# Patient Record
Sex: MALE | Race: WHITE | NOT HISPANIC OR LATINO | Employment: FULL TIME | URBAN - METROPOLITAN AREA
[De-identification: names, ages, dates, MRNs, and addresses within clinical notes are randomized per-mention and may not be internally consistent; named-entity substitution may affect disease eponyms.]

---

## 2017-02-28 ENCOUNTER — APPOINTMENT (OUTPATIENT)
Dept: LAB | Age: 62
End: 2017-02-28
Payer: COMMERCIAL

## 2017-02-28 ENCOUNTER — LAB CONVERSION - ENCOUNTER (OUTPATIENT)
Dept: OTHER | Facility: OTHER | Age: 62
End: 2017-02-28

## 2017-02-28 ENCOUNTER — TRANSCRIBE ORDERS (OUTPATIENT)
Dept: ADMINISTRATIVE | Age: 62
End: 2017-02-28

## 2017-02-28 DIAGNOSIS — IMO0001 UNCONTROLLED DIABETES MELLITUS TYPE 2 WITHOUT COMPLICATIONS, UNSPECIFIED LONG TERM INSULIN USE STATUS: ICD-10-CM

## 2017-02-28 DIAGNOSIS — IMO0001 UNCONTROLLED DIABETES MELLITUS TYPE 2 WITHOUT COMPLICATIONS, UNSPECIFIED LONG TERM INSULIN USE STATUS: Primary | ICD-10-CM

## 2017-02-28 LAB
EST. AVERAGE GLUCOSE BLD GHB EST-MCNC: 192 MG/DL
HBA1C MFR BLD: 8.3 % (ref 4.2–6.3)

## 2017-02-28 PROCEDURE — 83036 HEMOGLOBIN GLYCOSYLATED A1C: CPT

## 2017-02-28 PROCEDURE — 36415 COLL VENOUS BLD VENIPUNCTURE: CPT

## 2017-06-16 ENCOUNTER — TRANSCRIBE ORDERS (OUTPATIENT)
Dept: ADMINISTRATIVE | Facility: HOSPITAL | Age: 62
End: 2017-06-16

## 2017-06-16 ENCOUNTER — APPOINTMENT (OUTPATIENT)
Dept: LAB | Facility: MEDICAL CENTER | Age: 62
End: 2017-06-16
Payer: COMMERCIAL

## 2017-06-16 DIAGNOSIS — E78.5 OTHER AND UNSPECIFIED HYPERLIPIDEMIA: Primary | ICD-10-CM

## 2017-06-16 DIAGNOSIS — IMO0002 UNCONTROLLED TYPE 2 DIABETES MELLITUS WITH COMPLICATION, WITH LONG-TERM CURRENT USE OF INSULIN: ICD-10-CM

## 2017-06-16 DIAGNOSIS — I10 ESSENTIAL HYPERTENSION, BENIGN: ICD-10-CM

## 2017-06-16 LAB
ALBUMIN SERPL BCP-MCNC: 3.8 G/DL (ref 3.5–5)
ALP SERPL-CCNC: 63 U/L (ref 46–116)
ALT SERPL W P-5'-P-CCNC: 38 U/L (ref 12–78)
ANION GAP SERPL CALCULATED.3IONS-SCNC: 10 MMOL/L (ref 4–13)
AST SERPL W P-5'-P-CCNC: 17 U/L (ref 5–45)
BILIRUB SERPL-MCNC: 1.42 MG/DL (ref 0.2–1)
BUN SERPL-MCNC: 16 MG/DL (ref 5–25)
CALCIUM SERPL-MCNC: 9.1 MG/DL (ref 8.3–10.1)
CHLORIDE SERPL-SCNC: 108 MMOL/L (ref 100–108)
CHOLEST SERPL-MCNC: 115 MG/DL (ref 50–200)
CO2 SERPL-SCNC: 25 MMOL/L (ref 21–32)
CREAT SERPL-MCNC: 0.91 MG/DL (ref 0.6–1.3)
CREAT UR-MCNC: 442 MG/DL
EST. AVERAGE GLUCOSE BLD GHB EST-MCNC: 194 MG/DL
GFR SERPL CREATININE-BSD FRML MDRD: >60 ML/MIN/1.73SQ M
GLUCOSE P FAST SERPL-MCNC: 125 MG/DL (ref 65–99)
HBA1C MFR BLD: 8.4 % (ref 4.2–6.3)
HDLC SERPL-MCNC: 47 MG/DL (ref 40–60)
LDLC SERPL CALC-MCNC: 49 MG/DL (ref 0–100)
MICROALBUMIN UR-MCNC: 93.2 MG/L (ref 0–20)
MICROALBUMIN/CREAT 24H UR: 21 MG/G CREATININE (ref 0–30)
POTASSIUM SERPL-SCNC: 4.1 MMOL/L (ref 3.5–5.3)
PROT SERPL-MCNC: 6.6 G/DL (ref 6.4–8.2)
SODIUM SERPL-SCNC: 143 MMOL/L (ref 136–145)
TRIGL SERPL-MCNC: 96 MG/DL

## 2017-06-16 PROCEDURE — 83036 HEMOGLOBIN GLYCOSYLATED A1C: CPT | Performed by: INTERNAL MEDICINE

## 2017-06-16 PROCEDURE — 82570 ASSAY OF URINE CREATININE: CPT | Performed by: INTERNAL MEDICINE

## 2017-06-16 PROCEDURE — 36415 COLL VENOUS BLD VENIPUNCTURE: CPT | Performed by: INTERNAL MEDICINE

## 2017-06-16 PROCEDURE — 80061 LIPID PANEL: CPT | Performed by: INTERNAL MEDICINE

## 2017-06-16 PROCEDURE — 82043 UR ALBUMIN QUANTITATIVE: CPT | Performed by: INTERNAL MEDICINE

## 2017-06-16 PROCEDURE — 80053 COMPREHEN METABOLIC PANEL: CPT | Performed by: INTERNAL MEDICINE

## 2017-06-27 ENCOUNTER — GENERIC CONVERSION - ENCOUNTER (OUTPATIENT)
Dept: OTHER | Facility: OTHER | Age: 62
End: 2017-06-27

## 2017-07-14 ENCOUNTER — GENERIC CONVERSION - ENCOUNTER (OUTPATIENT)
Dept: OTHER | Facility: OTHER | Age: 62
End: 2017-07-14

## 2017-07-18 ENCOUNTER — ALLSCRIPTS OFFICE VISIT (OUTPATIENT)
Dept: OTHER | Facility: OTHER | Age: 62
End: 2017-07-18

## 2017-07-18 DIAGNOSIS — Z12.11 ENCOUNTER FOR SCREENING FOR MALIGNANT NEOPLASM OF COLON: ICD-10-CM

## 2017-07-18 DIAGNOSIS — Z82.49 FAMILY HISTORY OF ISCHEMIC HEART DISEASE AND OTHER DISEASES OF THE CIRCULATORY SYSTEM: ICD-10-CM

## 2017-07-25 ENCOUNTER — HOSPITAL ENCOUNTER (OUTPATIENT)
Dept: RADIOLOGY | Facility: MEDICAL CENTER | Age: 62
Discharge: HOME | End: 2017-07-25
Payer: COMMERCIAL

## 2017-07-25 ENCOUNTER — APPOINTMENT (OUTPATIENT)
Dept: LAB | Facility: MEDICAL CENTER | Age: 62
End: 2017-07-25
Payer: COMMERCIAL

## 2017-07-25 DIAGNOSIS — Z82.49 FAMILY HISTORY OF ISCHEMIC HEART DISEASE AND OTHER DISEASES OF THE CIRCULATORY SYSTEM: ICD-10-CM

## 2017-07-25 DIAGNOSIS — Z12.11 ENCOUNTER FOR SCREENING FOR MALIGNANT NEOPLASM OF COLON: ICD-10-CM

## 2017-07-25 LAB — HEMOCCULT STL QL IA: POSITIVE

## 2017-07-25 PROCEDURE — 76706 US ABDL AORTA SCREEN AAA: CPT

## 2017-07-25 PROCEDURE — G0328 FECAL BLOOD SCRN IMMUNOASSAY: HCPCS

## 2017-08-03 ENCOUNTER — GENERIC CONVERSION - ENCOUNTER (OUTPATIENT)
Dept: OTHER | Facility: OTHER | Age: 62
End: 2017-08-03

## 2017-08-04 ENCOUNTER — GENERIC CONVERSION - ENCOUNTER (OUTPATIENT)
Dept: OTHER | Facility: OTHER | Age: 62
End: 2017-08-04

## 2017-08-07 ENCOUNTER — ANESTHESIA EVENT (OUTPATIENT)
Dept: GASTROENTEROLOGY | Facility: HOSPITAL | Age: 62
End: 2017-08-07
Payer: COMMERCIAL

## 2017-08-08 ENCOUNTER — ANESTHESIA (OUTPATIENT)
Dept: GASTROENTEROLOGY | Facility: HOSPITAL | Age: 62
End: 2017-08-08
Payer: COMMERCIAL

## 2017-08-08 ENCOUNTER — HOSPITAL ENCOUNTER (OUTPATIENT)
Facility: HOSPITAL | Age: 62
Setting detail: OUTPATIENT SURGERY
Discharge: HOME | End: 2017-08-08
Attending: COLON & RECTAL SURGERY | Admitting: COLON & RECTAL SURGERY
Payer: COMMERCIAL

## 2017-08-08 VITALS
DIASTOLIC BLOOD PRESSURE: 51 MMHG | SYSTOLIC BLOOD PRESSURE: 104 MMHG | WEIGHT: 210 LBS | TEMPERATURE: 97.4 F | HEART RATE: 55 BPM | HEIGHT: 73 IN | BODY MASS INDEX: 27.83 KG/M2 | OXYGEN SATURATION: 100 % | RESPIRATION RATE: 17 BRPM

## 2017-08-08 DIAGNOSIS — Z80.0 FAMILY HISTORY OF MALIGNANT NEOPLASM OF DIGESTIVE ORGAN: ICD-10-CM

## 2017-08-08 PROCEDURE — 88305 TISSUE EXAM BY PATHOLOGIST: CPT | Performed by: COLON & RECTAL SURGERY

## 2017-08-08 RX ORDER — SODIUM CHLORIDE 9 MG/ML
INJECTION, SOLUTION INTRAVENOUS CONTINUOUS PRN
Status: DISCONTINUED | OUTPATIENT
Start: 2017-08-08 | End: 2017-08-08 | Stop reason: SURG

## 2017-08-08 RX ORDER — MULTIVIT-MIN/IRON FUM/FOLIC AC 7.5 MG-4
1 TABLET ORAL DAILY
COMMUNITY

## 2017-08-08 RX ORDER — GLIMEPIRIDE 4 MG/1
4 TABLET ORAL
COMMUNITY
End: 2021-02-26

## 2017-08-08 RX ORDER — ATORVASTATIN CALCIUM 10 MG/1
10 TABLET, FILM COATED ORAL DAILY
COMMUNITY
End: 2021-07-16

## 2017-08-08 RX ORDER — FLUTICASONE PROPIONATE 50 MCG
1 SPRAY, SUSPENSION (ML) NASAL DAILY
COMMUNITY

## 2017-08-08 RX ORDER — MAGNESIUM OXIDE/MAG AA CHELATE 133 MG
1 TABLET ORAL 2 TIMES DAILY
COMMUNITY

## 2017-08-08 RX ORDER — PROPOFOL 10 MG/ML
INJECTION, EMULSION INTRAVENOUS AS NEEDED
Status: DISCONTINUED | OUTPATIENT
Start: 2017-08-08 | End: 2017-08-08 | Stop reason: SURG

## 2017-08-08 RX ORDER — LISINOPRIL 5 MG/1
5 TABLET ORAL DAILY
COMMUNITY
End: 2020-02-07

## 2017-08-08 RX ORDER — INSULIN GLARGINE 100 [IU]/ML
32 INJECTION, SOLUTION SUBCUTANEOUS
COMMUNITY
End: 2020-02-07 | Stop reason: ALTCHOICE

## 2017-08-08 RX ORDER — ASPIRIN 81 MG/1
81 TABLET ORAL DAILY
COMMUNITY

## 2017-08-08 RX ORDER — METFORMIN HYDROCHLORIDE 1000 MG/1
1000 TABLET, FILM COATED, EXTENDED RELEASE ORAL 2 TIMES DAILY WITH MEALS
COMMUNITY
End: 2020-02-07

## 2017-08-08 RX ADMIN — PROPOFOL 100 MG: 10 INJECTION, EMULSION INTRAVENOUS at 11:45

## 2017-08-08 RX ADMIN — SODIUM CHLORIDE: 0.9 INJECTION, SOLUTION INTRAVENOUS at 11:28

## 2017-08-08 RX ADMIN — PROPOFOL 100 MG: 10 INJECTION, EMULSION INTRAVENOUS at 11:40

## 2017-08-08 RX ADMIN — PROPOFOL 100 MG: 10 INJECTION, EMULSION INTRAVENOUS at 11:35

## 2017-08-08 RX ADMIN — PROPOFOL 100 MG: 10 INJECTION, EMULSION INTRAVENOUS at 11:50

## 2017-08-08 RX ADMIN — PROPOFOL 100 MG: 10 INJECTION, EMULSION INTRAVENOUS at 11:30

## 2017-08-29 ENCOUNTER — GENERIC CONVERSION - ENCOUNTER (OUTPATIENT)
Dept: OTHER | Facility: OTHER | Age: 62
End: 2017-08-29

## 2017-10-02 ENCOUNTER — GENERIC CONVERSION - ENCOUNTER (OUTPATIENT)
Dept: OTHER | Facility: OTHER | Age: 62
End: 2017-10-02

## 2017-12-05 ENCOUNTER — TRANSCRIBE ORDERS (OUTPATIENT)
Dept: ADMINISTRATIVE | Facility: HOSPITAL | Age: 62
End: 2017-12-05

## 2017-12-05 DIAGNOSIS — S97.82XA CRUSHING INJURY OF LEFT FOOT, INITIAL ENCOUNTER: ICD-10-CM

## 2017-12-05 DIAGNOSIS — M79.672 LEFT FOOT PAIN: Primary | ICD-10-CM

## 2017-12-06 ENCOUNTER — GENERIC CONVERSION - ENCOUNTER (OUTPATIENT)
Dept: OTHER | Facility: OTHER | Age: 62
End: 2017-12-06

## 2017-12-06 ENCOUNTER — HOSPITAL ENCOUNTER (OUTPATIENT)
Dept: MRI IMAGING | Facility: CLINIC | Age: 62
Discharge: HOME | End: 2017-12-06
Payer: COMMERCIAL

## 2017-12-06 DIAGNOSIS — M79.672 LEFT FOOT PAIN: ICD-10-CM

## 2017-12-06 DIAGNOSIS — S97.82XA CRUSHING INJURY OF LEFT FOOT, INITIAL ENCOUNTER: ICD-10-CM

## 2017-12-06 PROCEDURE — 73718 MRI LOWER EXTREMITY W/O DYE: CPT

## 2017-12-06 PROCEDURE — 73721 MRI JNT OF LWR EXTRE W/O DYE: CPT

## 2017-12-07 ENCOUNTER — ALLSCRIPTS OFFICE VISIT (OUTPATIENT)
Dept: OTHER | Facility: OTHER | Age: 62
End: 2017-12-07

## 2017-12-07 ENCOUNTER — ANESTHESIA EVENT (OUTPATIENT)
Dept: PERIOP | Facility: HOSPITAL | Age: 62
End: 2017-12-07
Payer: COMMERCIAL

## 2017-12-08 ENCOUNTER — HOSPITAL ENCOUNTER (OUTPATIENT)
Facility: HOSPITAL | Age: 62
Setting detail: OUTPATIENT SURGERY
Discharge: HOME | End: 2017-12-08
Attending: PODIATRIST | Admitting: PODIATRIST
Payer: COMMERCIAL

## 2017-12-08 ENCOUNTER — GENERIC CONVERSION - ENCOUNTER (OUTPATIENT)
Dept: OTHER | Facility: OTHER | Age: 62
End: 2017-12-08

## 2017-12-08 ENCOUNTER — GENERIC CONVERSION - ENCOUNTER (OUTPATIENT)
Dept: FAMILY MEDICINE CLINIC | Facility: CLINIC | Age: 62
End: 2017-12-08

## 2017-12-08 ENCOUNTER — ANESTHESIA (OUTPATIENT)
Dept: PERIOP | Facility: HOSPITAL | Age: 62
End: 2017-12-08
Payer: COMMERCIAL

## 2017-12-08 ENCOUNTER — APPOINTMENT (OUTPATIENT)
Dept: RADIOLOGY | Facility: HOSPITAL | Age: 62
End: 2017-12-08
Payer: COMMERCIAL

## 2017-12-08 VITALS
RESPIRATION RATE: 18 BRPM | BODY MASS INDEX: 27.83 KG/M2 | SYSTOLIC BLOOD PRESSURE: 138 MMHG | WEIGHT: 210 LBS | OXYGEN SATURATION: 95 % | HEART RATE: 70 BPM | DIASTOLIC BLOOD PRESSURE: 63 MMHG | TEMPERATURE: 97.6 F | HEIGHT: 73 IN

## 2017-12-08 DIAGNOSIS — Z98.890 S/P PERONEAL TENDON REPAIR: Primary | ICD-10-CM

## 2017-12-08 LAB
ATRIAL RATE: 63 BPM
GLUCOSE SERPL-MCNC: 121 MG/DL (ref 65–140)
GLUCOSE SERPL-MCNC: 139 MG/DL (ref 65–140)
P AXIS: 8 DEGREES
PR INTERVAL: 156 MS
QRS AXIS: -25 DEGREES
QRSD INTERVAL: 106 MS
QT INTERVAL: 398 MS
QTC INTERVAL: 401 MS
T WAVE AXIS: 55 DEGREES
VENTRICULAR RATE: 61 BPM

## 2017-12-08 PROCEDURE — 93005 ELECTROCARDIOGRAM TRACING: CPT | Performed by: PODIATRIST

## 2017-12-08 PROCEDURE — C1762 CONN TISS, HUMAN(INC FASCIA): HCPCS | Performed by: PODIATRIST

## 2017-12-08 PROCEDURE — C1781 MESH (IMPLANTABLE): HCPCS | Performed by: PODIATRIST

## 2017-12-08 PROCEDURE — C1713 ANCHOR/SCREW BN/BN,TIS/BN: HCPCS | Performed by: PODIATRIST

## 2017-12-08 PROCEDURE — 82948 REAGENT STRIP/BLOOD GLUCOSE: CPT

## 2017-12-08 DEVICE — TENDON GRACILIS 20CM FROZEN: Type: IMPLANTABLE DEVICE | Site: ANKLE | Status: FUNCTIONAL

## 2017-12-08 DEVICE — GRAFT PROLAYER 4 X 4CM 0.4-1MMTHCK: Type: IMPLANTABLE DEVICE | Site: ANKLE | Status: FUNCTIONAL

## 2017-12-08 DEVICE — SONICANCHOR KIT
Type: IMPLANTABLE DEVICE | Site: ANKLE | Status: FUNCTIONAL
Brand: SONICANCHOR

## 2017-12-08 DEVICE — (32 SQ CM) - ALLOGRAFT TISSUE WRAP DS WET 4 X 8 CM: Type: IMPLANTABLE DEVICE | Site: ANKLE | Status: FUNCTIONAL

## 2017-12-08 RX ORDER — METOCLOPRAMIDE HYDROCHLORIDE 5 MG/ML
10 INJECTION INTRAMUSCULAR; INTRAVENOUS ONCE AS NEEDED
Status: DISCONTINUED | OUTPATIENT
Start: 2017-12-08 | End: 2017-12-08 | Stop reason: HOSPADM

## 2017-12-08 RX ORDER — ROPIVACAINE HYDROCHLORIDE 5 MG/ML
INJECTION, SOLUTION EPIDURAL; INFILTRATION; PERINEURAL AS NEEDED
Status: DISCONTINUED | OUTPATIENT
Start: 2017-12-08 | End: 2017-12-08 | Stop reason: SURG

## 2017-12-08 RX ORDER — MAGNESIUM HYDROXIDE 1200 MG/15ML
LIQUID ORAL AS NEEDED
Status: DISCONTINUED | OUTPATIENT
Start: 2017-12-08 | End: 2017-12-08 | Stop reason: HOSPADM

## 2017-12-08 RX ORDER — OXYCODONE HYDROCHLORIDE 5 MG/1
5 TABLET ORAL EVERY 4 HOURS PRN
Status: DISCONTINUED | OUTPATIENT
Start: 2017-12-08 | End: 2017-12-08 | Stop reason: HOSPADM

## 2017-12-08 RX ORDER — ONDANSETRON 2 MG/ML
4 INJECTION INTRAMUSCULAR; INTRAVENOUS EVERY 6 HOURS PRN
Status: DISCONTINUED | OUTPATIENT
Start: 2017-12-08 | End: 2017-12-08 | Stop reason: HOSPADM

## 2017-12-08 RX ORDER — SODIUM CHLORIDE 9 MG/ML
125 INJECTION, SOLUTION INTRAVENOUS CONTINUOUS
Status: DISCONTINUED | OUTPATIENT
Start: 2017-12-08 | End: 2017-12-08 | Stop reason: HOSPADM

## 2017-12-08 RX ORDER — MIDAZOLAM HYDROCHLORIDE 1 MG/ML
INJECTION INTRAMUSCULAR; INTRAVENOUS AS NEEDED
Status: DISCONTINUED | OUTPATIENT
Start: 2017-12-08 | End: 2017-12-08 | Stop reason: SURG

## 2017-12-08 RX ORDER — FENTANYL CITRATE 50 UG/ML
INJECTION, SOLUTION INTRAMUSCULAR; INTRAVENOUS AS NEEDED
Status: DISCONTINUED | OUTPATIENT
Start: 2017-12-08 | End: 2017-12-08 | Stop reason: SURG

## 2017-12-08 RX ORDER — EPHEDRINE SULFATE 50 MG/ML
INJECTION, SOLUTION INTRAVENOUS AS NEEDED
Status: DISCONTINUED | OUTPATIENT
Start: 2017-12-08 | End: 2017-12-08 | Stop reason: SURG

## 2017-12-08 RX ORDER — OXYCODONE HYDROCHLORIDE 5 MG/1
10 TABLET ORAL EVERY 6 HOURS PRN
Status: DISCONTINUED | OUTPATIENT
Start: 2017-12-08 | End: 2017-12-08 | Stop reason: HOSPADM

## 2017-12-08 RX ORDER — LIDOCAINE HYDROCHLORIDE 10 MG/ML
INJECTION, SOLUTION INFILTRATION; PERINEURAL AS NEEDED
Status: DISCONTINUED | OUTPATIENT
Start: 2017-12-08 | End: 2017-12-08 | Stop reason: SURG

## 2017-12-08 RX ORDER — ACETAMINOPHEN 325 MG/1
650 TABLET ORAL EVERY 4 HOURS PRN
Status: DISCONTINUED | OUTPATIENT
Start: 2017-12-08 | End: 2017-12-08 | Stop reason: HOSPADM

## 2017-12-08 RX ORDER — PROPOFOL 10 MG/ML
INJECTION, EMULSION INTRAVENOUS AS NEEDED
Status: DISCONTINUED | OUTPATIENT
Start: 2017-12-08 | End: 2017-12-08 | Stop reason: SURG

## 2017-12-08 RX ORDER — ONDANSETRON 2 MG/ML
INJECTION INTRAMUSCULAR; INTRAVENOUS AS NEEDED
Status: DISCONTINUED | OUTPATIENT
Start: 2017-12-08 | End: 2017-12-08 | Stop reason: SURG

## 2017-12-08 RX ORDER — FENTANYL CITRATE/PF 50 MCG/ML
25 SYRINGE (ML) INJECTION
Status: DISCONTINUED | OUTPATIENT
Start: 2017-12-08 | End: 2017-12-08 | Stop reason: HOSPADM

## 2017-12-08 RX ORDER — OXYCODONE HYDROCHLORIDE AND ACETAMINOPHEN 5; 325 MG/1; MG/1
1 TABLET ORAL EVERY 4 HOURS PRN
Qty: 40 TABLET | Refills: 0 | Status: SHIPPED | OUTPATIENT
Start: 2017-12-08 | End: 2017-12-18

## 2017-12-08 RX ORDER — ONDANSETRON 2 MG/ML
4 INJECTION INTRAMUSCULAR; INTRAVENOUS ONCE AS NEEDED
Status: DISCONTINUED | OUTPATIENT
Start: 2017-12-08 | End: 2017-12-08 | Stop reason: HOSPADM

## 2017-12-08 RX ORDER — DEXMEDETOMIDINE HYDROCHLORIDE 100 UG/ML
INJECTION, SOLUTION INTRAVENOUS AS NEEDED
Status: DISCONTINUED | OUTPATIENT
Start: 2017-12-08 | End: 2017-12-08 | Stop reason: SURG

## 2017-12-08 RX ADMIN — SODIUM CHLORIDE 125 ML/HR: 0.9 INJECTION, SOLUTION INTRAVENOUS at 07:48

## 2017-12-08 RX ADMIN — ROPIVACAINE HYDROCHLORIDE 30 ML: 5 INJECTION, SOLUTION EPIDURAL; INFILTRATION; PERINEURAL at 10:32

## 2017-12-08 RX ADMIN — SODIUM CHLORIDE: 0.9 INJECTION, SOLUTION INTRAVENOUS at 07:20

## 2017-12-08 RX ADMIN — EPHEDRINE SULFATE 10 MG: 50 INJECTION, SOLUTION INTRAMUSCULAR; INTRAVENOUS; SUBCUTANEOUS at 08:58

## 2017-12-08 RX ADMIN — SODIUM CHLORIDE: 0.9 INJECTION, SOLUTION INTRAVENOUS at 10:12

## 2017-12-08 RX ADMIN — FENTANYL CITRATE 50 MCG: 50 INJECTION INTRAMUSCULAR; INTRAVENOUS at 08:50

## 2017-12-08 RX ADMIN — FENTANYL CITRATE 50 MCG: 50 INJECTION INTRAMUSCULAR; INTRAVENOUS at 08:06

## 2017-12-08 RX ADMIN — MIDAZOLAM HYDROCHLORIDE 2 MG: 1 INJECTION, SOLUTION INTRAMUSCULAR; INTRAVENOUS at 07:56

## 2017-12-08 RX ADMIN — CEFAZOLIN SODIUM 2000 MG: 2 SOLUTION INTRAVENOUS at 08:16

## 2017-12-08 RX ADMIN — FENTANYL CITRATE 50 MCG: 50 INJECTION INTRAMUSCULAR; INTRAVENOUS at 08:20

## 2017-12-08 RX ADMIN — DEXAMETHASONE SODIUM PHOSPHATE 2 MG: 10 INJECTION INTRAMUSCULAR; INTRAVENOUS at 10:32

## 2017-12-08 RX ADMIN — DEXMEDETOMIDINE HYDROCHLORIDE 24 MCG: 100 INJECTION, SOLUTION INTRAVENOUS at 08:50

## 2017-12-08 RX ADMIN — ONDANSETRON 4 MG: 2 INJECTION INTRAMUSCULAR; INTRAVENOUS at 08:10

## 2017-12-08 RX ADMIN — EPHEDRINE SULFATE 10 MG: 50 INJECTION, SOLUTION INTRAMUSCULAR; INTRAVENOUS; SUBCUTANEOUS at 09:12

## 2017-12-08 RX ADMIN — DEXMEDETOMIDINE HYDROCHLORIDE 8 MCG: 100 INJECTION, SOLUTION INTRAVENOUS at 10:12

## 2017-12-08 RX ADMIN — LIDOCAINE HYDROCHLORIDE 75 MG: 10 INJECTION, SOLUTION INFILTRATION; PERINEURAL at 08:02

## 2017-12-08 RX ADMIN — PROPOFOL 200 MG: 10 INJECTION, EMULSION INTRAVENOUS at 08:02

## 2017-12-08 RX ADMIN — DEXAMETHASONE SODIUM PHOSPHATE 5 MG: 10 INJECTION INTRAMUSCULAR; INTRAVENOUS at 08:10

## 2017-12-08 RX ADMIN — FENTANYL CITRATE 50 MCG: 50 INJECTION INTRAMUSCULAR; INTRAVENOUS at 10:01

## 2017-12-08 NOTE — OP NOTE
OPERATIVE REPORT  PATIENT NAME: Tj Marley    :  1955  MRN: [id number]  Pt Location: AN OR ROOM 02    SURGERY DATE: 2017    Surgeon(s) and Role:     * Trent Obregon DPM - Primary     * Belgica Rowna DPM - Assisting    Preop Diagnosis:  Unspecified injury of muscle(s) and tendon(s) of peroneal muscle group at lower leg level, left leg, initial encounter [S86 302A]  Pain of left foot [M79 672]    Post-Op Diagnosis Codes:     * Unspecified injury of muscle(s) and tendon(s) of peroneal muscle group at lower leg level, left leg, initial encounter [S86 302A]     * Pain of left foot [M79 672]    Procedure(s) (LRB):  ANKLE PERONEUS LONGUS TENDON REPAIR with allograft;  RETINACULUM REPAIR SECONDARY  Application of Provena Vac (Left)    Specimen(s):  * No specimens in log *    Estimated Blood Loss:   Minimal    Drains:  Negative Pressure Wound Therapy (V A C ) Other (Comment) Outer (Active)   Other- # applied 1 2017  7:38 AM   Target Pressure (mmHg) 125 2017 10:11 AM   Number of days: 0     Hemostasis:  PNTT @ 300 mmHg for 90 minutes    Materials:  2 "[employer] with Force Fiber #2  1 Prolayer Acellular Dermal Matrix 4x4 cm  1 Allowrap 4x8 cm graft  1 Musculoskeletal [employer] Foundation Gracilis Tendon Cadaver Allograft  3-0 Vicryl  3-0 Ethibond  Surgical Staples  Provena Wound Vac    Anesthesia Type:   General    Injectables:  Popliteal block performed at end of procedure    Operative Indications:  Unspecified injury of muscle(s) and tendon(s) of peroneal muscle group at lower leg level, left leg, initial encounter [S86 302A]  Pain of left foot [M79 672]    Operative Findings:  Complete rupture of the peroneus longus tendon with a greater than 3 cm deficit, as well as a partial thickness peroneus brevis tear not previously seen on MRI  Tenodesis of the 2 tendons as well as repair of the peroneus longus tendon using a cadaveric gracilis tendon allograft    Recreation of the torn superior peroneal retinaculum using Prolayer acellular dermal matrix and Hortencia sonic anchor  Complications:   None    Procedure and Technique:  Under mild sedation the patient was brought into the operating room and placed on the operating table in the supine position  The patient then underwent in duction under and general anesthesia and endotracheal intubation  Then the pneumatic thigh tourniquet was placed around the patient's left thigh with ample Webril padding underneath  The patient was then placed in the lateral decubitus position with the left lateral ankle exposed, and all prominent pressure points appropriately offloaded  The left foot was then scrubbed prepped and draped in the usual aseptic manner  The left lower extremity was then elevated and exsanguinated utilizing Esmarch bandage to the pneumatic thigh tourniquet elevated 300 mm of mercury  The Esmarch bandage was then removed and the left lower extremity was then placed back on the table  Attention was then directed to the patient's lateral ankle where a lateral incision was created down to bone proximally from the area overlying the mid fibula extended distally to the end of the fibula and curved in order to avoid the sural nerve and extended to the base of the 5th metatarsal subcutaneously  The area was then explored with blunt dissection with care to tie off any noted vessels and to retract any vital neurovascular structures  The peroneal tendon sheath was then identified and opened both proximally and distally utilizing Metzenbaum scissors  The peroneus longus and brevis tendons were identified  The longus was noted to have a complete rupture at the distal aspect of the fibula near the lateral malleolus with the and partially retracted proximally  The peroneus longus tendon sheath was explored distally through the inferior peroneal retinaculum down to the level of the cuboid notch    The distal aspect of the tendon was retracted down to the level of the cuboid notch  The peroneus brevis tendon was noted to remain within the fibular groove of the fibula, however a significant partial tear was noted, and the superior peroneal retinaculum was completely torn  At this point due to the significant tear of the peroneus longus tendon as well as the degeneration of the peroneus brevis tendon, it was determined that the patient would require a primary repair of the peroneus longus tendon utilizing cadaver allograft, tenodesis of the peroneus longus to peroneus brevis tendon, and repair of the superior peroneal retinaculum  Attention was then directed to the peroneus longus tendon  The ends of the peroneus longus rupture site were then debrided in order to stimulate he healing  A CT of Sang gracilis allograft was then trimmed to the appropriate length for the repair of the tendon  The crystal is tendon was then sutured to the 2 ends of the peroneus longus rupture utilizing Ethibond suture in a crossing interlocking stitch both ends  It was then noted intraoperatively that this repair was stable  Then a tenodesis of the peroneus longus to the peroneus brevis tendon was performed utilizing the 3-0 Ethibond suture with a combination of interlocking and simple interrupted sutures  Then the Prolayer Acellular Dermal Matrix 4x4 cm was trimmed to size in order for the recreation of the superior peroneal retinaculum  Over half of the graft was used  The graft was then sutured into place utilizing 3-0 Vicryl in a series of simple interrupted sutures with great care to catch both the tendon sheath and the peroneus brevis and peroneus longus tendons  Then the [employer] set was used to drill and insert 2 anchors at the distal tip of the fibula as well as to the superior border of the superior peroneal retinaculum  Both anchors were noted to be secure    The ForceFibers from the anchors were then used to suture through the Prolayer graft to anchor it to the fibula  The Force fiber was then passed through with the 2 tendons out the other side of the ProAir graft into the subcutaneous tissues in order to recreate the superior peroneal retinaculum  The force fibers were then tied tightly down and the repair of the STIR superior peroneal retinaculum was noted to be secure  Then in order to augment the tenodesis, the repair was supplemented with additional simple interrupted sutures through the subcutaneous tissues and the 2 tendons in order to recreate the tendon sheath  At this point the repair was noted to be secure and stable  The area was then flushed with copious amounts of sterile saline  The Allowrap 4x8 cm graft was then cut in half lengthwise and both hat were placed to cover the entirety of the surgical site  The subcutaneous tissues were then reapproximated with 3-0 Vicryl in simple interrupted sutures  The skin was then reapproximated utilizing surgical staples with all skin edges everted  Due to the significant tension at the surgical site was deemed that a probe and a wound VAC would be appropriate to prevent any surgical site dehiscence  The prevent wound VAC was then applied over the surgical site according to manufacture instructions  The pneumatic thigh tourniquet was then deflated after total of 90 minutes and a prompt hyperemic response was noted to the patient's left lower extremity  A slight compression dressing was applied with Webril and Ace wraps  A posterior splint was then applied and secured with Ace wraps with the foot at 90 degrees  Anesthesia then performed a postoperative popliteal block under ultrasound guidance  The patient was then brought out of the lateral decubitus position and placed on to the hospital stretcher in the supine position  He was then awoken from general anesthesia without incident    He was transferred to the postanesthesia care unit where is expected he will be discharged home later today      Patient Disposition:  PACU     SIGNATURE: Aida Olmstead DPM  DATE: December 8, 2017  TIME: 11:00 AM

## 2017-12-08 NOTE — ANESTHESIA PREPROCEDURE EVALUATION
Review of Systems/Medical History  Patient summary reviewed  Chart reviewed  No history of anesthetic complications     Cardiovascular  Negative cardio ROS Hypertension ,    Pulmonary  Negative pulmonary ROS No sleep apnea , ,        GI/Hepatic  Negative GI/hepatic ROS   No GERD ,        Negative  ROS        Endo/Other  Diabetes () well controlled type 2 Insulin,   Comment: Glaucoma    GYN       Hematology  Negative hematology ROS      Musculoskeletal  Negative musculoskeletal ROS        Neurology  Negative neurology ROS      Psychology   Negative psychology ROS          Physical Exam    Airway    Mallampati score: II  TM Distance: >3 FB  Neck ROM: full     Dental   No notable dental hx     Cardiovascular  Comment: Negative ROS,     Pulmonary      Other Findings      Lab Results   Component Value Date    WBC 6 72 08/30/2016    HGB 13 7 08/30/2016     08/30/2016     Lab Results   Component Value Date     06/16/2017    K 4 1 06/16/2017    BUN 16 06/16/2017    CREATININE 0 91 06/16/2017    GLUCOSE 79 08/30/2016     Lab Results   Component Value Date    HGBA1C 8 4 (H) 06/16/2017       Anesthesia Plan  ASA Score- 2       Anesthesia Type- general and regional with ASA Monitors  Additional Monitors:   Airway Plan: LMA  Comment: Popliteal block for post op pain control  Induction- intravenous  Informed Consent- Anesthetic plan and risks discussed with patient and spouse  I personally reviewed this patient with the CRNA  Discussed and agreed on the Anesthesia Plan with the CRNA  Wood Garcia

## 2017-12-08 NOTE — ANESTHESIA PROCEDURE NOTES
Peripheral Block    Patient location during procedure: OR  Start time: 12/8/2017 10:30 AM  Reason for block: at surgeon's request and post-op pain management  Staffing  Anesthesiologist: José Dean  Performed: anesthesiologist   Preanesthetic Checklist  Completed: patient identified, site marked, surgical consent, pre-op evaluation, timeout performed, IV checked, risks and benefits discussed and monitors and equipment checked  Peripheral Block  Patient position: right lateral decubitus  Prep: ChloraPrep  Patient monitoring: heart rate, cardiac monitor, continuous pulse ox and frequent blood pressure checks  Block type: popliteal  Laterality: left  Injection technique: single-shot  Procedures: ultrasound guided  ultrasound permanent image saved    Local infiltration: ropivacaine (with epi 1:400K + dexamethasone PF 2 mg)  Infiltration strength: 0 5 %  Dose: 30 mL  Needle  Needle type: [employer]   Needle gauge: 22 G  Needle length: 10 cm  Needle localization: ultrasound guidance  Assessment  Injection assessment: incremental injection, local visualized surrounding nerve on ultrasound and negative aspiration for heme  Heart rate change: no  Slow fractionated injection: yes  Post-procedure:  site cleaned  patient tolerated the procedure well with no immediate complications

## 2017-12-08 NOTE — ANESTHESIA POSTPROCEDURE EVALUATION
Post-Op Assessment Note      CV Status:  Stable    Mental Status:  Alert and awake    Hydration Status:  Euvolemic    PONV Controlled:  Controlled    Airway Patency:  Patent    Post Op Vitals Reviewed: Yes          Staff: CRNA       Comments: vss sv nonobstructed uneventful           BP   134/61   Temp 98 8   Pulse 73   Resp 18   SpO2 99

## 2017-12-08 NOTE — DISCHARGE INSTRUCTIONS
Dr Vergara Sers Instructions    1  Take your prescribed medication as directed  2  Upon arrival at home, lie down and elevate your surgical foot on 2 pillows  3  Remain quiet, off your feet as much as possible, for the first 24-48 hours  This is when your feet first swell and may become painful  After 48 hours you may begin limited walking following these restrictions:   Nonweightbearing to the left lower extremity with crutches, walker, or knee scooter  4  Drink large quantities of water and citrus fruit juice  Consume no alcohol  Continue a well-balanced diet  5  Report any unusual discomfort or fever to this office  6  A limited amount of discomfort and swelling is to be expected  In some cases the skin may take on a bruised appearance  The surgical solution that was applied to your foot prior to the operation is dark in color and the operation site may appear to be oozing when it actually is not  7  A slight amount of blood is to be expected, and is no cause for alarm  Do not remove the dressings  If there is active bleeding and if the bleeding persists, add additional gauze to the bandage, apply direct pressure, elevate your feet and call this office  8  Do not get the dressings wet  As regular bathing may be inconvenient, sponge baths are recommended  9  When anesthesia wears off and if any discomfort should be present, apply an ice pack directly behind the knee for 15 minute intervals for several hours or until the pain leaves  (USE IN EXCESS OF 15 MINUTES COULD CAUSE FROSTBITE)  Do not use hot water bags or electric pads  A convenient icepack can be made by placing ice cubes in a plastic bag and covering this with a towel  10  If necessary, take a mild laxative before retiring  11  Having performed the operation, we are interested in a prompt recovery  Please cooperate by following the above instructions    12  Please call to confirm your post-op appointment or call with any other questions  13  Please take 1 Xarelto daily for the prevention of blood clots

## 2017-12-08 NOTE — PROGRESS NOTES
Patient reports mild cramping in the right calf  Patient states that it "feels better" when dorsiflexing the right ankle  Spoke to Dr Josie Levine, no further intervention at this time  Most likely due to position on the operating room table  In addition, Dr Josie Levine stated that patient to resume diabetic medications on his routine home schedule

## 2017-12-08 NOTE — PROGRESS NOTES
Assessment    1  Preoperative clearance (V72 84) (Z01 818)   2  Ankle pain, left (719 47) (M25 572)    Plan  Abnormal bilirubin test    · Milk Thistle 150 MG Oral Capsule; TAKE AS DIRECTED  Ankle pain, left, Vitamin D deficiency    · Vitamin D3 5000 UNIT Oral Tablet; Take 1 tablet daily    Discussion/Summary  Surgical clearance faxed to Dr Nathan Elizabeth   Pt cleared, chronic conditions stable  pt to f/u with our office after the surgery to manage DM and HTN  pt to see surgeon later today as planned where they will do an EKG  Possible side effects of new medications were reviewed with the patient/guardian today  The treatment plan was reviewed with the patient/guardian  The patient/guardian understands and agrees with the treatment plan      Chief Complaint  patient presented here for medical clearance for left foot surgery on 12/08      History of Present Illness  Pre-Op Visit (Brief): The patient is being seen for a preoperative visit  Surgical Risk Assessment:  Prior Anesthesia: He had prior anesthesia-- and-- no prior adverse reaction to general anesthesia  Exercise Capacity: able to walk four blocks without symptoms-- and-- able to walk two flights of stairs without symptoms  Lifestyle Factors: denies alcohol use and denies tobacco use  Symptoms: no symptoms  Living Situation: home is secure and supportive and no post-op concerns with his living situation  HPI: having L ankle/foot surgery tomorrow  injured on 12/2 while walking in the woods  stopped his meds as of last night per surgeon's instructions  sugar was 80 yesterday am and 120 this am fasting, sugar has not been higher than 160 in the last few months  pt sees the surgeon later today  otherwise feeling well  no pre-op labs were ordered  pt is scheduled to have an EKG this afternoon as per surgeon  Active Problems  1  Abnormal bilirubin test (277 4) (E80 6)   2  Acid reflux disease (530 81) (K21 9)   3  Arthritis (716 90) (M19 90)   4   Balance problem (781 99) (R26 89)   5  Benign essential HTN (401 1) (I10)   6  Encounter for prostate cancer screening (V76 44) (Z12 5)   7  Encounter for screening for malignant neoplasm of colon (V76 51) (Z12 11)   8  Flu vaccine need (V04 81) (Z23)   9  Gilbert's syndrome (277 4) (E80 4)   10  Hyperlipidemia (272 4) (E78 5)   11  Male erectile dysfunction (607 84) (N52 9)   12  Need for pneumococcal vaccination (V03 82) (Z23)   13  Need for prophylactic vaccination and inoculation against influenza (V04 81) (Z23)   14  Type 2 diabetes mellitus with hyperglycemia (250 00) (E11 65)   15  Vitamin D deficiency (268 9) (E55 9)    Past Medical History   · History of Fecal occult blood test positive (792 1) (R19 5)   · History of chest pain (V13 89) (H66 076)   · History of nasal polyp (V13 89) (Z87 09)   · History of Melanoma of ear, left (172 2) (C43 22)   · Need for pneumococcal vaccination (V03 82) (Z23)   · Need for prophylactic vaccination and inoculation against influenza (V04 81) (Z23)    The active problems and past medical history were reviewed and updated today  Surgical History     · History of Nasal Endoscopy Polypectomy   · History of Revision Of Total Knee Arthroplasty    Family History  Mother    · Family history of cardiac disorder (V17 49) (Z82 49)   · Family history of diabetes mellitus (V18 0) (Z83 3)   · Family history of Melanoma  Father    · Family history of abdominal aortic aneurysm (AAA) (V17 49) (Z82 49)   · Family history of malignant neoplasm of prostate (V16 42) (Z80 42)   · Family history of Malignant neoplasm of colon, unspecified part of colon   · Family history of Malignant neoplasm of sigmoid colon    The family history was reviewed and updated today  Social History     · Being A Social Drinker  The social history was reviewed and updated today  Current Meds   1  Accu-Chek Compact Plus In Vitro Strip; USE 1 STRIP 4 TIMES DAILY;  Therapy: 76FBZ7334 to (Evaluate:08Dud6168) Requested for: 37LVW1127; Last Rx:06Gqn2999 Ordered   2  Accu-Chek Compact Test Drum STRP; USE 1 STRIP 4 TIMES DAILY; Therapy: 65Dvv7484 to (Evaluate:66Crm8315)  Requested for: 33Coa3829; Last Rx:42Eam2991 Ordered   3  Aspirin 81 MG TABS; Therapy: (Per Bull) to Recorded   4  Atorvastatin Calcium 10 MG Oral Tablet; Therapy: 47KUR9971 to Recorded   5  Flonase 50 MCG/ACT SUSP; Therapy: (Recorded:07Ayl7577) to Recorded   6  Glimepiride 4 MG Oral Tablet; take 1 tablet daily with breakfast; Therapy: 17KMR8736 to (Miroslava Jain)  Requested for: 02EER9657; Last ON:17BXG0796 Ordered   7  Lantus 100 UNIT/ML Subcutaneous Solution; INJECT 32 UNIT Daily; Therapy: 36XHU0272 to (Last Rx:66Ccz2607) Ordered   8  Lisinopril 5 MG Oral Tablet; take 1 tablet by mouth every day; Therapy: 62RBO8013 to (Evaluate:10Maf7180)  Requested for: 53RBG3013; Last Rx:45Bho6815 Ordered   9  Mens 50+ Multi Vitamin/Min Oral Tablet; Therapy: (Per Bull) to Recorded   10  MetFORMIN HCl - 1000 MG Oral Tablet; TAKE 1 TABLET TWICE DAILY; Therapy: 59RLX6589 to (Dominga Lis)  Requested for: 02IDO6227; Last  Rx:31Dwm8637 Ordered    The medication list was reviewed and updated today  Allergies  1  No Known Drug Allergies    Vitals   Recorded: 57AIS9534 10:53AM   Temperature 97 6 F, Tympanic   Heart Rate 70   Pulse Quality Normal   Respiration Quality Normal   Respiration 16   Systolic 957, LUE, Sitting   Diastolic 76, LUE, Sitting   Height 5 ft 9 in   Weight 215 lb 8 oz   BMI Calculated 31 82   BSA Calculated 2 13   O2 Saturation 98       Physical Exam   Constitutional  General appearance: No acute distress, well appearing and well nourished  Pulmonary  Respiratory effort: No increased work of breathing or signs of respiratory distress  Auscultation of lungs: Clear to auscultation  Cardiovascular  Auscultation of heart: Normal rate and rhythm, normal S1 and S2, no murmurs     Examination of extremities for edema and/or varicosities: Normal    Abdomen  Abdomen: Non-tender, no masses  Liver and spleen: No hepatomegaly or splenomegaly  Musculoskeletal  Gait and station: Abnormal  -- using crutches, L ankle brace  Neurologic  Cortical function: Normal mental status  Psychiatric  Judgment and insight: Normal    Orientation to person, place and time: Normal    Recent and remote memory: Intact  Mood and affect: Normal        End of Encounter Meds    1  Milk Thistle 150 MG Oral Capsule; TAKE AS DIRECTED; Therapy: 93IKW8894 to (Last Rx:07Dec2017) Ordered    2  Vitamin D3 5000 UNIT Oral Tablet; Take 1 tablet daily; Therapy: 61QLL0933 to (Last Rx:07Dec2017) Ordered    3  Lisinopril 5 MG Oral Tablet; take 1 tablet by mouth every day; Therapy: 79PON1234 to (Evaluate:11Jun2015)  Requested for: 38IEM7688; Last Rx:61Xpj3587 Ordered    4  Atorvastatin Calcium 10 MG Oral Tablet; Therapy: 15UIT5247 to Recorded    5  Accu-Chek Compact Plus In Vitro Strip; USE 1 STRIP 4 TIMES DAILY; Therapy: 64JDT4312 to (Rambo Tinoco)  Requested for: 055 813 52 47; Last Rx:42Atk9091 Ordered   6  Glimepiride 4 MG Oral Tablet; take 1 tablet daily with breakfast; Therapy: 20XEB0776 to (Hank Mckeon)  Requested for: 76OMX9725; Last AU:34CHO7148 Ordered   7  Lantus 100 UNIT/ML Subcutaneous Solution; INJECT 32 UNIT Daily; Therapy: 35ORT9729 to (Last Rx:26Apr2016) Ordered   8  MetFORMIN HCl - 1000 MG Oral Tablet; TAKE 1 TABLET TWICE DAILY; Therapy: 95IQP7557 to (Renato Crooknegro)  Requested for: 85IMI1478; Last Rx:30Mar2015 Ordered    9  Accu-Chek Compact Test Drum STRP; USE 1 STRIP 4 TIMES DAILY; Therapy: 24Orq6001 to (Evaluate:52Xil8608)  Requested for: 57Bul1357; Last Rx:17Roq2013 Ordered   10  Aspirin 81 MG TABS; Therapy: (Gary Norris) to Recorded   11  Flonase 50 MCG/ACT SUSP (Fluticasone Propionate); Therapy: (Recorded:82Dts7202) to Recorded   12  Mens 50+ Multi Vitamin/Min Oral Tablet;   Therapy: (Redia Jr) to Recorded    Signatures   Electronically signed by : CATALINA Lutz ; Dec  7 2017 11:21AM EST                       (Author)

## 2017-12-08 NOTE — DISCHARGE SUMMARY
Discharge Summary Outpatient Procedure Podiatry- Trina Astorga 60 y o  male MRN: [id number]    Unit/Bed#: OR POOL Encounter: [id number]    Admission Date: 12/8/2017     Admitting Diagnosis: Unspecified injury of muscle(s) and tendon(s) of peroneal muscle group at lower leg level, left leg, initial encounter [S86 302A]  Pain of left foot [M28 562]    Discharge Diagnosis: same    Procedures Performed: ANKLE PERONEUS LONGUS TENDON REPAIR with allograft;  RETINACULUM REPAIR SECONDARY  Application of Provena Vac: 95396 (CPT®)    Complications: none    Condition at Discharge: stable    Discharge instructions/Information to patient and family:   See after visit summary for information provided to patient and family  Provisions for Follow-Up Care/Important appointments:  See after visit summary for information related to follow-up care and any pertinent home health orders  Discharge Medications:  See after visit summary for reconciled discharge medications provided to patient and family

## 2017-12-08 NOTE — OR NURSING
Popliteal block was done after prevena vac was placed and prior to dressing the leg with webril, ace, splint, ace

## 2017-12-12 ENCOUNTER — GENERIC CONVERSION - ENCOUNTER (OUTPATIENT)
Dept: OTHER | Facility: OTHER | Age: 62
End: 2017-12-12

## 2017-12-12 ENCOUNTER — GENERIC CONVERSION - ENCOUNTER (OUTPATIENT)
Dept: FAMILY MEDICINE CLINIC | Facility: CLINIC | Age: 62
End: 2017-12-12

## 2018-01-11 NOTE — RESULT NOTES
Verified Results  (1) CBC/PLT/DIFF 23Tuv9795 10:44AM [facility] Order Number: [id number]     Test Name Result Flag Reference   WBC COUNT 6 72 Thousand/uL  4 31-10 16   RBC COUNT 4 52 Million/uL  3 88-5 62   HEMOGLOBIN 13 7 g/dL  12 0-17 0   HEMATOCRIT 40 0 %  36 5-49 3   MCV 89 fL  82-98   MCH 30 3 pg  26 8-34 3   MCHC 34 3 g/dL  31 4-37 4   RDW 12 6 %  11 6-15 1   MPV 10 7 fL  8 9-12 7   PLATELET COUNT 415 Thousands/uL  149-390   nRBC AUTOMATED 0 /100 WBCs     NEUTROPHILS RELATIVE PERCENT 54 %  43-75   LYMPHOCYTES RELATIVE PERCENT 35 %  14-44   MONOCYTES RELATIVE PERCENT 7 %  4-12   EOSINOPHILS RELATIVE PERCENT 3 %  0-6   BASOPHILS RELATIVE PERCENT 1 %  0-1   NEUTROPHILS ABSOLUTE COUNT 3 69 Thousands/?L  1 85-7 62   LYMPHOCYTES ABSOLUTE COUNT 2 35 Thousands/?L  0 60-4 47   MONOCYTES ABSOLUTE COUNT 0 45 Thousand/?L  0 17-1 22   EOSINOPHILS ABSOLUTE COUNT 0 17 Thousand/?L  0 00-0 61   BASOPHILS ABSOLUTE COUNT 0 05 Thousands/?L  0 00-0 10     (1) COMPREHENSIVE METABOLIC PANEL [id number] 11:26ME [facility] Order Number: [id number]     Test Name Result Flag Reference   GLUCOSE,RANDM 79 mg/dL     If the patient is fasting, the ADA then defines impaired fasting glucose as > 100 mg/dL and diabetes as > or equal to 123 mg/dL     SODIUM 141 mmol/L  136-145   POTASSIUM 4 4 mmol/L  3 5-5 3   CHLORIDE 108 mmol/L  100-108   CARBON DIOXIDE 26 mmol/L  21-32   ANION GAP (CALC) 7 mmol/L  4-13   BLOOD UREA NITROGEN 17 mg/dL  5-25   CREATININE 0 94 mg/dL  0 60-1 30   Standardized to IDMS reference method   CALCIUM 8 4 mg/dL  8 3-10 1   BILI, TOTAL 1 16 mg/dL H 0 20-1 00   ALK PHOSPHATAS 60 U/L     ALT (SGPT) 31 U/L  12-78   AST(SGOT) 13 U/L  5-45   ALBUMIN 3 8 g/dL  3 5-5 0   TOTAL PROTEIN 6 7 g/dL  6 4-8 2   eGFR Non-African American      >60 0 ml/min/1 73sq m   [employer] Disease Education Program recommendations are as follows:  GFR calculation is accurate only with a steady state creatinine  Chronic Kidney disease less than 60 ml/min/1 73 sq  meters  Kidney failure less than 15 ml/min/1 73 sq  meters  (1) HEMOGLOBIN A1C 28Kth1332 10:44AM [facility] Order Number: [id number]     Test Name Result Flag Reference   HEMOGLOBIN A1C 7 9 % H 4 2-6 3   EST  AVG  GLUCOSE 180 mg/dl       (1) PSA (SCREEN) (Dx V76 44 Screen for Prostate Cancer) 37Dxq9881 10:44AM [facility] Order Number: [id number]     Test Name Result Flag Reference   PROSTATE SPECIFIC ANTIGEN 0 6 ng/mL  0 0-4 0     (1) VITAMIN D 25-HYDROXY 41Zaf9598 10:44AM [facility] Order Number: [id number]     Test Name Result Flag Reference   VIT D 25-HYDROX 44 3 ng/mL  30 0-100 0   This assay is a certified procedure of the CDC Vitamin D Standardization Certification Program (VDSCP)     Deficiency <20ng/ml   Insufficiency 20-30ng/ml   Sufficient  ng/ml     *Patients undergoing fluorescein dye angiography may retain small amounts of fluorescein in the body for 48-72 hours post procedure  Samples containing fluorescein can produce falsely elevated Vitamin D values  If the patient had this procedure, a specimen should be resubmitted post fluorescein clearance

## 2018-01-11 NOTE — PROGRESS NOTES
Assessment    1  Benign essential HTN (401 1) (I10)   2  Hyperlipidemia (272 4) (E78 5)   3  Type 2 diabetes mellitus with hyperglycemia (250 00) (E11 65)    Discussion/Summary    Patient follow up hospitalization chest pain  Patient nnow in srable health  All vital signs normal       Chief Complaint  patient presented here for hospital follow up      History of Present Illness  Patient foir follow up hopitalization for chest pain  Review of Systems    Constitutional: No fever or chills, feels well, no tiredness, no recent weight gain or weight loss  Eyes: No complaints of eye pain, no red eyes, no discharge from eyes, no itchy eyes  ENT: no complaints of earache, no hearing loss, no nosebleeds, no nasal discharge, no sore throat, no hoarseness  Cardiovascular: No complaints of slow heart rate, no fast heart rate, no chest pain, no palpitations, no leg claudication, no lower extremity  Respiratory: No complaints of shortness of breath, no wheezing, no cough, no SOB on exertion, no orthopnea or PND  Gastrointestinal: No complaints of abdominal pain, no constipation, no nausea or vomiting, no diarrhea or bloody stools  Genitourinary: No complaints of dysuria, no incontinence, no hesitancy, no nocturia, no genital lesion, no testicular pain  Musculoskeletal: No complaints of arthralgia, no myalgias, no joint swelling or stiffness, no limb pain or swelling  Integumentary: No complaints of skin rash or skin lesions, no itching, no skin wound, no dry skin  Neurological: No compliants of headache, no confusion, no convulsions, no numbness or tingling, no dizziness or fainting, no limb weakness, no difficulty walking  Psychiatric: Is not suicidal, no sleep disturbances, no anxiety or depression, no change in personality, no emotional problems  Endocrine: No complaints of proptosis, no hot flashes, no muscle weakness, no erectile dysfunction, no deepening of the voice, no feelings of weakness  Hematologic/Lymphatic: No complaints of swollen glands, no swollen glands in the neck, does not bleed easily, no easy bruising  Active Problems    1  Abdominal pain, RLQ (right lower quadrant) (789 03) (R10 31)   2  Abdominal pain, RUQ (789 01) (R10 11)   3  Arthritis (716 90) (M19 90)   4  Balance problem (781 99) (R26 89)   5  Benign essential HTN (401 1) (I10)   6  Chest pain (786 50) (R07 9)   7  Hyperlipidemia (272 4) (E78 5)   8  Male erectile dysfunction (607 84) (N52 9)   9  Nasal polyp (471 9) (J33 9)   10  Need for pneumococcal vaccination (V03 82) (Z23)   11  Need for prophylactic vaccination and inoculation against influenza (V04 81) (Z23)   12  Type 2 diabetes mellitus with hyperglycemia (250 00) (E11 65)   13  Vitamin D deficiency (268 9) (E55 9)    Past Medical History    1  History of nasal polyp (V13 89) (Z87 09)   2  Need for pneumococcal vaccination (V03 82) (Z23)   3  Need for prophylactic vaccination and inoculation against influenza (V04 81) (Z23)    Surgical History    1  History of Nasal Endoscopy Polypectomy   2  History of Revision Of Total Knee Arthroplasty    Family History    1  Family history of cardiac disorder (V17 49) (Z82 49)   2  Family history of diabetes mellitus (V18 0) (Z83 3)   3  Family history of Melanoma    4  Family history of malignant neoplasm of prostate (V16 42) (Z80 42)   5  Family history of Malignant neoplasm of colon, unspecified part of colon   6  Family history of Malignant neoplasm of sigmoid colon    Social History    · Being A Social Drinker   · Former smoker (W47 50) (E80 132)    Current Meds   1  Accu-Chek Compact Plus In Vitro Strip; USE 1 STRIP 4 TIMES DAILY; Therapy: 74STU9479 to (Perez Lyman)  Requested for: 055 813 52 47; Last   Rx:67Ozs9968 Ordered   2  Accu-Chek [employer] In Vitro Strip; USE 1 STRIP 4 TIMES DAILY; Therapy: 95Uiz7452 to (Evaluate:71Llk0314)  Requested for: 70Dlz2076; Last   Rx:97Urd2702 Ordered   3   Aspirin 81 MG Oral Tablet; Therapy: (Ramón Dukes) to Recorded   4  Atorvastatin Calcium 10 MG Oral Tablet; Therapy: 15WOV0342 to Recorded   5  Dexilant 60 MG Oral Capsule Delayed Release; Therapy: (Nikki Pelletier) to Recorded   6  DHEA TABS; Therapy: (Ramón Dukes) to Recorded   7  Flonase 50 MCG/ACT SUSP; Therapy: (Recorded:30Tsi8353) to Recorded   8  Glimepiride 4 MG Oral Tablet; take 1 tablet daily with breakfast;   Therapy: 85YBB9607 to (Jailene Thompson)  Requested for: 50YSI8586; Last   QS:51HNC4973 Ordered   9  Lantus 100 UNIT/ML Subcutaneous Solution; INJECT 28 UNIT Daily; Therapy: 87LOZ0897 to Recorded   10  Lisinopril 5 MG Oral Tablet; take 1 tablet by mouth every day; Therapy: 02MES9820 to (Evaluate:11Jun2015)  Requested for: 29ZKE9400; Last    Rx:76Gdt5076 Ordered   11  Mens 50+ Multi Vitamin/Min Oral Tablet; Therapy: (Ramón Dukes) to Recorded   12  MetFORMIN HCl - 1000 MG Oral Tablet; TAKE 1 TABLET TWICE DAILY; Therapy: 50BPK1085 to (Medardo Bowman)  Requested for: 65YAZ7302; Last    Rx:30Mar2015 Ordered    Allergies    1  No Known Drug Allergies    Vitals  Vital Signs [Data Includes: Current Encounter]    Recorded: 85YZU9712 11:13AM   Temperature 98 4 F, Tympanic   Heart Rate 64   Pulse Quality Normal   Respiration 16   Respiration Quality Normal   Systolic 453, LUE, Sitting   Diastolic 72, LUE, Sitting   Height 5 ft 9 in   Weight 215 lb 8 oz   BMI Calculated 31 82   BSA Calculated 2 13   O2 Saturation 98     Physical Exam    Constitutional   General appearance: No acute distress, well appearing and well nourished  Eyes   Conjunctiva and lids: No swelling, erythema, or discharge  Pupils and irises: Equal, round and reactive to light  Ears, Nose, Mouth, and Throat   External inspection of ears and nose: Normal     Otoscopic examination: Tympanic membrance translucent with normal light reflex  Canals patent without erythema      Nasal mucosa, septum, and turbinates: Normal without edema or erythema  Oropharynx: Normal with no erythema, edema, exudate or lesions  Pulmonary   Respiratory effort: No increased work of breathing or signs of respiratory distress  Auscultation of lungs: Clear to auscultation, equal breath sounds bilaterally, no wheezes, no rales, no rhonci  Cardiovascular   Palpation of heart: Normal PMI, no thrills  Auscultation of heart: Normal rate and rhythm, normal S1 and S2, without murmurs  Examination of extremities for edema and/or varicosities: Normal     Carotid pulses: Normal     Abdomen   Abdomen: Non-tender, no masses  Liver and spleen: No hepatomegaly or splenomegaly  Lymphatic   Palpation of lymph nodes in neck: No lymphadenopathy  Musculoskeletal   Gait and station: Normal     Digits and nails: Normal without clubbing or cyanosis  Inspection/palpation of joints, bones, and muscles: Normal     Skin   Skin and subcutaneous tissue: Normal without rashes or lesions  Neurologic   Cranial nerves: Cranial nerves 2-12 intact  Reflexes: 2+ and symmetric  Sensation: No sensory loss  Psychiatric   Orientation to person, place and time: Normal     Mood and affect: Normal          Signatures   Electronically signed by :  CATALINA Okeefe ; Jan 26 2016 12:26PM EST                       (Author)

## 2018-01-12 VITALS
OXYGEN SATURATION: 97 % | BODY MASS INDEX: 31.96 KG/M2 | HEIGHT: 69 IN | HEART RATE: 81 BPM | RESPIRATION RATE: 16 BRPM | DIASTOLIC BLOOD PRESSURE: 70 MMHG | WEIGHT: 215.8 LBS | TEMPERATURE: 97.5 F | SYSTOLIC BLOOD PRESSURE: 124 MMHG

## 2018-01-16 NOTE — PROGRESS NOTES
History of Present Illness  Care Coordination Encounter Information:   Type of Encounter: Telephonic   Contact: Initial Contact        Care Coordination [facility]:   The reason for call is to discuss outreach for follow up/needed services  Called patient to assess any needs or concerns  Three unsuccessful attempts made to contact patient  Left messages with each attempt to return call  Active Problems    1  Abnormal bilirubin test (277 4) (E80 6)   2  Acid reflux (530 81) (K21 9)   3  Arthritis (716 90) (M19 90)   4  Balance problem (781 99) (R26 89)   5  Benign essential HTN (401 1) (I10)   6  Encounter for prostate cancer screening (V76 44) (Z12 5)   7  Encounter for screening for malignant neoplasm of colon (V76 51) (Z12 11)   8  Flu vaccine need (V04 81) (Z23)   9  Hyperlipidemia (272 4) (E78 5)   10  Male erectile dysfunction (607 84) (N52 9)   11  Need for pneumococcal vaccination (V03 82) (Z23)   12  Need for prophylactic vaccination and inoculation against influenza (V04 81) (Z23)   13  Type 2 diabetes mellitus with hyperglycemia (250 00) (E11 65)   14  Vitamin D deficiency (268 9) (E55 9)    Past Medical History    1  History of Abdominal pain, RLQ (right lower quadrant) (789 03) (R10 31)   2  History of Abdominal pain, RUQ (789 01) (R10 11)   3  History of chest pain (V13 89) (Z87 898)   4  History of nasal polyp (V13 89) (Z87 09)   5  History of nasal polyp (V13 89) (Z87 09)   6  Need for pneumococcal vaccination (V03 82) (Z23)   7  Need for prophylactic vaccination and inoculation against influenza (V04 81) (Z23)    Surgical History    1  History of Nasal Endoscopy Polypectomy   2  History of Revision Of Total Knee Arthroplasty    Family History  Mother    1  Family history of cardiac disorder (V17 49) (Z82 49)   2  Family history of diabetes mellitus (V18 0) (Z83 3)   3  Family history of Melanoma  Father    4  Family history of malignant neoplasm of prostate (V16 42) (Z80 42)   5   Family history of Malignant neoplasm of colon, unspecified part of colon   6  Family history of Malignant neoplasm of sigmoid colon    Social History    · Being A Social Drinker   · History of Former smoker (V15 82) (M17 346)    Current Meds    1  Dexilant 60 MG Oral Capsule Delayed Release; TAKE 1 CAPSULE DAILY EVERY   MORNING BEFORE BREAKFAST  Requested for: 24RZA7760; Last Rx:52Gjy6979   Ordered    2  Lisinopril 5 MG Oral Tablet; take 1 tablet by mouth every day; Therapy: 37QDF9369 to (Evaluate:11Jun2015)  Requested for: 35YVI1720; Last   Rx:43Tkj9725 Ordered    3  Atorvastatin Calcium 10 MG Oral Tablet; Therapy: 92FMY5903 to Recorded    4  Accu-Chek Compact Plus In Vitro Strip; USE 1 STRIP 4 TIMES DAILY; Therapy: 18DYG6234 to (Kina Coon)  Requested for: 055 813 52 47; Last   Rx:51Xuu0355 Ordered   5  Glimepiride 4 MG Oral Tablet; take 1 tablet daily with breakfast;   Therapy: 38IHD1027 to (Marni Steinberg)  Requested for: 76GLZ8645; Last   TV:73SXJ8122 Ordered   6  Lantus 100 UNIT/ML Subcutaneous Solution; INJECT 32 UNIT Daily; Therapy: 88YZI6392 to (Last Rx:98Xth4986) Ordered   7  MetFORMIN HCl - 1000 MG Oral Tablet; TAKE 1 TABLET TWICE DAILY; Therapy: 78IUH0834 to (Keara Lozano)  Requested for: 88IES9871; Last   Rx:75Gfi3817 Ordered    8  Accu-Chek Compact Test Drum STRP; USE 1 STRIP 4 TIMES DAILY; Therapy: 73Qhs3741 to (Evaluate:67Gra9819)  Requested for: 83Gfg9184; Last   Rx:02Hkm1132 Ordered   9  Aspirin 81 MG TABS; Therapy: (Mayank Cary) to Recorded   10  DHEA TABS; Therapy: (Mayank Cary) to Recorded   11  Flonase 50 MCG/ACT SUSP (Fluticasone Propionate); Therapy: (Recorded:16Xhp7795) to Recorded   12  Mens 50+ Multi Vitamin/Min Oral Tablet; Therapy: (Recorded:09Vvy0869) to Recorded    Allergies    1  No Known Drug Allergies    End of Encounter Meds    1   Dexilant 60 MG Oral Capsule Delayed Release; TAKE 1 CAPSULE DAILY EVERY   MORNING BEFORE BREAKFAST  Requested for: 62WKO8801; Last Rx:95Kag5471   Ordered    2  Lisinopril 5 MG Oral Tablet; take 1 tablet by mouth every day; Therapy: 73BTI1139 to (Evaluate:11Jun2015)  Requested for: 60MZW1961; Last   Rx:58Ity6576 Ordered    3  Atorvastatin Calcium 10 MG Oral Tablet; Therapy: 89TGU7127 to Recorded    4  Accu-Chek Compact Plus In Vitro Strip; USE 1 STRIP 4 TIMES DAILY; Therapy: 19FOE6787 to (Chante Ellis)  Requested for: 055 813 52 47; Last   Rx:82Tmk3363 Ordered   5  Glimepiride 4 MG Oral Tablet; take 1 tablet daily with breakfast;   Therapy: 81FAN6156 to (Lisa Pennington)  Requested for: 30ZIN7529; Last   ZR:93PGF3448 Ordered   6  Lantus 100 UNIT/ML Subcutaneous Solution; INJECT 32 UNIT Daily; Therapy: 72RBN7577 to (Last Rx:51Ler0655) Ordered   7  MetFORMIN HCl - 1000 MG Oral Tablet; TAKE 1 TABLET TWICE DAILY; Therapy: 22UMF8105 to (Evorn Human)  Requested for: 44MIJ8414; Last   Rx:30Mar2015 Ordered    8  Accu-Chek Compact Test Drum STRP; USE 1 STRIP 4 TIMES DAILY; Therapy: 62Xbx3998 to (Evaluate:58Tmq4595)  Requested for: 17Pja6370; Last   Rx:81Xkt3191 Ordered   9  Aspirin 81 MG TABS; Therapy: (Deronda Matsu) to Recorded   10  DHEA TABS; Therapy: (Deronda Matsu) to Recorded   11  Flonase 50 MCG/ACT SUSP (Fluticasone Propionate); Therapy: (Recorded:41Egb0900) to Recorded   12  Mens 50+ Multi Vitamin/Min Oral Tablet; Therapy: (Deronda Matsu) to Recorded    Future Appointments    Date/Time Provider Specialty Site   07/18/2017 02:30 PM Tanika Chavira [facility]     Message   Recorded as Task   Date: 06/29/2017 10:28 AM, Created By: Kinjal Meléndez   Task Name: Call Back   Assigned To: Kinjal Meléndez   Regarding Patient: Alee Rawls, Status: In Progress   CommentGinger Yao - 29 Jun 2017 10:28 AM     TASK CREATED  Called patient to assess any needs or concerns  There was no answer   Left message on machine with my contact information to return call  Arline Morales - 29 Jun 2017 10:28 AM     TASK IN PROGRESS   Harley Nicholson - 05 Jul 2017 2:47 PM     TASK EDITED  Second attempt made to contact patient  There was no answer  Left message on machine with my contact information to return call  Arline Morales - 11 Jul 2017 3:30 PM     TASK EDITED  Third attempt made to contact patient  There was no answer  Left message with my contact information to return call       Signatures   Electronically signed by : Zoraida Gutierrez; Jul 14 2017  8:21AM EST                       (Author)

## 2018-01-18 NOTE — MISCELLANEOUS
Message   Recorded as Task   Date: 08/04/2017 11:05 AM, Created By: Portia Gordon   Task Name: Follow Up   Assigned To: Petrona Drummond   Regarding Patient: Juana Kimble, Status: Active   CommentWaddell Lav - 04 Aug 2017 11:05 AM     TASK CREATED  Caller: Self; General Medical Question; [phone] (Home); [phone] (Home)  GOING FOR A COLONOSCOPY ON TUESDAY AND WANTED TO KNOW IF HE SHOULD STOP TAKING ANY OF HIS MEDS THAT MIGHT REACT TO THE ANESTESIA    left detailed message, hold asa starting today  hold dibetes meds the morning of colonoscopy  Signatures   Electronically signed by : KAUSHIK Navarro;  Aug  4 2017 12:56PM EST                       (Author)

## 2018-01-23 VITALS
WEIGHT: 215.5 LBS | HEART RATE: 70 BPM | RESPIRATION RATE: 16 BRPM | TEMPERATURE: 97.6 F | OXYGEN SATURATION: 98 % | SYSTOLIC BLOOD PRESSURE: 152 MMHG | DIASTOLIC BLOOD PRESSURE: 76 MMHG | HEIGHT: 69 IN | BODY MASS INDEX: 31.92 KG/M2

## 2018-01-23 NOTE — CONSULTS
Chief Complaint  patient presented here for medical clearance for left foot surgery on 12/08      History of Present Illness  Pre-Op Visit (Brief): The patient is being seen for a preoperative visit  Surgical Risk Assessment:   Prior Anesthesia: He had prior anesthesia and no prior adverse reaction to general anesthesia  Exercise Capacity: able to walk four blocks without symptoms and able to walk two flights of stairs without symptoms  Lifestyle Factors: denies alcohol use and denies tobacco use  Symptoms: no symptoms  Living Situation: home is secure and supportive and no post-op concerns with his living situation  HPI: having L ankle/foot surgery tomorrow  injured on 12/2 while walking in the woods  stopped his meds as of last night per surgeon's instructions  sugar was 80 yesterday am and 120 this am fasting, sugar has not been higher than 160 in the last few months  pt sees the surgeon later today  otherwise feeling well  no pre-op labs were ordered  pt is scheduled to have an EKG this afternoon as per surgeon  Active Problems    1  Abnormal bilirubin test (277 4) (E80 6)   2  Acid reflux disease (530 81) (K21 9)   3  Arthritis (716 90) (M19 90)   4  Balance problem (781 99) (R26 89)   5  Benign essential HTN (401 1) (I10)   6  Encounter for prostate cancer screening (V76 44) (Z12 5)   7  Encounter for screening for malignant neoplasm of colon (V76 51) (Z12 11)   8  Flu vaccine need (V04 81) (Z23)   9  Gilbert's syndrome (277 4) (E80 4)   10  Hyperlipidemia (272 4) (E78 5)   11  Male erectile dysfunction (607 84) (N52 9)   12  Need for pneumococcal vaccination (V03 82) (Z23)   13  Need for prophylactic vaccination and inoculation against influenza (V04 81) (Z23)   14  Type 2 diabetes mellitus with hyperglycemia (250 00) (E11 65)   15   Vitamin D deficiency (268 9) (E55 9)    Past Medical History    · History of Fecal occult blood test positive (792 1) (R19 5)   · History of chest pain (V13 89) (P48 393)   · History of nasal polyp (V13 89) (Z87 09)   · History of Melanoma of ear, left (172 2) (C43 22)   · Need for pneumococcal vaccination (V03 82) (Z23)   · Need for prophylactic vaccination and inoculation against influenza (V04 81) (Z23)    The active problems and past medical history were reviewed and updated today  Surgical History    · History of Nasal Endoscopy Polypectomy   · History of Revision Of Total Knee Arthroplasty    Family History    · Family history of cardiac disorder (V17 49) (Z82 49)   · Family history of diabetes mellitus (V18 0) (Z83 3)   · Family history of Melanoma    · Family history of abdominal aortic aneurysm (AAA) (V17 49) (Z82 49)   · Family history of malignant neoplasm of prostate (V16 42) (Z80 42)   · Family history of Malignant neoplasm of colon, unspecified part of colon   · Family history of Malignant neoplasm of sigmoid colon    The family history was reviewed and updated today  Social History    · Being A Social Drinker  The social history was reviewed and updated today  Current Meds   1  Accu-Chek Compact Plus In Vitro Strip; USE 1 STRIP 4 TIMES DAILY; Therapy: 24QRL0047 to (Paulino Pineda)  Requested for: 055 813 52 47; Last   Rx:17Otr2996 Ordered   2  Accu-Chek Compact Test Drum STRP; USE 1 STRIP 4 TIMES DAILY; Therapy: 76Rbe9168 to (Evaluate:28Zjc4423)  Requested for: 99Tps6522; Last   Rx:12Nse2044 Ordered   3  Aspirin 81 MG TABS; Therapy: (Conchita Funk) to Recorded   4  Atorvastatin Calcium 10 MG Oral Tablet; Therapy: 30WGK5067 to Recorded   5  Flonase 50 MCG/ACT SUSP; Therapy: (Recorded:94Oro1335) to Recorded   6  Glimepiride 4 MG Oral Tablet; take 1 tablet daily with breakfast;   Therapy: 21ZLY4760 to (Piper Parker)  Requested for: 74ZVS7352; Last   XW:96ZKJ6797 Ordered   7  Lantus 100 UNIT/ML Subcutaneous Solution; INJECT 32 UNIT Daily; Therapy: 22ETU1642 to (Last Rx:60Pbs1240) Ordered   8   Lisinopril 5 MG Oral Tablet; take 1 tablet by mouth every day; Therapy: 92DAY8056 to (Evaluate:11Jun2015)  Requested for: 52KDL4519; Last   Rx:02Ncc6952 Ordered   9  Mens 50+ Multi Vitamin/Min Oral Tablet; Therapy: ([phone]) to Recorded   10  MetFORMIN HCl - 1000 MG Oral Tablet; TAKE 1 TABLET TWICE DAILY; Therapy: 98TSE0262 to (Nicrenetta Medrano)  Requested for: 16DUA2775; Last    Rx:30Mar2015 Ordered    The medication list was reviewed and updated today  Allergies    1  No Known Drug Allergies    Vitals  Signs    Temperature: 97 6 F, Tympanic  Heart Rate: 70  Pulse Quality: Normal  Respiration Quality: Normal  Respiration: 16  Systolic: 458, LUE, Sitting  Diastolic: 76, LUE, Sitting  Height: 5 ft 9 in  Weight: 215 lb 8 oz  BMI Calculated: 31 82  BSA Calculated: 2 13  O2 Saturation: 98    Physical Exam    Constitutional   General appearance: No acute distress, well appearing and well nourished  Pulmonary   Respiratory effort: No increased work of breathing or signs of respiratory distress  Auscultation of lungs: Clear to auscultation  Cardiovascular   Auscultation of heart: Normal rate and rhythm, normal S1 and S2, no murmurs  Examination of extremities for edema and/or varicosities: Normal     Abdomen   Abdomen: Non-tender, no masses  Liver and spleen: No hepatomegaly or splenomegaly  Musculoskeletal   Gait and station: Abnormal   using crutches, L ankle brace  Neurologic   Cortical function: Normal mental status  Psychiatric   Judgment and insight: Normal     Orientation to person, place and time: Normal     Recent and remote memory: Intact  Mood and affect: Normal        Assessment    1  Preoperative clearance (V72 84) (Z01 818)   2  Ankle pain, left (719 47) (M25 572)    Plan  Abnormal bilirubin test    · Milk Thistle 150 MG Oral Capsule; TAKE AS DIRECTED  Ankle pain, left, Vitamin D deficiency    · Vitamin D3 5000 UNIT Oral Tablet;  Take 1 tablet daily    Discussion/Summary  Surgical clearance faxed to Dr Swapna Sarabia   Pt cleared, chronic conditions stable  pt to f/u with our office after the surgery to manage DM and HTN  pt to see surgeon later today as planned where they will do an EKG  Possible side effects of new medications were reviewed with the patient/guardian today  The treatment plan was reviewed with the patient/guardian  The patient/guardian understands and agrees with the treatment plan      End of Encounter Meds    1  Milk Thistle 150 MG Oral Capsule; TAKE AS DIRECTED; Therapy: 95UNR1253 to (Last Rx:07Dec2017) Ordered    2  Vitamin D3 5000 UNIT Oral Tablet; Take 1 tablet daily; Therapy: 85WFH4664 to (Last Rx:07Dec2017) Ordered    3  Lisinopril 5 MG Oral Tablet; take 1 tablet by mouth every day; Therapy: 82OKI2132 to (Evaluate:11Jun2015)  Requested for: 06ECU9118; Last   Rx:00Uiz4867 Ordered    4  Atorvastatin Calcium 10 MG Oral Tablet; Therapy: 74VJR4140 to Recorded    5  Accu-Chek Compact Plus In Vitro Strip; USE 1 STRIP 4 TIMES DAILY; Therapy: 49TAH7058 to (Shannon )  Requested for: 055 813 52 47; Last   Rx:53Klb8529 Ordered   6  Glimepiride 4 MG Oral Tablet; take 1 tablet daily with breakfast;   Therapy: 07WWF8857 to ([place])  Requested for: 27JDQ0737; Last   MF:25VRX0426 Ordered   7  Lantus 100 UNIT/ML Subcutaneous Solution; INJECT 32 UNIT Daily; Therapy: 61VXE6127 to (Last Rx:26Apr2016) Ordered   8  MetFORMIN HCl - 1000 MG Oral Tablet; TAKE 1 TABLET TWICE DAILY; Therapy: 15XJE9098 to (Valery Lay)  Requested for: 96NVZ3360; Last   Rx:30Mar2015 Ordered    9  Accu-Chek Compact Test Drum STRP; USE 1 STRIP 4 TIMES DAILY; Therapy: 98Dbr6723 to (Evaluate:35Dhs1091)  Requested for: 10Sez0143; Last   Rx:31Iaq8480 Ordered   10  Aspirin 81 MG TABS; Therapy: (Salazar Alford) to Recorded   11  Flonase 50 MCG/ACT SUSP (Fluticasone Propionate); Therapy: (Recorded:12Crw6288) to Recorded   12   Mens 50+ Multi Vitamin/Min Oral Tablet; Therapy: (Richa Patterson) to Recorded    Signatures   Electronically signed by : CATALINA Rosenthal ; Dec  7 2017 11:21AM EST                       (Author)

## 2018-09-21 ENCOUNTER — CLINICAL SUPPORT (OUTPATIENT)
Dept: FAMILY MEDICINE CLINIC | Facility: CLINIC | Age: 63
End: 2018-09-21
Payer: COMMERCIAL

## 2018-09-21 DIAGNOSIS — Z23 FLU VACCINE NEED: Primary | ICD-10-CM

## 2018-09-21 PROCEDURE — 90682 RIV4 VACC RECOMBINANT DNA IM: CPT

## 2018-09-21 PROCEDURE — 90471 IMMUNIZATION ADMIN: CPT

## 2018-11-27 LAB
LEFT EYE DIABETIC RETINOPATHY: NORMAL
RIGHT EYE DIABETIC RETINOPATHY: NORMAL
SEVERITY (EYE EXAM): NORMAL

## 2020-02-05 PROBLEM — I25.10 CORONARY ARTERY DISEASE INVOLVING NATIVE CORONARY ARTERY OF NATIVE HEART WITHOUT ANGINA PECTORIS: Status: ACTIVE | Noted: 2020-02-05

## 2020-02-05 PROBLEM — E78.5 HYPERLIPIDEMIA: Status: ACTIVE | Noted: 2020-02-05

## 2020-02-05 PROBLEM — E80.4 GILBERT SYNDROME: Status: ACTIVE | Noted: 2020-02-05

## 2020-02-05 PROBLEM — I10 ESSENTIAL HYPERTENSION: Status: ACTIVE | Noted: 2020-02-05

## 2020-02-05 PROBLEM — E11.9 TYPE 2 DIABETES MELLITUS WITHOUT COMPLICATION, WITH LONG-TERM CURRENT USE OF INSULIN (HCC): Status: ACTIVE | Noted: 2020-02-05

## 2020-02-05 PROBLEM — Z79.4 TYPE 2 DIABETES MELLITUS WITHOUT COMPLICATION, WITH LONG-TERM CURRENT USE OF INSULIN (HCC): Status: ACTIVE | Noted: 2020-02-05

## 2020-02-07 ENCOUNTER — OFFICE VISIT (OUTPATIENT)
Dept: FAMILY MEDICINE CLINIC | Facility: CLINIC | Age: 65
End: 2020-02-07
Payer: COMMERCIAL

## 2020-02-07 VITALS
OXYGEN SATURATION: 98 % | BODY MASS INDEX: 27.57 KG/M2 | RESPIRATION RATE: 16 BRPM | HEART RATE: 71 BPM | DIASTOLIC BLOOD PRESSURE: 68 MMHG | SYSTOLIC BLOOD PRESSURE: 140 MMHG | WEIGHT: 208 LBS | HEIGHT: 73 IN | TEMPERATURE: 97 F

## 2020-02-07 DIAGNOSIS — Z12.5 SCREENING FOR PROSTATE CANCER: ICD-10-CM

## 2020-02-07 DIAGNOSIS — H91.93 DECREASED HEARING OF BOTH EARS: ICD-10-CM

## 2020-02-07 DIAGNOSIS — E11.9 TYPE 2 DIABETES MELLITUS WITHOUT COMPLICATION, WITH LONG-TERM CURRENT USE OF INSULIN (HCC): Primary | ICD-10-CM

## 2020-02-07 DIAGNOSIS — I10 ESSENTIAL HYPERTENSION: ICD-10-CM

## 2020-02-07 DIAGNOSIS — E80.4 GILBERT SYNDROME: ICD-10-CM

## 2020-02-07 DIAGNOSIS — E78.5 HYPERLIPIDEMIA, UNSPECIFIED HYPERLIPIDEMIA TYPE: ICD-10-CM

## 2020-02-07 DIAGNOSIS — Z79.4 TYPE 2 DIABETES MELLITUS WITHOUT COMPLICATION, WITH LONG-TERM CURRENT USE OF INSULIN (HCC): Primary | ICD-10-CM

## 2020-02-07 DIAGNOSIS — Z82.49 FAMILY HISTORY OF ABDOMINAL AORTIC ANEURYSM (AAA): ICD-10-CM

## 2020-02-07 DIAGNOSIS — Z13.29 SCREENING FOR THYROID DISORDER: ICD-10-CM

## 2020-02-07 DIAGNOSIS — Z82.49 FAMILY HISTORY OF PREMATURE CAD: ICD-10-CM

## 2020-02-07 PROBLEM — I25.10 CORONARY ARTERY DISEASE INVOLVING NATIVE CORONARY ARTERY OF NATIVE HEART WITHOUT ANGINA PECTORIS: Status: RESOLVED | Noted: 2020-02-05 | Resolved: 2020-02-07

## 2020-02-07 PROBLEM — C44.91 SKIN CANCER, BASAL CELL: Status: ACTIVE | Noted: 2020-02-07

## 2020-02-07 LAB — SL AMB POCT HEMOGLOBIN AIC: 8 (ref ?–6.5)

## 2020-02-07 PROCEDURE — 3008F BODY MASS INDEX DOCD: CPT | Performed by: PHYSICIAN ASSISTANT

## 2020-02-07 PROCEDURE — 99214 OFFICE O/P EST MOD 30 MIN: CPT | Performed by: PHYSICIAN ASSISTANT

## 2020-02-07 PROCEDURE — 4040F PNEUMOC VAC/ADMIN/RCVD: CPT | Performed by: PHYSICIAN ASSISTANT

## 2020-02-07 PROCEDURE — 83036 HEMOGLOBIN GLYCOSYLATED A1C: CPT | Performed by: PHYSICIAN ASSISTANT

## 2020-02-07 PROCEDURE — 4010F ACE/ARB THERAPY RXD/TAKEN: CPT | Performed by: PHYSICIAN ASSISTANT

## 2020-02-07 PROCEDURE — 1036F TOBACCO NON-USER: CPT | Performed by: PHYSICIAN ASSISTANT

## 2020-02-07 PROCEDURE — 3288F FALL RISK ASSESSMENT DOCD: CPT | Performed by: PHYSICIAN ASSISTANT

## 2020-02-07 PROCEDURE — 1101F PT FALLS ASSESS-DOCD LE1/YR: CPT | Performed by: PHYSICIAN ASSISTANT

## 2020-02-07 PROCEDURE — 3078F DIAST BP <80 MM HG: CPT | Performed by: PHYSICIAN ASSISTANT

## 2020-02-07 PROCEDURE — 3077F SYST BP >= 140 MM HG: CPT | Performed by: PHYSICIAN ASSISTANT

## 2020-02-07 RX ORDER — METFORMIN HYDROCHLORIDE 500 MG/1
1000 TABLET, EXTENDED RELEASE ORAL 2 TIMES DAILY
COMMUNITY
Start: 2019-12-23 | End: 2021-07-16

## 2020-02-07 RX ORDER — MILK THISTLE 150 MG
CAPSULE ORAL
COMMUNITY
Start: 2017-12-07

## 2020-02-07 RX ORDER — INSULIN DETEMIR 100 [IU]/ML
INJECTION, SOLUTION SUBCUTANEOUS
COMMUNITY
Start: 2019-12-13 | End: 2021-07-16

## 2020-02-07 RX ORDER — LISINOPRIL 10 MG/1
10 TABLET ORAL DAILY
COMMUNITY
Start: 2019-11-18 | End: 2021-07-16

## 2020-02-07 RX ORDER — LATANOPROST 50 UG/ML
SOLUTION/ DROPS OPHTHALMIC
COMMUNITY
Start: 2019-11-18

## 2020-02-07 NOTE — PROGRESS NOTES
Diabetic Foot Exam    Patient's shoes and socks removed  Right Foot/Ankle   Right Foot Inspection  Skin Exam: skin normal and skin intact no dry skin, no warmth, no callus, no erythema, no maceration, no abnormal color, no pre-ulcer, no ulcer and no callus                          Toe Exam: right toe deformity (hammer  toes)no swelling and erythema  Sensory   Vibration: intact    Monofilament testing: intact  Vascular    The right DP pulse is 2+  Left Foot/Ankle  Left Foot Inspection                           Toe Exam: no swelling and no erythema                   Sensory   Vibration: intact    Monofilament: intact  Vascular    The left DP pulse is 2+  Assign Risk Category:  Deformity present; No loss of protective sensation;        Risk: 1   BMI Counseling: Body mass index is 27 44 kg/m²  The BMI is above normal  Nutrition recommendations include decreasing portion sizes and moderation in carbohydrate intake  Exercise recommendations include exercising 3-5 times per week  No pharmacotherapy was ordered  Assessment/Plan:     Diagnoses and all orders for this visit:    Type 2 diabetes mellitus without complication, with long-term current use of insulin (HCC)  Comments:  Hemoglobin A1c is 8 0  Patient will continue his current regimen and follow-up with endocrinology for medication adjustment  Orders:  -     POCT hemoglobin A1c  -     Ambulatory referral to Cardiology; Future  -     Comprehensive metabolic panel  -     Microalbumin / creatinine urine ratio    Family history of premature CAD  Comments:  Referred to cardiology for possible workup  Orders:  -     Ambulatory referral to Cardiology; Future    Essential hypertension  -     Ambulatory referral to Cardiology; Future  -     Comprehensive metabolic panel    Hyperlipidemia, unspecified hyperlipidemia type  Comments:  Continue statin therapy and low-fat diet  Orders:  -     Ambulatory referral to Cardiology;  Future  -     Comprehensive metabolic panel  -     Lipid panel    Alexandr Rebecca syndrome  Comments:  Known elevated bilirubin which is chronic  Orders:  -     CBC and differential    Family history of abdominal aortic aneurysm (AAA)  Comments:  Abdominal aortic ultrasound ordered  Orders:  -     Ambulatory referral to Cardiology; Future  -     US abdominal aorta; Future    Decreased hearing of both ears  -     Ambulatory Referral to Otolaryngology; Future    Screening for prostate cancer  -     PSA, Total Screen    Screening for thyroid disorder  -     TSH, 3rd generation    Other orders  -     LEVEMIR FLEXTOUCH 100 units/mL injection pen; INJECT UP TO 70 UNITS DAILY UNDER THE SKIN AS PER INSTRUCTIONS  -     latanoprost (XALATAN) 0 005 % ophthalmic solution; PUT 1 DROP INTO BOTH EYES IN THE EVENING  -     metFORMIN (GLUCOPHAGE-XR) 500 mg 24 hr tablet; Take 1,000 mg by mouth 2 (two) times a day  -     lisinopril (ZESTRIL) 10 mg tablet; Take 10 mg by mouth daily  -     Milk Thistle 150 MG CAPS; Take by mouth          Subjective:      Patient ID: Murali Jung is a 71 y o  male  Patient presents for follow up chronic conditions  Patient has non-insulin diabetes mellitus type 2 on long-term insulin therapy  Patient is under care of endocrinologist   Patient checks his blood sugar daily  He is currently on glimepiride 2 mg 2 tablets twice a day  He is on Glumetza 500 mg 2 tablets twice a day and Levemir 60-70 units a day  For blood pressure control he is on lisinopril 10 mg  For lipids he is on Lipitor generic 10 mg  Patient has a strong family history of coronary artery disease  Will be referring patient for cardiology workup  Patient's father also has a history of aortic aneurysm  We will be checking AAA ultrasound  Patient's hemoglobin A1c in office is 8 today  Will follow-up with his endocrinologist   Patient has a history of elevated bilirubin and has Gilbert's syndrome    Patient is having difficulty hearing and will need to go to an audiologist   Colonoscopy is due in August   Patient is seeing dermatologist and has a basal cell skin carcinoma which is scheduled to be excised      The following portions of the patient's history were reviewed and updated as appropriate:   He  has a past medical history of Diabetes mellitus ([address] ), Essential hypertension (2/5/2020), Glaucoma, Hyperlipidemia (2/5/2020), Melanoma of ear, left ([address] ), and Nasal polyp  He   Patient Active Problem List    Diagnosis Date Noted    Skin cancer, basal cell 02/07/2020    Decreased hearing of both ears 02/07/2020    Essential hypertension 02/05/2020    Hyperlipidemia 02/05/2020    Type 2 diabetes mellitus without complication, with long-term current use of insulin ([facility] [address] ) 02/05/2020    Tamiko Booker syndrome 02/05/2020     He  has a past surgical history that includes pr colonoscopy flx dx w/collj spec when pfrmd (N/A, 8/8/2017); ARTHROSCOPY KNEE (Bilateral); Shoulder arthroscopy; Cystoscopy; Nasal polyp excision; and pr repair flex foot tendon,ea (Left, 12/8/2017)  His family history includes Aortic aneurysm in his father; Colon cancer in his father; Diabetes in his mother; Heart disease in his mother; Melanoma in his mother; Prostate cancer in his father  He  reports that he has quit smoking  He has quit using smokeless tobacco  He reports that he drinks alcohol  He reports that he does not use drugs    Current Outpatient Medications   Medication Sig Dispense Refill    aspirin (ECOTRIN LOW STRENGTH) 81 mg EC tablet Take 81 mg by mouth daily      atorvastatin (LIPITOR) 10 mg tablet Take 10 mg by mouth daily      Cholecalciferol (D-3-5) 5000 units capsule Take 5,000 Units by mouth daily      fluticasone (FLONASE) 50 mcg/act nasal spray 1 spray into each nostril daily      glimepiride (AMARYL) 4 mg tablet Take 4 mg by mouth every morning before breakfast      latanoprost (XALATAN) 0 005 % ophthalmic solution PUT 1 DROP INTO BOTH EYES IN THE EVENING      LEVEMIR FLEXTOUCH 100 units/mL injection pen INJECT UP TO 70 UNITS DAILY UNDER THE SKIN AS PER INSTRUCTIONS      lisinopril (ZESTRIL) 10 mg tablet Take 10 mg by mouth daily      metFORMIN (GLUCOPHAGE-XR) 500 mg 24 hr tablet Take 1,000 mg by mouth 2 (two) times a day      Milk Thistle 150 MG CAPS Take by mouth      Multiple Vitamins-Minerals (MULTIVITAMIN WITH MINERALS) tablet Take 1 tablet by mouth daily      Specialty Vitamins Products (MAGNESIUM, AMINO ACID CHELATE,) 133 MG tablet Take 1 tablet by mouth 2 (two) times a day      Vitamins-Lipotropics (B-100 PO) Take by mouth       No current facility-administered medications for this visit        Current Outpatient Medications on File Prior to Visit   Medication Sig    aspirin (ECOTRIN LOW STRENGTH) 81 mg EC tablet Take 81 mg by mouth daily    atorvastatin (LIPITOR) 10 mg tablet Take 10 mg by mouth daily    Cholecalciferol (D-3-5) 5000 units capsule Take 5,000 Units by mouth daily    fluticasone (FLONASE) 50 mcg/act nasal spray 1 spray into each nostril daily    glimepiride (AMARYL) 4 mg tablet Take 4 mg by mouth every morning before breakfast    latanoprost (XALATAN) 0 005 % ophthalmic solution PUT 1 DROP INTO BOTH EYES IN THE EVENING    LEVEMIR FLEXTOUCH 100 units/mL injection pen INJECT UP TO 70 UNITS DAILY UNDER THE SKIN AS PER INSTRUCTIONS    lisinopril (ZESTRIL) 10 mg tablet Take 10 mg by mouth daily    metFORMIN (GLUCOPHAGE-XR) 500 mg 24 hr tablet Take 1,000 mg by mouth 2 (two) times a day    Milk Thistle 150 MG CAPS Take by mouth    Multiple Vitamins-Minerals (MULTIVITAMIN WITH MINERALS) tablet Take 1 tablet by mouth daily    Specialty Vitamins Products (MAGNESIUM, AMINO ACID CHELATE,) 133 MG tablet Take 1 tablet by mouth 2 (two) times a day    Vitamins-Lipotropics (B-100 PO) Take by mouth    [DISCONTINUED] lisinopril (ZESTRIL) 5 mg tablet Take 5 mg by mouth daily    [DISCONTINUED] metFORMIN (GLUMETZA) 1000 MG (MOD) 24 hr tablet Take 1,000 mg by mouth 2 (two) times a day with meals    [DISCONTINUED] insulin glargine (LANTUS) 100 units/mL subcutaneous injection Inject 32 Units under the skin daily at bedtime    [DISCONTINUED] rivaroxaban (XARELTO) 10 mg tablet Take 1 tablet by mouth daily (Patient not taking: Reported on 2/7/2020)     No current facility-administered medications on file prior to visit  He has No Known Allergies       Review of Systems   Constitutional: Negative for activity change, appetite change, chills, fatigue and fever  HENT: Negative for ear pain and sore throat  Eyes: Negative for visual disturbance  Respiratory: Negative for cough and shortness of breath  Cardiovascular: Negative for chest pain, palpitations and leg swelling  Gastrointestinal: Negative for abdominal pain, blood in stool, constipation, diarrhea and nausea  Genitourinary: Negative for difficulty urinating  Musculoskeletal: Negative for arthralgias, back pain and myalgias  Skin: Negative for rash  Neurological: Negative for dizziness, syncope and headaches  Psychiatric/Behavioral: Negative for sleep disturbance  Objective:        Physical Exam   Constitutional: He is oriented to person, place, and time  He appears well-developed and well-nourished  No distress  HENT:   Head: Normocephalic and atraumatic  Right Ear: Tympanic membrane, external ear and ear canal normal    Left Ear: Tympanic membrane, external ear and ear canal normal    Mouth/Throat: Oropharynx is clear and moist    Eyes: Pupils are equal, round, and reactive to light  Conjunctivae are normal    Neck: Carotid bruit is not present  No thyromegaly present  Cardiovascular: Normal rate, regular rhythm and normal heart sounds  No murmur heard  Pulses:       Dorsalis pedis pulses are 2+ on the right side, and 2+ on the left side  Pulmonary/Chest: Effort normal and breath sounds normal  He has no wheezes  Abdominal: Soft   Bowel sounds are normal  He exhibits no mass  There is no tenderness  Musculoskeletal: He exhibits no edema  Feet:   Right Foot:   Skin Integrity: Negative for ulcer, skin breakdown, erythema, warmth, callus or dry skin  Lymphadenopathy:     He has no cervical adenopathy  Neurological: He is alert and oriented to person, place, and time  Skin: Skin is warm and dry  No rash noted  He is not diaphoretic  No erythema  Psychiatric: He has a normal mood and affect  His behavior is normal  Judgment and thought content normal    Nursing note and vitals reviewed

## 2020-02-14 ENCOUNTER — HOSPITAL ENCOUNTER (OUTPATIENT)
Dept: RADIOLOGY | Facility: MEDICAL CENTER | Age: 65
Discharge: HOME | End: 2020-02-14
Payer: COMMERCIAL

## 2020-02-14 ENCOUNTER — APPOINTMENT (OUTPATIENT)
Dept: LAB | Facility: MEDICAL CENTER | Age: 65
End: 2020-02-14
Payer: COMMERCIAL

## 2020-02-14 ENCOUNTER — TRANSCRIBE ORDERS (OUTPATIENT)
Dept: ADMINISTRATIVE | Facility: HOSPITAL | Age: 65
End: 2020-02-14

## 2020-02-14 DIAGNOSIS — E78.5 HYPERLIPIDEMIA, UNSPECIFIED HYPERLIPIDEMIA TYPE: ICD-10-CM

## 2020-02-14 DIAGNOSIS — Z82.49 FAMILY HISTORY OF ABDOMINAL AORTIC ANEURYSM (AAA): ICD-10-CM

## 2020-02-14 DIAGNOSIS — E11.65 UNCONTROLLED TYPE 2 DIABETES MELLITUS WITH HYPERGLYCEMIA (HCC): ICD-10-CM

## 2020-02-14 DIAGNOSIS — E55.9 VITAMIN D DEFICIENCY: ICD-10-CM

## 2020-02-14 DIAGNOSIS — E11.65 UNCONTROLLED TYPE 2 DIABETES MELLITUS WITH HYPERGLYCEMIA (HCC): Primary | ICD-10-CM

## 2020-02-14 LAB
25(OH)D3 SERPL-MCNC: 44.9 NG/ML (ref 30–100)
ALBUMIN SERPL BCP-MCNC: 3.7 G/DL (ref 3.5–5)
ALP SERPL-CCNC: 57 U/L (ref 46–116)
ALT SERPL W P-5'-P-CCNC: 31 U/L (ref 12–78)
ANION GAP SERPL CALCULATED.3IONS-SCNC: 5 MMOL/L (ref 4–13)
AST SERPL W P-5'-P-CCNC: 16 U/L (ref 5–45)
BASOPHILS # BLD AUTO: 0.03 THOUSANDS/ΜL (ref 0–0.1)
BASOPHILS NFR BLD AUTO: 0 % (ref 0–1)
BILIRUB SERPL-MCNC: 1.12 MG/DL (ref 0.2–1)
BUN SERPL-MCNC: 12 MG/DL (ref 5–25)
CALCIUM SERPL-MCNC: 8.9 MG/DL (ref 8.3–10.1)
CHLORIDE SERPL-SCNC: 109 MMOL/L (ref 100–108)
CHOLEST SERPL-MCNC: 103 MG/DL (ref 50–200)
CO2 SERPL-SCNC: 27 MMOL/L (ref 21–32)
CREAT SERPL-MCNC: 0.89 MG/DL (ref 0.6–1.3)
CREAT UR-MCNC: 210 MG/DL
EOSINOPHIL # BLD AUTO: 0.13 THOUSAND/ΜL (ref 0–0.61)
EOSINOPHIL NFR BLD AUTO: 2 % (ref 0–6)
ERYTHROCYTE [DISTWIDTH] IN BLOOD BY AUTOMATED COUNT: 12.4 % (ref 11.6–15.1)
EST. AVERAGE GLUCOSE BLD GHB EST-MCNC: 171 MG/DL
GFR SERPL CREATININE-BSD FRML MDRD: 90 ML/MIN/1.73SQ M
GLUCOSE P FAST SERPL-MCNC: 99 MG/DL (ref 65–99)
HBA1C MFR BLD: 7.6 %
HCT VFR BLD AUTO: 39.7 % (ref 36.5–49.3)
HDLC SERPL-MCNC: 56 MG/DL
HGB BLD-MCNC: 12.9 G/DL (ref 12–17)
IMM GRANULOCYTES # BLD AUTO: 0.01 THOUSAND/UL (ref 0–0.2)
IMM GRANULOCYTES NFR BLD AUTO: 0 % (ref 0–2)
LDLC SERPL CALC-MCNC: 34 MG/DL (ref 0–100)
LDLC SERPL DIRECT ASSAY-MCNC: 44 MG/DL (ref 0–100)
LYMPHOCYTES # BLD AUTO: 1.78 THOUSANDS/ΜL (ref 0.6–4.47)
LYMPHOCYTES NFR BLD AUTO: 26 % (ref 14–44)
MCH RBC QN AUTO: 30.5 PG (ref 26.8–34.3)
MCHC RBC AUTO-ENTMCNC: 32.5 G/DL (ref 31.4–37.4)
MCV RBC AUTO: 94 FL (ref 82–98)
MICROALBUMIN UR-MCNC: 72.3 MG/L (ref 0–20)
MICROALBUMIN/CREAT 24H UR: 34 MG/G CREATININE (ref 0–30)
MONOCYTES # BLD AUTO: 0.4 THOUSAND/ΜL (ref 0.17–1.22)
MONOCYTES NFR BLD AUTO: 6 % (ref 4–12)
NEUTROPHILS # BLD AUTO: 4.55 THOUSANDS/ΜL (ref 1.85–7.62)
NEUTS SEG NFR BLD AUTO: 66 % (ref 43–75)
NONHDLC SERPL-MCNC: 47 MG/DL
NRBC BLD AUTO-RTO: 0 /100 WBCS
PLATELET # BLD AUTO: 333 THOUSANDS/UL (ref 149–390)
PMV BLD AUTO: 11 FL (ref 8.9–12.7)
POTASSIUM SERPL-SCNC: 4.4 MMOL/L (ref 3.5–5.3)
PROT SERPL-MCNC: 6.3 G/DL (ref 6.4–8.2)
PSA SERPL-MCNC: 1 NG/ML (ref 0–4)
RBC # BLD AUTO: 4.23 MILLION/UL (ref 3.88–5.62)
SODIUM SERPL-SCNC: 141 MMOL/L (ref 136–145)
TRIGL SERPL-MCNC: 66 MG/DL
TSH SERPL DL<=0.05 MIU/L-ACNC: 1.73 UIU/ML (ref 0.36–3.74)
WBC # BLD AUTO: 6.9 THOUSAND/UL (ref 4.31–10.16)

## 2020-02-14 PROCEDURE — 80061 LIPID PANEL: CPT | Performed by: PHYSICIAN ASSISTANT

## 2020-02-14 PROCEDURE — 82306 VITAMIN D 25 HYDROXY: CPT

## 2020-02-14 PROCEDURE — 76775 US EXAM ABDO BACK WALL LIM: CPT

## 2020-02-14 PROCEDURE — 84443 ASSAY THYROID STIM HORMONE: CPT | Performed by: PHYSICIAN ASSISTANT

## 2020-02-14 PROCEDURE — 80053 COMPREHEN METABOLIC PANEL: CPT | Performed by: PHYSICIAN ASSISTANT

## 2020-02-14 PROCEDURE — G0103 PSA SCREENING: HCPCS | Performed by: PHYSICIAN ASSISTANT

## 2020-02-14 PROCEDURE — 82043 UR ALBUMIN QUANTITATIVE: CPT | Performed by: PHYSICIAN ASSISTANT

## 2020-02-14 PROCEDURE — 82570 ASSAY OF URINE CREATININE: CPT | Performed by: PHYSICIAN ASSISTANT

## 2020-02-14 PROCEDURE — 85025 COMPLETE CBC W/AUTO DIFF WBC: CPT | Performed by: PHYSICIAN ASSISTANT

## 2020-02-14 PROCEDURE — 83036 HEMOGLOBIN GLYCOSYLATED A1C: CPT | Performed by: INTERNAL MEDICINE

## 2020-02-14 PROCEDURE — 3060F POS MICROALBUMINURIA REV: CPT | Performed by: PHYSICIAN ASSISTANT

## 2020-02-14 PROCEDURE — 36415 COLL VENOUS BLD VENIPUNCTURE: CPT | Performed by: PHYSICIAN ASSISTANT

## 2020-02-14 PROCEDURE — 83721 ASSAY OF BLOOD LIPOPROTEIN: CPT

## 2020-04-29 ENCOUNTER — TELEPHONE (OUTPATIENT)
Dept: CARDIOLOGY CLINIC | Facility: CLINIC | Age: 65
End: 2020-04-29

## 2020-05-05 ENCOUNTER — TELEMEDICINE (OUTPATIENT)
Dept: CARDIOLOGY CLINIC | Facility: CLINIC | Age: 65
End: 2020-05-05
Payer: COMMERCIAL

## 2020-05-05 VITALS
BODY MASS INDEX: 26.9 KG/M2 | SYSTOLIC BLOOD PRESSURE: 121 MMHG | WEIGHT: 203 LBS | DIASTOLIC BLOOD PRESSURE: 60 MMHG | HEART RATE: 70 BPM | HEIGHT: 73 IN

## 2020-05-05 DIAGNOSIS — Z79.4 TYPE 2 DIABETES MELLITUS WITHOUT COMPLICATION, WITH LONG-TERM CURRENT USE OF INSULIN (HCC): ICD-10-CM

## 2020-05-05 DIAGNOSIS — I10 ESSENTIAL HYPERTENSION: ICD-10-CM

## 2020-05-05 DIAGNOSIS — Z82.49 FAMILY HISTORY OF PREMATURE CAD: ICD-10-CM

## 2020-05-05 DIAGNOSIS — E78.5 HYPERLIPIDEMIA, UNSPECIFIED HYPERLIPIDEMIA TYPE: ICD-10-CM

## 2020-05-05 DIAGNOSIS — Z82.49 FAMILY HISTORY OF ABDOMINAL AORTIC ANEURYSM (AAA): ICD-10-CM

## 2020-05-05 DIAGNOSIS — E11.9 TYPE 2 DIABETES MELLITUS WITHOUT COMPLICATION, WITH LONG-TERM CURRENT USE OF INSULIN (HCC): ICD-10-CM

## 2020-05-05 PROCEDURE — 99242 OFF/OP CONSLTJ NEW/EST SF 20: CPT | Performed by: INTERNAL MEDICINE

## 2020-05-08 ENCOUNTER — OFFICE VISIT (OUTPATIENT)
Dept: FAMILY MEDICINE CLINIC | Facility: CLINIC | Age: 65
End: 2020-05-08
Payer: COMMERCIAL

## 2020-05-08 VITALS
HEART RATE: 89 BPM | TEMPERATURE: 96.1 F | BODY MASS INDEX: 27.78 KG/M2 | HEIGHT: 73 IN | WEIGHT: 209.6 LBS | RESPIRATION RATE: 16 BRPM | OXYGEN SATURATION: 97 % | DIASTOLIC BLOOD PRESSURE: 58 MMHG | SYSTOLIC BLOOD PRESSURE: 130 MMHG

## 2020-05-08 DIAGNOSIS — E11.9 TYPE 2 DIABETES MELLITUS WITHOUT COMPLICATION, WITH LONG-TERM CURRENT USE OF INSULIN (HCC): Primary | ICD-10-CM

## 2020-05-08 DIAGNOSIS — I10 ESSENTIAL HYPERTENSION: ICD-10-CM

## 2020-05-08 DIAGNOSIS — E78.5 HYPERLIPIDEMIA, UNSPECIFIED HYPERLIPIDEMIA TYPE: ICD-10-CM

## 2020-05-08 DIAGNOSIS — Z79.4 TYPE 2 DIABETES MELLITUS WITHOUT COMPLICATION, WITH LONG-TERM CURRENT USE OF INSULIN (HCC): Primary | ICD-10-CM

## 2020-05-08 PROCEDURE — 1036F TOBACCO NON-USER: CPT | Performed by: PHYSICIAN ASSISTANT

## 2020-05-08 PROCEDURE — 3060F POS MICROALBUMINURIA REV: CPT | Performed by: PHYSICIAN ASSISTANT

## 2020-05-08 PROCEDURE — 1160F RVW MEDS BY RX/DR IN RCRD: CPT | Performed by: PHYSICIAN ASSISTANT

## 2020-05-08 PROCEDURE — 99214 OFFICE O/P EST MOD 30 MIN: CPT | Performed by: PHYSICIAN ASSISTANT

## 2020-05-08 PROCEDURE — 4040F PNEUMOC VAC/ADMIN/RCVD: CPT | Performed by: PHYSICIAN ASSISTANT

## 2020-05-08 PROCEDURE — 3078F DIAST BP <80 MM HG: CPT | Performed by: PHYSICIAN ASSISTANT

## 2020-05-08 PROCEDURE — 3008F BODY MASS INDEX DOCD: CPT | Performed by: PHYSICIAN ASSISTANT

## 2020-05-08 PROCEDURE — 3075F SYST BP GE 130 - 139MM HG: CPT | Performed by: PHYSICIAN ASSISTANT

## 2020-05-08 PROCEDURE — 3051F HG A1C>EQUAL 7.0%<8.0%: CPT | Performed by: PHYSICIAN ASSISTANT

## 2020-06-08 ENCOUNTER — DOCUMENTATION (OUTPATIENT)
Dept: OTHER | Facility: HOSPITAL | Age: 65
End: 2020-06-08

## 2020-06-08 ENCOUNTER — APPOINTMENT (OUTPATIENT)
Dept: RADIOLOGY | Facility: MEDICAL CENTER | Age: 65
End: 2020-06-08
Payer: COMMERCIAL

## 2020-06-08 DIAGNOSIS — S99.912A ANKLE INJURY, LEFT, INITIAL ENCOUNTER: ICD-10-CM

## 2020-06-08 DIAGNOSIS — S99.922A INJURY, FOOT, LEFT, INITIAL ENCOUNTER: Primary | ICD-10-CM

## 2020-06-08 DIAGNOSIS — S86.302A PERONEAL TENDON INJURY, LEFT, INITIAL ENCOUNTER: ICD-10-CM

## 2020-06-08 DIAGNOSIS — S99.922A INJURY, FOOT, LEFT, INITIAL ENCOUNTER: ICD-10-CM

## 2020-06-08 PROCEDURE — 73630 X-RAY EXAM OF FOOT: CPT

## 2020-06-12 ENCOUNTER — HOSPITAL ENCOUNTER (OUTPATIENT)
Dept: MRI IMAGING | Facility: HOSPITAL | Age: 65
Discharge: HOME | End: 2020-06-12
Payer: COMMERCIAL

## 2020-06-12 DIAGNOSIS — S86.302A PERONEAL TENDON INJURY, LEFT, INITIAL ENCOUNTER: ICD-10-CM

## 2020-06-12 DIAGNOSIS — S99.912A ANKLE INJURY, LEFT, INITIAL ENCOUNTER: ICD-10-CM

## 2020-06-12 DIAGNOSIS — S99.922A INJURY, FOOT, LEFT, INITIAL ENCOUNTER: ICD-10-CM

## 2020-06-12 PROCEDURE — 73718 MRI LOWER EXTREMITY W/O DYE: CPT

## 2020-06-12 PROCEDURE — 73721 MRI JNT OF LWR EXTRE W/O DYE: CPT

## 2020-08-14 ENCOUNTER — APPOINTMENT (OUTPATIENT)
Dept: LAB | Facility: MEDICAL CENTER | Age: 65
End: 2020-08-14
Payer: COMMERCIAL

## 2020-08-14 DIAGNOSIS — I10 ESSENTIAL HYPERTENSION: ICD-10-CM

## 2020-08-14 DIAGNOSIS — E78.5 HYPERLIPIDEMIA, UNSPECIFIED HYPERLIPIDEMIA TYPE: ICD-10-CM

## 2020-08-14 DIAGNOSIS — E11.9 TYPE 2 DIABETES MELLITUS WITHOUT COMPLICATION, WITH LONG-TERM CURRENT USE OF INSULIN (HCC): ICD-10-CM

## 2020-08-14 DIAGNOSIS — Z79.4 TYPE 2 DIABETES MELLITUS WITHOUT COMPLICATION, WITH LONG-TERM CURRENT USE OF INSULIN (HCC): ICD-10-CM

## 2020-08-14 LAB
ALBUMIN SERPL BCP-MCNC: 3.7 G/DL (ref 3.5–5)
ALP SERPL-CCNC: 65 U/L (ref 46–116)
ALT SERPL W P-5'-P-CCNC: 46 U/L (ref 12–78)
ANION GAP SERPL CALCULATED.3IONS-SCNC: 7 MMOL/L (ref 4–13)
AST SERPL W P-5'-P-CCNC: 16 U/L (ref 5–45)
BILIRUB SERPL-MCNC: 1.28 MG/DL (ref 0.2–1)
BUN SERPL-MCNC: 18 MG/DL (ref 5–25)
CALCIUM SERPL-MCNC: 8.7 MG/DL (ref 8.3–10.1)
CHLORIDE SERPL-SCNC: 108 MMOL/L (ref 100–108)
CHOLEST SERPL-MCNC: 112 MG/DL (ref 50–200)
CO2 SERPL-SCNC: 26 MMOL/L (ref 21–32)
CREAT SERPL-MCNC: 0.98 MG/DL (ref 0.6–1.3)
EST. AVERAGE GLUCOSE BLD GHB EST-MCNC: 189 MG/DL
GFR SERPL CREATININE-BSD FRML MDRD: 80 ML/MIN/1.73SQ M
GLUCOSE P FAST SERPL-MCNC: 143 MG/DL (ref 65–99)
HBA1C MFR BLD: 8.2 %
HDLC SERPL-MCNC: 50 MG/DL
LDLC SERPL CALC-MCNC: 41 MG/DL (ref 0–100)
NONHDLC SERPL-MCNC: 62 MG/DL
POTASSIUM SERPL-SCNC: 4.3 MMOL/L (ref 3.5–5.3)
PROT SERPL-MCNC: 6.6 G/DL (ref 6.4–8.2)
SODIUM SERPL-SCNC: 141 MMOL/L (ref 136–145)
TRIGL SERPL-MCNC: 104 MG/DL

## 2020-08-14 PROCEDURE — 3052F HG A1C>EQUAL 8.0%<EQUAL 9.0%: CPT | Performed by: PHYSICIAN ASSISTANT

## 2020-08-14 PROCEDURE — 83036 HEMOGLOBIN GLYCOSYLATED A1C: CPT

## 2020-08-14 PROCEDURE — 80053 COMPREHEN METABOLIC PANEL: CPT

## 2020-08-14 PROCEDURE — 36415 COLL VENOUS BLD VENIPUNCTURE: CPT

## 2020-08-14 PROCEDURE — 80061 LIPID PANEL: CPT

## 2020-08-21 ENCOUNTER — OFFICE VISIT (OUTPATIENT)
Dept: FAMILY MEDICINE CLINIC | Facility: CLINIC | Age: 65
End: 2020-08-21
Payer: COMMERCIAL

## 2020-08-21 VITALS
HEIGHT: 73 IN | SYSTOLIC BLOOD PRESSURE: 122 MMHG | BODY MASS INDEX: 27.7 KG/M2 | HEART RATE: 76 BPM | WEIGHT: 209 LBS | OXYGEN SATURATION: 98 % | DIASTOLIC BLOOD PRESSURE: 74 MMHG | TEMPERATURE: 98.1 F

## 2020-08-21 DIAGNOSIS — Z79.4 TYPE 2 DIABETES MELLITUS WITHOUT COMPLICATION, WITH LONG-TERM CURRENT USE OF INSULIN (HCC): Primary | ICD-10-CM

## 2020-08-21 DIAGNOSIS — E11.9 TYPE 2 DIABETES MELLITUS WITHOUT COMPLICATION, WITH LONG-TERM CURRENT USE OF INSULIN (HCC): Primary | ICD-10-CM

## 2020-08-21 DIAGNOSIS — Z12.5 SCREENING FOR PROSTATE CANCER: ICD-10-CM

## 2020-08-21 DIAGNOSIS — I10 ESSENTIAL HYPERTENSION: ICD-10-CM

## 2020-08-21 DIAGNOSIS — E78.5 HYPERLIPIDEMIA, UNSPECIFIED HYPERLIPIDEMIA TYPE: ICD-10-CM

## 2020-08-21 PROCEDURE — 3078F DIAST BP <80 MM HG: CPT | Performed by: PHYSICIAN ASSISTANT

## 2020-08-21 PROCEDURE — 1160F RVW MEDS BY RX/DR IN RCRD: CPT | Performed by: PHYSICIAN ASSISTANT

## 2020-08-21 PROCEDURE — 3008F BODY MASS INDEX DOCD: CPT | Performed by: PHYSICIAN ASSISTANT

## 2020-08-21 PROCEDURE — 99214 OFFICE O/P EST MOD 30 MIN: CPT | Performed by: PHYSICIAN ASSISTANT

## 2020-08-21 PROCEDURE — 3060F POS MICROALBUMINURIA REV: CPT | Performed by: PHYSICIAN ASSISTANT

## 2020-08-21 PROCEDURE — 3725F SCREEN DEPRESSION PERFORMED: CPT | Performed by: PHYSICIAN ASSISTANT

## 2020-08-21 PROCEDURE — 3052F HG A1C>EQUAL 8.0%<EQUAL 9.0%: CPT | Performed by: PHYSICIAN ASSISTANT

## 2020-08-21 PROCEDURE — 1036F TOBACCO NON-USER: CPT | Performed by: PHYSICIAN ASSISTANT

## 2020-08-21 PROCEDURE — 4040F PNEUMOC VAC/ADMIN/RCVD: CPT | Performed by: PHYSICIAN ASSISTANT

## 2020-08-21 PROCEDURE — 3074F SYST BP LT 130 MM HG: CPT | Performed by: PHYSICIAN ASSISTANT

## 2020-08-21 NOTE — PROGRESS NOTES
Diabetic Foot Exam    Patient's shoes and socks removed  Right Foot/Ankle   Right Foot Inspection  Skin Exam: skin intact                          Toe Exam: right toe deformity  Sensory   Vibration: intact    Monofilament testing: intact  Vascular    The right DP pulse is 2+  Left Foot/Ankle  Left Foot Inspection  Skin Exam: skin intact                         Toe Exam: left toe deformity (hammer  toes)                   Sensory   Vibration: intact    Monofilament: intact  Vascular    The left DP pulse is 2+  Assign Risk Category:  Deformity present; No loss of protective sensation; No weak pulses       Risk: 1  Assessment/Plan:     Diagnoses and all orders for this visit:    Type 2 diabetes mellitus without complication, with long-term current use of insulin ([facility] [address] )  Comments:  Patient will continue his current regimen  Follow-up with endocrinology next month for possible insulin adjustment  Check blood sugar daily  Orders:  -     Hemoglobin A1C; Future  -     Microalbumin / creatinine urine ratio; Future    Essential hypertension  Comments:  Blood pressure is at goal continue current regimen  Orders:  -     Comprehensive metabolic panel; Future    Hyperlipidemia, unspecified hyperlipidemia type  Comments:  Lipids are at goal continue statin therapy and low-fat diet  Orders:  -     Lipid panel; Future    Screening for prostate cancer  -     PSA, Total Screen; Future          Subjective:      Patient ID: Collette Anthony is a 76 y o  male  Patient presents for follow-up in the office for chronic conditions  Patient has non-insulin diabetes mellitus type 2  He is on insulin therapy  Current regimen includes glimepiride 4 mg in the morning, metformin  mg 2 twice a day  He is on Levemir 60 units a day period he checks his blood sugar daily  His current hemoglobin A1c is 8 2  Patient has and endocrinology appointment next month  Blood pressure is well controlled with lisinopril at 10 mg   Hepatic and renal functions are normal   Patient is on generic Lipitor 10 mg for lipid control  His lipid panel is at goal      The following portions of the patient's history were reviewed and updated as appropriate:   He   Patient Active Problem List    Diagnosis Date Noted    Skin cancer, basal cell 02/07/2020    Decreased hearing of both ears 02/07/2020    Essential hypertension 02/05/2020    Hyperlipidemia 02/05/2020    Type 2 diabetes mellitus without complication, with long-term current use of insulin ([address] ) 02/05/2020    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     Current Outpatient Medications   Medication Sig Dispense Refill    aspirin (ECOTRIN LOW STRENGTH) 81 mg EC tablet Take 81 mg by mouth daily      atorvastatin (LIPITOR) 10 mg tablet Take 10 mg by mouth daily      Cholecalciferol (D-3-5) 5000 units capsule Take 5,000 Units by mouth daily      fluticasone (FLONASE) 50 mcg/act nasal spray 1 spray into each nostril daily      glimepiride (AMARYL) 4 mg tablet Take 4 mg by mouth every morning before breakfast      latanoprost (XALATAN) 0 005 % ophthalmic solution PUT 1 DROP INTO BOTH EYES IN THE EVENING      LEVEMIR FLEXTOUCH 100 units/mL injection pen       lisinopril (ZESTRIL) 10 mg tablet Take 10 mg by mouth daily      metFORMIN (GLUCOPHAGE-XR) 500 mg 24 hr tablet Take 1,000 mg by mouth 2 (two) times a day      Milk Thistle 150 MG CAPS Take by mouth      Multiple Vitamins-Minerals (MULTIVITAMIN WITH MINERALS) tablet Take 1 tablet by mouth daily      sodium chloride (OCEAN) 0 65 % nasal spray 1 spray into each nostril as needed for congestion      Specialty Vitamins Products (MAGNESIUM, AMINO ACID CHELATE,) 133 MG tablet Take 1 tablet by mouth 2 (two) times a day      Vitamins-Lipotropics (B-100 PO) Take by mouth       No current facility-administered medications for this visit        Current Outpatient Medications on File Prior to Visit   Medication Sig    aspirin (ECOTRIN LOW STRENGTH) 81 mg EC tablet Take 81 mg by mouth daily    atorvastatin (LIPITOR) 10 mg tablet Take 10 mg by mouth daily    Cholecalciferol (D-3-5) 5000 units capsule Take 5,000 Units by mouth daily    fluticasone (FLONASE) 50 mcg/act nasal spray 1 spray into each nostril daily    glimepiride (AMARYL) 4 mg tablet Take 4 mg by mouth every morning before breakfast    latanoprost (XALATAN) 0 005 % ophthalmic solution PUT 1 DROP INTO BOTH EYES IN THE EVENING    LEVEMIR FLEXTOUCH 100 units/mL injection pen     lisinopril (ZESTRIL) 10 mg tablet Take 10 mg by mouth daily    metFORMIN (GLUCOPHAGE-XR) 500 mg 24 hr tablet Take 1,000 mg by mouth 2 (two) times a day    Milk Thistle 150 MG CAPS Take by mouth    Multiple Vitamins-Minerals (MULTIVITAMIN WITH MINERALS) tablet Take 1 tablet by mouth daily    sodium chloride (OCEAN) 0 65 % nasal spray 1 spray into each nostril as needed for congestion    Specialty Vitamins Products (MAGNESIUM, AMINO ACID CHELATE,) 133 MG tablet Take 1 tablet by mouth 2 (two) times a day    Vitamins-Lipotropics (B-100 PO) Take by mouth     No current facility-administered medications on file prior to visit  He has No Known Allergies       Review of Systems   Constitutional: Negative for activity change, appetite change, chills, fatigue and fever  HENT: Negative for ear pain and sore throat  Eyes: Negative for visual disturbance  Respiratory: Negative for cough and shortness of breath  Cardiovascular: Negative for chest pain, palpitations and leg swelling  Gastrointestinal: Negative for abdominal pain, blood in stool, constipation, diarrhea and nausea  Genitourinary: Negative for difficulty urinating  Musculoskeletal: Negative for arthralgias, back pain and myalgias  Skin: Negative for rash  Neurological: Negative for dizziness, syncope and headaches  Psychiatric/Behavioral: Negative for sleep disturbance  Objective:        Physical Exam  Vitals signs and nursing note reviewed  Constitutional:       General: He is not in acute distress  Appearance: Normal appearance  He is well-developed  He is not ill-appearing  HENT:      Head: Normocephalic and atraumatic  Right Ear: Tympanic membrane, ear canal and external ear normal       Left Ear: Tympanic membrane, ear canal and external ear normal    Eyes:      Conjunctiva/sclera: Conjunctivae normal       Pupils: Pupils are equal, round, and reactive to light  Neck:      Thyroid: No thyromegaly  Vascular: No carotid bruit  Cardiovascular:      Rate and Rhythm: Normal rate and regular rhythm  Pulses: no weak pulses          Dorsalis pedis pulses are 2+ on the right side and 2+ on the left side  Heart sounds: Normal heart sounds  No murmur  Pulmonary:      Effort: Pulmonary effort is normal       Breath sounds: Normal breath sounds  No wheezing  Abdominal:      General: Bowel sounds are normal       Palpations: Abdomen is soft  There is no mass  Tenderness: There is no abdominal tenderness  Musculoskeletal:      Right lower leg: No edema  Left lower leg: No edema  Lymphadenopathy:      Cervical: No cervical adenopathy  Skin:     General: Skin is warm and dry  Coloration: Skin is not jaundiced or pale  Findings: No erythema  Neurological:      General: No focal deficit present  Mental Status: He is alert and oriented to person, place, and time  Psychiatric:         Behavior: Behavior normal          Thought Content:  Thought content normal          Judgment: Judgment normal

## 2020-09-18 ENCOUNTER — IMMUNIZATIONS (OUTPATIENT)
Dept: FAMILY MEDICINE CLINIC | Facility: CLINIC | Age: 65
End: 2020-09-18
Payer: COMMERCIAL

## 2020-09-18 DIAGNOSIS — Z23 ENCOUNTER FOR IMMUNIZATION: Primary | ICD-10-CM

## 2020-09-18 PROCEDURE — 90662 IIV NO PRSV INCREASED AG IM: CPT

## 2020-09-18 PROCEDURE — 90471 IMMUNIZATION ADMIN: CPT

## 2020-10-30 LAB
LEFT EYE DIABETIC RETINOPATHY: NORMAL
RIGHT EYE DIABETIC RETINOPATHY: NORMAL

## 2020-11-13 ENCOUNTER — VBI (OUTPATIENT)
Dept: ADMINISTRATIVE | Facility: OTHER | Age: 65
End: 2020-11-13

## 2020-12-04 ENCOUNTER — TELEPHONE (OUTPATIENT)
Dept: FAMILY MEDICINE CLINIC | Facility: CLINIC | Age: 65
End: 2020-12-04

## 2021-02-19 ENCOUNTER — LAB (OUTPATIENT)
Dept: LAB | Facility: MEDICAL CENTER | Age: 66
End: 2021-02-19
Payer: COMMERCIAL

## 2021-02-19 DIAGNOSIS — I10 ESSENTIAL HYPERTENSION: ICD-10-CM

## 2021-02-19 DIAGNOSIS — Z12.5 SCREENING FOR PROSTATE CANCER: ICD-10-CM

## 2021-02-19 DIAGNOSIS — Z79.4 TYPE 2 DIABETES MELLITUS WITHOUT COMPLICATION, WITH LONG-TERM CURRENT USE OF INSULIN (HCC): ICD-10-CM

## 2021-02-19 DIAGNOSIS — E11.9 TYPE 2 DIABETES MELLITUS WITHOUT COMPLICATION, WITH LONG-TERM CURRENT USE OF INSULIN (HCC): ICD-10-CM

## 2021-02-19 DIAGNOSIS — E78.5 HYPERLIPIDEMIA, UNSPECIFIED HYPERLIPIDEMIA TYPE: ICD-10-CM

## 2021-02-19 LAB
ALBUMIN SERPL BCP-MCNC: 4.1 G/DL (ref 3.5–5)
ALP SERPL-CCNC: 72 U/L (ref 46–116)
ALT SERPL W P-5'-P-CCNC: 40 U/L (ref 12–78)
ANION GAP SERPL CALCULATED.3IONS-SCNC: 5 MMOL/L (ref 4–13)
AST SERPL W P-5'-P-CCNC: 20 U/L (ref 5–45)
BILIRUB SERPL-MCNC: 1.62 MG/DL (ref 0.2–1)
BUN SERPL-MCNC: 17 MG/DL (ref 5–25)
CALCIUM SERPL-MCNC: 9.7 MG/DL (ref 8.3–10.1)
CHLORIDE SERPL-SCNC: 108 MMOL/L (ref 100–108)
CHOLEST SERPL-MCNC: 131 MG/DL (ref 50–200)
CO2 SERPL-SCNC: 28 MMOL/L (ref 21–32)
CREAT SERPL-MCNC: 1.11 MG/DL (ref 0.6–1.3)
CREAT UR-MCNC: 314 MG/DL
EST. AVERAGE GLUCOSE BLD GHB EST-MCNC: 183 MG/DL
GFR SERPL CREATININE-BSD FRML MDRD: 69 ML/MIN/1.73SQ M
GLUCOSE P FAST SERPL-MCNC: 219 MG/DL (ref 65–99)
HBA1C MFR BLD: 8 %
HDLC SERPL-MCNC: 56 MG/DL
LDLC SERPL CALC-MCNC: 53 MG/DL (ref 0–100)
MICROALBUMIN UR-MCNC: 176 MG/L (ref 0–20)
MICROALBUMIN/CREAT 24H UR: 56 MG/G CREATININE (ref 0–30)
NONHDLC SERPL-MCNC: 75 MG/DL
POTASSIUM SERPL-SCNC: 4.6 MMOL/L (ref 3.5–5.3)
PROT SERPL-MCNC: 6.9 G/DL (ref 6.4–8.2)
PSA SERPL-MCNC: 2.3 NG/ML (ref 0–4)
SODIUM SERPL-SCNC: 141 MMOL/L (ref 136–145)
TRIGL SERPL-MCNC: 108 MG/DL

## 2021-02-19 PROCEDURE — 3052F HG A1C>EQUAL 8.0%<EQUAL 9.0%: CPT | Performed by: PHYSICIAN ASSISTANT

## 2021-02-19 PROCEDURE — 80053 COMPREHEN METABOLIC PANEL: CPT

## 2021-02-19 PROCEDURE — 80061 LIPID PANEL: CPT

## 2021-02-19 PROCEDURE — 83036 HEMOGLOBIN GLYCOSYLATED A1C: CPT

## 2021-02-19 PROCEDURE — G0103 PSA SCREENING: HCPCS

## 2021-02-19 PROCEDURE — 36415 COLL VENOUS BLD VENIPUNCTURE: CPT

## 2021-02-19 PROCEDURE — 3060F POS MICROALBUMINURIA REV: CPT | Performed by: PHYSICIAN ASSISTANT

## 2021-02-19 PROCEDURE — 82570 ASSAY OF URINE CREATININE: CPT

## 2021-02-19 PROCEDURE — 82043 UR ALBUMIN QUANTITATIVE: CPT

## 2021-02-26 ENCOUNTER — OFFICE VISIT (OUTPATIENT)
Dept: FAMILY MEDICINE CLINIC | Facility: CLINIC | Age: 66
End: 2021-02-26
Payer: COMMERCIAL

## 2021-02-26 VITALS
SYSTOLIC BLOOD PRESSURE: 130 MMHG | RESPIRATION RATE: 18 BRPM | BODY MASS INDEX: 27.7 KG/M2 | OXYGEN SATURATION: 99 % | DIASTOLIC BLOOD PRESSURE: 60 MMHG | HEART RATE: 62 BPM | TEMPERATURE: 97.6 F | HEIGHT: 73 IN | WEIGHT: 209 LBS

## 2021-02-26 DIAGNOSIS — E11.9 TYPE 2 DIABETES MELLITUS WITHOUT COMPLICATION, WITH LONG-TERM CURRENT USE OF INSULIN (HCC): Primary | ICD-10-CM

## 2021-02-26 DIAGNOSIS — I10 ESSENTIAL HYPERTENSION: ICD-10-CM

## 2021-02-26 DIAGNOSIS — E78.5 HYPERLIPIDEMIA, UNSPECIFIED HYPERLIPIDEMIA TYPE: ICD-10-CM

## 2021-02-26 DIAGNOSIS — Z79.4 TYPE 2 DIABETES MELLITUS WITHOUT COMPLICATION, WITH LONG-TERM CURRENT USE OF INSULIN (HCC): Primary | ICD-10-CM

## 2021-02-26 PROCEDURE — 3078F DIAST BP <80 MM HG: CPT | Performed by: PHYSICIAN ASSISTANT

## 2021-02-26 PROCEDURE — 3008F BODY MASS INDEX DOCD: CPT | Performed by: PHYSICIAN ASSISTANT

## 2021-02-26 PROCEDURE — 3725F SCREEN DEPRESSION PERFORMED: CPT | Performed by: PHYSICIAN ASSISTANT

## 2021-02-26 PROCEDURE — 99214 OFFICE O/P EST MOD 30 MIN: CPT | Performed by: PHYSICIAN ASSISTANT

## 2021-02-26 PROCEDURE — 1101F PT FALLS ASSESS-DOCD LE1/YR: CPT | Performed by: PHYSICIAN ASSISTANT

## 2021-02-26 PROCEDURE — 1160F RVW MEDS BY RX/DR IN RCRD: CPT | Performed by: PHYSICIAN ASSISTANT

## 2021-02-26 PROCEDURE — 3075F SYST BP GE 130 - 139MM HG: CPT | Performed by: PHYSICIAN ASSISTANT

## 2021-02-26 PROCEDURE — 3288F FALL RISK ASSESSMENT DOCD: CPT | Performed by: PHYSICIAN ASSISTANT

## 2021-02-26 PROCEDURE — 1036F TOBACCO NON-USER: CPT | Performed by: PHYSICIAN ASSISTANT

## 2021-02-26 RX ORDER — PEN NEEDLE, DIABETIC 32GX 5/32"
NEEDLE, DISPOSABLE MISCELLANEOUS DAILY
COMMUNITY
Start: 2020-12-13 | End: 2021-07-16

## 2021-02-26 RX ORDER — FLASH GLUCOSE SCANNING READER
1 EACH MISCELLANEOUS 4 TIMES DAILY
Qty: 1 DEVICE | Refills: 0 | Status: SHIPPED | OUTPATIENT
Start: 2021-02-26

## 2021-02-26 RX ORDER — FLASH GLUCOSE SENSOR
1 KIT MISCELLANEOUS
Qty: 6 EACH | Refills: 1 | Status: SHIPPED | OUTPATIENT
Start: 2021-02-26 | End: 2021-07-16

## 2021-02-26 RX ORDER — PIOGLITAZONEHYDROCHLORIDE 15 MG/1
15 TABLET ORAL DAILY
Qty: 90 TABLET | Refills: 1 | Status: SHIPPED | OUTPATIENT
Start: 2021-02-26 | End: 2021-07-16

## 2021-02-26 RX ORDER — GLIMEPIRIDE 4 MG/1
4 TABLET ORAL 2 TIMES DAILY
Qty: 180 TABLET | Refills: 1 | Status: SHIPPED | OUTPATIENT
Start: 2021-02-26 | End: 2021-07-16

## 2021-02-26 NOTE — PROGRESS NOTES
BMI Counseling: Body mass index is 27 57 kg/m²  The BMI is above normal  Nutrition recommendations include decreasing portion sizes and moderation in carbohydrate intake  Exercise recommendations include exercising 3-5 times per week

## 2021-02-26 NOTE — PROGRESS NOTES
Diabetic Foot Exam    Patient's shoes and socks removed  Right Foot/Ankle   Right Foot Inspection  Skin Exam: skin not intact                          Toe Exam:  no right toe deformity  Sensory   Vibration: intact    Monofilament testing: intact  Vascular    The right DP pulse is 2+  Left Foot/Ankle  Left Foot Inspection  Skin Exam: skin not intact                         Toe Exam: no left toe deformity                   Sensory   Vibration: intact    Monofilament: intact  Vascular    The left DP pulse is 2+  Assign Risk Category:  No deformity present; No loss of protective sensation; No weak pulses       Risk: 0  Assessment/Plan:     Diagnoses and all orders for this visit:    Type 2 diabetes mellitus without complication, with long-term current use of insulin ([facility])  Comments:  Continue current regimen  Add Actos 15 mg 1 in the morning  Freestyle Kalpana ordered  Orders:  -     glimepiride (AMARYL) 4 mg tablet; Take 1 tablet (4 mg total) by mouth 2 (two) times a day  -     pioglitazone (ACTOS) 15 mg tablet; Take 1 tablet (15 mg total) by mouth daily  -     Continuous Blood Gluc  (FreeStyle Kalpana 14 Day [place]) CAMRON; Use 1 application 4 (four) times a day Patient to check his blood sugar 4 times a day  -     Continuous Blood Gluc Sensor (FreeStyle Kalpana 14 Day Sensor) MISC; Use 1 application every 14 (fourteen) days    Essential hypertension  Comments:  Blood pressure is at goal continue lisinopril    Hyperlipidemia, unspecified hyperlipidemia type  Comments:  Lipids are at goal continue statin therapy and low-fat diet    Other orders  -     BD Pen Needle Meghana U/F 32G X 4 MM MISC; daily Use as directed          Subjective:      Patient ID: Jose Rich is a 76 y o  male  Patient presents in the office for follow up chronic conditions  Patient has diabetes for which he is on chronic insulin therapy    Patient does have an endocrinologist   His current regimen includes metformin ER 1000 mg twice a day, glimepiride 2 mg twice a day and Levemir 66 units per day period patient has tried injectable such as Trulicity with side effects  He is also intolerant to [place]  We will try to obtain a continuous glucometer Reginald for patient  His blood sugar is not at goal   His fasting blood sugar was 219 and his A1c was 8 0  Blood pressure is controlled with lisinopril 10 mg  His urine microalbumin is positive  For lipid control he is on Lipitor 10 mg   And his lipid panel is at goal and hepatic functions are normal   PSA is normal as well      The following portions of the patient's history were reviewed and updated as appropriate:   He   Patient Active Problem List    Diagnosis Date Noted    Skin cancer, basal cell 02/07/2020    Decreased hearing of both ears 02/07/2020    Essential hypertension 02/05/2020    Hyperlipidemia 02/05/2020    Type 2 diabetes mellitus without complication, with long-term current use of insulin ([facility] [address] ) 02/05/2020    Jannifer Shaper syndrome 02/05/2020     Current Outpatient Medications   Medication Sig Dispense Refill    aspirin (ECOTRIN LOW STRENGTH) 81 mg EC tablet Take 81 mg by mouth daily      atorvastatin (LIPITOR) 10 mg tablet Take 10 mg by mouth daily      BD Pen Needle Meghana U/F 32G X 4 MM MISC daily Use as directed      Cholecalciferol (D-3-5) 5000 units capsule Take 5,000 Units by mouth daily      Continuous Blood Gluc  (FreeStyle Kalpana 14 Day [place]) CAMRON Use 1 application 4 (four) times a day Patient to check his blood sugar 4 times a day 1 Device 0    Continuous Blood Gluc Sensor (FreeStyle Kalpana 14 Day Sensor) MISC Use 1 application every 14 (fourteen) days 6 each 1    fluticasone (FLONASE) 50 mcg/act nasal spray 1 spray into each nostril daily      glimepiride (AMARYL) 4 mg tablet Take 1 tablet (4 mg total) by mouth 2 (two) times a day 180 tablet 1    latanoprost (XALATAN) 0 005 % ophthalmic solution PUT 1 DROP INTO BOTH EYES IN THE EVENING      LEVEMIR FLEXTOUCH 100 units/mL injection pen       lisinopril (ZESTRIL) 10 mg tablet Take 10 mg by mouth daily      metFORMIN (GLUCOPHAGE-XR) 500 mg 24 hr tablet Take 1,000 mg by mouth 2 (two) times a day      Milk Thistle 150 MG CAPS Take by mouth      Multiple Vitamins-Minerals (MULTIVITAMIN WITH MINERALS) tablet Take 1 tablet by mouth daily      pioglitazone (ACTOS) 15 mg tablet Take 1 tablet (15 mg total) by mouth daily 90 tablet 1    sodium chloride (OCEAN) 0 65 % nasal spray 1 spray into each nostril as needed for congestion      Specialty Vitamins Products (MAGNESIUM, AMINO ACID CHELATE,) 133 MG tablet Take 1 tablet by mouth 2 (two) times a day      Vitamins-Lipotropics (B-100 PO) Take by mouth       No current facility-administered medications for this visit  He has No Known Allergies       Review of Systems   Constitutional: Negative for activity change, appetite change, chills, fatigue and fever  HENT: Negative for ear pain and sore throat  Eyes: Negative for visual disturbance  Respiratory: Negative for cough and shortness of breath  Cardiovascular: Negative for chest pain, palpitations and leg swelling  Gastrointestinal: Negative for abdominal pain, blood in stool, constipation, diarrhea and nausea  Genitourinary: Negative for difficulty urinating  Musculoskeletal: Negative for arthralgias, back pain and myalgias  Skin: Negative for rash  Neurological: Negative for dizziness, syncope and headaches  Psychiatric/Behavioral: Negative for self-injury, sleep disturbance and suicidal ideas  The patient is not nervous/anxious  Objective:        Physical Exam  Vitals signs and nursing note reviewed  Constitutional:       General: He is not in acute distress  Appearance: Normal appearance  He is well-developed  He is obese  He is not ill-appearing  HENT:      Head: Normocephalic and atraumatic        Right Ear: Tympanic membrane, ear canal and external ear normal  Left Ear: Tympanic membrane, ear canal and external ear normal    Eyes:      Conjunctiva/sclera: Conjunctivae normal       Pupils: Pupils are equal, round, and reactive to light  Neck:      Thyroid: No thyromegaly  Vascular: No carotid bruit  Cardiovascular:      Rate and Rhythm: Normal rate and regular rhythm  Pulses: no weak pulses          Dorsalis pedis pulses are 2+ on the right side and 2+ on the left side  Heart sounds: Normal heart sounds  No murmur  Pulmonary:      Effort: Pulmonary effort is normal       Breath sounds: Normal breath sounds  No wheezing  Abdominal:      General: Bowel sounds are normal       Palpations: Abdomen is soft  There is no mass  Tenderness: There is no abdominal tenderness  Musculoskeletal:      Right lower leg: No edema  Left lower leg: No edema  Lymphadenopathy:      Cervical: No cervical adenopathy  Skin:     General: Skin is warm and dry  Findings: No erythema  Neurological:      General: No focal deficit present  Mental Status: He is alert and oriented to person, place, and time  Psychiatric:         Mood and Affect: Mood normal          Behavior: Behavior normal          Thought Content:  Thought content normal          Judgment: Judgment normal

## 2021-03-11 ENCOUNTER — ANESTHESIA EVENT (OUTPATIENT)
Dept: GASTROENTEROLOGY | Facility: AMBULARY SURGERY CENTER | Age: 66
End: 2021-03-11

## 2021-03-22 ENCOUNTER — TELEPHONE (OUTPATIENT)
Dept: FAMILY MEDICINE CLINIC | Facility: CLINIC | Age: 66
End: 2021-03-22

## 2021-03-22 NOTE — TELEPHONE ENCOUNTER
Pt called to ask which meds he should take/not take during prep for colonoscopy  Prep day is Thurs 3/25 & procedure is 3/26

## 2021-03-23 NOTE — TELEPHONE ENCOUNTER
Spoke with patient  On the day of his prep he was instructed to hold all his diabetic meds and insulin  He can take his blood pressure pill and cholesterol pill  He will be on liquid diet only  If his blood sugar goes up he may take metformin  The morning of his colonoscopy no diabetic meds may take his blood pressure pill    After procedure is complete he may resume his diabetic meds as previously prescribed

## 2021-03-26 ENCOUNTER — HOSPITAL ENCOUNTER (OUTPATIENT)
Dept: GASTROENTEROLOGY | Facility: AMBULARY SURGERY CENTER | Age: 66
Setting detail: OUTPATIENT SURGERY
Discharge: HOME | End: 2021-03-26
Attending: COLON & RECTAL SURGERY | Admitting: COLON & RECTAL SURGERY
Payer: COMMERCIAL

## 2021-03-26 ENCOUNTER — ANESTHESIA (OUTPATIENT)
Dept: GASTROENTEROLOGY | Facility: AMBULARY SURGERY CENTER | Age: 66
End: 2021-03-26

## 2021-03-26 VITALS
WEIGHT: 198 LBS | DIASTOLIC BLOOD PRESSURE: 70 MMHG | SYSTOLIC BLOOD PRESSURE: 140 MMHG | BODY MASS INDEX: 26.24 KG/M2 | HEART RATE: 61 BPM | HEIGHT: 73 IN | OXYGEN SATURATION: 99 % | TEMPERATURE: 96 F | RESPIRATION RATE: 18 BRPM

## 2021-03-26 DIAGNOSIS — Z12.11 SCREENING FOR COLON CANCER: ICD-10-CM

## 2021-03-26 DIAGNOSIS — Z86.010 PERSONAL HISTORY OF COLONIC POLYPS: ICD-10-CM

## 2021-03-26 DIAGNOSIS — Z80.0 FAMILY HISTORY OF COLON CANCER: ICD-10-CM

## 2021-03-26 LAB — GLUCOSE SERPL-MCNC: 151 MG/DL (ref 65–140)

## 2021-03-26 PROCEDURE — 99213 OFFICE O/P EST LOW 20 MIN: CPT | Performed by: COLON & RECTAL SURGERY

## 2021-03-26 PROCEDURE — 88305 TISSUE EXAM BY PATHOLOGIST: CPT | Performed by: PATHOLOGY

## 2021-03-26 PROCEDURE — 45380 COLONOSCOPY AND BIOPSY: CPT | Performed by: COLON & RECTAL SURGERY

## 2021-03-26 PROCEDURE — 82948 REAGENT STRIP/BLOOD GLUCOSE: CPT

## 2021-03-26 RX ORDER — SODIUM CHLORIDE, SODIUM LACTATE, POTASSIUM CHLORIDE, CALCIUM CHLORIDE 600; 310; 30; 20 MG/100ML; MG/100ML; MG/100ML; MG/100ML
INJECTION, SOLUTION INTRAVENOUS CONTINUOUS PRN
Status: DISCONTINUED | OUTPATIENT
Start: 2021-03-26 | End: 2021-03-26

## 2021-03-26 RX ORDER — PROPOFOL 10 MG/ML
INJECTION, EMULSION INTRAVENOUS AS NEEDED
Status: DISCONTINUED | OUTPATIENT
Start: 2021-03-26 | End: 2021-03-26

## 2021-03-26 RX ORDER — LIDOCAINE HYDROCHLORIDE 10 MG/ML
INJECTION, SOLUTION EPIDURAL; INFILTRATION; INTRACAUDAL; PERINEURAL AS NEEDED
Status: DISCONTINUED | OUTPATIENT
Start: 2021-03-26 | End: 2021-03-26

## 2021-03-26 RX ADMIN — PROPOFOL 30 MG: 10 INJECTION, EMULSION INTRAVENOUS at 08:42

## 2021-03-26 RX ADMIN — PROPOFOL 150 MG: 10 INJECTION, EMULSION INTRAVENOUS at 08:38

## 2021-03-26 RX ADMIN — PROPOFOL 50 MG: 10 INJECTION, EMULSION INTRAVENOUS at 08:48

## 2021-03-26 RX ADMIN — PROPOFOL 20 MG: 10 INJECTION, EMULSION INTRAVENOUS at 08:43

## 2021-03-26 RX ADMIN — SODIUM CHLORIDE, SODIUM LACTATE, POTASSIUM CHLORIDE, AND CALCIUM CHLORIDE: .6; .31; .03; .02 INJECTION, SOLUTION INTRAVENOUS at 08:35

## 2021-03-26 RX ADMIN — PROPOFOL 50 MG: 10 INJECTION, EMULSION INTRAVENOUS at 08:45

## 2021-03-26 RX ADMIN — PROPOFOL 30 MG: 10 INJECTION, EMULSION INTRAVENOUS at 08:40

## 2021-03-26 RX ADMIN — LIDOCAINE HYDROCHLORIDE 50 MG: 10 INJECTION, SOLUTION EPIDURAL; INFILTRATION; INTRACAUDAL at 08:38

## 2021-03-26 NOTE — ANESTHESIA POSTPROCEDURE EVALUATION
Post-Op Assessment Note    CV Status:  Stable  Pain Score: 0    Pain management: adequate     Mental Status:  Awake and alert   Hydration Status:  Stable   PONV Controlled:  None   Airway Patency:  Patent and adequate      Post Op Vitals Reviewed: Yes      Staff: CRNA, Anesthesiologist         No complications documented      BP   105/54   Temp     Pulse  58   Resp   16   SpO2   100%

## 2021-03-26 NOTE — DISCHARGE INSTRUCTIONS
Colorectal Polyps   WHAT YOU NEED TO KNOW:   Colorectal polyps are small growths of tissue in the lining of the colon and rectum  Most polyps are hyperplastic polyps and are usually benign (noncancerous)  Certain types of polyps, called adenomatous polyps, may turn into cancer  DISCHARGE INSTRUCTIONS:   Follow up with your healthcare provider or gastroenterologist as directed: You may need to return for more tests, such as another colonoscopy  Write down your questions so you remember to ask them during your visits  Reduce your risk for colorectal polyps:   · Eat a variety of healthy foods:  Healthy foods include fruit, vegetables, whole-grain breads, low-fat dairy products, beans, lean meat, and fish  Ask if you need to be on a special diet  · Maintain a healthy weight:  Ask your healthcare provider if you need to lose weight and how much you need to lose  Ask for help with a weight loss program     · Exercise:  Begin to exercise slowly and do more as you get stronger  Talk with your healthcare provider before you start an exercise program      · Limit alcohol:  Your risk for polyps increases the more you drink  · Do not smoke: If you smoke, it is never too late to quit  Ask for information about how to stop  For support and more information:   · Irvin Coronado ([facility])  [address], [place] [address]  Phone: [phone]  Web Address: www digestive  niddk nih gov    Contact your healthcare provider or gastroenterologist if:   · You have a fever  · You have chills, a cough, or feel weak and achy  · You have abdominal pain that does not go away or gets worse after you take medicine  · Your abdomen is swollen  · You are losing weight without trying  · You have questions or concerns about your condition or care  Seek care immediately or call 911 if:   · You have sudden shortness of breath       · You have a fast heart rate, fast breathing, or are too dizzy to stand up  · You have severe abdominal pain  · You see blood in your bowel movement  © Copyright [address] Information is for End User's use only and may not be sold, redistributed or otherwise used for commercial purposes  All illustrations and images included in CareNotes® are the copyrighted property of A D A M , Inc  or [address] [place], [place] [address]amira   The above information is an  only  It is not intended as medical advice for individual conditions or treatments  Talk to your doctor, nurse or pharmacist before following any medical regimen to see if it is safe and effective for you  Colonoscopy   WHAT YOU NEED TO KNOW:   A colonoscopy is a procedure to examine the inside of your colon (intestine) with a scope  Polyps or tissue growths may have been removed during your colonoscopy  It is normal to feel bloated and to have some abdominal discomfort  You should be passing gas  If you have hemorrhoids or you had polyps removed, you may have a small amount of bleeding  DISCHARGE INSTRUCTIONS:   Seek care immediately if:   · You have a large amount of bright red blood in your bowel movements  · Your abdomen is hard and firm and you have severe pain  · You have sudden trouble breathing  Contact your healthcare provider if:   · You develop a rash or hives  · You have a fever within 24 hours of your procedure       · You have not had a bowel movement for 3 days after your procedure  · You have questions or concerns about your condition or care  Activity:   · Do not lift, strain, or run  for 3 days after your procedure  · Rest after your procedure  You have been given medicine to relax you  Do not  drive or make important decisions until the day after your procedure  Return to your normal activity as directed  · Relieve gas and discomfort from bloating  by lying on your right side with a heating pad on your abdomen   You may need to take short walks to help the gas move out  Eat small meals until bloating is relieved  If you had polyps removed: For 7 days after your procedure:  · Do not  take aspirin  · Do not  go on long car rides  Follow up with your healthcare provider as directed:  Write down your questions so you remember to ask them during your visits  © 2017 [address] is for End User's use only and may not be sold, redistributed or otherwise used for commercial purposes  All illustrations and images included in CareNotes® are the copyrighted property of A D A M , Inc  or Rodger Oro  The above information is an  only  It is not intended as medical advice for individual conditions or treatments  Talk to your doctor, nurse or pharmacist before following any medical regimen to see if it is safe and effective for you  Diverticulosis   WHAT YOU NEED TO KNOW:   Diverticulosis is a condition that causes small pockets called diverticula to form in your intestine  These pockets make it difficult for bowel movements to pass through your digestive system  DISCHARGE INSTRUCTIONS:   Seek care immediately if:   · You have severe pain on the left side of your lower abdomen  · Your bowel movements are bright or dark red  Contact your healthcare provider if:   · You have a fever and chills  · You feel dizzy or lightheaded  · You have nausea, or you are vomiting  · You have a change in your bowel movements  · You have questions or concerns about your condition or care  Medicines:   · Medicines  to soften your bowel movements may be given  You may also need medicines to treat symptoms such as bloating and pain  · Take your medicine as directed  Contact your healthcare provider if you think your medicine is not helping or if you have side effects  Tell him or her if you are allergic to any medicine   Keep a list of the medicines, vitamins, and herbs you take  Include the amounts, and when and why you take them  Bring the list or the pill bottles to follow-up visits  Carry your medicine list with you in case of an emergency  Self-care: The goal of treatment is to manage any symptoms you have and prevent other problems such as diverticulitis  Diverticulitis is swelling or infection of the diverticula  Your healthcare provider may recommend any of the following:  · Eat a variety of high-fiber foods  High-fiber foods help you have regular bowel movements  High-fiber foods include cooked beans, fruits, vegetables, and some cereals  Most adults need 25 to 35 grams of fiber each day  Your healthcare provider may recommend that you have more  Ask your healthcare provider how much fiber you need  Increase fiber slowly  You may have abdominal discomfort, bloating, and gas if you add fiber to your diet too quickly  You may need to take a fiber supplement if you are not getting enough fiber from food  · Drink liquids as directed  You may need to drink 2 to 3 liters (8 to 12 cups) of liquids every day  Ask your healthcare provider how much liquid to drink each day and which liquids are best for you  · Apply heat  on your abdomen for 20 to 30 minutes every 2 hours for as many days as directed  Heat helps decrease pain and muscle spasms  Help prevent diverticulitis or other symptoms: The following may help decrease your risk for diverticulitis or symptoms, such as bleeding  Talk to your provider about these or other things you can do to prevent problems that may occur with diverticulosis  · Exercise regularly  Ask your healthcare provider about the best exercise plan for you  Exercise can help you have regular bowel movements  Get 30 minutes of exercise on most days of the week  · Maintain a healthy weight  Ask your healthcare provider how much you should weigh  Ask him or her to help you create a weight loss plan if you are overweight       · Do not smoke  Nicotine and other chemicals in cigarettes increase your risk for diverticulitis  Ask your healthcare provider for information if you currently smoke and need help to quit  E-cigarettes or smokeless tobacco still contain nicotine  Talk to your healthcare provider before you use these products  · Ask your healthcare provider if it is safe to take NSAIDs  NSAIDs may increase your risk of diverticulitis  Follow up with your healthcare provider as directed:  Write down your questions so you remember to ask them during your visits  © Copyright [address] Information is for End User's use only and may not be sold, redistributed or otherwise used for commercial purposes  All illustrations and images included in CareNotes® are the copyrighted property of A D A M , Inc  or [address] [place] [address]  The above information is an  only  It is not intended as medical advice for individual conditions or treatments  Talk to your doctor, nurse or pharmacist before following any medical regimen to see if it is safe and effective for you  Diverticulosis Diet   WHAT YOU NEED TO KNOW:   What is a diverticulosis diet? A diverticulosis diet includes high-fiber foods  High-fiber foods help you have regular bowel movements  Extra fiber may decrease your risk of forming new diverticula (small pockets) in your intestine  A high-fiber diet may also help prevent diverticulitis  Diverticulitis is a painful condition that occurs when diverticula become inflamed or infected  You do not need to avoid nuts, seeds, corn, or popcorn while you are on a diverticulosis diet  How much fiber do I need? You may need 25 to 35 grams of fiber each day  Ask your dietitian or healthcare provider how much fiber you should have  Increase your intake of fiber slowly  When you eat more fiber, you may have gas and feel bloated  You may need to take a fiber supplement if you do not get enough fiber from food   Drink plenty of liquids as you increase the fiber in your diet  Your dietitian or healthcare provider may recommend 8 eight-ounce cups or more each day  Ask which liquids are best for you  Which foods are high in fiber? · Foods with at least 4 grams of fiber per serving:      ? ? to ½ cup of high-fiber cereal (check the nutrition label on the box)    ? ½ cup of blackberries or raspberries    ? 4 dried prunes    ? 1 cooked artichoke    ? ½ cup of cooked legumes, such as lentils, or red, kidney, and ramos beans    · Foods with 1 to 3 grams of fiber per serving:      ? 1 slice of whole-wheat, pumpernickel, or rye bread    ? 4 whole-wheat crackers    ? ½ cup of cereal with 1 to 3 grams of fiber per serving (check the nutrition label on the box)    ? 1 piece of fruit, such as an apple, banana, pear, kiwi, or orange    ? 3 dates    ? ½ cup of canned apricots, fruit cocktail, peaches, or pears    ? ½ cup of raw or cooked vegetables, such as carrots, cauliflower, cabbage, spinach, squash, or corn    When should I contact my healthcare provider? · You have questions about a high-fiber diet  · You have a change in your bowel movements  · You have an upset stomach  · You have a fever  · You have pain in your lower abdomen on the left side  · You have questions about your condition or care  CARE AGREEMENT:   You have the right to help plan your care  Learn about your health condition and how it may be treated  Discuss treatment options with your healthcare providers to decide what care you want to receive  You always have the right to refuse treatment  The above information is an  only  It is not intended as medical advice for individual conditions or treatments  Talk to your doctor, nurse or pharmacist before following any medical regimen to see if it is safe and effective for you    © Copyright [address] Information is for End User's use only and may not be sold, redistributed or otherwise used for commercial purposes   All illustrations and images included in CareNotes® are the copyrighted property of A D A M , Inc  or [facility] Chris Velez

## 2021-03-26 NOTE — H&P
History and Physical   Colon and Rectal Surgery   Murali Jung 76 y o  male MRN: [id number]  Unit/Bed#:  Encounter: [id number]  03/26/21   @NOW    No chief complaint on file  History of Present Illness   HPI:  Murali Jung is a 76 y o  male who presents for surveillance   He denies any current symptoms    Historical Information   Past Medical History:   Diagnosis Date    Colon polyp     Diabetes mellitus ([facility] [address] )     Ear problems     Essential hypertension 2/5/2020    Glaucoma     HL (hearing loss)     Hyperlipidemia 2/5/2020    Melanoma of ear, left ([address] )     Nasal polyp     Tinnitus      Past Surgical History:   Procedure Laterality Date    ARTHROSCOPY KNEE Bilateral     BASAL CELL CARCINOMA EXCISION  03/2020    left arm    CYSTOSCOPY      kidney stone removal    NASAL POLYP EXCISION      TX COLONOSCOPY FLX DX W/COLLJ SPEC WHEN PFRMD N/A 8/8/2017    Procedure: COLONOSCOPY;  Surgeon: Tory Coats MD;  [facility];   Service: Colorectal    TX REPAIR FLEX FOOT TENDON,EA Left 12/8/2017    Procedure: ANKLE PERONEUS LONGUS TENDON REPAIR with allograft;  RETINACULUM REPAIR SECONDARY  Application of Pro[employer]a Vac;  Surgeon: Joni Guthrie DPM;  [facility];  Service: Podiatry    ROTATOR CUFF REPAIR      SHOULDER ARTHROSCOPY      WISDOM TOOTH EXTRACTION         Meds/Allergies     (Not in a hospital admission)        Current Outpatient Medications:     aspirin (ECOTRIN LOW STRENGTH) 81 mg EC tablet, Take 81 mg by mouth daily, Disp: , Rfl:     atorvastatin (LIPITOR) 10 mg tablet, Take 10 mg by mouth daily, Disp: , Rfl:     Cholecalciferol (D-3-5) 5000 units capsule, Take 5,000 Units by mouth daily, Disp: , Rfl:     fluticasone (FLONASE) 50 mcg/act nasal spray, 1 spray into each nostril daily, Disp: , Rfl:     glimepiride (AMARYL) 4 mg tablet, Take 1 tablet (4 mg total) by mouth 2 (two) times a day, Disp: 180 tablet, Rfl: 1    latanoprost (XALATAN) 0 005 % ophthalmic solution, PUT 1 DROP INTO BOTH EYES IN THE EVENING, Disp: , Rfl:     LEVEMIR FLEXTOUCH 100 units/mL injection pen, , Disp: , Rfl:     lisinopril (ZESTRIL) 10 mg tablet, Take 10 mg by mouth daily, Disp: , Rfl:     metFORMIN (GLUCOPHAGE-XR) 500 mg 24 hr tablet, Take 1,000 mg by mouth 2 (two) times a day, Disp: , Rfl:     Milk Thistle 150 MG CAPS, Take by mouth, Disp: , Rfl:     Multiple Vitamins-Minerals (MULTIVITAMIN WITH MINERALS) tablet, Take 1 tablet by mouth daily, Disp: , Rfl:     pioglitazone (ACTOS) 15 mg tablet, Take 1 tablet (15 mg total) by mouth daily, Disp: 90 tablet, Rfl: 1    sodium chloride (OCEAN) 0 65 % nasal spray, 1 spray into each nostril as needed for congestion, Disp: , Rfl:     Specialty Vitamins Products (MAGNESIUM, AMINO ACID CHELATE,) 133 MG tablet, Take 1 tablet by mouth 2 (two) times a day, Disp: , Rfl:     Vitamins-Lipotropics (B-100 PO), Take by mouth, Disp: , Rfl:     BD Pen Needle Meghana U/F 32G X 4 MM MISC, daily Use as directed, Disp: , Rfl:     Continuous Blood Gluc  (FreeStyle Kal[place] 14 Day [place]) CAMRON, Use 1 application 4 (four) times a day Patient to check his blood sugar 4 times a day, Disp: 1 Device, Rfl: 0    Continuous Blood Gluc Sensor (FreeStyle [place] 14 Day Sensor) MISC, Use 1 application every 14 (fourteen) days, Disp: 6 each, Rfl: 1    No Known Allergies      Social History   Social History     Substance and Sexual Activity   Alcohol Use Yes    Frequency: Monthly or less    Drinks per session: 1 or 2     Social History     Substance and Sexual Activity   Drug Use No     Social History     Tobacco Use   Smoking Status Former Smoker   Smokeless Tobacco Former User   Tobacco Comment    quit 15 years ago         Family History:   Family History   Problem Relation Age of Onset    Heart disease Mother         cardiac disorder     Diabetes Mother     Melanoma Mother     Aortic aneurysm Father         abdominal     Prostate cancer Father     Colon cancer Father          Objective     Current Vitals:      No intake or output data in the 24 hours ending 03/26/21 0800    Physical Exam:  General:  Resting comfortably in bed   Eyes:Sclera anicteric  ENT: Trachea midline  Pulm:  Symmetric chest raise  No respiratory Distress  CV:  Regular on monitor  Abdomen:  Soft NT ND  Extremities:  No clubbing/ cyanosis/ edema    Lab Results: I have personally reviewed pertinent lab results  Imaging: I have personally reviewed pertinent reports  ASSESSMENT:  Afshin  is a 76 y o  male who presents for outpatient colonoscopy  PLAN:  For colonoscopy    Risks/ Benefits reviewed to include but not limited to anesthesia, bleeding, missed lesions, and colonoscopic perforation requiring surgery

## 2021-03-26 NOTE — ANESTHESIA PREPROCEDURE EVALUATION
Procedure:  COLONOSCOPY    Relevant Problems   CARDIO   (+) Essential hypertension   (+) Hyperlipidemia      ENDO   (+) Type 2 diabetes mellitus without complication, with long-term current use of insulin (HCC)        Physical Exam    Airway      TM Distance: >3 FB  Neck ROM: full     Dental   No notable dental hx     Cardiovascular  Cardiovascular exam normal    Pulmonary  Pulmonary exam normal     Other Findings        Anesthesia Plan  ASA Score- 2     Anesthesia Type- IV sedation with anesthesia with ASA Monitors  Additional Monitors:   Airway Plan:           Plan Factors-    Chart reviewed  Patient summary reviewed  Induction- intravenous  Postoperative Plan-     Informed Consent- Anesthetic plan and risks discussed with patient  I personally reviewed this patient with the CRNA  Discussed and agreed on the Anesthesia Plan with the CRNA  Ammon Hewitt

## 2021-04-14 ENCOUNTER — OFFICE VISIT (OUTPATIENT)
Dept: FAMILY MEDICINE CLINIC | Facility: CLINIC | Age: 66
End: 2021-04-14
Payer: COMMERCIAL

## 2021-04-14 VITALS
OXYGEN SATURATION: 99 % | WEIGHT: 208 LBS | HEIGHT: 73 IN | RESPIRATION RATE: 18 BRPM | BODY MASS INDEX: 27.57 KG/M2 | DIASTOLIC BLOOD PRESSURE: 70 MMHG | SYSTOLIC BLOOD PRESSURE: 144 MMHG | HEART RATE: 73 BPM | TEMPERATURE: 97 F

## 2021-04-14 DIAGNOSIS — I10 ESSENTIAL HYPERTENSION: ICD-10-CM

## 2021-04-14 DIAGNOSIS — E11.9 TYPE 2 DIABETES MELLITUS WITHOUT COMPLICATION, WITH LONG-TERM CURRENT USE OF INSULIN (HCC): Primary | ICD-10-CM

## 2021-04-14 DIAGNOSIS — Z79.4 TYPE 2 DIABETES MELLITUS WITHOUT COMPLICATION, WITH LONG-TERM CURRENT USE OF INSULIN (HCC): Primary | ICD-10-CM

## 2021-04-14 DIAGNOSIS — R20.0 NUMBNESS OF TONGUE: ICD-10-CM

## 2021-04-14 PROCEDURE — 3078F DIAST BP <80 MM HG: CPT | Performed by: PHYSICIAN ASSISTANT

## 2021-04-14 PROCEDURE — 1160F RVW MEDS BY RX/DR IN RCRD: CPT | Performed by: PHYSICIAN ASSISTANT

## 2021-04-14 PROCEDURE — 1036F TOBACCO NON-USER: CPT | Performed by: PHYSICIAN ASSISTANT

## 2021-04-14 PROCEDURE — 3077F SYST BP >= 140 MM HG: CPT | Performed by: PHYSICIAN ASSISTANT

## 2021-04-14 PROCEDURE — 99214 OFFICE O/P EST MOD 30 MIN: CPT | Performed by: PHYSICIAN ASSISTANT

## 2021-04-14 PROCEDURE — 3008F BODY MASS INDEX DOCD: CPT | Performed by: PHYSICIAN ASSISTANT

## 2021-04-14 NOTE — PROGRESS NOTES
Assessment/Plan:     Diagnoses and all orders for this visit:    Type 2 diabetes mellitus without complication, with long-term current use of insulin ([facility])  Comments:  Some hypoglycemia at night  Decreased p m  Glimepiride to 2 mg  Follow-up in a month    Essential hypertension  Comments:  Blood pressure is at goal continue current regimen    Numbness of tongue  Comments:  Observe  Follow-up 1 month          Subjective:      Patient ID: Ta Quintero is a 76 y o  male  Presents in the office with 3 separate spells  Patient states his abdominal go numb like he has had Novocain from the dentist   Then feels a heart thumb and then some rapid beats and then a thump and back to normal   The numbness in his tongue then resolves  We are no visual disturbance  Period no vertigo  Patient states he did notice a little bit more of headache  No chest pain or trouble breathing  No nausea or vomiting  Patient also has diabetes type 2 on insulin therapy  He has a new continuous glucometer [place]  He has been getting some low blood sugars around 12 a m  He is on Actos 15 once a day metformin  twice a day also Levemir insulin  He is on glimepiride 4 mg twice a day  We will decrease the p m  Dose to half a tablet or 2 mg   Patient will follow-up in a month we will check his A1c      The following portions of the patient's history were reviewed and updated as appropriate:   He   Patient Active Problem List    Diagnosis Date Noted    Skin cancer, basal cell 02/07/2020    Decreased hearing of both ears 02/07/2020    Essential hypertension 02/05/2020    Hyperlipidemia 02/05/2020    Type 2 diabetes mellitus without complication, with long-term current use of insulin ([facility] [address] ) 02/05/2020    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     Current Outpatient Medications   Medication Sig Dispense Refill    aspirin (ECOTRIN LOW STRENGTH) 81 mg EC tablet Take 81 mg by mouth daily      atorvastatin (LIPITOR) 10 mg tablet Take 10 mg by mouth daily      BD Pen Needle Meghana U/F 32G X 4 MM MISC daily Use as directed      Cholecalciferol (D-3-5) 5000 units capsule Take 5,000 Units by mouth daily      Continuous Blood Gluc  (FreeStyle Kalpana 14 Day [place]) CAMRON Use 1 application 4 (four) times a day Patient to check his blood sugar 4 times a day 1 Device 0    Continuous Blood Gluc Sensor (FreeStyle Kalpana 14 Day Sensor) MISC Use 1 application every 14 (fourteen) days 6 each 1    fluticasone (FLONASE) 50 mcg/act nasal spray 1 spray into each nostril daily      glimepiride (AMARYL) 4 mg tablet Take 1 tablet (4 mg total) by mouth 2 (two) times a day 180 tablet 1    latanoprost (XALATAN) 0 005 % ophthalmic solution PUT 1 DROP INTO BOTH EYES IN THE EVENING      LEVEMIR FLEXTOUCH 100 units/mL injection pen       lisinopril (ZESTRIL) 10 mg tablet Take 10 mg by mouth daily      metFORMIN (GLUCOPHAGE-XR) 500 mg 24 hr tablet Take 1,000 mg by mouth 2 (two) times a day      Milk Thistle 150 MG CAPS Take by mouth      Multiple Vitamins-Minerals (MULTIVITAMIN WITH MINERALS) tablet Take 1 tablet by mouth daily      pioglitazone (ACTOS) 15 mg tablet Take 1 tablet (15 mg total) by mouth daily 90 tablet 1    sodium chloride (OCEAN) 0 65 % nasal spray 1 spray into each nostril as needed for congestion      Specialty Vitamins Products (MAGNESIUM, AMINO ACID CHELATE,) 133 MG tablet Take 1 tablet by mouth 2 (two) times a day      Vitamins-Lipotropics (B-100 PO) Take by mouth       No current facility-administered medications for this visit  He has No Known Allergies       Review of Systems   Constitutional: Negative for activity change, appetite change, fatigue and fever  Respiratory: Negative for cough and shortness of breath  Cardiovascular: Negative for chest pain, palpitations and leg swelling  Gastrointestinal: Negative for abdominal pain and nausea  Neurological: Positive for headaches  Negative for dizziness  Objective:        Physical Exam  Vitals signs and nursing note reviewed  Constitutional:       General: He is not in acute distress  Appearance: Normal appearance  He is not ill-appearing  Neurological:      Mental Status: He is alert

## 2021-05-11 ENCOUNTER — VBI (OUTPATIENT)
Dept: ADMINISTRATIVE | Facility: OTHER | Age: 66
End: 2021-05-11

## 2021-05-24 ENCOUNTER — RA CDI HCC (OUTPATIENT)
Dept: OTHER | Facility: HOSPITAL | Age: 66
End: 2021-05-24

## 2021-05-24 NOTE — PROGRESS NOTES
[address]  coding opportunities             Chart reviewed, (number of) suggestions sent to provider: 2        [address]  coding opportunities             Chart reviewed, (number of) suggestions sent to provider: 2           Patients insurance company: [employer] (Medicare Advantage and Commercial)     Visit status: Patient canceled the appointment     Provider never responded to [facility] [address]  coding request       [facility] [address]  coding opportunities             Chart reviewed, (number of) suggestions sent to provider: 2           Patients insurance company: [employer] (Medicare Advantage and [employer])     Visit status: Patient canceled the appointment     Provider never responded to [facility] [address]  coding request            Patients insurance company: [employer] ([employer])

## 2021-05-27 ENCOUNTER — DOCUMENTATION (OUTPATIENT)
Dept: OTHER | Facility: HOSPITAL | Age: 66
End: 2021-05-27

## 2021-05-27 DIAGNOSIS — G89.29 CHRONIC PAIN OF LEFT ANKLE: Primary | ICD-10-CM

## 2021-05-27 DIAGNOSIS — M25.572 CHRONIC PAIN OF LEFT ANKLE: Primary | ICD-10-CM

## 2021-05-28 ENCOUNTER — RA CDI HCC (OUTPATIENT)
Dept: OTHER | Facility: HOSPITAL | Age: 66
End: 2021-05-28

## 2021-05-28 ENCOUNTER — APPOINTMENT (OUTPATIENT)
Dept: RADIOLOGY | Facility: MEDICAL CENTER | Age: 66
End: 2021-05-28
Payer: COMMERCIAL

## 2021-05-28 DIAGNOSIS — G89.29 CHRONIC PAIN OF LEFT ANKLE: ICD-10-CM

## 2021-05-28 DIAGNOSIS — M25.572 CHRONIC PAIN OF LEFT ANKLE: ICD-10-CM

## 2021-05-28 PROCEDURE — 73610 X-RAY EXAM OF ANKLE: CPT

## 2021-05-28 NOTE — PROGRESS NOTES
[place] [address]  coding opportunities             Chart reviewed, (number of) suggestions sent to provider: 2     Problem listed updated   Provider Accepted, (number of) suggestions accepted: 2        Patients insurance company: [employer] (Medicare Advantage and Commercial)     Visit status: Patient canceled the appointment        [place] [address]  coding opportunities             Chart reviewed, (number of) suggestions sent to provider: 2      DX:  E11 65-Type 2 diabetes mellitus with [id number]  E11 3293-Type 2 diabetes mellitus 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, bilateral-per last opth note-oct-2020     Patients insurance company: [employer] ([employer])

## 2021-06-01 DIAGNOSIS — S99.912D LEFT ANKLE INJURY, SUBSEQUENT ENCOUNTER: Primary | ICD-10-CM

## 2021-06-01 PROBLEM — E11.3293 TYPE 2 DIABETES MELLITUS WITH MILD NONPROLIFERATIVE DIABETIC RETINOPATHY WITHOUT MACULAR EDEMA, BILATERAL (HCC): Status: ACTIVE | Noted: 2021-06-01

## 2021-06-01 PROBLEM — E11.65 TYPE 2 DIABETES MELLITUS WITH HYPERGLYCEMIA (HCC): Status: ACTIVE | Noted: 2021-06-01

## 2021-06-02 ENCOUNTER — HOSPITAL ENCOUNTER (OUTPATIENT)
Dept: MRI IMAGING | Facility: HOSPITAL | Age: 66
Discharge: HOME | End: 2021-06-02
Payer: COMMERCIAL

## 2021-06-02 DIAGNOSIS — S99.912D LEFT ANKLE INJURY, SUBSEQUENT ENCOUNTER: ICD-10-CM

## 2021-06-02 PROCEDURE — G1004 CDSM NDSC: HCPCS

## 2021-06-02 PROCEDURE — 73721 MRI JNT OF LWR EXTRE W/O DYE: CPT

## 2021-06-03 ENCOUNTER — DOCUMENTATION (OUTPATIENT)
Dept: OTHER | Facility: HOSPITAL | Age: 66
End: 2021-06-03

## 2021-06-03 DIAGNOSIS — Z01.818 PREOP EXAMINATION: Primary | ICD-10-CM

## 2021-06-04 ENCOUNTER — CLINICAL SUPPORT (OUTPATIENT)
Dept: URGENT CARE | Facility: MEDICAL CENTER | Age: 66
End: 2021-06-04
Payer: COMMERCIAL

## 2021-06-04 ENCOUNTER — APPOINTMENT (OUTPATIENT)
Dept: RADIOLOGY | Facility: MEDICAL CENTER | Age: 66
End: 2021-06-04
Payer: COMMERCIAL

## 2021-06-04 ENCOUNTER — APPOINTMENT (OUTPATIENT)
Dept: LAB | Facility: MEDICAL CENTER | Age: 66
End: 2021-06-04
Payer: COMMERCIAL

## 2021-06-04 DIAGNOSIS — Z01.818 PREOP EXAMINATION: ICD-10-CM

## 2021-06-04 LAB
ALBUMIN SERPL BCP-MCNC: 3.9 G/DL (ref 3.5–5)
ALP SERPL-CCNC: 48 U/L (ref 46–116)
ALT SERPL W P-5'-P-CCNC: 45 U/L (ref 12–78)
ANION GAP SERPL CALCULATED.3IONS-SCNC: 5 MMOL/L (ref 4–13)
AST SERPL W P-5'-P-CCNC: 23 U/L (ref 5–45)
ATRIAL RATE: 69 BPM
BASOPHILS # BLD AUTO: 0.05 THOUSANDS/ΜL (ref 0–0.1)
BASOPHILS NFR BLD AUTO: 1 % (ref 0–1)
BILIRUB SERPL-MCNC: 1 MG/DL (ref 0.2–1)
BUN SERPL-MCNC: 26 MG/DL (ref 5–25)
CALCIUM SERPL-MCNC: 9.4 MG/DL (ref 8.3–10.1)
CHLORIDE SERPL-SCNC: 110 MMOL/L (ref 100–108)
CO2 SERPL-SCNC: 27 MMOL/L (ref 21–32)
CREAT SERPL-MCNC: 1.1 MG/DL (ref 0.6–1.3)
EOSINOPHIL # BLD AUTO: 0.22 THOUSAND/ΜL (ref 0–0.61)
EOSINOPHIL NFR BLD AUTO: 3 % (ref 0–6)
ERYTHROCYTE [DISTWIDTH] IN BLOOD BY AUTOMATED COUNT: 13.2 % (ref 11.6–15.1)
EST. AVERAGE GLUCOSE BLD GHB EST-MCNC: 134 MG/DL
GFR SERPL CREATININE-BSD FRML MDRD: 70 ML/MIN/1.73SQ M
GLUCOSE SERPL-MCNC: 210 MG/DL (ref 65–140)
HBA1C MFR BLD: 6.3 %
HCT VFR BLD AUTO: 38 % (ref 36.5–49.3)
HGB BLD-MCNC: 12.5 G/DL (ref 12–17)
IMM GRANULOCYTES # BLD AUTO: 0.02 THOUSAND/UL (ref 0–0.2)
IMM GRANULOCYTES NFR BLD AUTO: 0 % (ref 0–2)
LYMPHOCYTES # BLD AUTO: 2.05 THOUSANDS/ΜL (ref 0.6–4.47)
LYMPHOCYTES NFR BLD AUTO: 27 % (ref 14–44)
MCH RBC QN AUTO: 30.9 PG (ref 26.8–34.3)
MCHC RBC AUTO-ENTMCNC: 32.9 G/DL (ref 31.4–37.4)
MCV RBC AUTO: 94 FL (ref 82–98)
MONOCYTES # BLD AUTO: 0.54 THOUSAND/ΜL (ref 0.17–1.22)
MONOCYTES NFR BLD AUTO: 7 % (ref 4–12)
NEUTROPHILS # BLD AUTO: 4.63 THOUSANDS/ΜL (ref 1.85–7.62)
NEUTS SEG NFR BLD AUTO: 62 % (ref 43–75)
NRBC BLD AUTO-RTO: 0 /100 WBCS
P AXIS: 11 DEGREES
PLATELET # BLD AUTO: 353 THOUSANDS/UL (ref 149–390)
PMV BLD AUTO: 10.4 FL (ref 8.9–12.7)
POTASSIUM SERPL-SCNC: 4.6 MMOL/L (ref 3.5–5.3)
PR INTERVAL: 158 MS
PROT SERPL-MCNC: 6.6 G/DL (ref 6.4–8.2)
QRS AXIS: -28 DEGREES
QRSD INTERVAL: 116 MS
QT INTERVAL: 384 MS
QTC INTERVAL: 411 MS
RBC # BLD AUTO: 4.04 MILLION/UL (ref 3.88–5.62)
SODIUM SERPL-SCNC: 142 MMOL/L (ref 136–145)
T WAVE AXIS: 54 DEGREES
VENTRICULAR RATE: 69 BPM
WBC # BLD AUTO: 7.51 THOUSAND/UL (ref 4.31–10.16)

## 2021-06-04 PROCEDURE — 93010 ELECTROCARDIOGRAM REPORT: CPT | Performed by: INTERNAL MEDICINE

## 2021-06-04 PROCEDURE — 71046 X-RAY EXAM CHEST 2 VIEWS: CPT

## 2021-06-04 PROCEDURE — 36415 COLL VENOUS BLD VENIPUNCTURE: CPT

## 2021-06-04 PROCEDURE — 85025 COMPLETE CBC W/AUTO DIFF WBC: CPT

## 2021-06-04 PROCEDURE — 3044F HG A1C LEVEL LT 7.0%: CPT | Performed by: INTERNAL MEDICINE

## 2021-06-04 PROCEDURE — 93005 ELECTROCARDIOGRAM TRACING: CPT

## 2021-06-04 PROCEDURE — 80053 COMPREHEN METABOLIC PANEL: CPT

## 2021-06-04 PROCEDURE — 83036 HEMOGLOBIN GLYCOSYLATED A1C: CPT

## 2021-06-10 ENCOUNTER — RA CDI HCC (OUTPATIENT)
Dept: OTHER | Facility: HOSPITAL | Age: 66
End: 2021-06-10

## 2021-06-10 NOTE — PROGRESS NOTES
[facility] [employer]  coding opportunities             Chart reviewed, (number of) suggestions sent to provider: 2      DX:  E11 65-Type 2 diabetes mellitus with [id number]  E11 3293-Type 2 diabetes mellitus w  [place] [address]  coding opportunities             Chart reviewed, (number of) suggestions sent to provider: 2            Number of suggestions actually used: 1      Number of suggestions NOT actually used: 1     Patients insurance company: [employer] (Medicare Advantage and Commercial)   dx not used:   Visit status: Patient arrived for their scheduled appointment     Provider never responded to [employer]  coding request     ith mild nonproliferative diabetic retinopathy without macular edema, bilateral-per last opth note-oct-2020            Patients insurance company: [employer] ([employer])

## 2021-06-16 ENCOUNTER — OFFICE VISIT (OUTPATIENT)
Dept: FAMILY MEDICINE CLINIC | Facility: CLINIC | Age: 66
End: 2021-06-16
Payer: COMMERCIAL

## 2021-06-16 VITALS
SYSTOLIC BLOOD PRESSURE: 132 MMHG | RESPIRATION RATE: 16 BRPM | DIASTOLIC BLOOD PRESSURE: 64 MMHG | HEIGHT: 73 IN | HEART RATE: 60 BPM | BODY MASS INDEX: 27.17 KG/M2 | WEIGHT: 205 LBS | OXYGEN SATURATION: 97 % | TEMPERATURE: 97.7 F

## 2021-06-16 DIAGNOSIS — I25.10 CORONARY ARTERY DISEASE INVOLVING NATIVE CORONARY ARTERY OF NATIVE HEART WITHOUT ANGINA PECTORIS: ICD-10-CM

## 2021-06-16 DIAGNOSIS — Z79.4 TYPE 2 DIABETES MELLITUS WITH BOTH EYES AFFECTED BY MILD NONPROLIFERATIVE RETINOPATHY WITHOUT MACULAR EDEMA, WITH LONG-TERM CURRENT USE OF INSULIN (HCC): ICD-10-CM

## 2021-06-16 DIAGNOSIS — E11.3293 TYPE 2 DIABETES MELLITUS WITH BOTH EYES AFFECTED BY MILD NONPROLIFERATIVE RETINOPATHY WITHOUT MACULAR EDEMA, WITH LONG-TERM CURRENT USE OF INSULIN (HCC): ICD-10-CM

## 2021-06-16 DIAGNOSIS — I10 ESSENTIAL HYPERTENSION: ICD-10-CM

## 2021-06-16 DIAGNOSIS — T14.8XXA LIGAMENT TEAR: Primary | ICD-10-CM

## 2021-06-16 PROCEDURE — 1160F RVW MEDS BY RX/DR IN RCRD: CPT | Performed by: INTERNAL MEDICINE

## 2021-06-16 PROCEDURE — 3078F DIAST BP <80 MM HG: CPT | Performed by: INTERNAL MEDICINE

## 2021-06-16 PROCEDURE — 1036F TOBACCO NON-USER: CPT | Performed by: INTERNAL MEDICINE

## 2021-06-16 PROCEDURE — 3008F BODY MASS INDEX DOCD: CPT | Performed by: INTERNAL MEDICINE

## 2021-06-16 PROCEDURE — 3075F SYST BP GE 130 - 139MM HG: CPT | Performed by: INTERNAL MEDICINE

## 2021-06-16 PROCEDURE — 99243 OFF/OP CNSLTJ NEW/EST LOW 30: CPT | Performed by: INTERNAL MEDICINE

## 2021-06-16 NOTE — PRE-PROCEDURE INSTRUCTIONS
Pre-Surgery Instructions:   Medication Instructions    aspirin (ECOTRIN LOW STRENGTH) 81 mg EC tablet Patient was instructed by Physician and understands   atorvastatin (LIPITOR) 10 mg tablet Instructed patient per Anesthesia Guidelines   Cholecalciferol (D-3-5) 5000 units capsule Instructed patient per Anesthesia Guidelines   fluticasone (FLONASE) 50 mcg/act nasal spray Instructed patient per Anesthesia Guidelines   glimepiride (AMARYL) 4 mg tablet Instructed patient per Anesthesia Guidelines   latanoprost (XALATAN) 0 005 % ophthalmic solution Instructed patient per Anesthesia Guidelines   LEVEMIR FLEXTOUCH 100 units/mL injection pen Instructed patient per Anesthesia Guidelines   lisinopril (ZESTRIL) 10 mg tablet Instructed patient per Anesthesia Guidelines   metFORMIN (GLUCOPHAGE-XR) 500 mg 24 hr tablet Instructed patient per Anesthesia Guidelines   Milk Thistle 150 MG CAPS Instructed patient per Anesthesia Guidelines   Multiple Vitamins-Minerals (MULTIVITAMIN WITH MINERALS) tablet Instructed patient per Anesthesia Guidelines   pioglitazone (ACTOS) 15 mg tablet Instructed patient per Anesthesia Guidelines   sodium chloride (OCEAN) 0 65 % nasal spray Instructed patient per Anesthesia Guidelines   Specialty Vitamins Products (MAGNESIUM, AMINO ACID CHELATE,) 133 MG tablet Instructed patient per Anesthesia Guidelines   Vitamins-Lipotropics (B-100 PO) Instructed patient per Anesthesia Guidelines  All pre-op instructions reviewed as per Adeola Sandra My Surgical Experience w/ pt verb understanding  Inst NPO post MN   Inst to hold lisinopril, glimepiride, actos, and metformin on morning of sx  Inst by PCP to take only 22 units of his levemir instead of the full dose on the morning of his procedure  Instructed to avoid all ASA and OTC Vit/Supp 1 week prior to surgery and to avoid NSAIDs prior to surgery by dr Allison Farmer which he has been doing  Tylenol ok to take prn   Inst ok to use nasal sprays & eye drops on am of sx  Current hospital visitor & masking policy reviewed  Has preop showering instructions from dr office, is getting chg , reviewed process at time of call  Inst to bring crutches w/ him on DOS  Pt  Verbalized an understanding of all instructions reviewed and offers no concerns at this time

## 2021-06-16 NOTE — PROGRESS NOTES
Assessment/Plan:         Diagnoses and all orders for this visit:    Ligament tear  Comments:  left ankle ant talofibular repair  Type 2 diabetes mellitus with both eyes affected by mild nonproliferative retinopathy without macular edema, with long-term current use of insulin ([facility])  Comments:  see instructions/ stable    Essential hypertension  Comments:  controlled    Coronary artery disease involving native coronary artery of native heart without angina pectoris  Comments:  stable          Subjective:      Patient ID: Sherrie Nunes is a 76 y o  male  Pt to have ankle surgery to repair ligament by dr Eugenia Nair 6/22/21 +htn-controlled +dm controlled last a1c 6 3  Denies mi, arrythmia or problem with gen anesthesia  +had covid vaccine  See instructions  Reviewed lab/ekg/cxr and cleared for surgery      The following portions of the patient's history were reviewed and updated as appropriate: He  has a past medical history of Colon polyp, Diabetes mellitus ([facility] ), Ear problems, Essential hypertension (2/5/2020), Glaucoma, HL (hearing loss), Hyperlipidemia (2/5/2020), Hypertension, Kidney stone, Melanoma of ear, left ([facility] ), Nasal polyp, and Tinnitus  He   Patient Active Problem List    Diagnosis Date Noted    Type 2 diabetes mellitus with hyperglycemia ([facility] ) 06/01/2021    Type 2 diabetes mellitus 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, bilateral ([facility] [address] ) 06/01/2021    Skin cancer, basal cell 02/07/2020    Decreased hearing of both ears 02/07/2020    Essential hypertension 02/05/2020    Hyperlipidemia 02/05/2020    Type 2 diabetes mellitus without complication, with long-term current use of insulin ([place] [address] ) 02/05/2020    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     He  has a past surgical history that includes pr colonoscopy flx dx w/collj spec when pfrmd (N/A, 8/8/2017); ARTHROSCOPY KNEE (Bilateral); Shoulder arthroscopy; Cystoscopy;  Nasal polyp excision; pr repair flex foot tendon,ea (Left, 12/8/2017); [place] tooth extraction; Excision basal cell carcinoma (03/2020); and Rotator cuff repair (Bilateral)  His family history includes Aortic aneurysm in his father; Colon cancer in his father; Diabetes in his mother; Heart disease in his mother; Melanoma in his mother; Prostate cancer in his father  He  reports that he has quit smoking  He has quit using smokeless tobacco  He reports current alcohol use  He reports that he does not use drugs    Current Outpatient Medications   Medication Sig Dispense Refill    aspirin (ECOTRIN LOW STRENGTH) 81 mg EC tablet Take 81 mg by mouth daily      atorvastatin (LIPITOR) 10 mg tablet Take 10 mg by mouth daily      BD Pen Needle Meghana U/F 32G X 4 MM MISC daily Use as directed      Cholecalciferol (D-3-5) 5000 units capsule Take 5,000 Units by mouth daily      Continuous Blood Gluc  (Indeedyle Kalpana 14 Day [place]) CAMRON Use 1 application 4 (four) times a day Patient to check his blood sugar 4 times a day 1 Device 0    Continuous Blood Gluc Sensor ([employer]Style Kalpana 14 Day Sensor) MISC Use 1 application every 14 (fourteen) days 6 each 1    fluticasone (FLONASE) 50 mcg/act nasal spray 1 spray into each nostril daily      glimepiride (AMARYL) 4 mg tablet Take 1 tablet (4 mg total) by mouth 2 (two) times a day 180 tablet 1    latanoprost (XALATAN) 0 005 % ophthalmic solution PUT 1 DROP INTO BOTH EYES IN THE EVENING      LEVEMIR FLEXTOUCH 100 units/mL injection pen       lisinopril (ZESTRIL) 10 mg tablet Take 10 mg by mouth daily      metFORMIN (GLUCOPHAGE-XR) 500 mg 24 hr tablet Take 1,000 mg by mouth 2 (two) times a day      Milk Thistle 150 MG CAPS Take by mouth      Multiple Vitamins-Minerals (MULTIVITAMIN WITH MINERALS) tablet Take 1 tablet by mouth daily      pioglitazone (ACTOS) 15 mg tablet Take 1 tablet (15 mg total) by mouth daily 90 tablet 1    sodium chloride (OCEAN) 0 65 % nasal spray 1 spray into each nostril as needed for congestion  Specialty Vitamins Products (MAGNESIUM, AMINO ACID CHELATE,) 133 MG tablet Take 1 tablet by mouth 2 (two) times a day      Vitamins-Lipotropics (B-100 PO) Take by mouth       No current facility-administered medications for this visit  Current Outpatient Medications on File Prior to Visit   Medication Sig    aspirin (ECOTRIN LOW STRENGTH) 81 mg EC tablet Take 81 mg by mouth daily    atorvastatin (LIPITOR) 10 mg tablet Take 10 mg by mouth daily    BD Pen Needle Meghana U/F 32G X 4 MM MISC daily Use as directed    Cholecalciferol (D-3-5) 5000 units capsule Take 5,000 Units by mouth daily    Continuous Blood Gluc  (FreeStyle Kalpana 14 Day [place]) CAMRON Use 1 application 4 (four) times a day Patient to check his blood sugar 4 times a day    Continuous Blood Gluc Sensor (FreeStyle Kalpana 14 Day Sensor) MISC Use 1 application every 14 (fourteen) days    fluticasone (FLONASE) 50 mcg/act nasal spray 1 spray into each nostril daily    glimepiride (AMARYL) 4 mg tablet Take 1 tablet (4 mg total) by mouth 2 (two) times a day    latanoprost (XALATAN) 0 005 % ophthalmic solution PUT 1 DROP INTO BOTH EYES IN THE EVENING    LEVEMIR FLEXTOUCH 100 units/mL injection pen     lisinopril (ZESTRIL) 10 mg tablet Take 10 mg by mouth daily    metFORMIN (GLUCOPHAGE-XR) 500 mg 24 hr tablet Take 1,000 mg by mouth 2 (two) times a day    Milk Thistle 150 MG CAPS Take by mouth    Multiple Vitamins-Minerals (MULTIVITAMIN WITH MINERALS) tablet Take 1 tablet by mouth daily    pioglitazone (ACTOS) 15 mg tablet Take 1 tablet (15 mg total) by mouth daily    sodium chloride (OCEAN) 0 65 % nasal spray 1 spray into each nostril as needed for congestion    Specialty Vitamins Products (MAGNESIUM, AMINO ACID CHELATE,) 133 MG tablet Take 1 tablet by mouth 2 (two) times a day    Vitamins-Lipotropics (B-100 PO) Take by mouth     No current facility-administered medications on file prior to visit       He has No Known Allergies       Review of Systems   Constitutional: Negative for chills and fever  HENT: Negative  Respiratory: Negative  Cardiovascular: Negative  Musculoskeletal: Positive for arthralgias  Objective:      /64 (BP Location: Right arm, Patient Position: Sitting, Cuff Size: Large)   Pulse 60   Temp 97 7 °F (36 5 °C) (Temporal)   Resp 16   Ht 6' 1" (1 854 m)   Wt 93 kg (205 lb)   SpO2 97%   BMI 27 05 kg/m²          Physical Exam  HENT:      Head: Normocephalic and atraumatic  Right Ear: Tympanic membrane and ear canal normal       Left Ear: Tympanic membrane and ear canal normal       Nose: Nose normal    Cardiovascular:      Rate and Rhythm: Normal rate and regular rhythm  Pulmonary:      Effort: Pulmonary effort is normal       Breath sounds: Normal breath sounds  Neurological:      Mental Status: He is alert

## 2021-06-16 NOTE — PATIENT INSTRUCTIONS
Told pt do not take vitamins, asa, gingkoba, gingsing, vit e and fish oil x 1 week before surgery  Day of surgery do not take actos/metformen/amaryl  Take lisinopril and atorvastatin day of surgery with sip of water  Decrease levimir to 22 units am of surgery    Reviewed lab/ekg/cxr and cleared for surgery

## 2021-06-20 ENCOUNTER — ANESTHESIA EVENT (OUTPATIENT)
Dept: PERIOP | Facility: HOSPITAL | Age: 66
End: 2021-06-20
Payer: COMMERCIAL

## 2021-06-22 ENCOUNTER — APPOINTMENT (OUTPATIENT)
Dept: RADIOLOGY | Facility: HOSPITAL | Age: 66
End: 2021-06-22
Payer: COMMERCIAL

## 2021-06-22 ENCOUNTER — HOSPITAL ENCOUNTER (OUTPATIENT)
Facility: HOSPITAL | Age: 66
Setting detail: OUTPATIENT SURGERY
Discharge: HOME | End: 2021-06-22
Attending: PODIATRIST | Admitting: PODIATRIST
Payer: COMMERCIAL

## 2021-06-22 ENCOUNTER — ANESTHESIA (OUTPATIENT)
Dept: PERIOP | Facility: HOSPITAL | Age: 66
End: 2021-06-22
Payer: COMMERCIAL

## 2021-06-22 VITALS
TEMPERATURE: 97.4 F | HEIGHT: 73 IN | OXYGEN SATURATION: 99 % | DIASTOLIC BLOOD PRESSURE: 69 MMHG | WEIGHT: 205 LBS | HEART RATE: 56 BPM | BODY MASS INDEX: 27.17 KG/M2 | SYSTOLIC BLOOD PRESSURE: 149 MMHG | RESPIRATION RATE: 16 BRPM

## 2021-06-22 DIAGNOSIS — G89.18 POST-OPERATIVE PAIN: Primary | ICD-10-CM

## 2021-06-22 DIAGNOSIS — S93.492D SPRAIN OF OTHER LIGAMENT OF LEFT ANKLE, SUBSEQUENT ENCOUNTER: ICD-10-CM

## 2021-06-22 DIAGNOSIS — Z98.890 POST-OPERATIVE STATE: ICD-10-CM

## 2021-06-22 LAB
GLUCOSE SERPL-MCNC: 101 MG/DL (ref 65–140)
GLUCOSE SERPL-MCNC: 111 MG/DL (ref 65–140)
GLUCOSE SERPL-MCNC: 112 MG/DL (ref 65–140)
GLUCOSE SERPL-MCNC: 128 MG/DL (ref 65–140)
GLUCOSE SERPL-MCNC: 47 MG/DL (ref 65–140)
GLUCOSE SERPL-MCNC: 63 MG/DL (ref 65–140)

## 2021-06-22 PROCEDURE — C1781 MESH (IMPLANTABLE): HCPCS | Performed by: PODIATRIST

## 2021-06-22 PROCEDURE — C1713 ANCHOR/SCREW BN/BN,TIS/BN: HCPCS | Performed by: PODIATRIST

## 2021-06-22 PROCEDURE — 82948 REAGENT STRIP/BLOOD GLUCOSE: CPT

## 2021-06-22 DEVICE — IMPLANTABLE DEVICE: Type: IMPLANTABLE DEVICE | Site: ANKLE | Status: FUNCTIONAL

## 2021-06-22 DEVICE — SONICANCHOR KIT
Type: IMPLANTABLE DEVICE | Site: ANKLE | Status: FUNCTIONAL
Brand: SONICANCHOR

## 2021-06-22 RX ORDER — PROPOFOL 10 MG/ML
INJECTION, EMULSION INTRAVENOUS AS NEEDED
Status: DISCONTINUED | OUTPATIENT
Start: 2021-06-22 | End: 2021-06-22

## 2021-06-22 RX ORDER — OXYCODONE HYDROCHLORIDE 5 MG/1
5 TABLET ORAL EVERY 4 HOURS PRN
Status: DISCONTINUED | OUTPATIENT
Start: 2021-06-22 | End: 2021-06-22 | Stop reason: HOSPADM

## 2021-06-22 RX ORDER — LIDOCAINE HYDROCHLORIDE 20 MG/ML
INJECTION, SOLUTION EPIDURAL; INFILTRATION; INTRACAUDAL; PERINEURAL AS NEEDED
Status: DISCONTINUED | OUTPATIENT
Start: 2021-06-22 | End: 2021-06-22

## 2021-06-22 RX ORDER — OXYCODONE HYDROCHLORIDE AND ACETAMINOPHEN 5; 325 MG/1; MG/1
1 TABLET ORAL EVERY 4 HOURS PRN
Qty: 20 TABLET | Refills: 0 | Status: SHIPPED | OUTPATIENT
Start: 2021-06-22 | End: 2021-07-02

## 2021-06-22 RX ORDER — CEFAZOLIN SODIUM 2 G/50ML
2000 SOLUTION INTRAVENOUS ONCE
Status: COMPLETED | OUTPATIENT
Start: 2021-06-22 | End: 2021-06-22

## 2021-06-22 RX ORDER — DEXTROSE MONOHYDRATE 25 G/50ML
25 INJECTION, SOLUTION INTRAVENOUS ONCE
Status: COMPLETED | OUTPATIENT
Start: 2021-06-22 | End: 2021-06-22

## 2021-06-22 RX ORDER — ONDANSETRON 2 MG/ML
4 INJECTION INTRAMUSCULAR; INTRAVENOUS ONCE AS NEEDED
Status: DISCONTINUED | OUTPATIENT
Start: 2021-06-22 | End: 2021-06-22 | Stop reason: HOSPADM

## 2021-06-22 RX ORDER — DEXTROSE MONOHYDRATE 25 G/50ML
INJECTION, SOLUTION INTRAVENOUS AS NEEDED
Status: DISCONTINUED | OUTPATIENT
Start: 2021-06-22 | End: 2021-06-22

## 2021-06-22 RX ORDER — ROPIVACAINE HYDROCHLORIDE 5 MG/ML
INJECTION, SOLUTION EPIDURAL; INFILTRATION; PERINEURAL
Status: COMPLETED | OUTPATIENT
Start: 2021-06-22 | End: 2021-06-22

## 2021-06-22 RX ORDER — ONDANSETRON 2 MG/ML
INJECTION INTRAMUSCULAR; INTRAVENOUS AS NEEDED
Status: DISCONTINUED | OUTPATIENT
Start: 2021-06-22 | End: 2021-06-22

## 2021-06-22 RX ORDER — SODIUM CHLORIDE, SODIUM LACTATE, POTASSIUM CHLORIDE, CALCIUM CHLORIDE 600; 310; 30; 20 MG/100ML; MG/100ML; MG/100ML; MG/100ML
125 INJECTION, SOLUTION INTRAVENOUS CONTINUOUS
Status: DISCONTINUED | OUTPATIENT
Start: 2021-06-22 | End: 2021-06-22 | Stop reason: HOSPADM

## 2021-06-22 RX ORDER — DEXTROSE MONOHYDRATE 50 MG/ML
500 INJECTION, SOLUTION INTRAVENOUS ONCE
Status: DISCONTINUED | OUTPATIENT
Start: 2021-06-22 | End: 2021-06-22 | Stop reason: HOSPADM

## 2021-06-22 RX ORDER — MIDAZOLAM HYDROCHLORIDE 2 MG/2ML
INJECTION, SOLUTION INTRAMUSCULAR; INTRAVENOUS AS NEEDED
Status: DISCONTINUED | OUTPATIENT
Start: 2021-06-22 | End: 2021-06-22

## 2021-06-22 RX ORDER — FENTANYL CITRATE/PF 50 MCG/ML
25 SYRINGE (ML) INJECTION
Status: DISCONTINUED | OUTPATIENT
Start: 2021-06-22 | End: 2021-06-22 | Stop reason: HOSPADM

## 2021-06-22 RX ORDER — MEPERIDINE HYDROCHLORIDE 25 MG/ML
12.5 INJECTION INTRAMUSCULAR; INTRAVENOUS; SUBCUTANEOUS
Status: DISCONTINUED | OUTPATIENT
Start: 2021-06-22 | End: 2021-06-22 | Stop reason: HOSPADM

## 2021-06-22 RX ORDER — FENTANYL CITRATE 50 UG/ML
INJECTION, SOLUTION INTRAMUSCULAR; INTRAVENOUS AS NEEDED
Status: DISCONTINUED | OUTPATIENT
Start: 2021-06-22 | End: 2021-06-22

## 2021-06-22 RX ORDER — HYDROMORPHONE HCL/PF 1 MG/ML
0.5 SYRINGE (ML) INJECTION
Status: DISCONTINUED | OUTPATIENT
Start: 2021-06-22 | End: 2021-06-22 | Stop reason: HOSPADM

## 2021-06-22 RX ORDER — MAGNESIUM HYDROXIDE 1200 MG/15ML
LIQUID ORAL AS NEEDED
Status: DISCONTINUED | OUTPATIENT
Start: 2021-06-22 | End: 2021-06-22 | Stop reason: HOSPADM

## 2021-06-22 RX ORDER — KETOROLAC TROMETHAMINE 30 MG/ML
INJECTION, SOLUTION INTRAMUSCULAR; INTRAVENOUS AS NEEDED
Status: DISCONTINUED | OUTPATIENT
Start: 2021-06-22 | End: 2021-06-22

## 2021-06-22 RX ADMIN — CEFAZOLIN SODIUM 2000 MG: 2 SOLUTION INTRAVENOUS at 07:26

## 2021-06-22 RX ADMIN — LIDOCAINE HYDROCHLORIDE 100 MG: 20 INJECTION, SOLUTION EPIDURAL; INFILTRATION; INTRACAUDAL; PERINEURAL at 07:34

## 2021-06-22 RX ADMIN — DEXTROSE MONOHYDRATE 12.5 G: 500 INJECTION PARENTERAL at 08:21

## 2021-06-22 RX ADMIN — ONDANSETRON 4 MG: 2 INJECTION INTRAMUSCULAR; INTRAVENOUS at 07:38

## 2021-06-22 RX ADMIN — DEXTROSE MONOHYDRATE 25 ML: 500 INJECTION PARENTERAL at 06:48

## 2021-06-22 RX ADMIN — SODIUM CHLORIDE, SODIUM LACTATE, POTASSIUM CHLORIDE, AND CALCIUM CHLORIDE 125 ML/HR: .6; .31; .03; .02 INJECTION, SOLUTION INTRAVENOUS at 06:47

## 2021-06-22 RX ADMIN — KETOROLAC TROMETHAMINE 15 MG: 30 INJECTION, SOLUTION INTRAMUSCULAR at 08:31

## 2021-06-22 RX ADMIN — PROPOFOL 200 MG: 10 INJECTION, EMULSION INTRAVENOUS at 07:34

## 2021-06-22 RX ADMIN — ROPIVACAINE HYDROCHLORIDE 30 ML: 5 INJECTION, SOLUTION EPIDURAL; INFILTRATION; PERINEURAL at 07:17

## 2021-06-22 RX ADMIN — FENTANYL CITRATE 100 MCG: 50 INJECTION INTRAMUSCULAR; INTRAVENOUS at 07:13

## 2021-06-22 RX ADMIN — MIDAZOLAM 4 MG: 1 INJECTION INTRAMUSCULAR; INTRAVENOUS at 07:13

## 2021-06-22 RX ADMIN — SODIUM CHLORIDE, SODIUM LACTATE, POTASSIUM CHLORIDE, AND CALCIUM CHLORIDE: .6; .31; .03; .02 INJECTION, SOLUTION INTRAVENOUS at 07:44

## 2021-06-22 RX ADMIN — SODIUM CHLORIDE, SODIUM LACTATE, POTASSIUM CHLORIDE, AND CALCIUM CHLORIDE: .6; .31; .03; .02 INJECTION, SOLUTION INTRAVENOUS at 08:35

## 2021-06-22 NOTE — ANESTHESIA POSTPROCEDURE EVALUATION
Post-Op Assessment Note    CV Status:  Stable    Pain management: adequate     Mental Status:  Alert and awake   Hydration Status:  Euvolemic   PONV Controlled:  Controlled   Airway Patency:  Patent      Post Op Vitals Reviewed: Yes      Reason for prolonged intubation > 24 hours:  NaReason for prolonged intubation > 48 hours:  Na      No complications documented      BP      Temp (P) 97 6 °F (36 4 °C) (06/22/21 0852)    Pulse     Resp      SpO2

## 2021-06-22 NOTE — ANESTHESIA PROCEDURE NOTES
Peripheral Block    Patient location during procedure: holding area  Start time: 6/22/2021 7:13 AM  Reason for block: at surgeon's request and post-op pain management  Staffing  Performed: anesthesiologist   Anesthesiologist: David Moreno DO  Preanesthetic Checklist  Completed: patient identified, IV checked, site marked, risks and benefits discussed, surgical consent, monitors and equipment checked, pre-op evaluation and timeout performed  Peripheral Block  Patient position: supine  Prep: ChloraPrep  Patient monitoring: continuous pulse ox and frequent blood pressure checks  Block type: sciatic  Laterality: left  Injection technique: single-shot  Procedures: nerve stimulator  Ultrasound permanent image savedropivacaine (NAROPIN) 0 5 % perineural infiltration, 30 mL  Needle  Needle type: Stimuplex   Needle gauge: 22 G  Needle length: 10 cm  Needle localization: anatomical landmarks and nerve stimulator  Test dose: negative  Assessment  Injection assessment: incremental injection, local visualized surrounding nerve on ultrasound and negative aspiration for heme  Paresthesia pain: none  Heart rate change: no  Slow fractionated injection: yes  Post-procedure:  site cleaned  patient tolerated the procedure well with no immediate complications

## 2021-06-22 NOTE — OP NOTE
OPERATIVE REPORT - Podiatry  PATIENT NAME: Ruddy Payne    :  1955  MRN: [id number]  Pt Location: [facility] OR ROOM 04    SURGERY DATE: 2021    Surgeon(s) and Role:     * Trent Obregon DPM - Primary     * Marco Duarte DPM - Assisting    Pre-op Diagnosis:  Sprain of other ligament of left ankle, subsequent encounter [U53 492D]    Post-Op Diagnosis Codes: * Sprain of other ligament of left ankle, subsequent encounter [X79 492D]    Procedure(s) (LRB):  ANTERIOR TALOFIBULAR LIGAMENT REPAIR (Left)    Specimen(s):  * No specimens in log *    Estimated Blood Loss:   Minimal    Drains:  * No LDAs found *    Anesthesia Type:   General w/ Popliteal Block     Hemostasis:  Pneumatic ankle tourniquet set at 250 mmHg for 50 mins  Direct compression, electrocautery    Materials:  Implant Name Type Inv  Item Serial No   Lot No  LRB No  Used Action   [id number]  (8 SQ CM)AMNIOBAND VIALBE MEMBRANE 2 X 4CM -HT [id number] MUSCULOSKELETAL TRANSPLAN  Left 1 Implanted   ANCHOR SONIC KIT 2 5 X 10MM FORCE FIBER 0 C-2 - [id number]  ANCHOR SONIC KIT 2 5 X 10MM FORCE FIBER 0 C-2  [employer]ISA [employer] [id number] Left 2 Implanted     Fiberwire  2-0 Vicryl  4-0 Vicryl  4-0 Nylon    Injectables:  None    Operative Findings:  Consistent with Diagnosis    Complications:   None    Procedure and Technique:     Patient received popliteal block in preop holding  Under mild sedation, the patient was brought into the operating room and placed on the operating room table in the supine position  IV sedation was achieved by anesthesia team and a universal timeout was performed where all parties are in agreement of correct patient, correct procedure and correct site  A pneumatic tourniquet was then placed over the patient's left lower extremity with ample padding  The foot was then prepped and draped in the usual aseptic manner   An esmarch bandage was used to exsangunate the foot and the pneumatic tourniquet was then inflated to 250 mmHg  Attention was then directed to the lateral aspect of the distal left fibula where an approximately 5 cm curvilinear incision was made anterior to the fibular margin  Blunt dissection was carried down through the subcutaneous tissue with care taken to protect any vital neurovascular structures  Electrocautery was utilized for any bleeders as needed  The intermediate dorsal cutaneous nerve was identified and retracted and protected as needed  Utilizing a scalpel, sharp dissection was then carried through the extensor retinaculum and capsule  Soft tissues were reflected off of the distal anterior aspect of the fibular margin  Two sonic anchors (see implants above) were inserted under standard and factor technique at the distal anterior margin  Utilizing the attached FiberWire, the capsular structures were imbricated  The direct repair of ATFL was reinforced utilizing the inferior extensor retinaculum utilizing 2-0 Vicryl  The lateral ankle structures were noted to be corrected and stable  Anterior drawer and talar tilt are negative  The surgical incision was irrigated with copious amounts of normal sterile saline  The periosteal and capsular structures were reapproximated using fiberwire  Amnio band graft was laid directly onto the wound bed  Subcutaneous closure was obtained utilizing vicryl  Skin edges were reapproximated and closure was obtained utilizing nylon  The foot was then cleansed and dried  The incision site was dressed with xeroform, gauze fluffs, gauze  This was then covered with a Kerlex  A below knee posterior splint was applied  The tourniquet was deflated at approximately 50 min and normal hyperemic response was noted to all digits  The patient tolerated the procedure and anesthesia well without immediate complications and transferred to PACU with vital signs stable       Dr Theresa Wall was present during the entire procedure and participated in all key aspects  SIGNATURE: Sherine Santizo DPM  DATE: June 22, 2021  TIME: 8:57 AM      Portions of the record may have been created with voice recognition software  Occasional wrong word or "sound a like" substitutions may have occurred due to the inherent limitations of voice recognition software  Read the chart carefully and recognize, using context, where substitutions have occurred

## 2021-06-22 NOTE — PROGRESS NOTES
Pt blood sugar was 47, after taking 22 unit Levmir insulin at home  Pt is alert and oriented x 3 , and conversation appropriately  Dr Perez Keita aware and D50 insusin given  Pt is asymptomatic, only c/o feeling a little tired

## 2021-06-22 NOTE — ANESTHESIA PREPROCEDURE EVALUATION
Procedure:  ANTERIOR TALOFIBULAR LIGAMENT REPAIR (Left Ankle)    Relevant Problems   CARDIO   (+) Essential hypertension   (+) Hyperlipidemia      ENDO   (+) Type 2 diabetes mellitus with hyperglycemia (HCC)   (+) Type 2 diabetes mellitus 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, bilateral (HCC)   (+) Type 2 diabetes mellitus without complication, with long-term current use of insulin (HCC)        Physical Exam    Airway    Mallampati score: II  TM Distance: >3 FB  Neck ROM: full     Dental   No notable dental hx     Cardiovascular  Rhythm: regular, Rate: normal, Cardiovascular exam normal    Pulmonary  Pulmonary exam normal Breath sounds clear to auscultation,     Other Findings        Anesthesia Plan  ASA Score- 2     Anesthesia Type- general and regional with ASA Monitors  Additional Monitors:   Airway Plan: LMA  Plan Factors-    Chart reviewed  Patient summary reviewed  Induction- intravenous  Postoperative Plan-     Informed Consent- Anesthetic plan and risks discussed with patient

## 2021-06-22 NOTE — DISCHARGE INSTRUCTIONS
Dr Mace Hunger Instructions    1  Take your prescribed medication as directed  2  Upon arrival at home, lie down and elevate your surgical leg on 2 pillows  3  Remain quiet, off your feet as much as possible, for the first 24-48 hours  This is when your feet first swell and may become painful  After 48 hours you may begin limited walking following these restrictions:   Nonweightbearing to surgical foot  4  Drink large quantities of water  Consume no alcohol  Continue a well-balanced diet  5  Report any unusual discomfort or fever to this office  6  A limited amount of discomfort and swelling is to be expected  In some cases the skin may take on a bruised appearance  The surgical solution that was applied to your ankle prior to the operation is dark in color and the operation site may appear to be oozing when it actually is not  7  A slight amount of blood is to be expected, and is no cause for alarm  Do not remove the dressings  If there is active bleeding and if the bleeding persists, add additional gauze to the bandage, apply direct pressure, elevate your feet and call this office  8  Do not get the dressings wet  As regular bathing may be inconvenient, sponge baths are recommended  9  When anesthesia wears off and if any discomfort should be present, apply an ice pack directly behind the knee for 15 minute intervals for several hours or until the pain leaves  (USE IN EXCESS OF 15 MINUTES COULD CAUSE FROSTBITE)  Do not use hot water bags or electric pads  A convenient icepack can be made by placing ice cubes in a plastic bag and covering this with a towel  10  If necessary, take a mild laxative before retiring  11  Having performed the operation, we are interested in a prompt recovery  Please cooperate by following the above instructions  12  Please call to confirm your post-op appointment or call with any other questions

## 2021-06-22 NOTE — DISCHARGE SUMMARY
Discharge Summary Outpatient Procedure Podiatry -   Nicholas Bentley 78 y o  male MRN: [id number]  Unit/Bed#: OR POOL Encounter: [id number]    Admission Date: 6/22/2021     Admitting Diagnosis: Sprain of other ligament of left ankle, subsequent encounter [S9 492D]    Discharge Diagnosis: same    Procedures Performed: ANTERIOR TALOFIBULAR LIGAMENT REPAIR:     Complications: none    Condition at Discharge: stable    Discharge instructions/Information to patient and family:   See after visit summary for information provided to patient and family  Provisions for Follow-Up Care/Important appointments:  See after visit summary for information related to follow-up care and any pertinent home health orders  Discharge Medications:  See after visit summary for reconciled discharge medications provided to patient and family

## 2021-07-12 ENCOUNTER — RA CDI HCC (OUTPATIENT)
Dept: OTHER | Facility: HOSPITAL | Age: 66
End: 2021-07-12

## 2021-07-12 NOTE — PROGRESS NOTES
[facility]  coding opportunities          Chart reviewed, no opportunity found: CHART REVIEWED, NO OPPORTUNITY FOUND      a1c in the 6's currently-doesn't meet for the e1165               Patients insurance company: [employer]

## 2021-07-15 PROBLEM — H40.9 GLAUCOMA: Status: ACTIVE | Noted: 2021-07-15

## 2021-07-16 ENCOUNTER — OFFICE VISIT (OUTPATIENT)
Dept: FAMILY MEDICINE CLINIC | Facility: CLINIC | Age: 66
End: 2021-07-16
Payer: COMMERCIAL

## 2021-07-16 VITALS
DIASTOLIC BLOOD PRESSURE: 62 MMHG | TEMPERATURE: 96.9 F | SYSTOLIC BLOOD PRESSURE: 118 MMHG | RESPIRATION RATE: 16 BRPM | HEART RATE: 64 BPM | OXYGEN SATURATION: 98 %

## 2021-07-16 DIAGNOSIS — E11.9 TYPE 2 DIABETES MELLITUS WITHOUT COMPLICATION, WITH LONG-TERM CURRENT USE OF INSULIN (HCC): ICD-10-CM

## 2021-07-16 DIAGNOSIS — Z79.4 TYPE 2 DIABETES MELLITUS WITHOUT COMPLICATION, WITH LONG-TERM CURRENT USE OF INSULIN (HCC): ICD-10-CM

## 2021-07-16 DIAGNOSIS — E11.3293 TYPE 2 DIABETES MELLITUS WITH BOTH EYES AFFECTED BY MILD NONPROLIFERATIVE RETINOPATHY WITHOUT MACULAR EDEMA, WITH LONG-TERM CURRENT USE OF INSULIN (HCC): Primary | ICD-10-CM

## 2021-07-16 DIAGNOSIS — Z79.4 TYPE 2 DIABETES MELLITUS WITH BOTH EYES AFFECTED BY MILD NONPROLIFERATIVE RETINOPATHY WITHOUT MACULAR EDEMA, WITH LONG-TERM CURRENT USE OF INSULIN (HCC): Primary | ICD-10-CM

## 2021-07-16 DIAGNOSIS — E78.5 HYPERLIPIDEMIA, UNSPECIFIED HYPERLIPIDEMIA TYPE: ICD-10-CM

## 2021-07-16 DIAGNOSIS — I10 ESSENTIAL HYPERTENSION: ICD-10-CM

## 2021-07-16 PROCEDURE — 3288F FALL RISK ASSESSMENT DOCD: CPT | Performed by: PHYSICIAN ASSISTANT

## 2021-07-16 PROCEDURE — 99214 OFFICE O/P EST MOD 30 MIN: CPT | Performed by: PHYSICIAN ASSISTANT

## 2021-07-16 PROCEDURE — 4010F ACE/ARB THERAPY RXD/TAKEN: CPT | Performed by: INTERNAL MEDICINE

## 2021-07-16 RX ORDER — METFORMIN HYDROCHLORIDE 500 MG/1
1000 TABLET, EXTENDED RELEASE ORAL 2 TIMES DAILY
Qty: 360 TABLET | Refills: 1 | Status: SHIPPED | OUTPATIENT
Start: 2021-07-16 | End: 2022-03-01

## 2021-07-16 RX ORDER — LISINOPRIL 10 MG/1
10 TABLET ORAL DAILY
Qty: 90 TABLET | Refills: 1 | Status: SHIPPED | OUTPATIENT
Start: 2021-07-16 | End: 2022-02-14

## 2021-07-16 RX ORDER — PIOGLITAZONEHYDROCHLORIDE 15 MG/1
15 TABLET ORAL DAILY
Qty: 90 TABLET | Refills: 1 | Status: SHIPPED | OUTPATIENT
Start: 2021-07-16 | End: 2022-01-27

## 2021-07-16 RX ORDER — INSULIN DETEMIR 100 [IU]/ML
66 INJECTION, SOLUTION SUBCUTANEOUS DAILY
Qty: 15 ML | Refills: 3 | Status: SHIPPED | OUTPATIENT
Start: 2021-07-16 | End: 2021-11-01 | Stop reason: SDUPTHER

## 2021-07-16 RX ORDER — FLASH GLUCOSE SENSOR
1 KIT MISCELLANEOUS
Qty: 6 EACH | Refills: 1 | Status: SHIPPED | OUTPATIENT
Start: 2021-07-16 | End: 2021-10-08 | Stop reason: SDUPTHER

## 2021-07-16 RX ORDER — GLIMEPIRIDE 2 MG/1
2 TABLET ORAL 2 TIMES DAILY
Qty: 180 TABLET | Refills: 1 | Status: SHIPPED | OUTPATIENT
Start: 2021-07-16 | End: 2022-01-12

## 2021-07-16 RX ORDER — ATORVASTATIN CALCIUM 10 MG/1
10 TABLET, FILM COATED ORAL DAILY
Qty: 90 TABLET | Refills: 1 | Status: SHIPPED | OUTPATIENT
Start: 2021-07-16 | End: 2022-03-07

## 2021-07-16 RX ORDER — PEN NEEDLE, DIABETIC 32GX 5/32"
NEEDLE, DISPOSABLE MISCELLANEOUS DAILY
Qty: 100 EACH | Refills: 3 | Status: SHIPPED | OUTPATIENT
Start: 2021-07-16 | End: 2022-05-20 | Stop reason: SDUPTHER

## 2021-07-16 NOTE — PROGRESS NOTES
Diabetic Foot Exam    Patient's shoes and socks removed  Right Foot/Ankle   Right Foot Inspection  Skin Exam: skin intact no dry skin, no callus, no ulcer and no callus                          Toe Exam:  no right toe deformity  Sensory   Vibration: intact    Monofilament testing: intact  Vascular    The right DP pulse is 2+  Assessment/Plan:     Diagnoses and all orders for this visit:    Type 2 diabetes mellitus with both eyes affected by mild nonproliferative retinopathy without macular edema, with long-term current use of insulin ([facility] [address] )  Comments:  Diabetes is at goal   Her actually going to cut his glimepiride 2 mg twice a day  Orders:  -     Comprehensive metabolic panel; Future  -     Hemoglobin A1C; Future  -     metFORMIN (GLUCOPHAGE-XR) 500 mg 24 hr tablet; Take 2 tablets (1,000 mg total) by mouth 2 (two) times a day  -     Levemir FlexTouch 100 units/mL injection pen; Inject 66 Units under the skin daily  -     BD Pen Needle Meghana U/F 32G X 4 MM MISC; Inject under the skin daily Use as directed    Essential hypertension  Comments:  Blood pressure is at goal continue current regimen  Orders:  -     Comprehensive metabolic panel; Future  -     lisinopril (ZESTRIL) 10 mg tablet; Take 1 tablet (10 mg total) by mouth daily    Hyperlipidemia, unspecified hyperlipidemia type  Comments:  Continue statin therapy and low-fat diet  Orders:  -     Lipid panel; Future  -     atorvastatin (LIPITOR) 10 mg tablet; Take 1 tablet (10 mg total) by mouth daily    Type 2 diabetes mellitus without complication, with long-term current use of insulin ([facility])  -     pioglitazone (ACTOS) 15 mg tablet; Take 1 tablet (15 mg total) by mouth daily  -     Continuous Blood Gluc Sensor (FreeStyle Kalpana 14 Day Sensor) MISC; Use 1 application every 14 (fourteen) days  -     glimepiride (AMARYL) 2 mg tablet; Take 1 tablet (2 mg total) by mouth 2 (two) times a day          Subjective:      Patient ID: Gonzales Sierra is a 78 y o  male     Patient presents in the office for follow up chronic conditions  Patient is on insulin  He has retinopathy  His current regimen includes Actos 15 mg  He is also on glimepiride 4 mg twice a day  Metformin  twice a day  He is on Levemir 22 units  His A1c has come down from 8 to 6 3  He is using a continuous glucometer Reginald which has greatly helped him  He has had some hypoglycemic episodes  We are going to decrease his glimepiride to 2 mg twice a day  Blood pressure controlled with lisinopril at 10 mg  For hyperlipidemia he is on atorvastatin 10 mg  Recent left ankle surgery        The following portions of the patient's history were reviewed and updated as appropriate:   He   Patient Active Problem List    Diagnosis Date Noted    Glaucoma 07/15/2021    Type 2 diabetes mellitus with hyperglycemia ([facility] [address] ) 06/01/2021    Type 2 diabetes mellitus 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, bilateral ([facility] [address] ) 06/01/2021    Skin cancer, basal cell 02/07/2020    Decreased hearing of both ears 02/07/2020    Essential hypertension 02/05/2020    Hyperlipidemia 02/05/2020    Type 2 diabetes mellitus without complication, with long-term current use of insulin (HCC) 02/05/2020    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     Current Outpatient Medications   Medication Sig Dispense Refill    aspirin (ECOTRIN LOW STRENGTH) 81 mg EC tablet Take 81 mg by mouth daily      atorvastatin (LIPITOR) 10 mg tablet Take 1 tablet (10 mg total) by mouth daily 90 tablet 1    BD Pen Needle Meghana U/F 32G X 4 MM MISC Inject under the skin daily Use as directed 100 each 3    Cholecalciferol (D-3-5) 5000 units capsule Take 5,000 Units by mouth daily      Continuous Blood Gluc  (FreeStyle Kalpana 14 Day [place]) CAMRON Use 1 application 4 (four) times a day Patient to check his blood sugar 4 times a day 1 Device 0    Continuous Blood Gluc Sensor (FreeStyle Kalpana 14 Day Sensor) MISC Use 1 application every 14 (fourteen) days 6 each 1    fluticasone (FLONASE) 50 mcg/act nasal spray 1 spray into each nostril daily      glimepiride (AMARYL) 2 mg tablet Take 1 tablet (2 mg total) by mouth 2 (two) times a day 180 tablet 1    latanoprost (XALATAN) 0 005 % ophthalmic solution PUT 1 DROP INTO BOTH EYES IN THE EVENING      Levemir FlexTouch 100 units/mL injection pen Inject 66 Units under the skin daily 15 mL 3    lisinopril (ZESTRIL) 10 mg tablet Take 1 tablet (10 mg total) by mouth daily 90 tablet 1    metFORMIN (GLUCOPHAGE-XR) 500 mg 24 hr tablet Take 2 tablets (1,000 mg total) by mouth 2 (two) times a day 360 tablet 1    Milk Thistle 150 MG CAPS Take by mouth      Multiple Vitamins-Minerals (MULTIVITAMIN WITH MINERALS) tablet Take 1 tablet by mouth daily      pioglitazone (ACTOS) 15 mg tablet Take 1 tablet (15 mg total) by mouth daily 90 tablet 1    sodium chloride (OCEAN) 0 65 % nasal spray 1 spray into each nostril as needed for congestion      Specialty Vitamins Products (MAGNESIUM, AMINO ACID CHELATE,) 133 MG tablet Take 1 tablet by mouth 2 (two) times a day      Vitamins-Lipotropics (B-100 PO) Take by mouth       No current facility-administered medications for this visit  He has No Known Allergies       Review of Systems   Constitutional: Negative for activity change, appetite change, chills, fatigue and fever  HENT: Negative for ear pain and sore throat  Eyes: Negative for visual disturbance  Respiratory: Negative for cough and shortness of breath  Cardiovascular: Negative for chest pain, palpitations and leg swelling  Gastrointestinal: Negative for abdominal pain, blood in stool, constipation, diarrhea and nausea  Genitourinary: Negative for difficulty urinating  Musculoskeletal: Negative for arthralgias, back pain and myalgias  Skin: Negative for rash  Neurological: Negative for dizziness, syncope and headaches  Psychiatric/Behavioral: Negative for sleep disturbance  Objective:        Physical Exam  Vitals and nursing note reviewed  Constitutional:       General: He is not in acute distress  Appearance: Normal appearance  He is well-developed  He is not ill-appearing  HENT:      Head: Normocephalic and atraumatic  Right Ear: Tympanic membrane, ear canal and external ear normal       Left Ear: Tympanic membrane, ear canal and external ear normal    Eyes:      Conjunctiva/sclera: Conjunctivae normal       Pupils: Pupils are equal, round, and reactive to light  Neck:      Thyroid: No thyromegaly  Vascular: No carotid bruit  Cardiovascular:      Rate and Rhythm: Normal rate and regular rhythm  Pulses:           Dorsalis pedis pulses are 2+ on the right side  Heart sounds: Normal heart sounds  No murmur heard  Pulmonary:      Effort: Pulmonary effort is normal       Breath sounds: Normal breath sounds  No wheezing  Abdominal:      General: Bowel sounds are normal       Palpations: Abdomen is soft  There is no mass  Tenderness: There is no abdominal tenderness  Musculoskeletal:      Right lower leg: No edema  Left lower leg: No edema  Comments: Left ankle surgery  Patient has surgical dressings in place  These were not removed for exam   Feet:      Right foot:      Skin integrity: No ulcer, callus or dry skin  Lymphadenopathy:      Cervical: No cervical adenopathy  Skin:     General: Skin is warm and dry  Neurological:      General: No focal deficit present  Mental Status: He is alert and oriented to person, place, and time  Psychiatric:         Mood and Affect: Mood normal          Behavior: Behavior normal          Thought Content:  Thought content normal          Judgment: Judgment normal

## 2021-07-19 ENCOUNTER — TELEPHONE (OUTPATIENT)
Dept: FAMILY MEDICINE CLINIC | Facility: CLINIC | Age: 66
End: 2021-07-19

## 2021-07-19 NOTE — TELEPHONE ENCOUNTER
Spoke with patient  Low blood sugar reading this morning  Last week at his office visit we cut his glimepiride down to 2 mg twice a day  Patient was instructed to discontinue the glimepiride completely    Continue to monitor years blood sugar call with readings

## 2021-09-24 ENCOUNTER — APPOINTMENT (OUTPATIENT)
Dept: LAB | Facility: MEDICAL CENTER | Age: 66
End: 2021-09-24
Payer: COMMERCIAL

## 2021-09-24 DIAGNOSIS — I10 ESSENTIAL HYPERTENSION: ICD-10-CM

## 2021-09-24 DIAGNOSIS — E78.5 HYPERLIPIDEMIA, UNSPECIFIED HYPERLIPIDEMIA TYPE: ICD-10-CM

## 2021-09-24 DIAGNOSIS — E11.3293 TYPE 2 DIABETES MELLITUS WITH BOTH EYES AFFECTED BY MILD NONPROLIFERATIVE RETINOPATHY WITHOUT MACULAR EDEMA, WITH LONG-TERM CURRENT USE OF INSULIN (HCC): ICD-10-CM

## 2021-09-24 DIAGNOSIS — Z79.4 TYPE 2 DIABETES MELLITUS WITH BOTH EYES AFFECTED BY MILD NONPROLIFERATIVE RETINOPATHY WITHOUT MACULAR EDEMA, WITH LONG-TERM CURRENT USE OF INSULIN (HCC): ICD-10-CM

## 2021-09-24 LAB
ALBUMIN SERPL BCP-MCNC: 3.9 G/DL (ref 3.5–5)
ALP SERPL-CCNC: 54 U/L (ref 46–116)
ALT SERPL W P-5'-P-CCNC: 38 U/L (ref 12–78)
ANION GAP SERPL CALCULATED.3IONS-SCNC: 5 MMOL/L (ref 4–13)
AST SERPL W P-5'-P-CCNC: 19 U/L (ref 5–45)
BILIRUB SERPL-MCNC: 1.34 MG/DL (ref 0.2–1)
BUN SERPL-MCNC: 23 MG/DL (ref 5–25)
CALCIUM SERPL-MCNC: 9.5 MG/DL (ref 8.3–10.1)
CHLORIDE SERPL-SCNC: 108 MMOL/L (ref 100–108)
CHOLEST SERPL-MCNC: 124 MG/DL (ref 50–200)
CO2 SERPL-SCNC: 27 MMOL/L (ref 21–32)
CREAT SERPL-MCNC: 1.06 MG/DL (ref 0.6–1.3)
EST. AVERAGE GLUCOSE BLD GHB EST-MCNC: 137 MG/DL
GFR SERPL CREATININE-BSD FRML MDRD: 72 ML/MIN/1.73SQ M
GLUCOSE P FAST SERPL-MCNC: 170 MG/DL (ref 65–99)
HBA1C MFR BLD: 6.4 %
HDLC SERPL-MCNC: 60 MG/DL
LDLC SERPL CALC-MCNC: 50 MG/DL (ref 0–100)
NONHDLC SERPL-MCNC: 64 MG/DL
POTASSIUM SERPL-SCNC: 4.6 MMOL/L (ref 3.5–5.3)
PROT SERPL-MCNC: 6.9 G/DL (ref 6.4–8.2)
SODIUM SERPL-SCNC: 140 MMOL/L (ref 136–145)
TRIGL SERPL-MCNC: 72 MG/DL

## 2021-09-24 PROCEDURE — 80061 LIPID PANEL: CPT

## 2021-09-24 PROCEDURE — 36415 COLL VENOUS BLD VENIPUNCTURE: CPT

## 2021-09-24 PROCEDURE — 83036 HEMOGLOBIN GLYCOSYLATED A1C: CPT

## 2021-09-24 PROCEDURE — 80053 COMPREHEN METABOLIC PANEL: CPT

## 2021-10-01 ENCOUNTER — RA CDI HCC (OUTPATIENT)
Dept: OTHER | Facility: HOSPITAL | Age: 66
End: 2021-10-01

## 2021-10-08 ENCOUNTER — OFFICE VISIT (OUTPATIENT)
Dept: FAMILY MEDICINE CLINIC | Facility: CLINIC | Age: 66
End: 2021-10-08
Payer: COMMERCIAL

## 2021-10-08 VITALS
TEMPERATURE: 97.2 F | OXYGEN SATURATION: 98 % | BODY MASS INDEX: 26.83 KG/M2 | SYSTOLIC BLOOD PRESSURE: 132 MMHG | DIASTOLIC BLOOD PRESSURE: 68 MMHG | HEART RATE: 68 BPM | WEIGHT: 202.4 LBS | HEIGHT: 73 IN

## 2021-10-08 DIAGNOSIS — E11.3293 TYPE 2 DIABETES MELLITUS WITH BOTH EYES AFFECTED BY MILD NONPROLIFERATIVE RETINOPATHY WITHOUT MACULAR EDEMA, WITH LONG-TERM CURRENT USE OF INSULIN (HCC): Primary | ICD-10-CM

## 2021-10-08 DIAGNOSIS — Z23 ENCOUNTER FOR IMMUNIZATION: ICD-10-CM

## 2021-10-08 DIAGNOSIS — I10 ESSENTIAL HYPERTENSION: ICD-10-CM

## 2021-10-08 DIAGNOSIS — E78.5 HYPERLIPIDEMIA, UNSPECIFIED HYPERLIPIDEMIA TYPE: ICD-10-CM

## 2021-10-08 DIAGNOSIS — E11.9 TYPE 2 DIABETES MELLITUS WITHOUT COMPLICATION, WITH LONG-TERM CURRENT USE OF INSULIN (HCC): ICD-10-CM

## 2021-10-08 DIAGNOSIS — Z79.4 TYPE 2 DIABETES MELLITUS WITHOUT COMPLICATION, WITH LONG-TERM CURRENT USE OF INSULIN (HCC): ICD-10-CM

## 2021-10-08 DIAGNOSIS — Z79.4 TYPE 2 DIABETES MELLITUS WITH BOTH EYES AFFECTED BY MILD NONPROLIFERATIVE RETINOPATHY WITHOUT MACULAR EDEMA, WITH LONG-TERM CURRENT USE OF INSULIN (HCC): Primary | ICD-10-CM

## 2021-10-08 PROCEDURE — 90471 IMMUNIZATION ADMIN: CPT

## 2021-10-08 PROCEDURE — 3078F DIAST BP <80 MM HG: CPT | Performed by: PHYSICIAN ASSISTANT

## 2021-10-08 PROCEDURE — 99214 OFFICE O/P EST MOD 30 MIN: CPT | Performed by: PHYSICIAN ASSISTANT

## 2021-10-08 PROCEDURE — 3008F BODY MASS INDEX DOCD: CPT | Performed by: PHYSICIAN ASSISTANT

## 2021-10-08 PROCEDURE — 1160F RVW MEDS BY RX/DR IN RCRD: CPT | Performed by: PHYSICIAN ASSISTANT

## 2021-10-08 PROCEDURE — 1036F TOBACCO NON-USER: CPT | Performed by: PHYSICIAN ASSISTANT

## 2021-10-08 PROCEDURE — 3075F SYST BP GE 130 - 139MM HG: CPT | Performed by: PHYSICIAN ASSISTANT

## 2021-10-08 PROCEDURE — 90662 IIV NO PRSV INCREASED AG IM: CPT

## 2021-10-08 RX ORDER — FLASH GLUCOSE SENSOR
1 KIT MISCELLANEOUS
Qty: 6 EACH | Refills: 1 | Status: SHIPPED | OUTPATIENT
Start: 2021-10-08 | End: 2022-04-15

## 2021-11-01 DIAGNOSIS — E11.3293 TYPE 2 DIABETES MELLITUS WITH BOTH EYES AFFECTED BY MILD NONPROLIFERATIVE RETINOPATHY WITHOUT MACULAR EDEMA, WITH LONG-TERM CURRENT USE OF INSULIN (HCC): ICD-10-CM

## 2021-11-01 DIAGNOSIS — Z79.4 TYPE 2 DIABETES MELLITUS WITH BOTH EYES AFFECTED BY MILD NONPROLIFERATIVE RETINOPATHY WITHOUT MACULAR EDEMA, WITH LONG-TERM CURRENT USE OF INSULIN (HCC): ICD-10-CM

## 2021-11-01 RX ORDER — INSULIN DETEMIR 100 [IU]/ML
66 INJECTION, SOLUTION SUBCUTANEOUS DAILY
Qty: 15 ML | Refills: 3 | Status: SHIPPED | OUTPATIENT
Start: 2021-11-01 | End: 2022-01-14 | Stop reason: SDUPTHER

## 2022-01-12 PROBLEM — Z12.5 SCREENING FOR PROSTATE CANCER: Status: ACTIVE | Noted: 2022-01-12

## 2022-01-14 ENCOUNTER — OFFICE VISIT (OUTPATIENT)
Dept: FAMILY MEDICINE CLINIC | Facility: CLINIC | Age: 67
End: 2022-01-14
Payer: COMMERCIAL

## 2022-01-14 VITALS
BODY MASS INDEX: 26.53 KG/M2 | HEART RATE: 58 BPM | DIASTOLIC BLOOD PRESSURE: 64 MMHG | WEIGHT: 200.2 LBS | HEIGHT: 73 IN | TEMPERATURE: 97.5 F | OXYGEN SATURATION: 98 % | SYSTOLIC BLOOD PRESSURE: 126 MMHG

## 2022-01-14 DIAGNOSIS — Z12.5 SCREENING FOR PROSTATE CANCER: ICD-10-CM

## 2022-01-14 DIAGNOSIS — I10 ESSENTIAL HYPERTENSION: ICD-10-CM

## 2022-01-14 DIAGNOSIS — E78.5 HYPERLIPIDEMIA, UNSPECIFIED HYPERLIPIDEMIA TYPE: ICD-10-CM

## 2022-01-14 DIAGNOSIS — Z79.4 TYPE 2 DIABETES MELLITUS WITH BOTH EYES AFFECTED BY MILD NONPROLIFERATIVE RETINOPATHY WITHOUT MACULAR EDEMA, WITH LONG-TERM CURRENT USE OF INSULIN (HCC): Primary | ICD-10-CM

## 2022-01-14 DIAGNOSIS — E11.3293 TYPE 2 DIABETES MELLITUS WITH BOTH EYES AFFECTED BY MILD NONPROLIFERATIVE RETINOPATHY WITHOUT MACULAR EDEMA, WITH LONG-TERM CURRENT USE OF INSULIN (HCC): ICD-10-CM

## 2022-01-14 DIAGNOSIS — Z79.4 TYPE 2 DIABETES MELLITUS WITH BOTH EYES AFFECTED BY MILD NONPROLIFERATIVE RETINOPATHY WITHOUT MACULAR EDEMA, WITH LONG-TERM CURRENT USE OF INSULIN (HCC): ICD-10-CM

## 2022-01-14 DIAGNOSIS — E11.3293 TYPE 2 DIABETES MELLITUS WITH BOTH EYES AFFECTED BY MILD NONPROLIFERATIVE RETINOPATHY WITHOUT MACULAR EDEMA, WITH LONG-TERM CURRENT USE OF INSULIN (HCC): Primary | ICD-10-CM

## 2022-01-14 LAB — SL AMB POCT HEMOGLOBIN AIC: 7 (ref ?–6.5)

## 2022-01-14 PROCEDURE — 99214 OFFICE O/P EST MOD 30 MIN: CPT | Performed by: PHYSICIAN ASSISTANT

## 2022-01-14 PROCEDURE — 1036F TOBACCO NON-USER: CPT | Performed by: PHYSICIAN ASSISTANT

## 2022-01-14 PROCEDURE — 3008F BODY MASS INDEX DOCD: CPT | Performed by: PHYSICIAN ASSISTANT

## 2022-01-14 PROCEDURE — 3074F SYST BP LT 130 MM HG: CPT | Performed by: PHYSICIAN ASSISTANT

## 2022-01-14 PROCEDURE — 1160F RVW MEDS BY RX/DR IN RCRD: CPT | Performed by: PHYSICIAN ASSISTANT

## 2022-01-14 PROCEDURE — 3051F HG A1C>EQUAL 7.0%<8.0%: CPT | Performed by: PHYSICIAN ASSISTANT

## 2022-01-14 PROCEDURE — 83036 HEMOGLOBIN GLYCOSYLATED A1C: CPT | Performed by: PHYSICIAN ASSISTANT

## 2022-01-14 PROCEDURE — 3078F DIAST BP <80 MM HG: CPT | Performed by: PHYSICIAN ASSISTANT

## 2022-01-14 RX ORDER — INSULIN DETEMIR 100 [IU]/ML
66 INJECTION, SOLUTION SUBCUTANEOUS DAILY
Qty: 15 ML | Refills: 3 | Status: SHIPPED | OUTPATIENT
Start: 2022-01-14 | End: 2022-04-15

## 2022-01-14 NOTE — PROGRESS NOTES
Diabetic Foot Exam    Patient's shoes and socks removed  Right Foot/Ankle   Right Foot Inspection  Skin Exam: skin normal and skin intact  No dry skin, no warmth, no callus, no erythema, no maceration, no abnormal color, no pre-ulcer, no ulcer and no callus  Toe Exam: right toe deformity (hammr  toes)  Sensory   Vibration: intact  Monofilament testing: intact    Vascular  The right DP pulse is 2+  Left Foot/Ankle  Left Foot Inspection  Skin Exam: skin normal and skin intact  No dry skin, no warmth, no erythema, no maceration, normal color, no pre-ulcer, no ulcer and no callus  Toe Exam: left toe deformity  Sensory   Vibration: intact  Monofilament testing: intact    Vascular  The left DP pulse is 2+  Assign Risk Category  Deformity present  No loss of protective sensation  No weak pulses  Risk: 0  Assessment/Plan:     Diagnoses and all orders for this visit:    Type 2 diabetes mellitus with both eyes affected by mild nonproliferative retinopathy without macular edema, with long-term current use of insulin (HCC)  Comments:  Diabetes is at goal continue current regimen  Orders:  -     POCT hemoglobin A1c  -     Comprehensive metabolic panel  -     Hemoglobin A1C  -     Lipid panel  -     Microalbumin / creatinine urine ratio  -     Levemir FlexTouch 100 units/mL injection pen;  Inject 66 Units under the skin daily    Essential hypertension  Comments:  Blood pressure is at goal continue current regimen  Orders:  -     Comprehensive metabolic panel    Hyperlipidemia, unspecified hyperlipidemia type  Comments:  Continue low-fat diet and statin therapy  Orders:  -     Lipid panel    Screening for prostate cancer  -     PSA, Total Screen    Type 2 diabetes mellitus with both eyes affected by mild nonproliferative retinopathy without macular edema, with long-term current use of insulin (HCC)  Comments:  Diabetes is at goal    Orders:  -     POCT hemoglobin A1c  -     Comprehensive metabolic panel  -     Hemoglobin A1C  -     Lipid panel  -     Microalbumin / creatinine urine ratio  -     Levemir FlexTouch 100 units/mL injection pen; Inject 66 Units under the skin daily          Subjective:      Patient ID: Hiral Nolasco is a 78 y o  male  Presents in the office for follow up chronic conditions  Patient has diabetes type 1 he is on insulin therapy  He has a complication of retinopathy  His current regimen includes metformin  of them twice a day and Actos 15 mg  He also is on Levemir 66 units a day  His glimepiride was stopped due to hypoglycemia  Hemoglobin A1c in the office today is 7 0  Patient has a continuous read glucometer  Blood pressure is at goal on lisinopril 10 mg   For hyperlipidemia he is on Lipitor 10 mg      The following portions of the patient's history were reviewed and updated as appropriate:   He   Patient Active Problem List    Diagnosis Date Noted    Screening for prostate cancer 01/12/2022    Glaucoma 07/15/2021    Type 2 diabetes mellitus with hyperglycemia ([facility] [address] ) 06/01/2021    Type 2 diabetes mellitus 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, bilateral ([facility] [address] ) 06/01/2021    Skin cancer, basal cell 02/07/2020    Decreased hearing of both ears 02/07/2020    Essential hypertension 02/05/2020    Hyperlipidemia 02/05/2020    Type 2 diabetes mellitus without complication, with long-term current use of insulin (HCC) 02/05/2020    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     Current Outpatient Medications   Medication Sig Dispense Refill    aspirin (ECOTRIN LOW STRENGTH) 81 mg EC tablet Take 81 mg by mouth daily      atorvastatin (LIPITOR) 10 mg tablet Take 1 tablet (10 mg total) by mouth daily 90 tablet 1    BD Pen Needle Meghana U/F 32G X 4 MM MISC Inject under the skin daily Use as directed 100 each 3    Cholecalciferol (D-3-5) 5000 units capsule Take 5,000 Units by mouth daily      Continuous Blood Gluc  (FreeStyle Kalpana 14 Day [place]) CAMRON Use 1 application 4 (four) times a day Patient to check his blood sugar 4 times a day 1 Device 0    Continuous Blood Gluc Sensor (FreeStyle Kalpana 14 Day Sensor) MISC Use 1 application every 14 (fourteen) days 6 each 1    fluticasone (FLONASE) 50 mcg/act nasal spray 1 spray into each nostril daily      latanoprost (XALATAN) 0 005 % ophthalmic solution PUT 1 DROP INTO BOTH EYES IN THE EVENING      Levemir FlexTouch 100 units/mL injection pen Inject 66 Units under the skin daily 15 mL 3    lisinopril (ZESTRIL) 10 mg tablet Take 1 tablet (10 mg total) by mouth daily 90 tablet 1    metFORMIN (GLUCOPHAGE-XR) 500 mg 24 hr tablet Take 2 tablets (1,000 mg total) by mouth 2 (two) times a day 360 tablet 1    Milk Thistle 150 MG CAPS Take by mouth      Multiple Vitamins-Minerals (MULTIVITAMIN WITH MINERALS) tablet Take 1 tablet by mouth daily      pioglitazone (ACTOS) 15 mg tablet Take 1 tablet (15 mg total) by mouth daily 90 tablet 1    sodium chloride (OCEAN) 0 65 % nasal spray 1 spray into each nostril as needed for congestion      Specialty Vitamins Products (MAGNESIUM, AMINO ACID CHELATE,) 133 MG tablet Take 1 tablet by mouth 2 (two) times a day      Vitamins-Lipotropics (B-100 PO) Take by mouth       No current facility-administered medications for this visit  He has No Known Allergies       Review of Systems   Constitutional: Negative for activity change, appetite change, chills, fatigue and fever  HENT: Negative for ear pain and sore throat  Eyes: Negative for visual disturbance  Respiratory: Negative for cough and shortness of breath  Cardiovascular: Negative for chest pain, palpitations and leg swelling  Gastrointestinal: Negative for abdominal pain, blood in stool, constipation, diarrhea and nausea  Genitourinary: Negative for difficulty urinating  Musculoskeletal: Negative for arthralgias, back pain and myalgias  Skin: Negative for rash     Neurological: Negative for dizziness, syncope and headaches  Psychiatric/Behavioral: Negative for sleep disturbance  Objective:        Physical Exam  Vitals and nursing note reviewed  Constitutional:       General: He is not in acute distress  Appearance: Normal appearance  He is well-developed  He is not ill-appearing  HENT:      Head: Normocephalic and atraumatic  Right Ear: Tympanic membrane, ear canal and external ear normal       Left Ear: Tympanic membrane, ear canal and external ear normal    Eyes:      Conjunctiva/sclera: Conjunctivae normal       Pupils: Pupils are equal, round, and reactive to light  Neck:      Thyroid: No thyromegaly  Vascular: No carotid bruit  Cardiovascular:      Rate and Rhythm: Normal rate and regular rhythm  Pulses: no weak pulses          Dorsalis pedis pulses are 2+ on the right side and 2+ on the left side  Heart sounds: Normal heart sounds  No murmur heard  Pulmonary:      Effort: Pulmonary effort is normal       Breath sounds: Normal breath sounds  No wheezing  Abdominal:      General: Bowel sounds are normal       Palpations: Abdomen is soft  There is no mass  Tenderness: There is no abdominal tenderness  Musculoskeletal:      Right lower leg: No edema  Left lower leg: No edema  Feet:      Right foot:      Skin integrity: No ulcer, skin breakdown, erythema, warmth, callus or dry skin  Left foot:      Skin integrity: No ulcer, skin breakdown, erythema, warmth, callus or dry skin  Lymphadenopathy:      Cervical: No cervical adenopathy  Skin:     General: Skin is warm and dry  Neurological:      General: No focal deficit present  Mental Status: He is alert and oriented to person, place, and time  Psychiatric:         Mood and Affect: Mood normal          Behavior: Behavior normal          Thought Content:  Thought content normal          Judgment: Judgment normal

## 2022-01-27 DIAGNOSIS — E11.9 TYPE 2 DIABETES MELLITUS WITHOUT COMPLICATION, WITH LONG-TERM CURRENT USE OF INSULIN (HCC): ICD-10-CM

## 2022-01-27 DIAGNOSIS — Z79.4 TYPE 2 DIABETES MELLITUS WITHOUT COMPLICATION, WITH LONG-TERM CURRENT USE OF INSULIN (HCC): ICD-10-CM

## 2022-01-27 RX ORDER — PIOGLITAZONEHYDROCHLORIDE 15 MG/1
TABLET ORAL
Qty: 90 TABLET | Refills: 1 | Status: SHIPPED | OUTPATIENT
Start: 2022-01-27 | End: 2022-07-25

## 2022-02-13 DIAGNOSIS — I10 ESSENTIAL HYPERTENSION: ICD-10-CM

## 2022-02-14 PROCEDURE — 4010F ACE/ARB THERAPY RXD/TAKEN: CPT | Performed by: PHYSICIAN ASSISTANT

## 2022-02-14 RX ORDER — LISINOPRIL 10 MG/1
TABLET ORAL
Qty: 90 TABLET | Refills: 1 | Status: SHIPPED | OUTPATIENT
Start: 2022-02-14 | End: 2022-08-08 | Stop reason: SDUPTHER

## 2022-03-01 DIAGNOSIS — Z79.4 TYPE 2 DIABETES MELLITUS WITH BOTH EYES AFFECTED BY MILD NONPROLIFERATIVE RETINOPATHY WITHOUT MACULAR EDEMA, WITH LONG-TERM CURRENT USE OF INSULIN (HCC): ICD-10-CM

## 2022-03-01 DIAGNOSIS — E11.3293 TYPE 2 DIABETES MELLITUS WITH BOTH EYES AFFECTED BY MILD NONPROLIFERATIVE RETINOPATHY WITHOUT MACULAR EDEMA, WITH LONG-TERM CURRENT USE OF INSULIN (HCC): ICD-10-CM

## 2022-03-01 RX ORDER — METFORMIN HYDROCHLORIDE 500 MG/1
TABLET, EXTENDED RELEASE ORAL
Qty: 360 TABLET | Refills: 1 | Status: SHIPPED | OUTPATIENT
Start: 2022-03-01

## 2022-03-06 DIAGNOSIS — E78.5 HYPERLIPIDEMIA, UNSPECIFIED HYPERLIPIDEMIA TYPE: ICD-10-CM

## 2022-03-07 RX ORDER — ATORVASTATIN CALCIUM 10 MG/1
TABLET, FILM COATED ORAL
Qty: 90 TABLET | Refills: 1 | Status: SHIPPED | OUTPATIENT
Start: 2022-03-07

## 2022-04-01 ENCOUNTER — APPOINTMENT (OUTPATIENT)
Dept: LAB | Facility: MEDICAL CENTER | Age: 67
End: 2022-04-01
Payer: COMMERCIAL

## 2022-04-01 LAB
ALBUMIN SERPL BCP-MCNC: 3.7 G/DL (ref 3.5–5)
ALP SERPL-CCNC: 57 U/L (ref 46–116)
ALT SERPL W P-5'-P-CCNC: 33 U/L (ref 12–78)
ANION GAP SERPL CALCULATED.3IONS-SCNC: 5 MMOL/L (ref 4–13)
AST SERPL W P-5'-P-CCNC: 23 U/L (ref 5–45)
BILIRUB SERPL-MCNC: 1.09 MG/DL (ref 0.2–1)
BUN SERPL-MCNC: 19 MG/DL (ref 5–25)
CALCIUM SERPL-MCNC: 9 MG/DL (ref 8.3–10.1)
CHLORIDE SERPL-SCNC: 111 MMOL/L (ref 100–108)
CHOLEST SERPL-MCNC: 115 MG/DL
CO2 SERPL-SCNC: 26 MMOL/L (ref 21–32)
CREAT SERPL-MCNC: 1.01 MG/DL (ref 0.6–1.3)
CREAT UR-MCNC: 206 MG/DL
EST. AVERAGE GLUCOSE BLD GHB EST-MCNC: 146 MG/DL
GFR SERPL CREATININE-BSD FRML MDRD: 76 ML/MIN/1.73SQ M
GLUCOSE P FAST SERPL-MCNC: 183 MG/DL (ref 65–99)
HBA1C MFR BLD: 6.7 %
HDLC SERPL-MCNC: 57 MG/DL
LDLC SERPL CALC-MCNC: 46 MG/DL (ref 0–100)
MICROALBUMIN UR-MCNC: 104 MG/L (ref 0–20)
MICROALBUMIN/CREAT 24H UR: 50 MG/G CREATININE (ref 0–30)
NONHDLC SERPL-MCNC: 58 MG/DL
POTASSIUM SERPL-SCNC: 4.3 MMOL/L (ref 3.5–5.3)
PROT SERPL-MCNC: 6.6 G/DL (ref 6.4–8.2)
PSA SERPL-MCNC: 1.9 NG/ML (ref 0–4)
SODIUM SERPL-SCNC: 142 MMOL/L (ref 136–145)
TRIGL SERPL-MCNC: 58 MG/DL

## 2022-04-01 PROCEDURE — 83036 HEMOGLOBIN GLYCOSYLATED A1C: CPT | Performed by: PHYSICIAN ASSISTANT

## 2022-04-01 PROCEDURE — 82570 ASSAY OF URINE CREATININE: CPT | Performed by: PHYSICIAN ASSISTANT

## 2022-04-01 PROCEDURE — 3044F HG A1C LEVEL LT 7.0%: CPT | Performed by: PHYSICIAN ASSISTANT

## 2022-04-01 PROCEDURE — 80053 COMPREHEN METABOLIC PANEL: CPT | Performed by: PHYSICIAN ASSISTANT

## 2022-04-01 PROCEDURE — 80061 LIPID PANEL: CPT | Performed by: PHYSICIAN ASSISTANT

## 2022-04-01 PROCEDURE — 36415 COLL VENOUS BLD VENIPUNCTURE: CPT | Performed by: PHYSICIAN ASSISTANT

## 2022-04-01 PROCEDURE — 82043 UR ALBUMIN QUANTITATIVE: CPT | Performed by: PHYSICIAN ASSISTANT

## 2022-04-01 PROCEDURE — 3060F POS MICROALBUMINURIA REV: CPT | Performed by: PHYSICIAN ASSISTANT

## 2022-04-01 PROCEDURE — G0103 PSA SCREENING: HCPCS | Performed by: PHYSICIAN ASSISTANT

## 2022-04-08 ENCOUNTER — RA CDI HCC (OUTPATIENT)
Dept: OTHER | Facility: HOSPITAL | Age: 67
End: 2022-04-08

## 2022-04-08 NOTE — PROGRESS NOTES
[place] [id number]  coding opportunities          Chart Reviewed number of suggestions sent to Provider: 1     Patients Insurance        Commercial Insurance: 81 [employer] on 4/15/22    Found in active problem list - please review for current status    E11 [id number]: Type 2 diabetes mellitus with hyperglycemia ([place] [id number] )

## 2022-04-13 PROBLEM — E11.65 TYPE 2 DIABETES MELLITUS WITH HYPERGLYCEMIA (HCC): Status: RESOLVED | Noted: 2021-06-01 | Resolved: 2022-04-13

## 2022-04-15 ENCOUNTER — OFFICE VISIT (OUTPATIENT)
Dept: FAMILY MEDICINE CLINIC | Facility: CLINIC | Age: 67
End: 2022-04-15
Payer: COMMERCIAL

## 2022-04-15 VITALS
HEIGHT: 73 IN | HEART RATE: 76 BPM | OXYGEN SATURATION: 97 % | TEMPERATURE: 97.3 F | SYSTOLIC BLOOD PRESSURE: 132 MMHG | BODY MASS INDEX: 26.24 KG/M2 | DIASTOLIC BLOOD PRESSURE: 68 MMHG | WEIGHT: 198 LBS

## 2022-04-15 DIAGNOSIS — E11.3293 TYPE 2 DIABETES MELLITUS WITH BOTH EYES AFFECTED BY MILD NONPROLIFERATIVE RETINOPATHY WITHOUT MACULAR EDEMA, WITH LONG-TERM CURRENT USE OF INSULIN (HCC): ICD-10-CM

## 2022-04-15 DIAGNOSIS — E11.3293 TYPE 2 DIABETES MELLITUS WITH BOTH EYES AFFECTED BY MILD NONPROLIFERATIVE RETINOPATHY WITHOUT MACULAR EDEMA, WITH LONG-TERM CURRENT USE OF INSULIN (HCC): Primary | ICD-10-CM

## 2022-04-15 DIAGNOSIS — E78.5 HYPERLIPIDEMIA, UNSPECIFIED HYPERLIPIDEMIA TYPE: ICD-10-CM

## 2022-04-15 DIAGNOSIS — M77.8 TENDINITIS OF THUMB: ICD-10-CM

## 2022-04-15 DIAGNOSIS — E11.9 TYPE 2 DIABETES MELLITUS WITHOUT COMPLICATION, WITH LONG-TERM CURRENT USE OF INSULIN (HCC): ICD-10-CM

## 2022-04-15 DIAGNOSIS — Z79.4 TYPE 2 DIABETES MELLITUS WITHOUT COMPLICATION, WITH LONG-TERM CURRENT USE OF INSULIN (HCC): ICD-10-CM

## 2022-04-15 DIAGNOSIS — Z79.4 TYPE 2 DIABETES MELLITUS WITH BOTH EYES AFFECTED BY MILD NONPROLIFERATIVE RETINOPATHY WITHOUT MACULAR EDEMA, WITH LONG-TERM CURRENT USE OF INSULIN (HCC): Primary | ICD-10-CM

## 2022-04-15 DIAGNOSIS — Z79.4 TYPE 2 DIABETES MELLITUS WITH BOTH EYES AFFECTED BY MILD NONPROLIFERATIVE RETINOPATHY WITHOUT MACULAR EDEMA, WITH LONG-TERM CURRENT USE OF INSULIN (HCC): ICD-10-CM

## 2022-04-15 DIAGNOSIS — I10 ESSENTIAL HYPERTENSION: ICD-10-CM

## 2022-04-15 PROCEDURE — 1160F RVW MEDS BY RX/DR IN RCRD: CPT | Performed by: PHYSICIAN ASSISTANT

## 2022-04-15 PROCEDURE — 3288F FALL RISK ASSESSMENT DOCD: CPT | Performed by: PHYSICIAN ASSISTANT

## 2022-04-15 PROCEDURE — 1036F TOBACCO NON-USER: CPT | Performed by: PHYSICIAN ASSISTANT

## 2022-04-15 PROCEDURE — 3725F SCREEN DEPRESSION PERFORMED: CPT | Performed by: PHYSICIAN ASSISTANT

## 2022-04-15 PROCEDURE — 99214 OFFICE O/P EST MOD 30 MIN: CPT | Performed by: PHYSICIAN ASSISTANT

## 2022-04-15 PROCEDURE — 3075F SYST BP GE 130 - 139MM HG: CPT | Performed by: PHYSICIAN ASSISTANT

## 2022-04-15 PROCEDURE — 1101F PT FALLS ASSESS-DOCD LE1/YR: CPT | Performed by: PHYSICIAN ASSISTANT

## 2022-04-15 PROCEDURE — 3008F BODY MASS INDEX DOCD: CPT | Performed by: PHYSICIAN ASSISTANT

## 2022-04-15 PROCEDURE — 3078F DIAST BP <80 MM HG: CPT | Performed by: PHYSICIAN ASSISTANT

## 2022-04-15 RX ORDER — FLASH GLUCOSE SENSOR
1 KIT MISCELLANEOUS
Qty: 6 EACH | Refills: 1 | Status: SHIPPED | OUTPATIENT
Start: 2022-04-15

## 2022-04-15 RX ORDER — INSULIN DETEMIR 100 [IU]/ML
66 INJECTION, SOLUTION SUBCUTANEOUS DAILY
Qty: 15 ML | Refills: 3 | Status: SHIPPED | OUTPATIENT
Start: 2022-04-15 | End: 2022-08-08 | Stop reason: SDUPTHER

## 2022-04-15 NOTE — PROGRESS NOTES
BMI Counseling: Body mass index is 26 12 kg/m²  The BMI is above normal  Nutrition recommendations include decreasing portion sizes, encouraging healthy choices of fruits and vegetables and moderation in carbohydrate intake  Exercise recommendations include exercising 3-5 times per week  Rationale for BMI follow-up plan is due to patient being overweight or obese  Depression Screening and Follow-up Plan: Patient was screened for depression during today's encounter  They screened negative with a PHQ-2 score of 0  Assessment/Plan:     Diagnoses and all orders for this visit:    Type 2 diabetes mellitus with both eyes affected by mild nonproliferative retinopathy without macular edema, with long-term current use of insulin ([facility])  Comments:  Diabetes is at goal continue current regimen  Orders:  -     Levemir FlexTouch 100 units/mL injection pen; Inject 66 Units under the skin daily  -     Hemoglobin A1C; Future    Essential hypertension  Comments:  Blood pressure is at goal continue current regimen  Orders:  -     Comprehensive metabolic panel; Future    Hyperlipidemia, unspecified hyperlipidemia type  Comments:  Lipids are at goal continue statin therapy and low-fat diet  Orders:  -     Lipid panel; Future    Tendinitis of thumb  Comments:  Tendinitis of the left thumb dominant hand  Referred to orthopedic  Orders:  -     Ambulatory Referral to Orthopedic Surgery; Future    Type 2 diabetes mellitus with both eyes affected by mild nonproliferative retinopathy without macular edema, with long-term current use of insulin ([facility])  Comments:  Diabetes is at goal    Orders:  -     Levemir FlexTouch 100 units/mL injection pen;  Inject 66 Units under the skin daily  -     Hemoglobin A1C; Future    Type 2 diabetes mellitus without complication, with long-term current use of insulin (HCC)  -     Continuous Blood Gluc Sensor (FreeStyle Kalpana 14 Day Sensor) MISC; Use 1 application every 14 (fourteen) days Subjective:      Patient ID: Reina Kaumfan is a 78 y o  male  Patient presents in the office for follow up chronic conditions  Patient has diabetes type 2 on chronic insulin therapy  He also has a history of retinopathy  Current regimen includes metformin  twice a day  He is on Actos 15 mg  He uses Levemir 66 units daily  He has a continuous glucometer Reginald  This has greatly helped in the control of his blood sugar  Hemoglobin A1c is 6 7  Renal functions are normal   Urine microalbumin is only slightly positive  Pressure is well controlled with lisinopril at 10 mg  For hyperlipidemia he is on Lipitor 10 mg  Hepatic functions are normal except for slightly elevated bilirubin  He has known Gilbert's syndrome    Lipid panel is at goal      The following portions of the patient's history were reviewed and updated as appropriate:   He   Patient Active Problem List    Diagnosis Date Noted    Screening for prostate cancer 01/12/2022    Glaucoma 07/15/2021    Type 2 diabetes mellitus 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, bilateral ([address] ) 06/01/2021    Skin cancer, basal cell 02/07/2020    Decreased hearing of both ears 02/07/2020    Essential hypertension 02/05/2020    Hyperlipidemia 02/05/2020    Type 2 diabetes mellitus without complication, with long-term current use of insulin (HCC) 02/05/2020    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     Current Outpatient Medications   Medication Sig Dispense Refill    aspirin (ECOTRIN LOW STRENGTH) 81 mg EC tablet Take 81 mg by mouth daily      atorvastatin (LIPITOR) 10 mg tablet TAKE 1 TABLET BY MOUTH EVERY DAY 90 tablet 1    BD Pen Needle Meghana U/F 32G X 4 MM MISC Inject under the skin daily Use as directed 100 each 3    Cholecalciferol (D-3-5) 5000 units capsule Take 5,000 Units by mouth daily      Continuous Blood Gluc  (FreeStyle Kalpana 14 Day [place]) CAMRON Use 1 application 4 (four) times a day Patient to check his blood sugar 4 times a day 1 Device 0    Continuous Blood Gluc Sensor (FreeStyle Kalpana 14 Day Sensor) MISC Use 1 application every 14 (fourteen) days 6 each 1    fluticasone (FLONASE) 50 mcg/act nasal spray 1 spray into each nostril daily      latanoprost (XALATAN) 0 005 % ophthalmic solution PUT 1 DROP INTO BOTH EYES IN THE EVENING      Levemir FlexTouch 100 units/mL injection pen Inject 66 Units under the skin daily 15 mL 3    lisinopril (ZESTRIL) 10 mg tablet TAKE 1 TABLET BY MOUTH EVERY DAY 90 tablet 1    metFORMIN (GLUCOPHAGE-XR) 500 mg 24 hr tablet TAKE 2 TABLETS BY MOUTH TWICE A  tablet 1    Milk Thistle 150 MG CAPS Take by mouth      Multiple Vitamins-Minerals (MULTIVITAMIN WITH MINERALS) tablet Take 1 tablet by mouth daily      pioglitazone (ACTOS) 15 mg tablet TAKE 1 TABLET BY MOUTH EVERY DAY 90 tablet 1    sodium chloride (OCEAN) 0 65 % nasal spray 1 spray into each nostril as needed for congestion      Specialty Vitamins Products (MAGNESIUM, AMINO ACID CHELATE,) 133 MG tablet Take 1 tablet by mouth 2 (two) times a day      Vitamins-Lipotropics (B-100 PO) Take by mouth       No current facility-administered medications for this visit  He has No Known Allergies       Review of Systems   Constitutional: Negative for activity change, appetite change, chills, fatigue and fever  HENT: Negative for ear pain and sore throat  Eyes: Negative for visual disturbance  Respiratory: Negative for cough and shortness of breath  Cardiovascular: Negative for chest pain, palpitations and leg swelling  Gastrointestinal: Negative for abdominal pain, blood in stool, constipation, diarrhea and nausea  Genitourinary: Negative for difficulty urinating  Musculoskeletal: Positive for arthralgias  Negative for back pain and myalgias  Left   Wrist/ thumb  Pain  Pt is  Left  Hand  dominant   Skin: Negative for rash  Neurological: Negative for dizziness, syncope and headaches  Psychiatric/Behavioral: Negative for self-injury, sleep disturbance and suicidal ideas  The patient is not nervous/anxious  Objective:        Physical Exam  Vitals and nursing note reviewed  Constitutional:       General: He is not in acute distress  Appearance: Normal appearance  He is well-developed  He is not ill-appearing  HENT:      Head: Normocephalic and atraumatic  Right Ear: Tympanic membrane, ear canal and external ear normal       Left Ear: Tympanic membrane, ear canal and external ear normal    Eyes:      Conjunctiva/sclera: Conjunctivae normal       Pupils: Pupils are equal, round, and reactive to light  Neck:      Thyroid: No thyromegaly  Vascular: No carotid bruit  Cardiovascular:      Rate and Rhythm: Normal rate and regular rhythm  Pulses: no weak pulses          Dorsalis pedis pulses are 2+ on the right side and 2+ on the left side  Heart sounds: Normal heart sounds  No murmur heard  Pulmonary:      Effort: Pulmonary effort is normal       Breath sounds: Normal breath sounds  No wheezing  Abdominal:      General: Bowel sounds are normal       Palpations: Abdomen is soft  There is no mass  Tenderness: There is no abdominal tenderness  Musculoskeletal:      Right lower leg: No edema  Left lower leg: No edema  Comments: Patient has tenderness the base of the left thumb  This is consistent with tendinitis   Feet:      Right foot:      Skin integrity: No ulcer, skin breakdown, erythema, warmth, callus or dry skin  Left foot:      Skin integrity: No ulcer, skin breakdown, erythema, warmth, callus or dry skin  Lymphadenopathy:      Cervical: No cervical adenopathy  Skin:     General: Skin is warm and dry  Neurological:      General: No focal deficit present  Mental Status: He is alert and oriented to person, place, and time     Psychiatric:         Mood and Affect: Mood normal          Behavior: Behavior normal  Thought Content: Thought content normal          Judgment: Judgment normal       Diabetic Foot Exam    Patient's shoes and socks removed  Right Foot/Ankle   Right Foot Inspection  Skin Exam: skin normal and skin intact  No dry skin, no warmth, no callus, no erythema, no maceration, no abnormal color, no pre-ulcer, no ulcer and no callus  Toe Exam: right toe deformity (hammer  toes)  Sensory   Vibration: intact  Monofilament testing: intact    Vascular  The right DP pulse is 2+  Left Foot/Ankle  Left Foot Inspection  Skin Exam: skin normal and skin intact  No dry skin, no warmth, no erythema, no maceration, normal color, no pre-ulcer, no ulcer and no callus  Toe Exam: left toe deformity  Sensory   Vibration: intact  Monofilament testing: intact    Vascular  The left DP pulse is 2+       Assign Risk Category  Deformity present  No loss of protective sensation  No weak pulses  Risk: 0

## 2022-05-06 ENCOUNTER — TELEPHONE (OUTPATIENT)
Dept: FAMILY MEDICINE CLINIC | Facility: CLINIC | Age: 67
End: 2022-05-06

## 2022-05-06 ENCOUNTER — APPOINTMENT (OUTPATIENT)
Dept: RADIOLOGY | Facility: CLINIC | Age: 67
End: 2022-05-06
Payer: COMMERCIAL

## 2022-05-06 ENCOUNTER — OFFICE VISIT (OUTPATIENT)
Dept: OBGYN CLINIC | Facility: CLINIC | Age: 67
End: 2022-05-06
Payer: COMMERCIAL

## 2022-05-06 VITALS
DIASTOLIC BLOOD PRESSURE: 69 MMHG | BODY MASS INDEX: 26.51 KG/M2 | HEIGHT: 73 IN | HEART RATE: 71 BPM | SYSTOLIC BLOOD PRESSURE: 145 MMHG | WEIGHT: 200 LBS

## 2022-05-06 DIAGNOSIS — M77.8 TENDINITIS OF THUMB: ICD-10-CM

## 2022-05-06 DIAGNOSIS — M25.532 PAIN IN LEFT WRIST: ICD-10-CM

## 2022-05-06 DIAGNOSIS — M19.032 ARTHRITIS OF LEFT WRIST: Primary | ICD-10-CM

## 2022-05-06 PROCEDURE — 20605 DRAIN/INJ JOINT/BURSA W/O US: CPT | Performed by: FAMILY MEDICINE

## 2022-05-06 PROCEDURE — 73110 X-RAY EXAM OF WRIST: CPT

## 2022-05-06 PROCEDURE — 3078F DIAST BP <80 MM HG: CPT | Performed by: FAMILY MEDICINE

## 2022-05-06 PROCEDURE — 99204 OFFICE O/P NEW MOD 45 MIN: CPT | Performed by: FAMILY MEDICINE

## 2022-05-06 PROCEDURE — 1036F TOBACCO NON-USER: CPT | Performed by: FAMILY MEDICINE

## 2022-05-06 PROCEDURE — 1160F RVW MEDS BY RX/DR IN RCRD: CPT | Performed by: FAMILY MEDICINE

## 2022-05-06 PROCEDURE — 3077F SYST BP >= 140 MM HG: CPT | Performed by: FAMILY MEDICINE

## 2022-05-06 PROCEDURE — 3008F BODY MASS INDEX DOCD: CPT | Performed by: FAMILY MEDICINE

## 2022-05-06 RX ORDER — LIDOCAINE HYDROCHLORIDE 10 MG/ML
1 INJECTION, SOLUTION INFILTRATION; PERINEURAL
Status: COMPLETED | OUTPATIENT
Start: 2022-05-06 | End: 2022-05-06

## 2022-05-06 RX ORDER — TRIAMCINOLONE ACETONIDE 40 MG/ML
20 INJECTION, SUSPENSION INTRA-ARTICULAR; INTRAMUSCULAR
Status: COMPLETED | OUTPATIENT
Start: 2022-05-06 | End: 2022-05-06

## 2022-05-06 RX ADMIN — TRIAMCINOLONE ACETONIDE 20 MG: 40 INJECTION, SUSPENSION INTRA-ARTICULAR; INTRAMUSCULAR at 09:55

## 2022-05-06 RX ADMIN — LIDOCAINE HYDROCHLORIDE 1 ML: 10 INJECTION, SOLUTION INFILTRATION; PERINEURAL at 09:55

## 2022-05-06 NOTE — PROGRESS NOTES
Assessment/Plan:  Assessment/Plan   Diagnoses and all orders for this visit:    Arthritis of left wrist  -     Ambulatory Referral to Orthopedic Surgery  -     XR wrist 3+ vw left; Future  -     Diclofenac Sodium (VOLTAREN) 1 %; Apply 2 g topically 4 (four) times a day  -     Medium joint arthrocentesis: L intercarpal        78year-old left hand dominant male with left thumb and wrist pain more than few months duration  Discussed with patient physical exam, radiographs, impression and plan  X-rays left wrist noted for triscaphe arthritis with subchondral cyst within the proximal pole scaphoid, chondrocalcinosis  Physical exam noted for tenderness the thumb CMC joint, snuffbox, radial styloid, and 1st dorsal compartment  He has limited range of motion with extension and there is pain with radial deviation  Genette Schmitt causes mild pain  He has intact range of motion and strength in digits of the hand  There is pain with axial load  He is intact neurovascularly  Clinical impression is that he is symptomatic from arthritis of the wrist   I discussed regimen of corticosteroid injection, supplements, and topical anti-inflammatory  Surgery is not warranted  I administered mixture of 0 5 cc 1% lidocaine and 0 5 cc Kenalog to the left wrist triscaphe joint without complication  He is To start taking tumeric at least 1000 mg daily, tart cherry at least 1000 mg daily, and glucosamine-chondroitin 2 to 3 times a day  He is to apply topical diclofenac gel to 4 times a day for 10 days  He will follow up as needed  Emery Wilkes  [facility] arthritis  Subjective:   Patient ID: Clint Guillermo is a 78 y o  male  Chief Complaint   Patient presents with    Left Hand - Pain       70-year-old left hand dominant male presents evaluation of left thumb and wrist pain more than few months duration  He denies any particular trauma or inciting event    Pain described as gradual in onset, localized to the base of the thumb at the CMC joint, radiating proximally to the radial volar aspect the wrist and distally to thenar eminence, achy and throbbing and sometimes sharp and shooting, worse with twisting and gripping, and improved with resting  He has difficulty with opening jars  He does not usually take medication for symptoms  Hand Pain  This is a new problem  The current episode started more than 1 month ago  The problem occurs daily  The problem has been gradually worsening  Associated symptoms include arthralgias  Pertinent negatives include no abdominal pain, chest pain, chills, fever, joint swelling, numbness, rash, sore throat or weakness  Exacerbated by: Gripping  He has tried rest for the symptoms  The treatment provided mild relief  The following portions of the patient's history were reviewed and updated as appropriate: He  has a past medical history of Colon polyp, Diabetes mellitus ([facility] [address] ), Ear problems, Essential hypertension (2/5/2020), Glaucoma, HL (hearing loss), Hyperlipidemia (2/5/2020), Hypertension, Kidney stone, Melanoma of ear, left ([facility] [address] ), Nasal polyp, and Tinnitus  He  has a past surgical history that includes pr colonoscopy flx dx w/collj spec when pfrmd (N/A, 8/8/2017); ARTHROSCOPY KNEE (Bilateral); Shoulder arthroscopy; Cystoscopy; Nasal polyp excision; pr repair flex foot tendon,ea (Left, 12/8/2017); [place] tooth extraction; Excision basal cell carcinoma (03/2020); Rotator cuff repair (Bilateral); and Foot Tendon Surgery (Left, 6/22/2021)  His family history includes Aortic aneurysm in his father; Colon cancer in his father; Diabetes in his mother; Heart disease in his mother; Melanoma in his mother; Prostate cancer in his father  He  reports that he has quit smoking  He has quit using smokeless tobacco  He reports current alcohol use  He reports that he does not use drugs  He has No Known Allergies       Review of Systems   Constitutional: Negative for chills and fever     HENT: Negative for sore throat  Eyes: Negative for visual disturbance  Respiratory: Negative for shortness of breath  Cardiovascular: Negative for chest pain  Gastrointestinal: Negative for abdominal pain  Genitourinary: Negative for flank pain  Musculoskeletal: Positive for arthralgias  Negative for joint swelling  Skin: Negative for rash and wound  Neurological: Negative for weakness and numbness  Hematological: Does not bruise/bleed easily  Psychiatric/Behavioral: Negative for self-injury  Objective:  Vitals:    05/06/22 0757   BP: 145/69   Pulse: 71   Weight: 90 7 kg (200 lb)   Height: 6' 1" (1 854 m)     Left Hand Exam     Muscle Strength   The patient has normal left wrist strength  Tests   Tinel's sign (median nerve): negative  Finkelstein's test: positive (Mild)    Other   Sensation: normal  Pulse: present          Observations     Left Wrist/Hand   Negative for deformity  Tenderness     Left Wrist/Hand   Tenderness in the first dorsal compartment, CMC and scaphoid  Additional Tenderness Details  Left  -radial styloid    Active Range of Motion     Left Wrist   Wrist flexion: 30 degrees   Wrist extension: 30 degrees   Radial deviation: WFL and with pain    Strength/Myotome Testing     Left Wrist/Hand   Normal wrist strength    Tests     Left Wrist/Hand   Positive CMC grind ( mild) and Finkelstein's (Mild)  Negative AIN OK sign, distal radial-ulnar joint stress and Tinel's sign (medial nerve)  Physical Exam  Vitals and nursing note reviewed  Constitutional:       General: He is not in acute distress  Appearance: He is well-developed  He is not ill-appearing or diaphoretic  HENT:      Head: Normocephalic and atraumatic  Right Ear: External ear normal       Left Ear: External ear normal    Eyes:      Conjunctiva/sclera: Conjunctivae normal    Neck:      Trachea: No tracheal deviation  Cardiovascular:      Rate and Rhythm: Normal rate     Pulmonary:      Effort: Pulmonary effort is normal  No respiratory distress  Abdominal:      General: There is no distension  Musculoskeletal:         General: Tenderness present  No swelling, deformity or signs of injury  Left hand: No deformity  Skin:     General: Skin is warm and dry  Coloration: Skin is not jaundiced or pale  Neurological:      Mental Status: He is alert and oriented to person, place, and time  Psychiatric:         Mood and Affect: Mood normal          Behavior: Behavior normal          Thought Content: Thought content normal          Judgment: Judgment normal          I have personally reviewed pertinent films in PACS and my interpretation is triscaphe arthritis with subchondral cyst within the proximal pole scaphoid, chondrocalcinosis  Medium joint arthrocentesis: L intercarpal  Universal Protocol:  Consent: Verbal consent obtained  Risks and benefits: risks, benefits and alternatives were discussed  Consent given by: patient  Time out: Immediately prior to procedure a "time out" was called to verify the correct patient, procedure, equipment, support staff and site/side marked as required  Patient understanding: patient states understanding of the procedure being performed  Patient consent: the patient's understanding of the procedure matches consent given  Procedure consent: procedure consent matches procedure scheduled  Relevant documents: relevant documents present and verified  Test results: test results available and properly labeled  Site marked: the operative site was marked  Radiology Images displayed and confirmed   If images not available, report reviewed: imaging studies available  Required items: required blood products, implants, devices, and special equipment available  Patient identity confirmed: verbally with patient    Supporting Documentation  Indications: pain   Procedure Details  Location: wrist - L intercarpal  Preparation: Patient was prepped and draped in the usual sterile fashion  Needle size: 27 G  Ultrasound guidance: no  Approach: dorsal  Medications administered: 1 mL lidocaine 1 %; 20 mg triamcinolone acetonide 40 mg/mL    Patient tolerance: patient tolerated the procedure well with no immediate complications  Dressing:  Sterile dressing applied

## 2022-05-06 NOTE — LETTER
May 6, 2022     Esther Heath PA-C  [address]    Patient: Afshin Chamberlain   YOB: 1955   Date of Visit: 5/6/2022       Dear Dr Davonte Ramey:    Thank you for referring Bull Santizo to me for evaluation  Below are my notes for this consultation  If you have questions, please do not hesitate to call me  I look forward to following your patient along with you  Sincerely,        [employer], DO        CC: No Recipients  [employer], DO  5/6/2022 10:46 AM  Sign when Signing Visit  Assessment/Plan:  Assessment/Plan   Diagnoses and all orders for this visit:    Arthritis of left wrist  -     Ambulatory Referral to Orthopedic Surgery  -     XR wrist 3+ vw left; Future  -     Diclofenac Sodium (VOLTAREN) 1 %; Apply 2 g topically 4 (four) times a day  -     Medium joint arthrocentesis: L intercarpal        78year-old left hand dominant male with left thumb and wrist pain more than few months duration  Discussed with patient physical exam, radiographs, impression and plan  X-rays left wrist noted for triscaphe arthritis with subchondral cyst within the proximal pole scaphoid, chondrocalcinosis  Physical exam noted for tenderness the thumb CMC joint, snuffbox, radial styloid, and 1st dorsal compartment  He has limited range of motion with extension and there is pain with radial deviation  Serena Going causes mild pain  He has intact range of motion and strength in digits of the hand  There is pain with axial load  He is intact neurovascularly  Clinical impression is that he is symptomatic from arthritis of the wrist   I discussed regimen of corticosteroid injection, supplements, and topical anti-inflammatory  Surgery is not warranted  I administered mixture of 0 5 cc 1% lidocaine and 0 5 cc Kenalog to the left wrist triscaphe joint without complication   He is To start taking tumeric at least 1000 mg daily, tart cherry at least 1000 mg daily, and glucosamine-chondroitin 2 to 3 times a day  He is to apply topical diclofenac gel to 4 times a day for 10 days  He will follow up as needed  Marcos Carter  [facility] arthritis  Subjective:   Patient ID: Snehal Corado is a 78 y o  male  Chief Complaint   Patient presents with    Left Hand - Pain       16-year-old left hand dominant male presents evaluation of left thumb and wrist pain more than few months duration  He denies any particular trauma or inciting event  Pain described as gradual in onset, localized to the base of the thumb at the [facility] joint, radiating proximally to the radial volar aspect the wrist and distally to thenar eminence, achy and throbbing and sometimes sharp and shooting, worse with twisting and gripping, and improved with resting  He has difficulty with opening jars  He does not usually take medication for symptoms  Hand Pain  This is a new problem  The current episode started more than 1 month ago  The problem occurs daily  The problem has been gradually worsening  Associated symptoms include arthralgias  Pertinent negatives include no abdominal pain, chest pain, chills, fever, joint swelling, numbness, rash, sore throat or weakness  Exacerbated by: Gripping  He has tried rest for the symptoms  The treatment provided mild relief  The following portions of the patient's history were reviewed and updated as appropriate: He  has a past medical history of Colon polyp, Diabetes mellitus ([address] ), Ear problems, Essential hypertension (2/5/2020), Glaucoma, HL (hearing loss), Hyperlipidemia (2/5/2020), Hypertension, Kidney stone, Melanoma of ear, left ([address] ), Nasal polyp, and Tinnitus  He  has a past surgical history that includes pr colonoscopy flx dx w/collj spec when pfrmd (N/A, 8/8/2017); ARTHROSCOPY KNEE (Bilateral); Shoulder arthroscopy; Cystoscopy; Nasal polyp excision; pr repair flex foot tendon,ea (Left, 12/8/2017); Ona tooth extraction;  Excision basal cell carcinoma (03/2020); Rotator cuff repair (Bilateral); and Foot Tendon Surgery (Left, 6/22/2021)  His family history includes Aortic aneurysm in his father; Colon cancer in his father; Diabetes in his mother; Heart disease in his mother; Melanoma in his mother; Prostate cancer in his father  He  reports that he has quit smoking  He has quit using smokeless tobacco  He reports current alcohol use  He reports that he does not use drugs  He has No Known Allergies       Review of Systems   Constitutional: Negative for chills and fever  HENT: Negative for sore throat  Eyes: Negative for visual disturbance  Respiratory: Negative for shortness of breath  Cardiovascular: Negative for chest pain  Gastrointestinal: Negative for abdominal pain  Genitourinary: Negative for flank pain  Musculoskeletal: Positive for arthralgias  Negative for joint swelling  Skin: Negative for rash and wound  Neurological: Negative for weakness and numbness  Hematological: Does not bruise/bleed easily  Psychiatric/Behavioral: Negative for self-injury  Objective:  Vitals:    05/06/22 0757   BP: 145/69   Pulse: 71   Weight: 90 7 kg (200 lb)   Height: 6' 1" (1 854 m)     Left Hand Exam     Muscle Strength   The patient has normal left wrist strength  Tests   Tinel's sign (median nerve): negative  Finkelstein's test: positive (Mild)    Other   Sensation: normal  Pulse: present          Observations     Left Wrist/Hand   Negative for deformity  Tenderness     Left Wrist/Hand   Tenderness in the first dorsal compartment, CMC and scaphoid  Additional Tenderness Details  Left  -radial styloid    Active Range of Motion     Left Wrist   Wrist flexion: 30 degrees   Wrist extension: 30 degrees   Radial deviation: WFL and with pain    Strength/Myotome Testing     Left Wrist/Hand   Normal wrist strength    Tests     Left Wrist/Hand   Positive CMC grind ( mild) and Finkelstein's (Mild)     Negative AIN OK sign, distal radial-ulnar joint stress and Tinel's sign (medial nerve)  Physical Exam  Vitals and nursing note reviewed  Constitutional:       General: He is not in acute distress  Appearance: He is well-developed  He is not ill-appearing or diaphoretic  HENT:      Head: Normocephalic and atraumatic  Right Ear: External ear normal       Left Ear: External ear normal    Eyes:      Conjunctiva/sclera: Conjunctivae normal    Neck:      Trachea: No tracheal deviation  Cardiovascular:      Rate and Rhythm: Normal rate  Pulmonary:      Effort: Pulmonary effort is normal  No respiratory distress  Abdominal:      General: There is no distension  Musculoskeletal:         General: Tenderness present  No swelling, deformity or signs of injury  Left hand: No deformity  Skin:     General: Skin is warm and dry  Coloration: Skin is not jaundiced or pale  Neurological:      Mental Status: He is alert and oriented to person, place, and time  Psychiatric:         Mood and Affect: Mood normal          Behavior: Behavior normal          Thought Content: Thought content normal          Judgment: Judgment normal          I have personally reviewed pertinent films in PACS and my interpretation is triscaphe arthritis with subchondral cyst within the proximal pole scaphoid, chondrocalcinosis  Medium joint arthrocentesis: L intercarpal  Universal Protocol:  Consent: Verbal consent obtained  Risks and benefits: risks, benefits and alternatives were discussed  Consent given by: patient  Time out: Immediately prior to procedure a "time out" was called to verify the correct patient, procedure, equipment, support staff and site/side marked as required    Patient understanding: patient states understanding of the procedure being performed  Patient consent: the patient's understanding of the procedure matches consent given  Procedure consent: procedure consent matches procedure scheduled  Relevant documents: relevant documents present and verified  Test results: test results available and properly labeled  Site marked: the operative site was marked  Radiology Images displayed and confirmed   If images not available, report reviewed: imaging studies available  Required items: required blood products, implants, devices, and special equipment available  Patient identity confirmed: verbally with patient    Supporting Documentation  Indications: pain   Procedure Details  Location: wrist - L intercarpal  Preparation: Patient was prepped and draped in the usual sterile fashion  Needle size: 27 G  Ultrasound guidance: no  Approach: dorsal  Medications administered: 1 mL lidocaine 1 %; 20 mg triamcinolone acetonide 40 mg/mL    Patient tolerance: patient tolerated the procedure well with no immediate complications  Dressing:  Sterile dressing applied

## 2022-05-06 NOTE — TELEPHONE ENCOUNTER
Patient had a wrist xray and an orthopedic consult and they mentioned something about the veins running through his wrist have "a lot of plaque or something" and patient wanted to know if you can take a look to see if there is anything else he needs to do or if he should be concerned  Please advise

## 2022-05-20 DIAGNOSIS — E11.3293 TYPE 2 DIABETES MELLITUS WITH BOTH EYES AFFECTED BY MILD NONPROLIFERATIVE RETINOPATHY WITHOUT MACULAR EDEMA, WITH LONG-TERM CURRENT USE OF INSULIN (HCC): ICD-10-CM

## 2022-05-20 DIAGNOSIS — Z79.4 TYPE 2 DIABETES MELLITUS WITH BOTH EYES AFFECTED BY MILD NONPROLIFERATIVE RETINOPATHY WITHOUT MACULAR EDEMA, WITH LONG-TERM CURRENT USE OF INSULIN (HCC): ICD-10-CM

## 2022-05-20 RX ORDER — PEN NEEDLE, DIABETIC 32GX 5/32"
NEEDLE, DISPOSABLE MISCELLANEOUS DAILY
Qty: 100 EACH | Refills: 3 | Status: SHIPPED | OUTPATIENT
Start: 2022-05-20

## 2022-07-15 ENCOUNTER — VBI (OUTPATIENT)
Dept: ADMINISTRATIVE | Facility: OTHER | Age: 67
End: 2022-07-15

## 2022-07-23 DIAGNOSIS — Z79.4 TYPE 2 DIABETES MELLITUS WITHOUT COMPLICATION, WITH LONG-TERM CURRENT USE OF INSULIN (HCC): ICD-10-CM

## 2022-07-23 DIAGNOSIS — E11.9 TYPE 2 DIABETES MELLITUS WITHOUT COMPLICATION, WITH LONG-TERM CURRENT USE OF INSULIN (HCC): ICD-10-CM

## 2022-07-25 RX ORDER — PIOGLITAZONEHYDROCHLORIDE 15 MG/1
TABLET ORAL
Qty: 90 TABLET | Refills: 1 | Status: SHIPPED | OUTPATIENT
Start: 2022-07-25

## 2022-08-08 DIAGNOSIS — Z79.4 TYPE 2 DIABETES MELLITUS WITH BOTH EYES AFFECTED BY MILD NONPROLIFERATIVE RETINOPATHY WITHOUT MACULAR EDEMA, WITH LONG-TERM CURRENT USE OF INSULIN (HCC): ICD-10-CM

## 2022-08-08 DIAGNOSIS — I10 ESSENTIAL HYPERTENSION: ICD-10-CM

## 2022-08-08 DIAGNOSIS — E11.3293 TYPE 2 DIABETES MELLITUS WITH BOTH EYES AFFECTED BY MILD NONPROLIFERATIVE RETINOPATHY WITHOUT MACULAR EDEMA, WITH LONG-TERM CURRENT USE OF INSULIN (HCC): ICD-10-CM

## 2022-08-08 RX ORDER — LISINOPRIL 10 MG/1
10 TABLET ORAL DAILY
Qty: 90 TABLET | Refills: 1 | Status: SHIPPED | OUTPATIENT
Start: 2022-08-08

## 2022-08-08 RX ORDER — INSULIN DETEMIR 100 [IU]/ML
66 INJECTION, SOLUTION SUBCUTANEOUS DAILY
Qty: 15 ML | Refills: 3 | Status: SHIPPED | OUTPATIENT
Start: 2022-08-08 | End: 2022-10-21 | Stop reason: SDUPTHER

## 2022-08-23 DIAGNOSIS — E11.3293 TYPE 2 DIABETES MELLITUS WITH BOTH EYES AFFECTED BY MILD NONPROLIFERATIVE RETINOPATHY WITHOUT MACULAR EDEMA, WITH LONG-TERM CURRENT USE OF INSULIN (HCC): ICD-10-CM

## 2022-08-23 DIAGNOSIS — Z79.4 TYPE 2 DIABETES MELLITUS WITH BOTH EYES AFFECTED BY MILD NONPROLIFERATIVE RETINOPATHY WITHOUT MACULAR EDEMA, WITH LONG-TERM CURRENT USE OF INSULIN (HCC): ICD-10-CM

## 2022-08-23 DIAGNOSIS — E78.5 HYPERLIPIDEMIA, UNSPECIFIED HYPERLIPIDEMIA TYPE: ICD-10-CM

## 2022-08-23 RX ORDER — METFORMIN HYDROCHLORIDE 500 MG/1
TABLET, EXTENDED RELEASE ORAL
Qty: 360 TABLET | Refills: 1 | Status: SHIPPED | OUTPATIENT
Start: 2022-08-23

## 2022-08-23 RX ORDER — ATORVASTATIN CALCIUM 10 MG/1
TABLET, FILM COATED ORAL
Qty: 90 TABLET | Refills: 1 | Status: SHIPPED | OUTPATIENT
Start: 2022-08-23

## 2022-08-26 DIAGNOSIS — Z79.4 TYPE 2 DIABETES MELLITUS WITHOUT COMPLICATION, WITH LONG-TERM CURRENT USE OF INSULIN (HCC): ICD-10-CM

## 2022-08-26 DIAGNOSIS — E11.9 TYPE 2 DIABETES MELLITUS WITHOUT COMPLICATION, WITH LONG-TERM CURRENT USE OF INSULIN (HCC): ICD-10-CM

## 2022-08-26 RX ORDER — FLASH GLUCOSE SENSOR
KIT MISCELLANEOUS
Qty: 6 EACH | Refills: 1 | Status: SHIPPED | OUTPATIENT
Start: 2022-08-26

## 2022-09-02 ENCOUNTER — APPOINTMENT (OUTPATIENT)
Dept: LAB | Facility: MEDICAL CENTER | Age: 67
End: 2022-09-02
Payer: COMMERCIAL

## 2022-09-02 DIAGNOSIS — E78.5 HYPERLIPIDEMIA, UNSPECIFIED HYPERLIPIDEMIA TYPE: ICD-10-CM

## 2022-09-02 DIAGNOSIS — I10 ESSENTIAL HYPERTENSION: ICD-10-CM

## 2022-09-02 DIAGNOSIS — Z79.4 TYPE 2 DIABETES MELLITUS WITH BOTH EYES AFFECTED BY MILD NONPROLIFERATIVE RETINOPATHY WITHOUT MACULAR EDEMA, WITH LONG-TERM CURRENT USE OF INSULIN (HCC): ICD-10-CM

## 2022-09-02 DIAGNOSIS — E11.3293 TYPE 2 DIABETES MELLITUS WITH BOTH EYES AFFECTED BY MILD NONPROLIFERATIVE RETINOPATHY WITHOUT MACULAR EDEMA, WITH LONG-TERM CURRENT USE OF INSULIN (HCC): ICD-10-CM

## 2022-09-02 LAB
ALBUMIN SERPL BCP-MCNC: 3.4 G/DL (ref 3.5–5)
ALP SERPL-CCNC: 63 U/L (ref 46–116)
ALT SERPL W P-5'-P-CCNC: 40 U/L (ref 12–78)
ANION GAP SERPL CALCULATED.3IONS-SCNC: 5 MMOL/L (ref 4–13)
AST SERPL W P-5'-P-CCNC: 19 U/L (ref 5–45)
BILIRUB SERPL-MCNC: 0.81 MG/DL (ref 0.2–1)
BUN SERPL-MCNC: 19 MG/DL (ref 5–25)
CALCIUM ALBUM COR SERPL-MCNC: 9.6 MG/DL (ref 8.3–10.1)
CALCIUM SERPL-MCNC: 9.1 MG/DL (ref 8.3–10.1)
CHLORIDE SERPL-SCNC: 108 MMOL/L (ref 96–108)
CHOLEST SERPL-MCNC: 115 MG/DL
CO2 SERPL-SCNC: 26 MMOL/L (ref 21–32)
CREAT SERPL-MCNC: 1.05 MG/DL (ref 0.6–1.3)
GFR SERPL CREATININE-BSD FRML MDRD: 72 ML/MIN/1.73SQ M
GLUCOSE P FAST SERPL-MCNC: 162 MG/DL (ref 65–99)
HDLC SERPL-MCNC: 51 MG/DL
LDLC SERPL CALC-MCNC: 45 MG/DL (ref 0–100)
NONHDLC SERPL-MCNC: 64 MG/DL
POTASSIUM SERPL-SCNC: 4.2 MMOL/L (ref 3.5–5.3)
PROT SERPL-MCNC: 6.2 G/DL (ref 6.4–8.4)
SODIUM SERPL-SCNC: 139 MMOL/L (ref 135–147)
TRIGL SERPL-MCNC: 96 MG/DL

## 2022-09-02 PROCEDURE — 83036 HEMOGLOBIN GLYCOSYLATED A1C: CPT

## 2022-09-02 PROCEDURE — 80061 LIPID PANEL: CPT

## 2022-09-02 PROCEDURE — 80053 COMPREHEN METABOLIC PANEL: CPT

## 2022-09-02 PROCEDURE — 36415 COLL VENOUS BLD VENIPUNCTURE: CPT

## 2022-09-03 LAB
EST. AVERAGE GLUCOSE BLD GHB EST-MCNC: 146 MG/DL
HBA1C MFR BLD: 6.7 %

## 2022-09-27 ENCOUNTER — VBI (OUTPATIENT)
Dept: ADMINISTRATIVE | Facility: OTHER | Age: 67
End: 2022-09-27

## 2022-10-12 PROBLEM — Z12.5 SCREENING FOR PROSTATE CANCER: Status: RESOLVED | Noted: 2022-01-12 | Resolved: 2022-10-12

## 2022-10-21 ENCOUNTER — OFFICE VISIT (OUTPATIENT)
Dept: FAMILY MEDICINE CLINIC | Facility: CLINIC | Age: 67
End: 2022-10-21
Payer: COMMERCIAL

## 2022-10-21 VITALS
BODY MASS INDEX: 27.04 KG/M2 | OXYGEN SATURATION: 100 % | HEART RATE: 90 BPM | HEIGHT: 73 IN | DIASTOLIC BLOOD PRESSURE: 66 MMHG | SYSTOLIC BLOOD PRESSURE: 134 MMHG | TEMPERATURE: 97.3 F | WEIGHT: 204 LBS

## 2022-10-21 DIAGNOSIS — E78.5 HYPERLIPIDEMIA, UNSPECIFIED HYPERLIPIDEMIA TYPE: ICD-10-CM

## 2022-10-21 DIAGNOSIS — Z79.4 TYPE 2 DIABETES MELLITUS WITH BOTH EYES AFFECTED BY MILD NONPROLIFERATIVE RETINOPATHY WITHOUT MACULAR EDEMA, WITH LONG-TERM CURRENT USE OF INSULIN (HCC): Primary | ICD-10-CM

## 2022-10-21 DIAGNOSIS — Z79.4 TYPE 2 DIABETES MELLITUS WITH BOTH EYES AFFECTED BY MILD NONPROLIFERATIVE RETINOPATHY WITHOUT MACULAR EDEMA, WITH LONG-TERM CURRENT USE OF INSULIN (HCC): ICD-10-CM

## 2022-10-21 DIAGNOSIS — E11.3293 TYPE 2 DIABETES MELLITUS WITH BOTH EYES AFFECTED BY MILD NONPROLIFERATIVE RETINOPATHY WITHOUT MACULAR EDEMA, WITH LONG-TERM CURRENT USE OF INSULIN (HCC): Primary | ICD-10-CM

## 2022-10-21 DIAGNOSIS — I10 ESSENTIAL HYPERTENSION: ICD-10-CM

## 2022-10-21 DIAGNOSIS — Z23 ENCOUNTER FOR IMMUNIZATION: ICD-10-CM

## 2022-10-21 DIAGNOSIS — E11.3293 TYPE 2 DIABETES MELLITUS WITH BOTH EYES AFFECTED BY MILD NONPROLIFERATIVE RETINOPATHY WITHOUT MACULAR EDEMA, WITH LONG-TERM CURRENT USE OF INSULIN (HCC): ICD-10-CM

## 2022-10-21 DIAGNOSIS — Z12.5 SCREENING FOR PROSTATE CANCER: ICD-10-CM

## 2022-10-21 PROCEDURE — 99214 OFFICE O/P EST MOD 30 MIN: CPT | Performed by: PHYSICIAN ASSISTANT

## 2022-10-21 PROCEDURE — 90471 IMMUNIZATION ADMIN: CPT

## 2022-10-21 PROCEDURE — 90662 IIV NO PRSV INCREASED AG IM: CPT

## 2022-10-21 RX ORDER — INSULIN DETEMIR 100 [IU]/ML
66 INJECTION, SOLUTION SUBCUTANEOUS DAILY
Qty: 15 ML | Refills: 31 | Status: SHIPPED | OUTPATIENT
Start: 2022-10-21

## 2022-10-21 NOTE — PROGRESS NOTES
Diabetic Foot Exam    Patient's shoes and socks removed  Right Foot/Ankle   Right Foot Inspection  Skin Exam: skin normal and skin intact  No dry skin, no warmth, no callus, no erythema, no maceration, no abnormal color, no pre-ulcer, no ulcer and no callus  Toe Exam: right toe deformity  No swelling, no tenderness and erythema    Sensory   Vibration: intact  Monofilament testing: intact    Vascular  The right DP pulse is 2+  Left Foot/Ankle  Left Foot Inspection  Skin Exam: skin normal and skin intact  No dry skin, no warmth, no erythema, no maceration, normal color, no pre-ulcer, no ulcer and no callus  Toe Exam: left toe deformity  No swelling, no tenderness and no erythema  Sensory   Vibration: intact  Monofilament testing: intact    Vascular  The left DP pulse is 2+  Assessment/Plan:     Diagnoses and all orders for this visit:    Type 2 diabetes mellitus with both eyes affected by mild nonproliferative retinopathy without macular edema, with long-term current use of insulin ([address] )  Comments:  Diabetes is at goal continue current regimen  Orders:  -     Levemir FlexTouch 100 units/mL injection pen; Inject 66 Units under the skin daily  -     Hemoglobin A1C; Future  -     Microalbumin / creatinine urine ratio; Future    Essential hypertension  Comments:  Blood pressure is at goal continue current regimen  Orders:  -     Comprehensive metabolic panel; Future    Hyperlipidemia, unspecified hyperlipidemia type  Comments:  Lipids are at goal continue current regimen  Orders:  -     Lipid panel; Future    Type 2 diabetes mellitus with both eyes affected by mild nonproliferative retinopathy without macular edema, with long-term current use of insulin ([facility])  Comments:  Diabetes is at goal    Orders:  -     Levemir FlexTouch 100 units/mL injection pen; Inject 66 Units under the skin daily  -     Hemoglobin A1C; Future  -     Microalbumin / creatinine urine ratio;  Future    Screening for prostate cancer  -     PSA, Total Screen; Future          Subjective:      Patient ID: Rajan Arauz is a 75 y o  male  Presents in the office for follow up chronic conditions  Patient has diabetes type 2 on insulin therapy  He also has retinopathy  He is a continuous glucometer [place]  His current regimen includes metformin  b i d , Actos 15 mg and Levemir 66 units  Current A1c is at goal at 6 7  Blood pressure is well controlled with lisinopril 10 mg  For hyperlipidemia he is on atorvastatin 10 mg  Flu vaccine updated today  Other labs show profile    Renal functions are normal      The following portions of the patient's history were reviewed and updated as appropriate:   He   Patient Active Problem List    Diagnosis Date Noted   • Glaucoma 07/15/2021   • Type 2 diabetes mellitus 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, bilateral ([facility] ) 06/01/2021   • Skin cancer, basal cell 02/07/2020   • Decreased hearing of both ears 02/07/2020   • Essential hypertension 02/05/2020   • Hyperlipidemia 02/05/2020   • Type 2 diabetes mellitus without complication, with long-term current use of insulin ([facility] ) 02/05/2020   • [place] syndrome 02/05/2020     Current Outpatient Medications   Medication Sig Dispense Refill   • Levemir FlexTouch 100 units/mL injection pen Inject 66 Units under the skin daily 15 mL 31   • aspirin (ECOTRIN LOW STRENGTH) 81 mg EC tablet Take 81 mg by mouth daily     • atorvastatin (LIPITOR) 10 mg tablet TAKE 1 TABLET BY MOUTH EVERY DAY 90 tablet 1   • BD Pen Needle Meghana U/F 32G X 4 MM MISC Inject under the skin daily Use as directed 100 each 3   • Cholecalciferol (D-3-5) 5000 units capsule Take 5,000 Units by mouth daily     • Continuous Blood Gluc  (FreeStyle Kalpana 14 Day [place]) CAMRON Use 1 application 4 (four) times a day Patient to check his blood sugar 4 times a day 1 Device 0   • Continuous Blood Gluc Sensor (FreeStyle Kalpana 14 Day Sensor) MISC USE 1 EVERY 14 DAYS 6 each 1   • Diclofenac Sodium (VOLTAREN) 1 % Apply 2 g topically 4 (four) times a day 100 g 2   • fluticasone (FLONASE) 50 mcg/act nasal spray 1 spray into each nostril daily     • latanoprost (XALATAN) 0 005 % ophthalmic solution PUT 1 DROP INTO BOTH EYES IN THE EVENING     • lisinopril (ZESTRIL) 10 mg tablet Take 1 tablet (10 mg total) by mouth daily 90 tablet 1   • metFORMIN (GLUCOPHAGE-XR) 500 mg 24 hr tablet TAKE 2 TABLETS BY MOUTH TWICE A  tablet 1   • Milk Thistle 150 MG CAPS Take by mouth     • Multiple Vitamins-Minerals (MULTIVITAMIN WITH MINERALS) tablet Take 1 tablet by mouth daily     • pioglitazone (ACTOS) 15 mg tablet TAKE 1 TABLET BY MOUTH EVERY DAY 90 tablet 1   • sodium chloride (OCEAN) 0 65 % nasal spray 1 spray into each nostril as needed for congestion     • Specialty Vitamins Products (MAGNESIUM, AMINO ACID CHELATE,) 133 MG tablet Take 1 tablet by mouth 2 (two) times a day     • Vitamins-Lipotropics (B-100 PO) Take by mouth       No current facility-administered medications for this visit  He has No Known Allergies       Review of Systems   Constitutional: Negative for activity change, appetite change, chills, fatigue and fever  HENT: Negative for ear pain and sore throat  Eyes: Negative for visual disturbance  Respiratory: Negative for cough and shortness of breath  Cardiovascular: Negative for chest pain, palpitations and leg swelling  Gastrointestinal: Negative for abdominal pain, blood in stool, constipation, diarrhea and nausea  Genitourinary: Negative for difficulty urinating  Musculoskeletal: Negative for arthralgias, back pain and myalgias  Skin: Negative for rash  Neurological: Negative for dizziness, syncope and headaches  Psychiatric/Behavioral: Positive for sleep disturbance  Objective:        Physical Exam  Vitals and nursing note reviewed  Constitutional:       Appearance: Normal appearance  He is well-developed     HENT:      Head: Normocephalic and atraumatic  Right Ear: Tympanic membrane, ear canal and external ear normal       Left Ear: Tympanic membrane, ear canal and external ear normal    Eyes:      Conjunctiva/sclera: Conjunctivae normal       Pupils: Pupils are equal, round, and reactive to light  Neck:      Thyroid: No thyromegaly  Vascular: No carotid bruit  Cardiovascular:      Rate and Rhythm: Normal rate and regular rhythm  Pulses:           Dorsalis pedis pulses are 2+ on the right side and 2+ on the left side  Heart sounds: Normal heart sounds  No murmur heard  Pulmonary:      Effort: Pulmonary effort is normal       Breath sounds: Normal breath sounds  No wheezing  Abdominal:      General: Bowel sounds are normal       Palpations: Abdomen is soft  There is no mass  Tenderness: There is no abdominal tenderness  Musculoskeletal:      Right lower leg: No edema  Left lower leg: No edema  Feet:      Right foot:      Skin integrity: No ulcer, skin breakdown, erythema, warmth, callus or dry skin  Left foot:      Skin integrity: No ulcer, skin breakdown, erythema, warmth, callus or dry skin  Lymphadenopathy:      Cervical: No cervical adenopathy  Skin:     General: Skin is warm and dry  Neurological:      General: No focal deficit present  Mental Status: He is alert and oriented to person, place, and time  Psychiatric:         Mood and Affect: Mood normal          Behavior: Behavior normal          Thought Content:  Thought content normal          Judgment: Judgment normal

## 2023-01-18 DIAGNOSIS — E11.9 TYPE 2 DIABETES MELLITUS WITHOUT COMPLICATION, WITH LONG-TERM CURRENT USE OF INSULIN (HCC): ICD-10-CM

## 2023-01-18 DIAGNOSIS — Z79.4 TYPE 2 DIABETES MELLITUS WITHOUT COMPLICATION, WITH LONG-TERM CURRENT USE OF INSULIN (HCC): ICD-10-CM

## 2023-01-18 RX ORDER — PIOGLITAZONEHYDROCHLORIDE 15 MG/1
TABLET ORAL
Qty: 90 TABLET | Refills: 1 | Status: SHIPPED | OUTPATIENT
Start: 2023-01-18

## 2023-01-27 LAB
LEFT EYE DIABETIC RETINOPATHY: POSITIVE
RIGHT EYE DIABETIC RETINOPATHY: POSITIVE

## 2023-01-31 DIAGNOSIS — I10 ESSENTIAL HYPERTENSION: ICD-10-CM

## 2023-01-31 RX ORDER — LISINOPRIL 10 MG/1
TABLET ORAL
Qty: 90 TABLET | Refills: 1 | Status: SHIPPED | OUTPATIENT
Start: 2023-01-31

## 2023-02-15 DIAGNOSIS — E11.9 TYPE 2 DIABETES MELLITUS WITHOUT COMPLICATION, WITH LONG-TERM CURRENT USE OF INSULIN (HCC): ICD-10-CM

## 2023-02-15 DIAGNOSIS — E78.5 HYPERLIPIDEMIA, UNSPECIFIED HYPERLIPIDEMIA TYPE: ICD-10-CM

## 2023-02-15 DIAGNOSIS — Z79.4 TYPE 2 DIABETES MELLITUS WITH BOTH EYES AFFECTED BY MILD NONPROLIFERATIVE RETINOPATHY WITHOUT MACULAR EDEMA, WITH LONG-TERM CURRENT USE OF INSULIN (HCC): ICD-10-CM

## 2023-02-15 DIAGNOSIS — E11.3293 TYPE 2 DIABETES MELLITUS WITH BOTH EYES AFFECTED BY MILD NONPROLIFERATIVE RETINOPATHY WITHOUT MACULAR EDEMA, WITH LONG-TERM CURRENT USE OF INSULIN (HCC): ICD-10-CM

## 2023-02-15 DIAGNOSIS — Z79.4 TYPE 2 DIABETES MELLITUS WITHOUT COMPLICATION, WITH LONG-TERM CURRENT USE OF INSULIN (HCC): ICD-10-CM

## 2023-02-15 RX ORDER — METFORMIN HYDROCHLORIDE 500 MG/1
TABLET, EXTENDED RELEASE ORAL
Qty: 360 TABLET | Refills: 1 | Status: SHIPPED | OUTPATIENT
Start: 2023-02-15

## 2023-02-15 RX ORDER — ATORVASTATIN CALCIUM 10 MG/1
TABLET, FILM COATED ORAL
Qty: 90 TABLET | Refills: 1 | Status: SHIPPED | OUTPATIENT
Start: 2023-02-15

## 2023-02-15 RX ORDER — FLASH GLUCOSE SENSOR
KIT MISCELLANEOUS
Qty: 6 EACH | Refills: 1 | Status: SHIPPED | OUTPATIENT
Start: 2023-02-15

## 2023-02-17 ENCOUNTER — OFFICE VISIT (OUTPATIENT)
Dept: FAMILY MEDICINE CLINIC | Facility: CLINIC | Age: 68
End: 2023-02-17

## 2023-02-17 VITALS
TEMPERATURE: 98.1 F | SYSTOLIC BLOOD PRESSURE: 140 MMHG | WEIGHT: 204.8 LBS | OXYGEN SATURATION: 99 % | HEART RATE: 71 BPM | BODY MASS INDEX: 27.14 KG/M2 | DIASTOLIC BLOOD PRESSURE: 64 MMHG | HEIGHT: 73 IN

## 2023-02-17 DIAGNOSIS — Z79.4 TYPE 2 DIABETES MELLITUS WITH BOTH EYES AFFECTED BY MILD NONPROLIFERATIVE RETINOPATHY WITHOUT MACULAR EDEMA, WITH LONG-TERM CURRENT USE OF INSULIN (HCC): ICD-10-CM

## 2023-02-17 DIAGNOSIS — E78.5 HYPERLIPIDEMIA, UNSPECIFIED HYPERLIPIDEMIA TYPE: ICD-10-CM

## 2023-02-17 DIAGNOSIS — Z01.818 PREOPERATIVE EXAMINATION: Primary | ICD-10-CM

## 2023-02-17 DIAGNOSIS — I10 ESSENTIAL HYPERTENSION: ICD-10-CM

## 2023-02-17 DIAGNOSIS — H02.401 PTOSIS OF RIGHT EYELID: ICD-10-CM

## 2023-02-17 DIAGNOSIS — E11.3293 TYPE 2 DIABETES MELLITUS WITH BOTH EYES AFFECTED BY MILD NONPROLIFERATIVE RETINOPATHY WITHOUT MACULAR EDEMA, WITH LONG-TERM CURRENT USE OF INSULIN (HCC): ICD-10-CM

## 2023-02-17 NOTE — PROGRESS NOTES
Assessment/Plan:     Diagnoses and all orders for this visit:    Preoperative examination  Comments:  Is cleared for proposed eyelid surgery    Ptosis of right eyelid    Type 2 diabetes mellitus with both eyes affected by mild nonproliferative retinopathy without macular edema, with long-term current use of insulin ([facility] )  Comments: This is stable  Continue current regimen    Essential hypertension  Comments:  Blood pressure is well controlled    Hyperlipidemia, unspecified hyperlipidemia type  Comments:  Continue statin therapy and low-fat diet          Subjective:      Patient ID: Danny Brown is a 75 y o  male  Presents to the office for preoperative examination  Patient is scheduled arched third with his  for ptosis of his right upper eyelid  He has insulin-dependent diabetes  He will hold his insulin the morning of the surgery  He may take his metformin  Hold his Actos  He has hypertension he may take his lisinopril 10 mg the morning of his surgery  He will hold his atorvastatin or cholesterol medication  He  Is on a baby aspirin  he will stop this approximately 3 days prior to surgery        The following portions of the patient's history were reviewed and updated as appropriate:   He   Patient Active Problem List    Diagnosis Date Noted   • Glaucoma 07/15/2021   • Type 2 diabetes mellitus 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, bilateral ([facility] ) 06/01/2021   • Skin cancer, basal cell 02/07/2020   • Decreased hearing of both ears 02/07/2020   • Essential hypertension 02/05/2020   • Hyperlipidemia 02/05/2020   • Type 2 diabetes mellitus without complication, with long-term current use of insulin ([facility] ) 02/05/2020   • [place] syndrome 02/05/2020     Current Outpatient Medications   Medication Sig Dispense Refill   • aspirin (ECOTRIN LOW STRENGTH) 81 mg EC tablet Take 81 mg by mouth daily     • atorvastatin (LIPITOR) 10 mg tablet TAKE 1 TABLET BY MOUTH EVERY DAY 90 tablet 1   • BD Pen Needle Meghana U/F 32G X 4 MM MISC Inject under the skin daily Use as directed 100 each 3   • Cholecalciferol 125 MCG (5000 UT) capsule Take 5,000 Units by mouth daily     • Continuous Blood Gluc  (FreeStyle Kalpana 14 Day [place]) CAMRON Use 1 application 4 (four) times a day Patient to check his blood sugar 4 times a day 1 Device 0   • Continuous Blood Gluc Sensor (FreeStyle Kalpana 14 Day Sensor) MISC USE 1 EVERY 14 DAYS 6 each 1   • fluticasone (FLONASE) 50 mcg/act nasal spray 1 spray into each nostril daily     • latanoprost (XALATAN) 0 005 % ophthalmic solution PUT 1 DROP INTO BOTH EYES IN THE EVENING     • Levemir FlexTouch 100 units/mL injection pen Inject 66 Units under the skin daily 15 mL 31   • lisinopril (ZESTRIL) 10 mg tablet TAKE 1 TABLET BY MOUTH EVERY DAY 90 tablet 1   • metFORMIN (GLUCOPHAGE-XR) 500 mg 24 hr tablet TAKE 2 TABLETS BY MOUTH TWICE A  tablet 1   • Milk Thistle 150 MG CAPS Take by mouth     • Multiple Vitamins-Minerals (MULTIVITAMIN WITH MINERALS) tablet Take 1 tablet by mouth daily     • pioglitazone (ACTOS) 15 mg tablet TAKE 1 TABLET BY MOUTH EVERY DAY 90 tablet 1   • sodium chloride (OCEAN) 0 65 % nasal spray 1 spray into each nostril as needed for congestion     • Vitamins-Lipotropics (B-100 PO) Take by mouth     • Diclofenac Sodium (VOLTAREN) 1 % Apply 2 g topically 4 (four) times a day (Patient not taking: Reported on 2/17/2023) 100 g 2   • Specialty Vitamins Products (MAGNESIUM, AMINO ACID CHELATE,) 133 MG tablet Take 1 tablet by mouth 2 (two) times a day (Patient not taking: Reported on 2/17/2023)       No current facility-administered medications for this visit  He has No Known Allergies       Review of Systems   Constitutional: Negative for activity change, appetite change, chills, fatigue and fever  HENT: Negative for ear pain and sore throat  Eyes: Negative for visual disturbance  Respiratory: Negative for cough and shortness of breath  Cardiovascular: Negative for chest pain, palpitations and leg swelling  Gastrointestinal: Negative for abdominal pain, blood in stool, constipation, diarrhea and nausea  Genitourinary: Negative for difficulty urinating  Musculoskeletal: Negative for arthralgias, back pain and myalgias  Skin: Negative for rash  Neurological: Negative for dizziness, syncope and headaches  Psychiatric/Behavioral: Negative for sleep disturbance  Objective:        Physical Exam  Vitals and nursing note reviewed  Constitutional:       Appearance: Normal appearance  He is well-developed  HENT:      Head: Normocephalic and atraumatic  Right Ear: Tympanic membrane, ear canal and external ear normal       Left Ear: Tympanic membrane, ear canal and external ear normal       Mouth/Throat:      Pharynx: No posterior oropharyngeal erythema  Eyes:      Conjunctiva/sclera: Conjunctivae normal       Pupils: Pupils are equal, round, and reactive to light  Neck:      Thyroid: No thyromegaly  Vascular: No carotid bruit  Cardiovascular:      Rate and Rhythm: Normal rate and regular rhythm  Heart sounds: Normal heart sounds  No murmur heard  Pulmonary:      Effort: Pulmonary effort is normal       Breath sounds: Normal breath sounds  No wheezing  Abdominal:      General: Bowel sounds are normal       Palpations: Abdomen is soft  There is no mass  Tenderness: There is no abdominal tenderness  Musculoskeletal:      Right lower leg: No edema  Left lower leg: No edema  Lymphadenopathy:      Cervical: No cervical adenopathy  Skin:     General: Skin is warm and dry  Neurological:      General: No focal deficit present  Mental Status: He is alert and oriented to person, place, and time  Psychiatric:         Mood and Affect: Mood normal          Behavior: Behavior normal          Thought Content:  Thought content normal          Judgment: Judgment normal

## 2023-02-20 DIAGNOSIS — Z79.4 TYPE 2 DIABETES MELLITUS WITHOUT COMPLICATION, WITH LONG-TERM CURRENT USE OF INSULIN (HCC): ICD-10-CM

## 2023-02-20 DIAGNOSIS — E11.9 TYPE 2 DIABETES MELLITUS WITHOUT COMPLICATION, WITH LONG-TERM CURRENT USE OF INSULIN (HCC): ICD-10-CM

## 2023-02-20 RX ORDER — FLASH GLUCOSE SCANNING READER
1 EACH MISCELLANEOUS 4 TIMES DAILY
Qty: 1 EACH | Refills: 0 | Status: SHIPPED | OUTPATIENT
Start: 2023-02-20

## 2023-04-07 ENCOUNTER — APPOINTMENT (OUTPATIENT)
Dept: LAB | Facility: MEDICAL CENTER | Age: 68
End: 2023-04-07

## 2023-04-07 DIAGNOSIS — Z79.4 TYPE 2 DIABETES MELLITUS WITH BOTH EYES AFFECTED BY MILD NONPROLIFERATIVE RETINOPATHY WITHOUT MACULAR EDEMA, WITH LONG-TERM CURRENT USE OF INSULIN (HCC): ICD-10-CM

## 2023-04-07 DIAGNOSIS — I10 ESSENTIAL HYPERTENSION: ICD-10-CM

## 2023-04-07 DIAGNOSIS — Z12.5 SCREENING FOR PROSTATE CANCER: ICD-10-CM

## 2023-04-07 DIAGNOSIS — E11.3293 TYPE 2 DIABETES MELLITUS WITH BOTH EYES AFFECTED BY MILD NONPROLIFERATIVE RETINOPATHY WITHOUT MACULAR EDEMA, WITH LONG-TERM CURRENT USE OF INSULIN (HCC): ICD-10-CM

## 2023-04-07 DIAGNOSIS — E78.5 HYPERLIPIDEMIA, UNSPECIFIED HYPERLIPIDEMIA TYPE: ICD-10-CM

## 2023-04-07 LAB
ALBUMIN SERPL BCP-MCNC: 3.8 G/DL (ref 3.5–5)
ALP SERPL-CCNC: 51 U/L (ref 46–116)
ALT SERPL W P-5'-P-CCNC: 31 U/L (ref 12–78)
ANION GAP SERPL CALCULATED.3IONS-SCNC: 4 MMOL/L (ref 4–13)
AST SERPL W P-5'-P-CCNC: 18 U/L (ref 5–45)
BILIRUB SERPL-MCNC: 1.12 MG/DL (ref 0.2–1)
BUN SERPL-MCNC: 29 MG/DL (ref 5–25)
CALCIUM SERPL-MCNC: 9.9 MG/DL (ref 8.3–10.1)
CHLORIDE SERPL-SCNC: 108 MMOL/L (ref 96–108)
CHOLEST SERPL-MCNC: 113 MG/DL
CO2 SERPL-SCNC: 25 MMOL/L (ref 21–32)
CREAT SERPL-MCNC: 1.28 MG/DL (ref 0.6–1.3)
CREAT UR-MCNC: 217 MG/DL
GFR SERPL CREATININE-BSD FRML MDRD: 57 ML/MIN/1.73SQ M
GLUCOSE P FAST SERPL-MCNC: 187 MG/DL (ref 65–99)
HDLC SERPL-MCNC: 58 MG/DL
LDLC SERPL CALC-MCNC: 40 MG/DL (ref 0–100)
MICROALBUMIN UR-MCNC: 42.9 MG/L (ref 0–20)
MICROALBUMIN/CREAT 24H UR: 20 MG/G CREATININE (ref 0–30)
NONHDLC SERPL-MCNC: 55 MG/DL
POTASSIUM SERPL-SCNC: 4.8 MMOL/L (ref 3.5–5.3)
PROT SERPL-MCNC: 6.5 G/DL (ref 6.4–8.4)
PSA SERPL-MCNC: 1.2 NG/ML (ref 0–4)
SODIUM SERPL-SCNC: 137 MMOL/L (ref 135–147)
TRIGL SERPL-MCNC: 75 MG/DL

## 2023-04-08 LAB
EST. AVERAGE GLUCOSE BLD GHB EST-MCNC: 148 MG/DL
HBA1C MFR BLD: 6.8 %

## 2023-04-23 DIAGNOSIS — Z79.4 TYPE 2 DIABETES MELLITUS WITHOUT COMPLICATION, WITH LONG-TERM CURRENT USE OF INSULIN (HCC): ICD-10-CM

## 2023-04-23 DIAGNOSIS — E11.9 TYPE 2 DIABETES MELLITUS WITHOUT COMPLICATION, WITH LONG-TERM CURRENT USE OF INSULIN (HCC): ICD-10-CM

## 2023-04-24 RX ORDER — PIOGLITAZONEHYDROCHLORIDE 15 MG/1
TABLET ORAL
Qty: 90 TABLET | Refills: 1 | Status: SHIPPED | OUTPATIENT
Start: 2023-04-24

## 2023-05-11 ENCOUNTER — TELEPHONE (OUTPATIENT)
Dept: FAMILY MEDICINE CLINIC | Facility: CLINIC | Age: 68
End: 2023-05-11

## 2023-05-11 DIAGNOSIS — H91.93 DECREASED HEARING OF BOTH EARS: Primary | ICD-10-CM

## 2023-05-11 NOTE — TELEPHONE ENCOUNTER
Patient called in needing a DR to  order placed in [facility] for a hearing evaluation  Please advise  Appt on 5/26

## 2023-05-23 DIAGNOSIS — I10 ESSENTIAL HYPERTENSION: ICD-10-CM

## 2023-05-23 DIAGNOSIS — E78.5 HYPERLIPIDEMIA, UNSPECIFIED HYPERLIPIDEMIA TYPE: ICD-10-CM

## 2023-05-23 RX ORDER — LISINOPRIL 10 MG/1
TABLET ORAL
Qty: 90 TABLET | Refills: 1 | Status: SHIPPED | OUTPATIENT
Start: 2023-05-23

## 2023-05-23 RX ORDER — ATORVASTATIN CALCIUM 10 MG/1
TABLET, FILM COATED ORAL
Qty: 90 TABLET | Refills: 1 | Status: SHIPPED | OUTPATIENT
Start: 2023-05-23

## 2023-05-26 ENCOUNTER — OFFICE VISIT (OUTPATIENT)
Dept: AUDIOLOGY | Age: 68
End: 2023-05-26

## 2023-05-26 DIAGNOSIS — H93.13 TINNITUS OF BOTH EARS: ICD-10-CM

## 2023-05-26 DIAGNOSIS — H90.3 SENSORY HEARING LOSS, BILATERAL: Primary | ICD-10-CM

## 2023-05-26 DIAGNOSIS — H91.93 DECREASED HEARING OF BOTH EARS: ICD-10-CM

## 2023-05-26 NOTE — PROGRESS NOTES
HEARING EVALUATION    Name:  Sandra Reddy  :  1955  Age:  75 y o  Date of Evaluation: 23     History: Difficulty Understanding  Reason for visit: Sandra Reddy is being seen today at the request of Dr Tiffanie Arellano for an evaluation of hearing  Patient reports difficulty understanding, especially in background noise  He reports constant tinnitus, bilaterally  He has a longstanding mild rising to normal sloping to severe sensorineural hearing loss, bilaterally  He previously tried ReSound hearing aids at [facility], but returned them due to [facility]  EVALUATION:    Otoscopic Evaluation:   Right Ear: Clear and healthy ear canal and tympanic membrane   Left Ear: Clear and healthy ear canal and tympanic membrane    Tympanometry:   Right: Type A - normal middle ear pressure and compliance   Left: Type A - normal middle ear pressure and compliance    Audiogram Results:  Pure tone testing revealed a mild rising to normal sloping to severe sensorineural hearing loss bilaterally  SRT and PTA are in agreement indicating good test reliability  Word recognition scores were excellent bilaterally  Hearing is stable since   *see attached audiogram      RECOMMENDATIONS:  Annual hearing eval, Return to [facility]  for F/U, Hearing Aid Evaluation and Copy to Patient/Caregiver    PATIENT EDUCATION:   Discussed results and recommendations with Chelle Amaya and his wife  Questions were addressed and the patient was encouraged to contact our department should concerns arise  Servando Poon    Clinical Audiologist

## 2023-05-26 NOTE — PROGRESS NOTES
Hearing Aid Evaluation  Name:  Juve Apple  :  1955  Age:  75 y o  Date of Evaluation: 23     Audiologic Results: Audiologic testing was performed on 2023, testing revealed mild rising to normal hearing sloping to severe sensorineural nova loss, bilaterally  Amplification is recommended binaurally    Hearing Aid Evaluation:  Hearing aid styles, technology, and price were discussed with the patient  Possible hearing aid options, programs, and accessories were discussed  Pricing options and technology levels were discussed to determine the appropriate device to recommend  Recommendation/Quote: The first hearing aid recommendation is  [employer]on Real 3 miniRITE-R  See attached quote sheet*    Selection:   The patient selected the [employer] Real 3 miniRITE-R hearing aids  Level: Intermediate   Color:Chroma beige    size: Right 3X85 / Left 3X85   Dome size: 8 mm DB   Accessories:     Patient paid $1,700 00 today and will pay the remaining $1,700 00 when he returns for his HAP  Servando Kemp    Clinical Audiologist

## 2023-05-30 NOTE — PROGRESS NOTES
Progress Note    Name:  Kallie Arellano  :  1955  Age:  75 y o  Date of Evaluation: 23     Real 3 miniRITE-R hearing aids arrived  Right: [id number]  Left: [id number]  Warranty: 2026    Patient scheduled for 2023    Servando Olea    Clinical Audiologist

## 2023-06-02 ENCOUNTER — OFFICE VISIT (OUTPATIENT)
Dept: PODIATRY | Facility: CLINIC | Age: 68
End: 2023-06-02

## 2023-06-02 ENCOUNTER — HOSPITAL ENCOUNTER (OUTPATIENT)
Dept: RADIOLOGY | Facility: HOSPITAL | Age: 68
End: 2023-06-02
Attending: PODIATRIST
Payer: MEDICARE

## 2023-06-02 VITALS
DIASTOLIC BLOOD PRESSURE: 73 MMHG | WEIGHT: 204 LBS | HEIGHT: 73 IN | OXYGEN SATURATION: 98 % | HEART RATE: 71 BPM | SYSTOLIC BLOOD PRESSURE: 152 MMHG | BODY MASS INDEX: 27.04 KG/M2

## 2023-06-02 DIAGNOSIS — M79.671 PAIN IN BOTH FEET: ICD-10-CM

## 2023-06-02 DIAGNOSIS — R52 PAIN: ICD-10-CM

## 2023-06-02 DIAGNOSIS — M79.672 PAIN IN BOTH FEET: ICD-10-CM

## 2023-06-02 DIAGNOSIS — M77.42 METATARSALGIA, LEFT FOOT: ICD-10-CM

## 2023-06-02 DIAGNOSIS — M19.072 DJD (DEGENERATIVE JOINT DISEASE), ANKLE AND FOOT, LEFT: Primary | ICD-10-CM

## 2023-06-02 PROCEDURE — 73630 X-RAY EXAM OF FOOT: CPT

## 2023-06-02 RX ORDER — TRIAMCINOLONE ACETONIDE 40 MG/ML
20 INJECTION, SUSPENSION INTRA-ARTICULAR; INTRAMUSCULAR
Status: SHIPPED | OUTPATIENT
Start: 2023-06-02

## 2023-06-02 RX ORDER — BUPIVACAINE HYDROCHLORIDE 2.5 MG/ML
1 INJECTION, SOLUTION EPIDURAL; INFILTRATION; INTRACAUDAL
Status: SHIPPED | OUTPATIENT
Start: 2023-06-02

## 2023-06-02 RX ORDER — BIMATOPROST 0.1 MG/ML
SOLUTION/ DROPS OPHTHALMIC
COMMUNITY
Start: 2023-06-02

## 2023-06-02 RX ADMIN — BUPIVACAINE HYDROCHLORIDE 1 ML: 2.5 INJECTION, SOLUTION EPIDURAL; INFILTRATION; INTRACAUDAL at 14:00

## 2023-06-02 RX ADMIN — TRIAMCINOLONE ACETONIDE 20 MG: 40 INJECTION, SUSPENSION INTRA-ARTICULAR; INTRAMUSCULAR at 14:00

## 2023-06-02 NOTE — PROGRESS NOTES
Assessment/Plan:    X-rays of the patient's left foot taken today were personally viewed and interpreted  I saw no signs of fracture or dislocation to the left foot  Follow-up on official read  On clinical examination, there is moderate to severe tenderness palpation to the articulation of the cuboid to the fourth and fifth metatarsal bases  There is no erythema, no edema, no calor, no ecchymosis  There is decreased epicritic sensation to the patient's left third fourth and fifth toes  There is contracture deformities of the lesser digits of the left foot  There is no tenderness with palpation along the peroneal tendons and over the Achilles tendon laterally  There is no tenderness palpation of the ATFL or the CFL of the lateral left ankle  The patient's symptomatology is consistent with metatarsalgia or flareup of an arthritic joint to the dorsal lateral aspect of the left midfoot  Treatment options were discussed and he was agreeable to a corticosteroid injection  After prepping the skin with alcohol, a CSI was administered without complication  The patient tolerated the injection well  Continue supportive shoe gear  Ankle bracing as needed  The patient states he has multiple braces at home that he can use  Recommend follow-up in 4 weeks  Diagnoses and all orders for this visit:    Pain  -     X-ray foot left 3+ views; Future    Pain in both feet  Comments:  Refer to podiatry  Trial of gabapentin  Orders:  -     Ambulatory Referral to Podiatry    Other orders  -     Lumigan 0 01 % ophthalmic drops          Subjective:      Patient ID: Halima Fernandez is a 75 y o  male  Patient presents today for his initial consultation with Jaida Jara with a chief complaint of severe left midfoot pain that has been present for about a week  The patient states that the pain came on rather suddenly while he was ambulating  There is no specific injury or trauma    He does have a prior history of peroneal tendon debridement and repair  He states that this was operated on twice and after the second surgery, he has had numbness in his left third fourth and fifth toes  This pain to his left foot is present with weightbearing and ambulation  He does get relief and getting off of his feet  The following portions of the patient's history were reviewed and updated as appropriate: allergies, current medications, past family history, past medical history, past social history, past surgical history and problem list       PAST MEDICAL HISTORY:  Past Medical History:   Diagnosis Date   • Arthritis    • Cancer ([facility] [place] [facility] )    • Colon polyp    • Diabetes mellitus ([facility] [place] [facility] )    • Ear problems    • Essential hypertension 02/05/2020   • Glaucoma    • HL (hearing loss)    • Hyperlipidemia 02/05/2020   • Hypertension    • Kidney stone    • Melanoma of ear, left ([facility] [place] [facility] )    • Nasal polyp    • Tinnitus        PAST SURGICAL HISTORY:  Past Surgical History:   Procedure Laterality Date   • ARTHROSCOPY KNEE Bilateral    • BASAL CELL CARCINOMA EXCISION  03/2020    left arm   • CYSTOSCOPY      kidney stone removal   • FOOT TENDON SURGERY Left 06/22/2021    Procedure: ANTERIOR TALOFIBULAR LIGAMENT REPAIR;  Surgeon: Mark Quintana DPM;  [facility];  Service: Podiatry   • FRACTURE SURGERY  March 2023   • KNEE SURGERY     • NASAL POLYP EXCISION     • DC COLONOSCOPY FLX DX W/COLLJ SPEC WHEN PFRMD N/A 08/08/2017    Procedure: COLONOSCOPY;  Surgeon: Rogers Salgado MD;  [facility];   Service: Colorectal   • DC RPR TDN FLXR FOOT 1/2 W/O FREE GRAFG EACH TENDON Left 12/08/2017    Procedure: ANKLE PERONEUS LONGUS TENDON REPAIR with allograft;  RETINACULUM REPAIR SECONDARY  Application of Provena Vac;  Surgeon: Mark Quintana DPM;  [facility];  Service: Podiatry   • ROTATOR CUFF REPAIR Bilateral    • SHOULDER ARTHROSCOPY     • WISDOM TOOTH EXTRACTION          ALLERGIES:  Patient has no known allergies      MEDICATIONS:  Current Outpatient Medications   Medication Sig Dispense Refill   • aspirin (ECOTRIN LOW STRENGTH) 81 mg EC tablet Take 81 mg by mouth daily     • atorvastatin (LIPITOR) 10 mg tablet TAKE 1 TABLET BY MOUTH EVERY DAY 90 tablet 1   • BD Pen Needle Meghana U/F 32G X 4 MM MISC Inject under the skin daily Use as directed 100 each 3   • Cholecalciferol 125 MCG (5000 UT) capsule Take 5,000 Units by mouth daily     • Continuous Blood Gluc  (FreeStyle Kalpana 14 Day [place]) CAMRON Use 1 application 4 (four) times a day Patient to check his blood sugar 4 times a day 1 each 0   • Continuous Blood Gluc Sensor (FreeStyle Kalpana 14 Day Sensor) MISC USE 1 EVERY 14 DAYS 6 each 1   • Diclofenac Sodium (VOLTAREN) 1 % Apply 2 g topically 4 (four) times a day 100 g 2   • fluticasone (FLONASE) 50 mcg/act nasal spray 1 spray into each nostril daily     • gabapentin (Neurontin) 100 mg capsule Take 1 capsule (100 mg total) by mouth 2 (two) times a day 60 capsule 2   • latanoprost (XALATAN) 0 005 % ophthalmic solution PUT 1 DROP INTO BOTH EYES IN THE EVENING     • Levemir FlexTouch 100 units/mL injection pen Inject 66 Units under the skin daily 15 mL 31   • lisinopril (ZESTRIL) 10 mg tablet TAKE 1 TABLET BY MOUTH EVERY DAY 90 tablet 1   • Lumigan 0 01 % ophthalmic drops      • metFORMIN (GLUCOPHAGE-XR) 500 mg 24 hr tablet TAKE 2 TABLETS BY MOUTH TWICE A  tablet 1   • Milk Thistle 150 MG CAPS Take by mouth     • Multiple Vitamins-Minerals (MULTIVITAMIN WITH MINERALS) tablet Take 1 tablet by mouth daily     • pioglitazone (ACTOS) 15 mg tablet TAKE 1 TABLET BY MOUTH EVERY DAY 90 tablet 1   • sodium chloride (OCEAN) 0 65 % nasal spray 1 spray into each nostril as needed for congestion     • Specialty Vitamins Products (MAGNESIUM, AMINO ACID CHELATE,) 133 MG tablet Take 1 tablet by mouth 2 (two) times a day     • Vitamins-Lipotropics (B-100 PO) Take by mouth       No current facility-administered medications "for this visit  SOCIAL HISTORY:  Social History     Socioeconomic History   • Marital status: /Civil Union     Spouse name: None   • Number of children: None   • Years of education: None   • Highest education level: None   Occupational History   • None   Tobacco Use   • Smoking status: Former     Packs/day: 1 00     Years: 30 00     Total pack years: 30 00     Types: Cigarettes     Start date: 6/21/1974     Quit date: 6/21/2007     Years since quitting: 15 9   • Smokeless tobacco: Current     Types: Chew   • Tobacco comments:     quit 15 years ago   Vaping Use   • Vaping Use: Never used   Substance and Sexual Activity   • Alcohol use: Yes     Comment: Occaisonally one drink   • Drug use: No   • Sexual activity: Yes     Partners: Female     Birth control/protection: None   Other Topics Concern   • None   Social History Narrative   • None     Social Determinants of Health     Financial Resource Strain: Low Risk  (4/21/2023)    Overall Financial Resource Strain (CARDIA)    • Difficulty of Paying Living Expenses: Not hard at all   Food Insecurity: Not on file   Transportation Needs: No Transportation Needs (4/21/2023)    PRAPARE - Transportation    • Lack of Transportation (Medical): No    • Lack of Transportation (Non-Medical): No   Physical Activity: Not on file   Stress: Not on file   Social Connections: Not on file   Intimate Partner Violence: Not on file   Housing Stability: Not on file        Review of Systems   Constitutional: Negative  HENT: Negative  Eyes: Negative  Respiratory: Negative  Cardiovascular: Negative  Endocrine: Negative  Musculoskeletal: Negative  Skin: Negative  Neurological: Negative  Hematological: Negative  Psychiatric/Behavioral: Negative            Objective:      /73 (BP Location: Right arm, Patient Position: Sitting, Cuff Size: Standard)   Pulse 71   Ht 6' 1\" (1 854 m)   Wt 92 5 kg (204 lb)   SpO2 98%   BMI 26 91 kg/m²          Physical " "Exam  Vitals reviewed  Constitutional:       Appearance: Normal appearance  HENT:      Head: Normocephalic and atraumatic  Nose: Nose normal    Eyes:      Conjunctiva/sclera: Conjunctivae normal       Pupils: Pupils are equal, round, and reactive to light  Cardiovascular:      Pulses:           Dorsalis pedis pulses are 2+ on the left side  Posterior tibial pulses are 1+ on the left side  Pulmonary:      Effort: Pulmonary effort is normal    Feet:      Left foot:      Skin integrity: Skin integrity normal       Comments: On clinical examination, there is moderate to severe tenderness palpation to the articulation of the cuboid to the fourth and fifth metatarsal bases  There is no erythema, no edema, no calor, no ecchymosis  There is decreased epicritic sensation to the patient's left third fourth and fifth toes  There is contracture deformities of the lesser digits of the left foot  There is no tenderness with palpation along the peroneal tendons and over the Achilles tendon laterally  There is no tenderness palpation of the ATFL or the CFL of the lateral left ankle  He has a pes cavus foot type  Skin:     General: Skin is warm  Capillary Refill: Capillary refill takes less than 2 seconds  Neurological:      General: No focal deficit present  Mental Status: He is alert and oriented to person, place, and time  Psychiatric:         Mood and Affect: Mood normal          Behavior: Behavior normal          Thought Content: Thought content normal            Small joint arthrocentesis: L intertarsal  Universal Protocol:  Consent: Verbal consent obtained  Risks and benefits: risks, benefits and alternatives were discussed  Consent given by: patient  Time out: Immediately prior to procedure a \"time out\" was called to verify the correct patient, procedure, equipment, support staff and site/side marked as required    Timeout called at: 6/2/2023 2:10 PM   Patient understanding: patient " states understanding of the procedure being performed  Patient consent: the patient's understanding of the procedure matches consent given  Patient identity confirmed: verbally with patient and provided demographic data    Supporting Documentation  Indications: pain   Procedure Details  Location: foot - L intertarsal  Needle size: 25 G  Ultrasound guidance: no  Approach: dorsal  Medications administered: 1 mL bupivacaine (PF) 0 25 %; 20 mg triamcinolone acetonide 40 mg/mL    Patient tolerance: patient tolerated the procedure well with no immediate complications    The area of the cuboid articulation with the fourth and fifth metatarsal bases was prepped with alcohol  Ethyl chloride was administered for topical anesthesia  I proceeded to inject 1 ml 0 25% bupivacaine plain/0 5 ml kenalog 40  A sterile Band-Aid was applied postinjection  The patient tolerated it well

## 2023-06-29 DIAGNOSIS — Z79.4 TYPE 2 DIABETES MELLITUS WITH BOTH EYES AFFECTED BY MILD NONPROLIFERATIVE RETINOPATHY WITHOUT MACULAR EDEMA, WITH LONG-TERM CURRENT USE OF INSULIN (HCC): ICD-10-CM

## 2023-06-29 DIAGNOSIS — E11.3293 TYPE 2 DIABETES MELLITUS WITH BOTH EYES AFFECTED BY MILD NONPROLIFERATIVE RETINOPATHY WITHOUT MACULAR EDEMA, WITH LONG-TERM CURRENT USE OF INSULIN (HCC): ICD-10-CM

## 2023-06-29 RX ORDER — PEN NEEDLE, DIABETIC 32GX 5/32"
NEEDLE, DISPOSABLE MISCELLANEOUS
Qty: 100 EACH | Refills: 3 | Status: SHIPPED | OUTPATIENT
Start: 2023-06-29

## 2023-06-30 ENCOUNTER — OFFICE VISIT (OUTPATIENT)
Dept: AUDIOLOGY | Age: 68
End: 2023-06-30

## 2023-06-30 DIAGNOSIS — H90.3 SENSORY HEARING LOSS, BILATERAL: Primary | ICD-10-CM

## 2023-06-30 NOTE — PROGRESS NOTES
Hearing Aid Fitting    Name:  Bertha Acosta  :  1955  Age:  70 y o  Date of Evaluation: 23     Bertha Acosta is being see today to be fit with new hearing aids through [employer]  Patient is fit with [employer] Real 3 miniRITE-R hearing aid(s)  Right serial number [id number]  Left serial number [id number]  Warranty date: 2026 (Loss/Damage and repair)  3X85 r&L, 10 mm DB     Ear mold Battery  Dome   Right - R 3X85 10 mm DB   Left - R 3X85 10 mm DB     The hearing aid(s) were adjusted based on the patient's most recent audiogram and patient comfort  Patient noted good sound quality, and was happy with the fitting  Care and cleaning of the hearing aids was reviewed  Domes, filters, and batteries and user manual were reviewed with the patient  Insertion and removal of the hearing aids was done  The patient practiced insertion and removal of the devices in the office, they demonstrated excellent ability to manipulate the hearing aids  Telephone use was reviewed with the patient  The patient expressed satisfaction with the hearing aids  Recommendation:   Follow up in two weeks  Servando Weldon    Clinical Audiologist

## 2023-07-05 DIAGNOSIS — E11.3293 TYPE 2 DIABETES MELLITUS WITH BOTH EYES AFFECTED BY MILD NONPROLIFERATIVE RETINOPATHY WITHOUT MACULAR EDEMA, WITH LONG-TERM CURRENT USE OF INSULIN (HCC): ICD-10-CM

## 2023-07-05 DIAGNOSIS — Z79.4 TYPE 2 DIABETES MELLITUS WITH BOTH EYES AFFECTED BY MILD NONPROLIFERATIVE RETINOPATHY WITHOUT MACULAR EDEMA, WITH LONG-TERM CURRENT USE OF INSULIN (HCC): ICD-10-CM

## 2023-07-05 RX ORDER — METFORMIN HYDROCHLORIDE 500 MG/1
TABLET, EXTENDED RELEASE ORAL
Qty: 360 TABLET | Refills: 1 | Status: SHIPPED | OUTPATIENT
Start: 2023-07-05

## 2023-07-07 ENCOUNTER — OFFICE VISIT (OUTPATIENT)
Dept: PODIATRY | Facility: CLINIC | Age: 68
End: 2023-07-07
Payer: MEDICARE

## 2023-07-07 VITALS
HEIGHT: 73 IN | DIASTOLIC BLOOD PRESSURE: 73 MMHG | OXYGEN SATURATION: 100 % | BODY MASS INDEX: 27.43 KG/M2 | WEIGHT: 207 LBS | HEART RATE: 68 BPM | SYSTOLIC BLOOD PRESSURE: 150 MMHG

## 2023-07-07 DIAGNOSIS — M77.42 METATARSALGIA, LEFT FOOT: Primary | ICD-10-CM

## 2023-07-07 DIAGNOSIS — M19.072 DJD (DEGENERATIVE JOINT DISEASE), ANKLE AND FOOT, LEFT: ICD-10-CM

## 2023-07-07 PROCEDURE — 99213 OFFICE O/P EST LOW 20 MIN: CPT | Performed by: PODIATRIST

## 2023-07-07 NOTE — PROGRESS NOTES
Assessment/Plan:     The patient's clinical examination today was relatively benign. There is no significant tenderness palpation to the dorsal lateral aspect the patient's left midfoot today. There is no erythema nor edema. She motion of the ankle hindfoot joints is within normal limits and without tenderness. The patient is doing well from a clinical standpoint status post injection therapy. He notes no significant pain in his left foot today. He was able to go on vacation to [place] without any discomfort at all in his left foot. He does note an occasional twinge in the left midfoot that shoots through but only is present for a couple of seconds. He does note some similar symptoms on the right foot but it is currently very mild and he will continue with supportive shoe gear and OTC NSAID therapy on as-needed basis for now. We did discuss the importance of good supportive shoe gear and the potential benefits of some good-quality OTC arch supports. Some ankle exercises were also attached to his AVS to improve upon ankle range of motion. This should help reduce stress in the midfoot joints during his gait cycle. Diagnoses and all orders for this visit:    Mark Hardy, left foot    DJD (degenerative joint disease), ankle and foot, left          Subjective:     Patient ID: Lillie Rene is a 70 y.o. male. The patient presents today for follow-up of left midfoot pain secondary to DJD and strain of the lateral longitudinal arch. He has responded well to injection therapy at his last visit. He notes no significant pain in his left foot today. He was very happy with the results of the injection. He states that he was able to go on vacation to [place] and he did everything he wanted to do without any pain in his left foot. He does note some very similar symptoms in his right foot that are very mild at this point in time.   He does not want to proceed with any aggressive therapies in his right foot for now. PAST MEDICAL HISTORY:  Past Medical History:   Diagnosis Date   • Arthritis    • Cancer [facility])    • Colon polyp    • Diabetes mellitus ([address])    • Ear problems    • Essential hypertension 02/05/2020   • Glaucoma    • HL (hearing loss)    • Hyperlipidemia 02/05/2020   • Hypertension    • Kidney stone    • Melanoma of ear, left ([address])    • Nasal polyp    • Tinnitus        PAST SURGICAL HISTORY:  Past Surgical History:   Procedure Laterality Date   • ARTHROSCOPY KNEE Bilateral    • BASAL CELL CARCINOMA EXCISION  03/2020    left arm   • CYSTOSCOPY      kidney stone removal   • FOOT TENDON SURGERY Left 06/22/2021    Procedure: ANTERIOR TALOFIBULAR LIGAMENT REPAIR;  Surgeon: Desiree Pat DPM;  [facility];  Service: Podiatry   • FRACTURE SURGERY  March 2023   • KNEE SURGERY     • NASAL POLYP EXCISION     • WA COLONOSCOPY FLX DX W/COLLJ SPEC WHEN PFRMD N/A 08/08/2017    Procedure: COLONOSCOPY;  Surgeon: Jose M Gannon MD;  [facility]; Service: Colorectal   • WA RPR TDN FLXR FOOT 1/2 W/O FREE GRAFG EACH TENDON Left 12/08/2017    Procedure: ANKLE PERONEUS LONGUS TENDON REPAIR with allograft;  RETINACULUM REPAIR SECONDARY  Application of Provena Vac;  Surgeon: Desiree Pat DPM;  [facility];  Service: Podiatry   • ROTATOR CUFF REPAIR Bilateral    • SHOULDER ARTHROSCOPY     • WISDOM TOOTH EXTRACTION          ALLERGIES:  Patient has no known allergies.     MEDICATIONS:  Current Outpatient Medications   Medication Sig Dispense Refill   • aspirin (ECOTRIN LOW STRENGTH) 81 mg EC tablet Take 81 mg by mouth daily     • atorvastatin (LIPITOR) 10 mg tablet TAKE 1 TABLET BY MOUTH EVERY DAY 90 tablet 1   • BD Pen Needle Meghana 2nd Gen 32G X 4 MM MISC DAILY USE AS DIRECTED 100 each 3   • Cholecalciferol 125 MCG (5000 UT) capsule Take 5,000 Units by mouth daily     • Continuous Blood Gluc  (FreeStyle Kalpana 14 Day [place]) CAMRON Use 1 application 4 (four) times a day Patient to check his blood sugar 4 times a day 1 each 0   • Continuous Blood Gluc Sensor (FreeStyle Kalpana 14 Day Sensor) MISC USE 1 EVERY 14 DAYS 6 each 1   • Diclofenac Sodium (VOLTAREN) 1 % Apply 2 g topically 4 (four) times a day 100 g 2   • fluticasone (FLONASE) 50 mcg/act nasal spray 1 spray into each nostril daily     • gabapentin (Neurontin) 100 mg capsule Take 1 capsule (100 mg total) by mouth 2 (two) times a day 60 capsule 2   • latanoprost (XALATAN) 0.005 % ophthalmic solution PUT 1 DROP INTO BOTH EYES IN THE EVENING     • Levemir FlexTouch 100 units/mL injection pen Inject 66 Units under the skin daily 15 mL 31   • lisinopril (ZESTRIL) 10 mg tablet TAKE 1 TABLET BY MOUTH EVERY DAY 90 tablet 1   • Lumigan 0.01 % ophthalmic drops      • metFORMIN (GLUCOPHAGE-XR) 500 mg 24 hr tablet TAKE 2 TABLETS BY MOUTH TWICE A  tablet 1   • Milk Thistle 150 MG CAPS Take by mouth     • Multiple Vitamins-Minerals (MULTIVITAMIN WITH MINERALS) tablet Take 1 tablet by mouth daily     • pioglitazone (ACTOS) 15 mg tablet TAKE 1 TABLET BY MOUTH EVERY DAY 90 tablet 1   • sodium chloride (OCEAN) 0.65 % nasal spray 1 spray into each nostril as needed for congestion     • Specialty Vitamins Products (MAGNESIUM, AMINO ACID CHELATE,) 133 MG tablet Take 1 tablet by mouth 2 (two) times a day     • Vitamins-Lipotropics (B-100 PO) Take by mouth       Current Facility-Administered Medications   Medication Dose Route Frequency Provider Last Rate Last Admin   • bupivacaine (PF) (MARCAINE) 0.25 % injection 1 mL  1 mL Intra-articular  Fernie Bear Castelan, DPM   1 mL at 06/02/23 1400   • triamcinolone acetonide (KENALOG-40) 40 mg/mL injection 20 mg  20 mg Intra-articular  Fernie Bear Castelan, DPM   20 mg at 06/02/23 1400       SOCIAL HISTORY:  Social History     Socioeconomic History   • Marital status: /Civil Union     Spouse name: None   • Number of children: None   • Years of education: None   • Highest education level: None   Occupational History   • None   Tobacco Use   • Smoking status: Former     Packs/day: 1.00     Years: 30.00     Total pack years: 30.00     Types: Cigarettes     Start date: 1974     Quit date: 2007     Years since quittin.0   • Smokeless tobacco: Current     Types: Chew   • Tobacco comments:     quit 15 years ago   Vaping Use   • Vaping Use: Never used   Substance and Sexual Activity   • Alcohol use: Yes     Comment: Occaisonally one drink   • Drug use: No   • Sexual activity: Yes     Partners: Female     Birth control/protection: None   Other Topics Concern   • None   Social History Narrative   • None     Social Determinants of Health     Financial Resource Strain: Low Risk  (2023)    Overall Financial Resource Strain (CARDIA)    • Difficulty of Paying Living Expenses: Not hard at all   Food Insecurity: Not on file   Transportation Needs: No Transportation Needs (2023)    PRAPARE - Transportation    • Lack of Transportation (Medical): No    • Lack of Transportation (Non-Medical): No   Physical Activity: Not on file   Stress: Not on file   Social Connections: Not on file   Intimate Partner Violence: Not on file   Housing Stability: Not on file        Review of Systems   Constitutional: Negative. HENT: Negative. Eyes: Negative. Respiratory: Negative. Cardiovascular: Negative. Endocrine: Negative. Musculoskeletal: Negative. Skin: Negative. Neurological: Negative. Hematological: Negative. Psychiatric/Behavioral: Negative. Objective:     Physical Exam  Vitals reviewed. Constitutional:       Appearance: Normal appearance. HENT:      Head: Normocephalic and atraumatic. Nose: Nose normal.   Eyes:      Conjunctiva/sclera: Conjunctivae normal.      Pupils: Pupils are equal, round, and reactive to light. Cardiovascular:      Pulses:           Dorsalis pedis pulses are 2+ on the left side. Posterior tibial pulses are 2+ on the left side.    Pulmonary:      Effort: Pulmonary effort is normal.   Feet:      Left foot:      Skin integrity: Skin integrity normal.      Comments: The patient's clinical examination today was relatively benign. There is no significant tenderness palpation to the dorsal lateral aspect the patient's left midfoot today. There is no erythema nor edema. She motion of the ankle hindfoot joints is within normal limits and without tenderness. Skin:     General: Skin is warm. Capillary Refill: Capillary refill takes less than 2 seconds. Neurological:      General: No focal deficit present. Mental Status: He is alert and oriented to person, place, and time. Psychiatric:         Mood and Affect: Mood normal.         Behavior: Behavior normal.         Thought Content:  Thought content normal.

## 2023-07-28 ENCOUNTER — OFFICE VISIT (OUTPATIENT)
Dept: AUDIOLOGY | Age: 68
End: 2023-07-28

## 2023-07-28 DIAGNOSIS — H90.3 SENSORY HEARING LOSS, BILATERAL: Primary | ICD-10-CM

## 2023-07-28 NOTE — PROGRESS NOTES
Hearing Aid Visit:    Name:  Prabha Shipman  :  1955  Age:  70 y.o. Date of Evaluation: 23     Prabha Shipman is being seen for a hearing aid visit. Patient is fit with [employer] Real 3 miniRITE-R hearing aid(s). Right serial number [id number]. Left serial number [id number]. Warranty date: 2026 (Loss/Damage and repair). 3X85 r&L, 10 mm DB    Patient reports overall good sound quality. Action:  The hearing aids were cleaned and checked. Maintenance was reviewed. Wax guards and domes were replaced. A listening check revealed the hearing aids to be providing adequate amplification for the patients hearing loss. No other changes were made today. Recommendations: Follow up in 6 months. Valentino Berg, AuD.   Clinical Audiologist

## 2023-08-01 DIAGNOSIS — M79.672 PAIN IN BOTH FEET: ICD-10-CM

## 2023-08-01 DIAGNOSIS — M79.671 PAIN IN BOTH FEET: ICD-10-CM

## 2023-08-01 RX ORDER — GABAPENTIN 100 MG/1
100 CAPSULE ORAL 2 TIMES DAILY
Qty: 60 CAPSULE | Refills: 2 | Status: SHIPPED | OUTPATIENT
Start: 2023-08-01

## 2023-08-16 DIAGNOSIS — E11.9 TYPE 2 DIABETES MELLITUS WITHOUT COMPLICATION, WITH LONG-TERM CURRENT USE OF INSULIN (HCC): ICD-10-CM

## 2023-08-16 DIAGNOSIS — Z79.4 TYPE 2 DIABETES MELLITUS WITHOUT COMPLICATION, WITH LONG-TERM CURRENT USE OF INSULIN (HCC): ICD-10-CM

## 2023-08-16 RX ORDER — FLASH GLUCOSE SENSOR
KIT MISCELLANEOUS
Qty: 1 EACH | Refills: 3 | Status: SHIPPED | OUTPATIENT
Start: 2023-08-16

## 2023-09-15 ENCOUNTER — HOSPITAL ENCOUNTER (EMERGENCY)
Facility: HOSPITAL | Age: 68
End: 2023-09-15
Attending: EMERGENCY MEDICINE
Payer: MEDICARE

## 2023-09-15 ENCOUNTER — TELEPHONE (OUTPATIENT)
Age: 68
End: 2023-09-15

## 2023-09-15 ENCOUNTER — APPOINTMENT (EMERGENCY)
Dept: CT IMAGING | Facility: HOSPITAL | Age: 68
End: 2023-09-15
Payer: MEDICARE

## 2023-09-15 ENCOUNTER — HOSPITAL ENCOUNTER (INPATIENT)
Facility: HOSPITAL | Age: 68
LOS: 2 days | Discharge: HOME | End: 2023-09-17
Attending: EMERGENCY MEDICINE | Admitting: EMERGENCY MEDICINE
Payer: MEDICARE

## 2023-09-15 ENCOUNTER — NURSE TRIAGE (OUTPATIENT)
Age: 68
End: 2023-09-15

## 2023-09-15 VITALS
TEMPERATURE: 98.2 F | SYSTOLIC BLOOD PRESSURE: 141 MMHG | RESPIRATION RATE: 18 BRPM | HEART RATE: 82 BPM | OXYGEN SATURATION: 99 % | DIASTOLIC BLOOD PRESSURE: 73 MMHG | WEIGHT: 210.98 LBS | BODY MASS INDEX: 27.84 KG/M2

## 2023-09-15 DIAGNOSIS — E11.3293 TYPE 2 DIABETES MELLITUS WITH BOTH EYES AFFECTED BY MILD NONPROLIFERATIVE RETINOPATHY WITHOUT MACULAR EDEMA, WITH LONG-TERM CURRENT USE OF INSULIN (HCC): ICD-10-CM

## 2023-09-15 DIAGNOSIS — G44.53 THUNDERCLAP HEADACHE: ICD-10-CM

## 2023-09-15 DIAGNOSIS — G44.53 THUNDERCLAP HEADACHE: Primary | ICD-10-CM

## 2023-09-15 DIAGNOSIS — Z79.4 TYPE 2 DIABETES MELLITUS WITH BOTH EYES AFFECTED BY MILD NONPROLIFERATIVE RETINOPATHY WITHOUT MACULAR EDEMA, WITH LONG-TERM CURRENT USE OF INSULIN (HCC): ICD-10-CM

## 2023-09-15 DIAGNOSIS — I10 ESSENTIAL HYPERTENSION: ICD-10-CM

## 2023-09-15 DIAGNOSIS — R29.90 STROKE-LIKE SYMPTOMS: Primary | ICD-10-CM

## 2023-09-15 LAB
2HR DELTA HS TROPONIN: 1 NG/L
ANION GAP SERPL CALCULATED.3IONS-SCNC: 9 MMOL/L
APTT PPP: 27 SECONDS (ref 23–37)
ATRIAL RATE: 67 BPM
BUN SERPL-MCNC: 22 MG/DL (ref 5–25)
C GATTII+NEOFOR DNA CSF QL NAA+NON-PROBE: NOT DETECTED
CALCIUM SERPL-MCNC: 9.2 MG/DL (ref 8.4–10.2)
CARDIAC TROPONIN I PNL SERPL HS: 5 NG/L
CARDIAC TROPONIN I PNL SERPL HS: 6 NG/L
CHLORIDE SERPL-SCNC: 105 MMOL/L (ref 96–108)
CMV DNA CSF QL NAA+NON-PROBE: NOT DETECTED
CO2 SERPL-SCNC: 25 MMOL/L (ref 21–32)
CREAT SERPL-MCNC: 1.11 MG/DL (ref 0.6–1.3)
E COLI K1 DNA CSF QL NAA+NON-PROBE: NOT DETECTED
ERYTHROCYTE [DISTWIDTH] IN BLOOD BY AUTOMATED COUNT: 13.2 % (ref 11.6–15.1)
EV RNA CSF QL NAA+NON-PROBE: NOT DETECTED
FLUAV RNA RESP QL NAA+PROBE: NEGATIVE
FLUBV RNA RESP QL NAA+PROBE: NEGATIVE
GFR SERPL CREATININE-BSD FRML MDRD: 67 ML/MIN/1.73SQ M
GLUCOSE CSF-MCNC: 54 MG/DL (ref 40–70)
GLUCOSE SERPL-MCNC: 115 MG/DL (ref 65–140)
GLUCOSE SERPL-MCNC: 118 MG/DL (ref 65–140)
GLUCOSE SERPL-MCNC: 141 MG/DL (ref 65–140)
GLUCOSE SERPL-MCNC: 52 MG/DL (ref 65–140)
GLUCOSE SERPL-MCNC: 54 MG/DL (ref 65–140)
GLUCOSE SERPL-MCNC: 64 MG/DL (ref 65–140)
GP B STREP DNA CSF QL NAA+NON-PROBE: NOT DETECTED
GRAM STN SPEC: NORMAL
HAEM INFLU DNA CSF QL NAA+NON-PROBE: NOT DETECTED
HCT VFR BLD AUTO: 39.4 % (ref 36.5–49.3)
HGB BLD-MCNC: 12.9 G/DL (ref 12–17)
HHV6 DNA CSF QL NAA+NON-PROBE: NOT DETECTED
HSV1 DNA CSF QL NAA+NON-PROBE: NOT DETECTED
HSV2 DNA CSF QL NAA+NON-PROBE: NOT DETECTED
INR PPP: 0.97 (ref 0.84–1.19)
L MONOCYTOG DNA CSF QL NAA+NON-PROBE: NOT DETECTED
MCH RBC QN AUTO: 30.7 PG (ref 26.8–34.3)
MCHC RBC AUTO-ENTMCNC: 32.7 G/DL (ref 31.4–37.4)
MCV RBC AUTO: 94 FL (ref 82–98)
N MEN DNA CSF QL NAA+NON-PROBE: NOT DETECTED
P AXIS: 25 DEGREES
PARECHOVIRUS A RNA CSF QL NAA+NON-PROBE: NOT DETECTED
PLATELET # BLD AUTO: 335 THOUSANDS/UL (ref 149–390)
PMV BLD AUTO: 10 FL (ref 8.9–12.7)
POTASSIUM SERPL-SCNC: 4 MMOL/L (ref 3.5–5.3)
PR INTERVAL: 144 MS
PROT CSF-MCNC: 62 MG/DL (ref 15–45)
PROTHROMBIN TIME: 13.4 SECONDS (ref 11.6–14.5)
QRS AXIS: -29 DEGREES
QRSD INTERVAL: 122 MS
QT INTERVAL: 428 MS
QTC INTERVAL: 452 MS
RBC # BLD AUTO: 4.2 MILLION/UL (ref 3.88–5.62)
RBC # CSF MANUAL: 32 UL (ref 0–10)
RBC # CSF MANUAL: 6 UL (ref 0–10)
RSV RNA RESP QL NAA+PROBE: NEGATIVE
S PNEUM DNA CSF QL NAA+NON-PROBE: NOT DETECTED
SARS-COV-2 RNA RESP QL NAA+PROBE: NEGATIVE
SODIUM SERPL-SCNC: 139 MMOL/L (ref 135–147)
T WAVE AXIS: 33 DEGREES
VENTRICULAR RATE: 67 BPM
VZV DNA CSF QL NAA+NON-PROBE: NOT DETECTED
WBC # BLD AUTO: 11.93 THOUSAND/UL (ref 4.31–10.16)

## 2023-09-15 PROCEDURE — 80048 BASIC METABOLIC PNL TOTAL CA: CPT | Performed by: PHYSICIAN ASSISTANT

## 2023-09-15 PROCEDURE — 96376 TX/PRO/DX INJ SAME DRUG ADON: CPT

## 2023-09-15 PROCEDURE — 89050 BODY FLUID CELL COUNT: CPT | Performed by: PHYSICIAN ASSISTANT

## 2023-09-15 PROCEDURE — 87483 CNS DNA AMP PROBE TYPE 12-25: CPT | Performed by: PHYSICIAN ASSISTANT

## 2023-09-15 PROCEDURE — 93005 ELECTROCARDIOGRAM TRACING: CPT

## 2023-09-15 PROCEDURE — 99285 EMERGENCY DEPT VISIT HI MDM: CPT

## 2023-09-15 PROCEDURE — 70496 CT ANGIOGRAPHY HEAD: CPT

## 2023-09-15 PROCEDURE — 85027 COMPLETE CBC AUTOMATED: CPT | Performed by: PHYSICIAN ASSISTANT

## 2023-09-15 PROCEDURE — 84484 ASSAY OF TROPONIN QUANT: CPT | Performed by: PHYSICIAN ASSISTANT

## 2023-09-15 PROCEDURE — 82948 REAGENT STRIP/BLOOD GLUCOSE: CPT

## 2023-09-15 PROCEDURE — 96366 THER/PROPH/DIAG IV INF ADDON: CPT

## 2023-09-15 PROCEDURE — 70498 CT ANGIOGRAPHY NECK: CPT

## 2023-09-15 PROCEDURE — 84157 ASSAY OF PROTEIN OTHER: CPT | Performed by: PHYSICIAN ASSISTANT

## 2023-09-15 PROCEDURE — 0241U HB NFCT DS VIR RESP RNA 4 TRGT: CPT | Performed by: PHYSICIAN ASSISTANT

## 2023-09-15 PROCEDURE — 96375 TX/PRO/DX INJ NEW DRUG ADDON: CPT

## 2023-09-15 PROCEDURE — 85610 PROTHROMBIN TIME: CPT | Performed by: PHYSICIAN ASSISTANT

## 2023-09-15 PROCEDURE — 99291 CRITICAL CARE FIRST HOUR: CPT | Performed by: EMERGENCY MEDICINE

## 2023-09-15 PROCEDURE — 85730 THROMBOPLASTIN TIME PARTIAL: CPT | Performed by: PHYSICIAN ASSISTANT

## 2023-09-15 PROCEDURE — 82945 GLUCOSE OTHER FLUID: CPT | Performed by: PHYSICIAN ASSISTANT

## 2023-09-15 PROCEDURE — 87070 CULTURE OTHR SPECIMN AEROBIC: CPT | Performed by: PHYSICIAN ASSISTANT

## 2023-09-15 PROCEDURE — 96365 THER/PROPH/DIAG IV INF INIT: CPT

## 2023-09-15 PROCEDURE — 36415 COLL VENOUS BLD VENIPUNCTURE: CPT | Performed by: PHYSICIAN ASSISTANT

## 2023-09-15 RX ORDER — DEXTROSE MONOHYDRATE 25 G/50ML
50 INJECTION, SOLUTION INTRAVENOUS ONCE
Status: COMPLETED | OUTPATIENT
Start: 2023-09-15 | End: 2023-09-15

## 2023-09-15 RX ORDER — DEXTROSE MONOHYDRATE 25 G/50ML
INJECTION, SOLUTION INTRAVENOUS
Status: COMPLETED
Start: 2023-09-15 | End: 2023-09-15

## 2023-09-15 RX ORDER — INSULIN LISPRO 100 [IU]/ML
1-6 INJECTION, SOLUTION INTRAVENOUS; SUBCUTANEOUS
Status: DISCONTINUED | OUTPATIENT
Start: 2023-09-15 | End: 2023-09-17 | Stop reason: HOSPADM

## 2023-09-15 RX ORDER — MILK THISTLE 150 MG
CAPSULE ORAL DAILY
Status: DISCONTINUED | OUTPATIENT
Start: 2023-09-15 | End: 2023-09-15

## 2023-09-15 RX ORDER — GABAPENTIN 100 MG/1
100 CAPSULE ORAL 2 TIMES DAILY
Status: DISCONTINUED | OUTPATIENT
Start: 2023-09-15 | End: 2023-09-17 | Stop reason: HOSPADM

## 2023-09-15 RX ORDER — CHLORHEXIDINE GLUCONATE ORAL RINSE 1.2 MG/ML
15 SOLUTION DENTAL EVERY 12 HOURS SCHEDULED
Status: DISCONTINUED | OUTPATIENT
Start: 2023-09-15 | End: 2023-09-17 | Stop reason: HOSPADM

## 2023-09-15 RX ORDER — DEXTROSE 10 % IN WATER 10 %
250 INTRAVENOUS SOLUTION INTRAVENOUS ONCE
Status: DISCONTINUED | OUTPATIENT
Start: 2023-09-15 | End: 2023-09-15

## 2023-09-15 RX ORDER — FLUTICASONE PROPIONATE 50 MCG
1 SPRAY, SUSPENSION (ML) NASAL DAILY
Status: DISCONTINUED | OUTPATIENT
Start: 2023-09-16 | End: 2023-09-17 | Stop reason: HOSPADM

## 2023-09-15 RX ORDER — ECHINACEA PURPUREA EXTRACT 125 MG
1 TABLET ORAL
Status: DISCONTINUED | OUTPATIENT
Start: 2023-09-15 | End: 2023-09-17 | Stop reason: HOSPADM

## 2023-09-15 RX ORDER — ATORVASTATIN CALCIUM 10 MG/1
10 TABLET, FILM COATED ORAL DAILY
Status: DISCONTINUED | OUTPATIENT
Start: 2023-09-16 | End: 2023-09-17 | Stop reason: HOSPADM

## 2023-09-15 RX ADMIN — NICARDIPINE HYDROCHLORIDE 7.5 MG/HR: 25 INJECTION, SOLUTION INTRAVENOUS at 17:18

## 2023-09-15 RX ADMIN — DEXTROSE MONOHYDRATE 50 ML: 25 INJECTION, SOLUTION INTRAVENOUS at 12:03

## 2023-09-15 RX ADMIN — DEXTROSE MONOHYDRATE 50 ML: 25 INJECTION, SOLUTION INTRAVENOUS at 15:59

## 2023-09-15 RX ADMIN — IOHEXOL 85 ML: 350 INJECTION, SOLUTION INTRAVENOUS at 12:01

## 2023-09-15 RX ADMIN — NICARDIPINE HYDROCHLORIDE 5 MG/HR: 25 INJECTION, SOLUTION INTRAVENOUS at 17:06

## 2023-09-15 RX ADMIN — NICARDIPINE HYDROCHLORIDE 5 MG/HR: 2.5 INJECTION, SOLUTION INTRAVENOUS at 12:58

## 2023-09-15 RX ADMIN — GABAPENTIN 100 MG: 100 CAPSULE ORAL at 18:28

## 2023-09-15 RX ADMIN — CHLORHEXIDINE GLUCONATE 15 ML: 1.2 SOLUTION ORAL at 22:00

## 2023-09-15 NOTE — TELEPHONE ENCOUNTER
Patient's wife called in to report patient is disoriented, emotional, left facial/arm numbness, cold sweats, emotional, disoriented and low blood sugar=42 while on phone with Triage RN. Immediate administration of apple juice and discussed with patient to go to ER. Patent wanted to discuss with PCP. RN advised patient that symptoms reported could be result of a CVA and the more time he is delaying evaluation and treatment, could affect his health. Patient did agree to go to ER and placed on Adi Schein. Reason for Disposition  • Neurologic deficit that was brief (now gone), ANY of the following: * Weakness of the face, arm, or leg on one side of the body* Numbness of the face, arm, or leg on one side of the body* Loss of speech or garbled speech    Answer Assessment - Initial Assessment Questions  1. SYMPTOM: "What is the main symptom you are concerned about?" (e.g., weakness, numbness)      *No Answer*  2. ONSET: "When did this start?" (minutes, hours, days; while sleeping)     This morning XW=499; ate before OV went to eye appointment  3. LAST NORMAL: "When was the last time you were normal (no symptoms)?"     Bedtime  4. PATTERN "Does this come and go, or has it been constant since it started?"  "Is it present now?"      *No Answer*  5. CARDIAC SYMPTOMS: "Have you had any of the following symptoms: chest pain, difficulty breathing, palpitations?"      Palpitations; heart racing  6. NEUROLOGIC SYMPTOMS: "Have you had any of the following symptoms: headache, dizziness, vision loss, double vision, changes in speech, unsteady on your feet?"     Numbness left face and arm, tongue, speech is disoriented; unsteady on feed,   7. OTHER SYMPTOMS: "Do you have any other symptoms?"     BS=42 now on phone; immediate administration of apple cider full cup;   8.  PREGNANCY: "Is there any chance you are pregnant?" "When was your last menstrual period?"      *No Answer*    Protocols used: NEUROLOGIC DEFICIT-ADULT-[place]

## 2023-09-15 NOTE — TELEPHONE ENCOUNTER
Patient's wife called in to report patient with left face/arm numbness, cold sweats, emotional, disoriented, and current BS=42. Immediate administration of apple cider. BS on awakening was 155. Last known normal was bedtime last night. Patient wanted to discuss or see PCP. Patient advised to go to ER and RN discussed with patient the immediate need for evaluation and treatment. Patient did agree to have wife take him to the ER. Placed on Adi Ryan, Triage encounter completed.

## 2023-09-15 NOTE — EMTALA/ACUTE CARE TRANSFER
[address] [facility] [address]  Dept: [phone]      EMTALA TRANSFER CONSENT    NAME Arnulfo Michele                                         1955                              MRN [id number]    I have been informed of my rights regarding examination, treatment, and transfer   by Dr. Anshu Bell MD    Benefits: Specialized equipment and/or services available at the receiving facility (Include comment)________________________ ([facility])    Risks: Potential for delay in receiving treatment, Potential deterioration of medical condition, Loss of IV, Increased discomfort during transfer, Possible worsening of condition or death during transfer      Consent for Transfer:  I acknowledge that my medical condition has been evaluated and explained to me by the emergency department physician or other qualified medical person and/or my attending physician, who has recommended that I be transferred to the service of  Accepting Physician: Dr. Lance Johnson at [address] [place] [address] [address] Name, [facility] : [facility]. The above potential benefits of such transfer, the potential risks associated with such transfer, and the probable risks of not being transferred have been explained to me, and I fully understand them. The doctor has explained that, in my case, the benefits of transfer outweigh the risks. I agree to be transferred. I authorize the performance of emergency medical procedures and treatments upon me in both transit and upon arrival at the receiving facility. Additionally, I authorize the release of any and all medical records to the receiving facility and request they be transported with me, if possible. I understand that the safest mode of transportation during a medical emergency is an ambulance and that the Hospital advocates the use of this mode of transport.  Risks of traveling to the receiving facility by car, including absence of medical control, life sustaining equipment, such as oxygen, and medical personnel has been explained to me and I fully understand them. ([facility] CORRECT BOX BELOW)  [  ]  I consent to the stated transfer and to be transported by ambulance/helicopter. [  ]  I consent to the stated transfer, but refuse transportation by ambulance and accept full responsibility for my transportation by car. I understand the risks of non-ambulance transfers and I exonerate the Hospital and its staff from any deterioration in my condition that results from this refusal.    X___________________________________________    DATE  09/15/23  TIME________  Signature of patient or legally responsible individual signing on patient behalf           RELATIONSHIP TO PATIENT_________________________          Provider Certification    NAME Nicolasa Vaughan                                         1955                              MRN [id number]    A medical screening exam was performed on the above named patient. Based on the examination:    Condition Necessitating Transfer The primary encounter diagnosis was Stroke-like symptoms. A diagnosis of [place] headache was also pertinent to this visit.     Patient Condition: The patient has been stabilized such that within reasonable medical probability, no material deterioration of the patient condition or the condition of the unborn child(vickey) is likely to result from the transfer    Reason for Transfer: Level of Care needed not available at this facility    Transfer Requirements: Facility B   · Space available and qualified personnel available for treatment as acknowledged by PACS  · Agreed to accept transfer and to provide appropriate medical treatment as acknowledged by       Dr. Romeo Grider  · Appropriate medical records of the examination and treatment of the patient are provided at the time of transfer   [address] _______  · Transfer will be performed by qualified personnel from [facility]  and appropriate transfer equipment as required, including the use of necessary and appropriate life support measures. Provider Certification: I have examined the patient and explained the following risks and benefits of being transferred/refusing transfer to the patient/family:  General risk, such as traffic hazards, adverse weather conditions, rough terrain or turbulence, possible failure of equipment (including vehicle or aircraft), or consequences of actions of persons outside the control of the transport personnel, Unanticipated needs of medical equipment and personnel during transport, Risk of worsening condition      Based on these reasonable risks and benefits to the patient and/or the unborn child(vickey), and based upon the information available at the time of the patient’s examination, I certify that the medical benefits reasonably to be expected from the provision of appropriate medical treatments at another medical facility outweigh the increasing risks, if any, to the individual’s medical condition, and in the case of labor to the unborn child, from effecting the transfer.     X____________________________________________ DATE 09/15/23        TIME_______      ORIGINAL - SEND TO MEDICAL RECORDS   COPY - SEND WITH PATIENT DURING TRANSFER

## 2023-09-15 NOTE — ED PROVIDER NOTES
History  Chief Complaint   Patient presents with   • Weakness - Generalized     Patient presents for "feeling bad this morning" states he felt slow, and his hands were itchy. Then felt nauseous. Checked his sugar and it was 42, drank some juice, but hasn't checked his sugar since. States left side of tongue and lips are numb. Patient's wife states he was having some aphasia. Patient is a 65-year-old male with a past medical history significant for hypertension, hyperlipidemia, insulin-dependent diabetes presenting to the emergency department for evaluation of strokelike symptoms. Patient states that when he woke up this morning he was feeling "off."  He states that his hands were itchy bilaterally which he does not typically experience. He then developed nausea which was atypical for him. He checked his blood sugar and at that time it was 42. He drank some juice and began to feel better. He had an eye doctor appointment at 9 AM.  He was completely asymptomatic and route to his eye doctor appointment, however when he got out of the car he began to experience some left-sided facial numbness, left upper and left lower extremity numbness as well as left-sided weakness that was making it difficult for him to ambulate. His wife states that he was "stumbling" at his appointment. After leaving his appointment, his wife states that he was speaking to her but was not making any sense. He was not having slurred speech, his speech was clear but the words that he was saying were not making sense. This prompted his visit to the emergency department. Patient states that his symptoms are improving, however he is still experiencing some numbness/tingling to the left side. He is no longer having any speech difficulties. He is no longer having left-sided weakness. He is denying dizziness as a room spinning sensation.   He has not been having any fevers, chills, chest pain, difficulty breathing, abdominal pain, vomiting, diarrhea. No other complaints at this time. Prior to Admission Medications   Prescriptions Last Dose Informant Patient Reported? Taking?    BD Pen Needle Meghana 2nd Gen 32G X 4 MM MISC  Self No No   Sig: DAILY USE AS DIRECTED   Cholecalciferol 125 MCG (5000 UT) capsule  Self Yes No   Sig: Take 5,000 Units by mouth daily   Continuous Blood Gluc  (FreeStyle Kalpana 14 Day [place]) CAMRON  Self No No   Sig: Use 1 application 4 (four) times a day Patient to check his blood sugar 4 times a day   Continuous Blood Gluc  (FreeStyle [place]) CAMRON   No No   Sig: Check blood sugars multiple times per day   Continuous Blood Gluc Sensor (FreeStyle Kalpana 14 Day Sensor) MISC   No No   Sig: USE 1 EVERY 14 DAYS   Continuous Blood Gluc Sensor (FreeStyle Kalpana 2 Sensor) MISC   No No   Sig: Check blood sugars multiple times per day   Diclofenac Sodium (VOLTAREN) 1 %  Self No No   Sig: Apply 2 g topically 4 (four) times a day   Milk Thistle 150 MG CAPS  Self Yes No   Sig: Take by mouth   Multiple Vitamins-Minerals (MULTIVITAMIN WITH MINERALS) tablet  Self Yes No   Sig: Take 1 tablet by mouth daily   Specialty Vitamins Products (MAGNESIUM, AMINO ACID CHELATE,) 133 MG tablet  Self Yes No   Sig: Take 1 tablet by mouth 2 (two) times a day   Vitamins-Lipotropics (B-100 PO)  Self Yes No   Sig: Take by mouth   aspirin (ECOTRIN LOW STRENGTH) 81 mg EC tablet  Self Yes No   Sig: Take 81 mg by mouth daily   atorvastatin (LIPITOR) 10 mg tablet  Self No No   Sig: TAKE 1 TABLET BY MOUTH EVERY DAY   fluticasone (FLONASE) 50 mcg/act nasal spray  Self Yes No   Si spray into each nostril daily   gabapentin (NEURONTIN) 100 mg capsule   No No   Sig: TAKE 1 CAPSULE BY MOUTH TWICE A DAY   lisinopril (ZESTRIL) 10 mg tablet  Self No No   Sig: TAKE 1 TABLET BY MOUTH EVERY DAY   metFORMIN (GLUCOPHAGE-XR) 500 mg 24 hr tablet  Self No No   Sig: TAKE 2 TABLETS BY MOUTH TWICE A DAY   sodium chloride (OCEAN) 0.65 % nasal spray Self Yes No   Si spray into each nostril as needed for congestion      Facility-Administered Medications Last Administration Doses Remaining   bupivacaine (PF) (MARCAINE) 0.25 % injection 1 mL 2023  2:00 PM    triamcinolone acetonide (KENALOG-40) 40 mg/mL injection 20 mg 2023  2:00 PM           Past Medical History:   Diagnosis Date   • Arthritis    • Cancer [facility])    • Colon polyp    • Diabetes mellitus ([address])    • Ear problems    • Essential hypertension 2020   • Glaucoma    • HL (hearing loss)    • Hyperlipidemia 2020   • Hypertension    • Kidney stone    • Melanoma of ear, 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[address])    • Nasal polyp    • Tinnitus        Past Surgical History:   Procedure Laterality Date   • ARTHROSCOPY KNEE Bilateral    • BASAL CELL CARCINOMA EXCISION  2020    left arm   • CYSTOSCOPY      kidney stone removal   • FOOT TENDON SURGERY Left 2021    Procedure: ANTERIOR TALOFIBULAR LIGAMENT REPAIR;  Surgeon: Rene Vazquez DPM;  Location: [facility];  Service: Podiatry   • FRACTURE SURGERY  2023   • KNEE SURGERY     • NASAL POLYP EXCISION     • IL COLONOSCOPY FLX DX W/COLLJ SPEC WHEN PFRMD N/A 2017    Procedure: COLONOSCOPY;  Surgeon: Bhavani Saravia MD;  Location: [facility]; Service: Colorectal   • IL RPR TDN FLXR FOOT / W/O FREE GRAFG EACH TENDON Left 2017    Procedure: ANKLE PERONEUS LONGUS TENDON REPAIR with allograft;  RETINACULUM REPAIR SECONDARY  Application of Provena Vac;  Surgeon: Rene Vazquez DPM;  Location: [facility];  Service: Podiatry   • ROTATOR CUFF REPAIR Bilateral    • SHOULDER ARTHROSCOPY     • WISDOM TOOTH EXTRACTION         Family History   Problem Relation Age of Onset   • Heart disease Mother         MOTHER   • Diabetes Mother    • Melanoma Mother    • Cancer Mother    • Aortic aneurysm Father         abdominal    • Prostate cancer Father    • Colon cancer Father      I have reviewed and agree with the history as documented.     E-Cigarette/Vaping   • E-Cigarette Use Never User      E-Cigarette/Vaping Substances   • Nicotine No    • THC No    • CBD No    • Flavoring No    • Other No    • Unknown No      Social History     Tobacco Use   • Smoking status: Former     Packs/day: 1.00     Years: 30.00     Total pack years: 30.00     Types: Cigarettes     Start date: 1974     Quit date: 2007     Years since quittin.2   • Smokeless tobacco: Current     Types: Chew   • Tobacco comments:     quit 15 years ago   Vaping Use   • Vaping Use: Never used   Substance Use Topics   • Alcohol use: Yes     Comment: Occaisonally one drink   • Drug use: No       Review of Systems   Constitutional: Negative for chills and fever. HENT: Negative for congestion, rhinorrhea and sore throat. Respiratory: Negative for cough, chest tightness and shortness of breath. Cardiovascular: Negative for chest pain and palpitations. Gastrointestinal: Positive for nausea. Negative for abdominal pain, diarrhea and vomiting. Neurological: Positive for speech difficulty, weakness and numbness. Negative for dizziness and light-headedness. All other systems reviewed and are negative. Physical Exam  Physical Exam  Vitals reviewed. Constitutional:       General: He is not in acute distress. Appearance: Normal appearance. He is normal weight. He is not ill-appearing, toxic-appearing or diaphoretic. HENT:      Head: Normocephalic and atraumatic. Right Ear: External ear normal.      Left Ear: External ear normal.   Eyes:      General: No visual field deficit or scleral icterus. Right eye: No discharge. Left eye: No discharge. Extraocular Movements: Extraocular movements intact. Conjunctiva/sclera: Conjunctivae normal.      Pupils: Pupils are equal, round, and reactive to light. Cardiovascular:      Rate and Rhythm: Normal rate and regular rhythm. Heart sounds: Normal heart sounds. No murmur heard. No friction rub. No gallop. Pulmonary:      Effort: Pulmonary effort is normal. No respiratory distress. Breath sounds: Normal breath sounds. No stridor. No wheezing, rhonchi or rales. Musculoskeletal:      Cervical back: Normal range of motion and neck supple. Skin:     General: Skin is warm and dry. Neurological:      Mental Status: He is alert and oriented to person, place, and time. GCS: GCS eye subscore is 4. GCS verbal subscore is 5. GCS motor subscore is 6. Cranial Nerves: Cranial nerves 2-12 are intact. No cranial nerve deficit, dysarthria or facial asymmetry. Sensory: Sensory deficit (decreased pinprick sensation to the L side) present. Motor: Motor function is intact. No weakness, tremor, seizure activity or pronator drift. Coordination: Coordination is intact.  Coordination normal. Finger-Nose-Finger Test and Heel to [facility] Test normal. Rapid alternating movements normal.   Psychiatric:         Mood and Affect: Mood normal.         Behavior: Behavior normal.         Vital Signs  ED Triage Vitals [09/15/23 1130]   Temperature Pulse Respirations Blood Pressure SpO2   98.2 °F (36.8 °C) 61 18 (!) 192/91 100 %      Temp Source Heart Rate Source Patient Position - Orthostatic VS BP Location FiO2 (%)   Oral Monitor Lying Right arm --      Pain Score       --           Vitals:    09/15/23 1445 09/15/23 1500 09/15/23 1515 09/15/23 1530   BP: 143/64 154/68 148/66 141/73   Pulse: 73 82 86 82   Patient Position - Orthostatic VS:  Lying Lying          Visual Acuity  Visual Acuity    Flowsheet Row Most Recent Value   L Pupil Size (mm) 3   R Pupil Size (mm) 3          ED Medications  Medications   dextrose 50 % IV solution 50 mL (50 mL Intravenous Given 9/15/23 1203)   iohexol (OMNIPAQUE) 350 MG/ML injection (MULTI-DOSE) 85 mL (85 mL Intravenous Given 9/15/23 1201)   dextrose 50 % IV solution 50 mL (50 mL Intravenous Given 9/15/23 1559)       Diagnostic Studies  Results Reviewed     Procedure Component Value Units Date/Time    CSF culture and Gram stain [[id number]] Collected: 09/15/23 1412    Lab Status: Preliminary result Specimen: Cerebrospinal Fluid from Lumbar Puncture Updated: 09/17/23 1207     CSF Culture No growth    STAT Gram Stain [[id number]] Collected: 09/15/23 1428    Lab Status: Final result Specimen: Cerebrospinal Fluid Updated: 09/15/23 1745     Gram Stain Result No bacteria seen      Rare Polys      No Mononuclear Cells    Meningitis/Encephalitis (ME) Panel [[id number]]  (Normal) Collected: 09/15/23 1412    Lab Status: Final result Specimen: Cerebrospinal Fluid from Lumbar Puncture Updated: 09/15/23 1722     C.NEOFORMANS/GATTII Not Detected     CYTOMEGALOVIRUS Not Detected     ENTEROVIRUS Not Detected     E.COLI K1 Not Detected     H.INFLUENZAE Not Detected     H.SIMPLEX 1 Not Detected     H.SIMPLEX 2 Not Detected     HERPES VIRUS 6 Not Detected     PARECHOVIRUS Not Detected     L. MONOCYTOGENES Not Detected     N.MENINGITIDIS Not Detected     S.AGALACTIAE Not Detected     S. PNEUMONIAE Not Detected     V.ZOSTER Not Detected    RBC count,CSF [[id number]]  (Normal) Collected: 09/15/23 1515    Lab Status: Final result Specimen: Cerebrospinal Fluid from Lumbar Puncture Updated: 09/15/23 1619     RBC, CSF 6 uL     Narrative:      Tube-4    RBC count,CSF [[id number]]  (Abnormal) Collected: 09/15/23 1412    Lab Status: Final result Specimen: Cerebrospinal Fluid from Lumbar Puncture Updated: 09/15/23 1604     RBC, CSF 32 uL     Narrative:      Tube-1    Fingerstick Glucose (POCT) [[id number]]  (Abnormal) Collected: 09/15/23 1543    Lab Status: Final result Updated: 09/15/23 1544     POC Glucose 64 mg/dl     HS Troponin I 2hr [[id number]]  (Normal) Collected: 09/15/23 1415    Lab Status: Final result Specimen: Blood from Arm, Left Updated: 09/15/23 1450     hs TnI 2hr 6 ng/L      Delta 2hr hsTnI 1 ng/L     CSF Total Protein [[id number]]  (Abnormal) Collected: 09/15/23 1412    Lab Status: Final result Specimen: Cerebrospinal Fluid from Lumbar Puncture Updated: 09/15/23 1436     Protein, CSF 62 mg/dL     CSF Glucose [[id number]]  (Normal) Collected: 09/15/23 1412    Lab Status: Final result Specimen: Cerebrospinal Fluid from Lumbar Puncture Updated: 09/15/23 1436     Glucose, CSF 54 mg/dL     FLU/RSV/COVID - if FLU/RSV clinically relevant [[id number]]  (Normal) Collected: 09/15/23 1236    Lab Status: Final result Specimen: Nares from Nose Updated: 09/15/23 1353     SARS-CoV-2 Negative     INFLUENZA A PCR Negative     INFLUENZA B PCR Negative     RSV PCR Negative    Narrative:      FOR PEDIATRIC PATIENTS - copy/paste COVID Guidelines URL to browser: [email or web address]    SARS-CoV-2 assay is a Nucleic Acid Amplification assay intended for the  qualitative detection of nucleic acid from SARS-CoV-2 in nasopharyngeal  swabs. Results are for the presumptive identification of SARS-CoV-2 RNA. Positive results are indicative of infection with SARS-CoV-2, the virus  causing COVID-19, but do not rule out bacterial infection or co-infection  with other viruses. Laboratories within the [facility] and its  territories are required to report all positive results to the appropriate  public health authorities. Negative results do not preclude SARS-CoV-2  infection and should not be used as the sole basis for treatment or other  patient management decisions. Negative results must be combined with  clinical observations, patient history, and epidemiological information. This test has not been FDA cleared or approved. This test has been authorized by FDA under an Emergency Use Authorization  (EUA).  This test is only authorized for the duration of time the  declaration that circumstances exist justifying the authorization of the  emergency use of an in vitro diagnostic tests for detection of SARS-CoV-2  virus and/or diagnosis of COVID-19 infection under section 564(b)(1) of  the Act, 21 U. S.C. 961XHV-2(W)(4), unless the authorization is terminated  or revoked sooner. The test has been validated but independent review by FDA  and CLIA is pending. Test performed using [employer]: This RT-PCR assay targets N2,  a region unique to SARS-CoV-2. A conserved region in the E-gene was chosen  for pan-Sarbecovirus detection which includes SARS-CoV-2. According to CMS-2020-01-R, this platform meets the definition of high-throughput technology.     Fingerstick Glucose (POCT) [[id number]]  (Normal) Collected: 09/15/23 1226    Lab Status: Final result Updated: 09/15/23 1228     POC Glucose 115 mg/dl     Basic metabolic panel [[id number]]  (Abnormal) Collected: 09/15/23 1149    Lab Status: Final result Specimen: Blood from Arm, Right Updated: 09/15/23 1223     Sodium 139 mmol/L      Potassium 4.0 mmol/L      Chloride 105 mmol/L      CO2 25 mmol/L      ANION GAP 9 mmol/L      BUN 22 mg/dL      Creatinine 1.11 mg/dL      Glucose 54 mg/dL      Calcium 9.2 mg/dL      eGFR 67 ml/min/1.73sq m     Narrative:      [place] guidelines for Chronic Kidney Disease (CKD):   •  Stage 1 with normal or high GFR (GFR > 90 mL/min/1.73 square meters)  •  Stage 2 Mild CKD (GFR = 60-89 mL/min/1.73 square meters)  •  Stage 3A Moderate CKD (GFR = 45-59 mL/min/1.73 square meters)  •  Stage 3B Moderate CKD (GFR = 30-44 mL/min/1.73 square meters)  •  Stage 4 Severe CKD (GFR = 15-29 mL/min/1.73 square meters)  •  Stage 5 End Stage CKD (GFR <15 mL/min/1.73 square meters)  Note: GFR calculation is accurate only with a steady state creatinine    HS Troponin 0hr (reflex protocol) [[id number]]  (Normal) Collected: 09/15/23 1149    Lab Status: Final result Specimen: Blood from Arm, Right Updated: 09/15/23 1221     hs TnI 0hr 5 ng/L     Protime-INR [[id number]]  (Normal) Collected: 09/15/23 1149    Lab Status: Final result Specimen: Blood from Arm, Right Updated: 09/15/23 1212     Protime 13.4 seconds INR 0.97    APTT [[id number]]  (Normal) Collected: 09/15/23 1149    Lab Status: Final result Specimen: Blood from Arm, Right Updated: 09/15/23 1212     PTT 27 seconds     CBC and Platelet [[id number]]  (Abnormal) Collected: 09/15/23 1149    Lab Status: Final result Specimen: Blood from Arm, Right Updated: 09/15/23 1157     WBC 11.93 Thousand/uL      RBC 4.20 Million/uL      Hemoglobin 12.9 g/dL      Hematocrit 39.4 %      MCV 94 fL      MCH 30.7 pg      MCHC 32.7 g/dL      RDW 13.2 %      Platelets 948 Thousands/uL      MPV 10.0 fL     Fingerstick Glucose (POCT) [[id number]]  (Abnormal) Collected: 09/15/23 1125    Lab Status: Final result Updated: 09/15/23 1129     POC Glucose 52 mg/dl                  CTA stroke alert (head/neck)   Final Result by Sonya Baca MD (09/15 1228)      1. Patent major vessels of the [place] of weeks without high-grade stenosis. No aneurysm. 2.  No hemodynamically significant stenosis or dissection of cervical carotid and vertebral arteries. 3.  Incidental 3 mm right upper lobe pulmonary nodule. Patient has history of basal cell carcinoma. Although lung metastasis is rare in basal cell carcinoma, recommend 3 months follow-up low-dose chest CT to ensure stability. This study was marked in [facility] for notification and follow-up. Findings were directly discussed with Barrera Membreno at 12:15 p.m. Workstation performed: [id number]         CT stroke alert brain   Final Result by Sonya Baca MD (09/15 1227)      No acute intracranial CT abnormality. Findings were directly discussed with Barrera Membreno at 12:15 p.m. Workstation performed: [id number]                    Procedures  Procedures         ED Course  ED Course as of 09/17/23 1512   Fri Sep 15, 2023   1229 Patient developed sudden onset thunderclap headache, will obtain lumbar puncture to assess for xanthochromia per neurology's recommendations.   Patient will require transfer to [facility] neuro critical care for further monitoring                  Stroke Assessment     Row Name 09/15/23 1144             NIH Stroke Scale    Interval --      Level of Consciousness (1a.) 0      LOC Questions (1b.) 0      LOC Commands (1c.) 0      Best Gaze (2.) 0      Visual (3.) 0      Facial Palsy (4.) 0      Motor Arm, Left (5a.) 0      Motor Arm, Right (5b.) 0      Motor Leg, Left (6a.) 0      Motor Leg, Right (6b.) 0      Limb Ataxia (7.) 0      Sensory (8.) 1      Best Language (9.) 0      Dysarthria (10.) 0      Extinction and Inattention (11.) (Formerly Neglect) 0      Total 1                                          Medical Decision Making  Patient presenting to the emergency department for evaluation of strokelike symptoms that started around 9 AM this morning. Upon arrival patient appears comfortable. He does not appear to be in any acute distress. Initial vital signs remarkable for hypertension. Remainder vital signs unremarkable. On physical examination patient has a GCS of 15. He has objective sensory deficits to the left side. No weakness or facial droop. No aphasia/dysarthria on my assessment. Given patient's persistent symptoms and onset of symptoms within 24 hours a stroke alert was initiated. CTs were unremarkable, however patient developed a severe headache while he was in the emergency department. He was evaluated by neurology who recommended lumbar puncture to further assess for subarachnoid hemorrhage. LP was performed by resident, please see separate procedure note. Patient was started on Cardene drip for blood pressure control. He was ultimately transferred to neuro critical care at our [facility] for an arteriogram and continued monitoring and management. He is in stable condition. Stroke-like symptoms: acute illness or injury  Thunderclap headache: acute illness or injury  Amount and/or Complexity of Data Reviewed  Labs: ordered.   Radiology: ordered. Risk  Prescription drug management. Disposition  Final diagnoses:   Stroke-like symptoms   Thunderclap headache     Time reflects when diagnosis was documented in both MDM as applicable and the Disposition within this note     Time User Action Codes Description Comment    9/15/2023 11:46 AM Inna Powell Add [R29.90] Stroke-like symptoms     9/15/2023  1:56 PM Inna Powell Add [G44.53] Thunderclap headache       ED Disposition     ED Disposition   Transfer to Another Facility-In Network    Condition   --    Date/Time   Fri Sep 15, 2023  1:56 PM    Comment   Darshan Marquez should be transferred out to hospitals.            MD Francy Fox Most Recent Value   Patient Condition The patient has been stabilized such that within reasonable medical probability, no material deterioration of the patient condition or the condition of the unborn child(vickey) is likely to result from the transfer   Reason for Transfer Level of Care needed not available at this facility   Benefits of Transfer Specialized equipment and/or services available at the receiving facility (Include comment)________________________  [facility]]   Risks of Transfer Potential for delay in receiving treatment, Potential deterioration of medical condition, Loss of IV, Increased discomfort during transfer, Possible worsening of condition or death during transfer   Accepting Physician Dr. Nidhi Mark Name, [facility]    (Name & Tel number) PACS   Transported by (Company and Unit #) [employer]   Provider Certification General risk, such as traffic hazards, adverse weather conditions, rough terrain or turbulence, possible failure of equipment (including vehicle or aircraft), or consequences of actions of persons outside the control of the transport personnel, Unanticipated needs of medical equipment and personnel during transport, Risk of worsening condition      RN Documentation    [address] Name, [address] [facility]    (Name & Tel number) PACS   Transported by (Company and Unit #) [employer]      Follow-up Information    None         Discharge Medication List as of 9/15/2023  4:06 PM      CONTINUE these medications which have NOT CHANGED    Details   aspirin (ECOTRIN LOW STRENGTH) 81 mg EC tablet Take 81 mg by mouth daily, Historical Med      atorvastatin (LIPITOR) 10 mg tablet TAKE 1 TABLET BY MOUTH EVERY DAY, Normal      BD Pen Needle Meghana 2nd Gen 32G X 4 MM MISC DAILY USE AS DIRECTED, Normal      Cholecalciferol 125 MCG (5000 UT) capsule Take 5,000 Units by mouth daily, Historical Med      !! Continuous Blood Gluc  (FreeStyle Kalpana 14 Day [place]) CAMRON Use 1 application 4 (four) times a day Patient to check his blood sugar 4 times a day, Starting Mon 2/20/2023, Normal      !! Continuous Blood Gluc  (FreeStyle [employer] [place] [place]) CAMRON Check blood sugars multiple times per day, Normal      !! Continuous Blood Gluc Sensor (FreeStyle Kalpana 14 Day Sensor) MISC USE 1 EVERY 14 DAYS, Normal      !!  Continuous Blood Gluc Sensor (FreeStyle Kalpana 2 Sensor) MISC Check blood sugars multiple times per day, Normal      Diclofenac Sodium (VOLTAREN) 1 % Apply 2 g topically 4 (four) times a day, Starting Fri 5/6/2022, Normal      fluticasone (FLONASE) 50 mcg/act nasal spray 1 spray into each nostril daily, Historical Med      gabapentin (NEURONTIN) 100 mg capsule TAKE 1 CAPSULE BY MOUTH TWICE A DAY, Starting Tue 8/1/2023, Normal      lisinopril (ZESTRIL) 10 mg tablet TAKE 1 TABLET BY MOUTH EVERY DAY, Normal      metFORMIN (GLUCOPHAGE-XR) 500 mg 24 hr tablet TAKE 2 TABLETS BY MOUTH TWICE A DAY, Normal      Milk Thistle 150 MG CAPS Take by mouth, Starting Thu 12/7/2017, Historical Med      Multiple Vitamins-Minerals (MULTIVITAMIN WITH MINERALS) tablet Take 1 tablet by mouth daily, Historical Med      sodium chloride (OCEAN) 0.65 % nasal spray 1 spray into each nostril as needed for congestion, Historical Med      Specialty Vitamins Products (MAGNESIUM, AMINO ACID CHELATE,) 133 MG tablet Take 1 tablet by mouth 2 (two) times a day, Historical Med      Vitamins-Lipotropics (B-100 PO) Take by mouth, Historical Med      latanoprost (XALATAN) 0.005 % ophthalmic solution PUT 1 DROP INTO BOTH EYES IN THE EVENING, Historical Med      Levemir FlexTouch 100 units/mL injection pen Inject 66 Units under the skin daily, Starting Fri 10/21/2022, Normal      Lumigan 0.01 % ophthalmic drops Starting Fri 6/2/2023, Historical Med      pioglitazone (ACTOS) 15 mg tablet TAKE 1 TABLET BY MOUTH EVERY DAY, Normal       !! - Potential duplicate medications found. Please discuss with provider. No discharge procedures on file.     PDMP Review     None          ED Provider  Electronically Signed by           Zachary Oviedo PA-C  09/17/23 1665

## 2023-09-15 NOTE — ED PROCEDURE NOTE
Procedure  Lumbar puncture    Date/Time: 9/15/2023 2:10 PM    Performed by: Evangelista Rosenthal DO  Authorized by: Evangelista Rosenthal DO  Universal Protocol:  Procedure performed by: (Dr. Anastasiya Minor)  Consent: Verbal consent obtained. Written consent obtained. Risks and benefits: risks, benefits and alternatives were discussed  Consent given by: patient  Time out: Immediately prior to procedure a "time out" was called to verify the correct patient, procedure, equipment, support staff and site/side marked as required. Patient understanding: patient states understanding of the procedure being performed  Patient consent: the patient's understanding of the procedure matches consent given  Patient identity confirmed: verbally with patient      Patient location:  ED  Pre-procedure details:     Preparation: Patient was prepped and draped in usual sterile fashion    Indications:     Indications: evaluation for subarachnoid hemorrhage and evaluation of opening pressure    Anesthesia (see MAR for exact dosages): Anesthesia method:  Local infiltration    Local anesthetic:  Bupivacaine 0.25% w/o epi  Procedure details:     Lumbar space:  L4-L5 interspace    Patient position:  Sitting    Equipment: Lumbar puncture kit used      Needle gauge:  20G x 3.5in    Needle type:  Spinal needle - Quincke tip    Ultrasound guidance: no      Number of attempts:  4    Fluid appearance:  Clear    Tubes of fluid:  4    Total volume (ml):  8  Post-procedure:     Puncture site:  Direct pressure applied    Patient tolerance of procedure: Tolerated well, no immediate complications    Patient instructed to lie flat for one(1) hour post lumbar puncture.: Yes    Comments:      Was unable to get sample lying lateral so was repositioned into sitting but was unable to get opening pressure.                      Evangelista Rosenthal DO  09/15/23 6000

## 2023-09-15 NOTE — CONSULTS
Stroke Alert- Neurology Consult     Name: Erin Cantu   Age & Sex: 70 y.o. male   MRN: [id number]  Unit/Bed#: ED-30   Encounter: [id number]  Length of Stay: 0    Assessment plan: Thunderclap headache  Assessment & Plan  · 70 y.o. R handed male w/ hx of HTN, dyslipidemia, T2DM, current smoker, chronic hearing loss, melanoma (ear s/p excision) who presented as a stroke alert on 09/15/23 at 11:46AM.  LKW 2.5hrs ago. Initial presenting deficits: thunderclap headache, L sided sensory loss. On ASA 81mg QD (last dose this morning). Family hx significant for aneurysmal SAH in father, sister (passed away from it). · Per EMS/ED, initial Blood Pressure: (!) 192/91 and persistently 189/81 afterwards. Pulse 61. Glucose 115. NIHSS 1 (L sided sensory loss). · CTH wo contrast unremarkable particularly for [facility] or IVH, or any other acute ischemic changes. · CTA h/n unremarkable for LVO, dissection, aneurysms. · Not a TNK candidate due to: concern for [facility]. · On exam, patient does complain of some neck stiffness and nausea. However, no light sensitivity, no anisocoria, no cranial nerve deficits, no focal motor or sensory deficits noted. · Hunt & Vanegas Scale: 1 (mild headache, slight nuchal rigidity)     Sudden onset holocephalic headache with a crescendo pattern w/ L sided sensory loss w/ SBP 180s-190s in a patient w/ strong family hx of aneurysmal bleeds concerning for [facility] vs [facility].  Even in the case of CTH and LP being negative, there would remain concern for a sentinel headache. complicated migraine also on the differential, but in this case will most certainly be a diagnosis of exclusion.     Plan, stroke pathway:  · Discussed w/ Dr. Bob Davey  · LP to be performed by ED team at [facility]  · -160, MAP > 65; Cardene drip   · Patient to be transferred to [facility] for arteriogram and NCC  · Will hold off on empiric seizure prophylaxis  · STAT CTH if changes in exam/decline in GCS > 2 pts/1hr  · Consider MRI brain, C-spine w/ and w/out contrast to assess for vascular malformations  · DVT ppx: SCDs only  · Neuro checks [facility]          Recommendations for outpatient neurological follow up have yet to be determined. Pending for discharge: Stroke work-up    Subjective:   Reason for Consult / Principal Problem:  stroke alert  Stroke alert called: 11:46 AM 09/15/23  Neurology stroke alert response: Immediate  Hx and PE limited by:  None   Time last known well: 2.5 hrs ago   Review of previous medical records was completed as available. HPI: [place] is a 70 y.o. right handed male w/ hx of HTN, dyslipidemia, T2DM, current smoker, chronic hearing loss, melanoma (ear s/p excision) who presented as a stroke alert on 09/15/23 at 11:46AM.  LKW 2.5hrs ago. Initial presenting deficits: thunderclap headache, L sided sensory loss. On ASA 81mg QD (last dose this morning). Family hx significant for aneurysmal SAH in father, sister (passed away from it). Patient reported being in normal state of health by wife until earlier this morning. When he awoke this morning, patient reported feeling "off" and reported that he was nauseous. Checked blood sugar at home and it was 42. Drank some juice and began to feel better. Went to see an optometrist later on and was completely asymptomatic and 100% back to baseline at this time around 9 AM.  Approximately 2.5 hours prior to presentation, began to experience L sided numbness, prompting ED visit. While in the ED, patient had a sudden onset holocephalic headache that peaked in intensity from 0-8 within a span of 1 to 2 minutes. Associated with L sided numbness, but no visual loss or diplopia. No antecedent trauma, infections, med changes reported. Did take his ASA this morning. Per EMS/ED, initial Blood Pressure: (!) 192/91 and persistently 189/81 afterwards. Pulse 61. Glucose 115. NIHSS 1 (L sided sensory loss).    CTH wo contrast unremarkable particularly for [facility] or [facility], or any other acute ischemic changes. CTA h/n unremarkable for LVO, dissection, aneurysms. .    Not a TNK candidate due to: concern for [facility] versus [facility].    Neurology consulted for workup, management of stroke. Wife is present at bedside who assisted with history. Inpatient consult to Neurology  Consult performed by: Gilford Beckmann, MD  Consult ordered by: James Montenegro PA-C          Historical Information   Past Medical History:   Diagnosis Date   • Arthritis    • Cancer [facility])    • Colon polyp    • Diabetes mellitus ([address])    • Ear problems    • Essential hypertension 02/05/2020   • Glaucoma    • HL (hearing loss)    • Hyperlipidemia 02/05/2020   • Hypertension    • Kidney stone    • Melanoma of ear, left [facility])    • Nasal polyp    • Tinnitus      Past Surgical History:   Procedure Laterality Date   • ARTHROSCOPY KNEE Bilateral    • BASAL CELL CARCINOMA EXCISION  03/2020    left arm   • CYSTOSCOPY      kidney stone removal   • FOOT TENDON SURGERY Left 06/22/2021    Procedure: ANTERIOR TALOFIBULAR LIGAMENT REPAIR;  Surgeon: Snow Anderson DPM;  Location: [facility];  Service: Podiatry   • FRACTURE SURGERY  March 2023   • KNEE SURGERY     • NASAL POLYP EXCISION     • ID COLONOSCOPY FLX DX W/COLLJ SPEC WHEN PFRMD N/A 08/08/2017    Procedure: COLONOSCOPY;  Surgeon: Therese Chong MD;  Location: [facility];   Service: Colorectal   • ID RPR TDN FLXR FOOT 1/2 W/O FREE GRAFG EACH TENDON Left 12/08/2017    Procedure: ANKLE PERONEUS LONGUS TENDON REPAIR with allograft;  RETINACULUM REPAIR SECONDARY  Application of Provena Vac;  Surgeon: Snow Anderson DPM;  Location: [facility];  Service: Podiatry   • ROTATOR CUFF REPAIR Bilateral    • SHOULDER ARTHROSCOPY     • WISDOM TOOTH EXTRACTION       Social History   Social History     Substance and Sexual Activity   Alcohol Use Yes    Comment: Occaisonally one drink     Social History     Substance and Sexual Activity   Drug Use No     E-Cigarette/Vaping   • E-Cigarette Use Never User E-Cigarette/Vaping Substances   • Nicotine No    • THC No    • CBD No    • Flavoring No    • Other No    • Unknown No      Social History     Tobacco Use   Smoking Status Former   • Packs/day: 1.00   • Years: 30.00   • Total pack years: 30.00   • Types: Cigarettes   • Start date: 1974   • Quit date: 2007   • Years since quittin.2   Smokeless Tobacco Current   • Types: Chew   Tobacco Comments    quit 15 years ago     Family History:   Family History   Problem Relation Age of Onset   • Heart disease Mother         MOTHER   • Diabetes Mother    • Melanoma Mother    • Cancer Mother    • Aortic aneurysm Father         abdominal    • Prostate cancer Father    • Colon cancer Father      Meds/Allergies   all current active meds have been reviewed  No Known Allergies      Review of Systems see HPI     Objective:     Patient ID: Erin Cantu is a 70 y.o. male. Vitals:   Vitals:    09/15/23 1245 09/15/23 1300 09/15/23 1315 09/15/23 1330   BP: (!) 188/66 (!) 212/98 156/76 144/65   BP Location:       Pulse: 65 91 68 72   Resp: 18 18 18 18   Temp:       TempSrc:       SpO2: 99% 99% 99% 98%   Weight:          Body mass index is 27.84 kg/m². No intake or output data in the 24 hours ending 09/15/23 1347    Temperature:   Temp (24hrs), Av.2 °F (36.8 °C), Min:98.2 °F (36.8 °C), Max:98.2 °F (36.8 °C)    Temperature: 98.2 °F (36.8 °C)    Invasive Devices: Invasive Devices     Peripheral Intravenous Line  Duration           Peripheral IV 09/15/23 Left Antecubital <1 day    Peripheral IV 09/15/23 Right Antecubital <1 day                Physical Exam:   Vitals reviewed, persistently hypertensive. General Examination: Cooperative. CVS: S1, S2 noted. Regular rate, rhythm. Pulm: Normal effort. HEENT:  Does have some neck stiffness and nausea. No photophobia or light sensitivity. AAOx3. Speech fluent without errors. Normal naming and repetition (including low frequency objects- hammock).  Follows cross-body commands. Pupils equal, 3mm-->2mm (bright light). VFF. EOMI. No nystagmus. Face symmetric. No dysarthria. Uvula midline. Tongue midline. Strength full t/o. No drift or orbiting. No abnormal movements. Symmetric LT sensation t/o. No extinction to DSS. Reflexes: Absent Rosario's b/l. No clonus. Intact FNF b/l and heel to shin b/l. No truncal ataxia. NIH Stroke Scale    1a. Level of Consciousness 0- ALERT       1b. LOC Questions: Month, Age 0- Answers both month, age correctly       1c. LOC Commands 0- Performs both tasks       2. Best Gaze: Horizontal eye movements 0- Normal      3. Visual  0 - No visual loss       4. Facial Palsy 0- Normal, symmetric movements     5. Motor Arm RIGHT ARM  0- No drift - holds 90 or 45deg for full 10s    LEFT ARM  0- No drift - holds 90 or 45deg for full 10s     6. Motor Leg RIGHT LEG  0- No drift, holds 30deg for 5s    LEFT LEG  0- No drift, holds 30deg for 5s     7. Limb Ataxia  0- Absent      8. Sensory   1- Mild-moderate (patient feels pinprick is less sharp/dull on affected LEFT side, but aware of being touched)     9. Best Language  0- No aphasia; normal.    10. Dysarthria 0- Normal.      11. Extinction and Inattention (formerly Neglect) 0- No abnormality. TOTAL SCORE: 1     Time NIHSS was completed: 11:55AM    Modified Lashawn Score:  0 (No baseline symptoms/disability)    Labs: I have personally reviewed pertinent reports.     Results from last 7 days   Lab Units 09/15/23  1149   WBC Thousand/uL 11.93*   HEMOGLOBIN g/dL 12.9   HEMATOCRIT % 39.4   PLATELETS Thousands/uL 335      Results from last 7 days   Lab Units 09/15/23  1149   POTASSIUM mmol/L 4.0   CHLORIDE mmol/L 105   CO2 mmol/L 25   BUN mg/dL 22   CREATININE mg/dL 1.11   CALCIUM mg/dL 9.2              Results from last 7 days   Lab Units 09/15/23  1149   INR  0.97   PTT seconds 27               Imaging and diagnostic studies:    Radiology Results: I have personally reviewed pertinent reports. and I have personally reviewed pertinent films in PACS  CTA stroke alert (head/neck)   Final Result by Christo Cain MD (09/15 1228)      1. Patent major vessels of the [place] of weeks without high-grade stenosis. No aneurysm. 2.  No hemodynamically significant stenosis or dissection of cervical carotid and vertebral arteries. 3.  Incidental 3 mm right upper lobe pulmonary nodule. Patient has history of basal cell carcinoma. Although lung metastasis is rare in basal cell carcinoma, recommend 3 months follow-up low-dose chest CT to ensure stability. This study was marked in [facility] for notification and follow-up. Findings were directly discussed with Sybil Caballero at 12:15 p.m. Workstation performed: [id number]         CT stroke alert brain   Final Result by Christo Cain MD (09/15 1227)      No acute intracranial CT abnormality. Findings were directly discussed with Sybil Caballreo at 12:15 p.m. Workstation performed: [id number]             Other Diagnostic Testing: I have personally reviewed pertinent reports. Active medications:  Current Facility-Administered Medications   Medication Dose Route Frequency   • bupivacaine (PF) (MARCAINE) 0.25 % injection 1 mL  1 mL Intra-articular    • niCARdipine (CARDENE) 25 mg (STANDARD CONCENTRATION) in sodium chloride 0.9% 250 mL  1-15 mg/hr Intravenous Titrated   • triamcinolone acetonide (KENALOG-40) 40 mg/mL injection 20 mg  20 mg Intra-articular        Prior to Admission medications    Medication Sig Start Date End Date Taking?  Authorizing Provider   aspirin (ECOTRIN LOW STRENGTH) 81 mg EC tablet Take 81 mg by mouth daily    Historical Provider, MD   atorvastatin (LIPITOR) 10 mg tablet TAKE 1 TABLET BY MOUTH EVERY DAY 5/23/23   Yoni Lala PA-C   BD Pen Needle Meghana 2nd Gen 32G X 4 MM MISC DAILY USE AS DIRECTED 6/29/23   Yoni Lala PA-C   Cholecalciferol 125 MCG (5000 UT) capsule Take 5,000 Units by mouth daily    Historical Provider, MD   Continuous Blood Gluc  (FreeStyle [place] 14 Day [place]) CAMRON Use 1 application 4 (four) times a day Patient to check his blood sugar 4 times a day 2/20/23   Gris Harris PA-C   Continuous Blood Gluc  (FreeStyle [place] 2 [place]) Jeannette Cons Check blood sugars multiple times per day 8/17/23   Gris Harris PA-C   Continuous Blood Gluc Sensor (FreeStyle [place] 14 Day Sensor) MISC USE 1 EVERY 14 DAYS 8/16/23   Gris Harris PA-C   Continuous Blood Gluc Sensor (FreeStyle [place] 2 Sensor) MISC Check blood sugars multiple times per day 8/17/23   Gris Harris PA-C   Diclofenac Sodium (VOLTAREN) 1 % Apply 2 g topically 4 (four) times a day 5/6/22   Anjelica Thurston,    fluticasone (FLONASE) 50 mcg/act nasal spray 1 spray into each nostril daily    Historical Provider, MD   gabapentin (NEURONTIN) 100 mg capsule TAKE 1 CAPSULE BY MOUTH TWICE A DAY 8/1/23   Gris Harris PA-C   latanoprost (XALATAN) 0.005 % ophthalmic solution PUT 1 DROP INTO BOTH EYES IN THE EVENING 11/18/19   Historical Provider, MD   Levemir FlexTouch 100 units/mL injection pen Inject 66 Units under the skin daily 10/21/22   Gris Harris PA-C   lisinopril (ZESTRIL) 10 mg tablet TAKE 1 TABLET BY MOUTH EVERY DAY 5/23/23   Gris Harris PA-C   Lumigan 0.01 % ophthalmic drops  6/2/23   Historical Provider, MD   metFORMIN (GLUCOPHAGE-XR) 500 mg 24 hr tablet TAKE 2 TABLETS BY MOUTH TWICE A DAY 7/5/23   Gris Harris PA-C   Milk Thistle 150 MG CAPS Take by mouth 12/7/17   Historical Provider, MD   Multiple Vitamins-Minerals (MULTIVITAMIN WITH MINERALS) tablet Take 1 tablet by mouth daily    Historical Provider, MD   pioglitazone (ACTOS) 15 mg tablet TAKE 1 TABLET BY MOUTH EVERY DAY 4/24/23   Gris Harris PA-C   sodium chloride (OCEAN) 0.65 % nasal spray 1 spray into each nostril as needed for congestion    Historical Provider, MD   Specialty Vitamins Products (MAGNESIUM, AMINO ACID CHELATE,) 133 MG tablet Take 1 tablet by mouth 2 (two) times a day    Historical Provider, MD   Vitamins-Lipotropics (B-100 PO) Take by mouth    Historical Provider, MD         Code status and advanced directives:  Code Status: No Order      VTE Pharmacologic Prophylaxis: Pharmacologic VTE Prophylaxis contraindicated due to concern for [facility]  VTE Mechanical Prophylaxis: sequential compression device    ==  BRIAN Quinonez  PGY-3, Neurology

## 2023-09-15 NOTE — H&P
[facility]  H&P: Critical Care  Name: Robert Amato 70 y.o. male I MRN: [id number]  [facility] I Date of Admission: (Not on file)   Date of Service: 9/15/2023 I Hospital Day: 0      Assessment/Plan   Neuro:   · Diagnosis: Stroke-like symptoms  · Presented with left sided weakness and developed thunderclap headache in ED  · With significant family history of aneurysmal SAH  · CTH and CTA H/N negative on admission, however concern still high given symptoms and timeframe  · CSF RBC decreased appropriately; reduces suspicion for [facility]  · Transfer to [facility] for arteriogram and possible neurosurgery evaluation pending results  · Neurology consulted, appreciate recommendations  · SBP goal <160; cardene gtt titrated to achieve this  · STAT CTH for changes in neuro exam/decline in GCS by >2 points   · Consider MRI brain & c-spine to assess for vascular malformations  · Avoid AC/AP at this time  · No pharmacologic DVT ppx  · Q1h neuro checks      CV:   · Diagnosis: Hypertension  · Patient significantly hypertensive on admission  · Home antihypertensive regimen includes: lisinopril 10mg  · Continue nicardipine gtt titrated to goal SBP <160  · Hold off on home antihypertensives for now      Pulm:  · Diagnosis: no acute issues  · Stable on RA at this time  · Monitor O2 requirement      GI:   · Diagnosis: no acute issues  · Monitor BM frequency  · No bowel regimen at this time      :   · Diagnosis: no acute issues  · Kidney function at baseline  · Daily BMP  · Avoid nephrotoxic agents  · Monitor UO closely      F/E/N:    · F  · No fluids  · E  · Replete to keep K>4, P>3, Mg>2  · N  · Diabetic diet      Heme/Onc:   · Diagnosis: no acute issues  · Holding pharmacologic DVT ppx for now  · SCDs  · Can initiate lovenox for DVT ppx when able      Endo:   · Diagnosis: Type 2 Diabetes  · Home regimen includes Levemir 66U daily, metformin 1000mg BID, pioglitazone 15mg daily  · Continue home insulin  · SSI  · Monitor BG with meals, inpatient goal 140-180    ID:   · Diagnosis: no acute issues  · No concern for active ongoing infection  · Trend fever curve  · Daily CBC as needed  · Follow LP culture/gram stain      MSK/Skin:   · Diagnosis: no acute issues  · OOB with assistance  · PT/OT eval  · Encourage daily ambulation      Disposition: Stepdown Level 1       History of Present Illness     HPI: Sarah Rodriguez is a 70 y.o. M with PMH of HTN, HLD, DM2, Gilbert syndrome, melanoma of the ear s/p excision, chronic hearing loss who [facility] complaining of acute thunderclap headache and left sided sensory loss. Last known normal was about 2.5 hours prior to initial presentation. Vitals in the ED noted significant hypertension with SBP >190. Stroke alert was called in the ED given presenting symptoms and family history of aneurysmal SAH in father, for which CT head and CTA head/neck were negative for acute abnormalities/high grade stenosis/aneurysms. However, due to family history and presentation with thunderclap headache in setting of significant hypertension, there is still a concern for [facility] vs [facility]. LP performed in [facility] ED prior to transfer, and patient is being transferred to [facility] for arteriogram (& possible subsequent neurosurgery evaluation if needed). History obtained from the patient and chart review.     Review of Systems   Historical Information   Past Medical History:  No date: Arthritis  No date: Cancer [facility])  No date: Colon polyp  No date: Diabetes mellitus ([address])  No date: Ear problems  02/05/2020: Essential hypertension  No date: Glaucoma  No date: HL (hearing loss)  02/05/2020: Hyperlipidemia  No date: Hypertension  No date: Kidney stone  No date: Melanoma of ear, left ([address])  No date: Nasal polyp  No date: Tinnitus Past Surgical History:  No date: ARTHROSCOPY KNEE; Bilateral  03/2020: BASAL CELL CARCINOMA EXCISION      Comment:  left arm  No date: CYSTOSCOPY      Comment: kidney stone removal  06/22/2021: FOOT TENDON SURGERY; Left      Comment:  Procedure: ANTERIOR TALOFIBULAR LIGAMENT REPAIR;                 Surgeon: Parvin Scruggs DPM;  Location: [facility];                 Service: Podiatry  March 2023: FRACTURE SURGERY  No date: KNEE SURGERY  No date: NASAL POLYP EXCISION  08/08/2017: OK COLONOSCOPY FLX DX W/COLLJ SPEC WHEN PFRMD; N/A      Comment:  Procedure: COLONOSCOPY;  Surgeon: Balaji Bejarano MD;                 Location: [facility]; Service: Colorectal  12/08/2017: OK RPR TDN FLXR FOOT 1/2 W/O FREE GRAFG EACH TENDON; Left      Comment:  Procedure: ANKLE PERONEUS LONGUS TENDON REPAIR with                allograft;  RETINACULUM REPAIR SECONDARY  Application of                Provena Vac;  Surgeon: Parvin Scruggs DPM;  Location: [facility]               Main OR;  Service: Podiatry  No date: ROTATOR CUFF REPAIR; Bilateral  No date: SHOULDER ARTHROSCOPY  No date: WISDOM TOOTH EXTRACTION   Current Outpatient Medications   Medication Instructions   • aspirin (ECOTRIN LOW STRENGTH) 81 mg, Oral, Daily   • atorvastatin (LIPITOR) 10 mg tablet TAKE 1 TABLET BY MOUTH EVERY DAY   • BD Pen Needle Meghana 2nd Gen 32G X 4 MM MISC DAILY USE AS DIRECTED   • Cholecalciferol 5,000 Units, Oral, Daily   • Continuous Blood Gluc  (FreeStyle Kalpana 14 Day Kingsley) CAMRON 1 application. , Does not apply, 4 times daily, Patient to check his blood sugar 4 times a day   • Continuous Blood Gluc  (FreeStyle [place] 2 Kingsley) CAMRON Check blood sugars multiple times per day   • Continuous Blood Gluc Sensor (FreeStyle Kalpana 14 Day Sensor) MISC USE 1 EVERY 14 DAYS   • Continuous Blood Gluc Sensor (FreeStyle Kalpana 2 Sensor) MISC Check blood sugars multiple times per day   • Diclofenac Sodium (VOLTAREN) 2 g, Topical, 4 times daily   • fluticasone (FLONASE) 50 mcg/act nasal spray 1 spray, Nasal, Daily   • gabapentin (NEURONTIN) 100 mg, Oral, 2 times daily   • latanoprost (XALATAN) 0.005 % ophthalmic solution PUT 1 DROP INTO BOTH EYES IN THE EVENING   • Levemir FlexTouch 66 Units, Subcutaneous, Daily   • lisinopril (ZESTRIL) 10 mg tablet TAKE 1 TABLET BY MOUTH EVERY DAY   • Lumigan 0.01 % ophthalmic drops No dose, route, or frequency recorded. • metFORMIN (GLUCOPHAGE-XR) 500 mg 24 hr tablet TAKE 2 TABLETS BY MOUTH TWICE A DAY   • Milk Thistle 150 MG CAPS Oral   • Multiple Vitamins-Minerals (MULTIVITAMIN WITH MINERALS) tablet 1 tablet, Oral, Daily   • pioglitazone (ACTOS) 15 mg tablet TAKE 1 TABLET BY MOUTH EVERY DAY   • sodium chloride (OCEAN) 0.65 % nasal spray 1 spray, Nasal, As needed   • Specialty Vitamins Products (MAGNESIUM, AMINO ACID CHELATE,) 133 MG tablet 1 tablet, Oral, 2 times daily   • Vitamins-Lipotropics (B-100 PO) Oral    No Known Allergies   Social History     Tobacco Use   • Smoking status: Former     Packs/day: 1.00     Years: 30.00     Total pack years: 30.00     Types: Cigarettes     Start date: 1974     Quit date: 2007     Years since quittin.2   • Smokeless tobacco: Current     Types: Chew   • Tobacco comments:     quit 15 years ago   Vaping Use   • Vaping Use: Never used   Substance Use Topics   • Alcohol use: Yes     Comment: Occaisonally one drink   • Drug use: No    Family History   Problem Relation Age of Onset   • Heart disease Mother         MOTHER   • Diabetes Mother    • Melanoma Mother    • Cancer Mother    • Aortic aneurysm Father         abdominal    • Prostate cancer Father    • Colon cancer Father           Objective                            Vitals I/O      Most Recent Min/Max in 24hrs   Temp   Temp  Min: 98.2 °F (36.8 °C)  Max: 98.2 °F (36.8 °C)   Pulse   Pulse  Min: 61  Max: 91   Resp   Resp  Min: 18  Max: 18   BP   BP  Min: 126/58  Max: 212/98   O2 Sat   SpO2  Min: 96 %  Max: 100 %    No intake or output data in the 24 hours ending 09/15/23 1514      No diet orders on file     Invasive Monitoring Physical exam    Physical Exam  Vitals and nursing note reviewed. Constitutional:       General: He is not in acute distress. Appearance: He is well-developed. HENT:      Head: Normocephalic and atraumatic. Eyes:      Conjunctiva/sclera: Conjunctivae normal.   Cardiovascular:      Rate and Rhythm: Normal rate and regular rhythm. Heart sounds: S1 normal and S2 normal. No murmur heard. Pulmonary:      Effort: Pulmonary effort is normal. No respiratory distress. Breath sounds: Normal breath sounds. Abdominal:      General: Bowel sounds are normal.      Palpations: Abdomen is soft. Tenderness: There is no abdominal tenderness. Musculoskeletal:         General: No swelling. Cervical back: Neck supple. Skin:     General: Skin is warm and dry. Capillary Refill: Capillary refill takes less than 2 seconds. Neurological:      Mental Status: He is alert and oriented to person, place, and time. Cranial Nerves: Cranial nerves 2-12 are intact. Motor: Motor function is intact. Comments: LUE and LLE numbness   Psychiatric:         Mood and Affect: Mood normal.            Diagnostic Studies      EKG: NSR  Imaging:  I have personally reviewed pertinent reports.        Medications:  Scheduled PRN   bupivacaine (PF), 1 mL,   triamcinolone acetonide, 20 mg,       bupivacaine (PF), 1 mL,   triamcinolone acetonide, 20 mg,        Continuous          Labs:    CBC    Recent Labs     09/15/23  1149   WBC 11.93*   HGB 12.9   HCT 39.4        BMP    Recent Labs     09/15/23  1149   SODIUM 139   K 4.0      CO2 25   AGAP 9   BUN 22   CREATININE 1.11   CALCIUM 9.2       Coags    Recent Labs     09/15/23  1149   INR 0.97   PTT 27        Additional Electrolytes  No recent results       Blood Gas    No recent results  No recent results LFTs  No recent results    Infectious  No recent results  Glucose  Recent Labs     09/15/23  1149   GLUC 54*             Critical Care Time Delivered: Upon my evaluation, this patient had a high probability of imminent or life-threatening deterioration due to rule out SAH, which required my direct attention, intervention, and personal management. I have personally provided 60 minutes of critical care time, exclusive of procedures, teaching, family meetings, and any prior time recorded by providers other than myself.      Sarah Ponce, DO

## 2023-09-15 NOTE — ED NOTES
Transfer Information:   Ambulance Squad: [facility] Time: 1600   Destination:    Accepting Physician: Dr. Per Mccarthy   Report Number: Axel España RN  09/15/23 [id number]

## 2023-09-15 NOTE — ED ATTENDING ATTESTATION
9/15/2023  I, Angel Lees MD, saw and evaluated the patient. I have discussed the patient with the resident/non-physician practitioner and agree with the resident's/non-physician practitioner's findings, Plan of Care, and MDM as documented in the resident's/non-physician practitioner's note, except where noted. All available labs and Radiology studies were reviewed. I was present for key portions of any procedure(s) performed by the resident/non-physician practitioner and I was immediately available to provide assistance. At this point I agree with the current assessment done in the Emergency Department. I have conducted an independent evaluation of this patient a history and physical is as follows:    History 78-year-old male with history of hypertension, hyperlipidemia, insulin-dependent diabetes, presenting for evaluation of strokelike symptoms. About 2 hours prior to arrival the patient developed tingling and numbness to the left hemibody as well as weakness in the left arm and left leg. He arrives as a stroke alert. He was hypoglycemic to 42 prehospital so was given dextrose on arrival with improvement in his symptoms. Patient subsequently reported a sudden onset right frontal headache earlier this morning that had resolved by the time of arrival to the emergency department but recurred when he was being examined by neurology. Of note, patient has 2 family members who  from aneurysmal bleeds. Stroke alert was activated at the time of arrival to the emergency department. Stroke alert imaging negative. By the time of my evaluation of the patient, his NIH stroke scale is 0. Patient was evaluated by neurology at bedside.   They are recommending LP to rule out radiographically occult bleed and transfer to [address] [place] [address] for angiogram and admission to neuro critical care due to possible concern for [facility].    Started on Cardene drip per neurology recommendations with goal blood pressure of 653 systolic. LP was performed by resident physician with myself at bedside. Initially unsuccessful in the left lateral decubitus position. I was able to obtain CSF with the patient sitting up but was unable to obtain opening pressure. CSF is clear in appearance, will send for fluid studies. Patient transferred to [address] [place] [address] in stable condition. Physical Exam  Vitals reviewed. Constitutional:       General: He is not in acute distress. Appearance: Normal appearance. He is not ill-appearing or toxic-appearing. HENT:      Head: Normocephalic and atraumatic. Right Ear: External ear normal.      Left Ear: External ear normal.      Nose: Nose normal.   Eyes:      Conjunctiva/sclera: Conjunctivae normal.   Cardiovascular:      Rate and Rhythm: Normal rate. Pulmonary:      Effort: Pulmonary effort is normal. No respiratory distress. Abdominal:      General: There is no distension. Musculoskeletal:         General: No deformity. Normal range of motion. Cervical back: Normal range of motion. Skin:     General: Skin is warm and dry. Neurological:      General: No focal deficit present. Mental Status: He is alert and oriented to person, place, and time. Comments: Face symmetric, tongue midline. Clear speech. Normal strength in all 4 extremities. No truncal ataxia while sitting up in bed.    Psychiatric:         Mood and Affect: Mood normal.           ED Course         Critical Care Time  CriticalCare Time    Date/Time: 9/15/2023 3:31 PM    Performed by: Heath Sicard, MD  Authorized by: Heath Sicard, MD    Critical care provider statement:     Critical care time (minutes):  30    Critical care time was exclusive of:  Separately billable procedures and treating other patients and teaching time    Critical care was necessary to treat or prevent imminent or life-threatening deterioration of the following conditions:  CNS failure or compromise Critical care was time spent personally by me on the following activities:  Obtaining history from patient or surrogate, development of treatment plan with patient or surrogate, discussions with consultants, evaluation of patient's response to treatment, examination of patient, review of old charts, re-evaluation of patient's condition, ordering and review of radiographic studies, ordering and review of laboratory studies and ordering and performing treatments and interventions    I assumed direction of critical care for this patient from another provider in my specialty: no

## 2023-09-15 NOTE — ED NOTES
Report called to receiving RN at [facility].      Blanca Shelley, OLU  09/15/23 [id number]

## 2023-09-15 NOTE — ASSESSMENT & PLAN NOTE
· 70 y.o. R handed male w/ hx of HTN, dyslipidemia, T2DM, current smoker, chronic hearing loss, melanoma (ear s/p excision) who presented as a stroke alert on 09/15/23 at 11:46AM.  LKW 2.5hrs ago. Initial presenting deficits: thunderclap headache, L sided sensory loss. On ASA 81mg QD (last dose this morning). Family hx significant for aneurysmal SAH in father, sister (passed away from it). · Per EMS/ED, initial Blood Pressure: (!) 192/91 and persistently 189/81 afterwards. Pulse 61. Glucose 115. NIHSS 1 (L sided sensory loss). · CTH wo contrast unremarkable particularly for [facility] or IVH, or any other acute ischemic changes. · CTA h/n unremarkable for LVO, dissection, aneurysms. · Not a TNK candidate due to: concern for [facility]. · On exam, patient does complain of some neck stiffness and nausea. However, no light sensitivity, no anisocoria, no cranial nerve deficits, no focal motor or sensory deficits noted. · Hunt & Vanegas Scale: 1 (mild headache, slight nuchal rigidity)     Sudden onset holocephalic headache with a crescendo pattern w/ L sided sensory loss w/ SBP 180s-190s in a patient w/ strong family hx of aneurysmal bleeds concerning for [facility] vs [facility].  Even in the case of CTH and LP being negative, there would remain concern for a sentinel headache. complicated migraine also on the differential, but in this case will most certainly be a diagnosis of exclusion.     Plan, stroke pathway:  · Discussed w/ Dr. David Garza  · LP to be performed by ED team at [facility]  · -160, MAP > 65; Cardene drip   · Patient to be transferred to [facility] for arteriogram and NCC  · Will hold off on empiric seizure prophylaxis  · STAT CTH if changes in exam/decline in GCS > 2 pts/1hr  · Consider MRI brain, C-spine w/ and w/out contrast to assess for vascular malformations  · DVT ppx: SCDs only  · Neuro checks [facility]

## 2023-09-16 ENCOUNTER — APPOINTMENT (OUTPATIENT)
Dept: RADIOLOGY | Facility: HOSPITAL | Age: 68
End: 2023-09-16
Payer: MEDICARE

## 2023-09-16 ENCOUNTER — APPOINTMENT (INPATIENT)
Dept: NEUROLOGY | Facility: CLINIC | Age: 68
End: 2023-09-16
Payer: MEDICARE

## 2023-09-16 LAB
ALBUMIN SERPL BCP-MCNC: 3.7 G/DL (ref 3.5–5)
ALP SERPL-CCNC: 44 U/L (ref 34–104)
ALT SERPL W P-5'-P-CCNC: 16 U/L (ref 7–52)
ANION GAP SERPL CALCULATED.3IONS-SCNC: 8 MMOL/L
AST SERPL W P-5'-P-CCNC: 18 U/L (ref 13–39)
BASOPHILS # BLD AUTO: 0.04 THOUSANDS/ÂΜL (ref 0–0.1)
BASOPHILS NFR BLD AUTO: 1 % (ref 0–1)
BILIRUB SERPL-MCNC: 1.68 MG/DL (ref 0.2–1)
BUN SERPL-MCNC: 17 MG/DL (ref 5–25)
CALCIUM SERPL-MCNC: 8.2 MG/DL (ref 8.4–10.2)
CHLORIDE SERPL-SCNC: 107 MMOL/L (ref 96–108)
CO2 SERPL-SCNC: 24 MMOL/L (ref 21–32)
CREAT SERPL-MCNC: 1.04 MG/DL (ref 0.6–1.3)
EOSINOPHIL # BLD AUTO: 0.12 THOUSAND/ÂΜL (ref 0–0.61)
EOSINOPHIL NFR BLD AUTO: 2 % (ref 0–6)
ERYTHROCYTE [DISTWIDTH] IN BLOOD BY AUTOMATED COUNT: 13.3 % (ref 11.6–15.1)
GFR SERPL CREATININE-BSD FRML MDRD: 72 ML/MIN/1.73SQ M
GLUCOSE SERPL-MCNC: 163 MG/DL (ref 65–140)
GLUCOSE SERPL-MCNC: 173 MG/DL (ref 65–140)
GLUCOSE SERPL-MCNC: 188 MG/DL (ref 65–140)
GLUCOSE SERPL-MCNC: 189 MG/DL (ref 65–140)
GLUCOSE SERPL-MCNC: 71 MG/DL (ref 65–140)
HCT VFR BLD AUTO: 36.2 % (ref 36.5–49.3)
HGB BLD-MCNC: 11.9 G/DL (ref 12–17)
IMM GRANULOCYTES # BLD AUTO: 0.01 THOUSAND/UL (ref 0–0.2)
IMM GRANULOCYTES NFR BLD AUTO: 0 % (ref 0–2)
LYMPHOCYTES # BLD AUTO: 1.5 THOUSANDS/ÂΜL (ref 0.6–4.47)
LYMPHOCYTES NFR BLD AUTO: 24 % (ref 14–44)
MAGNESIUM SERPL-MCNC: 1.6 MG/DL (ref 1.9–2.7)
MCH RBC QN AUTO: 30.7 PG (ref 26.8–34.3)
MCHC RBC AUTO-ENTMCNC: 32.9 G/DL (ref 31.4–37.4)
MCV RBC AUTO: 94 FL (ref 82–98)
MONOCYTES # BLD AUTO: 0.47 THOUSAND/ÂΜL (ref 0.17–1.22)
MONOCYTES NFR BLD AUTO: 8 % (ref 4–12)
NEUTROPHILS # BLD AUTO: 4.02 THOUSANDS/ÂΜL (ref 1.85–7.62)
NEUTS SEG NFR BLD AUTO: 65 % (ref 43–75)
NRBC BLD AUTO-RTO: 0 /100 WBCS
PHOSPHATE SERPL-MCNC: 3 MG/DL (ref 2.3–4.1)
PLATELET # BLD AUTO: 300 THOUSANDS/UL (ref 149–390)
PMV BLD AUTO: 10.2 FL (ref 8.9–12.7)
POTASSIUM SERPL-SCNC: 4 MMOL/L (ref 3.5–5.3)
PROT SERPL-MCNC: 5.4 G/DL (ref 6.4–8.4)
RBC # BLD AUTO: 3.87 MILLION/UL (ref 3.88–5.62)
SODIUM SERPL-SCNC: 139 MMOL/L (ref 135–147)
WBC # BLD AUTO: 6.16 THOUSAND/UL (ref 4.31–10.16)

## 2023-09-16 PROCEDURE — 99223 1ST HOSP IP/OBS HIGH 75: CPT | Performed by: RADIOLOGY

## 2023-09-16 PROCEDURE — 70553 MRI BRAIN STEM W/O & W/DYE: CPT

## 2023-09-16 PROCEDURE — G1004 CDSM NDSC: HCPCS

## 2023-09-16 PROCEDURE — 95819 EEG AWAKE AND ASLEEP: CPT | Performed by: PSYCHIATRY & NEUROLOGY

## 2023-09-16 PROCEDURE — NC001 PR NO CHARGE: Performed by: EMERGENCY MEDICINE

## 2023-09-16 PROCEDURE — 82948 REAGENT STRIP/BLOOD GLUCOSE: CPT

## 2023-09-16 PROCEDURE — 80053 COMPREHEN METABOLIC PANEL: CPT | Performed by: STUDENT IN AN ORGANIZED HEALTH CARE EDUCATION/TRAINING PROGRAM

## 2023-09-16 PROCEDURE — 99233 SBSQ HOSP IP/OBS HIGH 50: CPT | Performed by: EMERGENCY MEDICINE

## 2023-09-16 PROCEDURE — A9585 GADOBUTROL INJECTION: HCPCS | Performed by: PHYSICIAN ASSISTANT

## 2023-09-16 PROCEDURE — 85025 COMPLETE CBC W/AUTO DIFF WBC: CPT | Performed by: STUDENT IN AN ORGANIZED HEALTH CARE EDUCATION/TRAINING PROGRAM

## 2023-09-16 PROCEDURE — 99232 SBSQ HOSP IP/OBS MODERATE 35: CPT | Performed by: PSYCHIATRY & NEUROLOGY

## 2023-09-16 PROCEDURE — 70544 MR ANGIOGRAPHY HEAD W/O DYE: CPT

## 2023-09-16 PROCEDURE — NC001 PR NO CHARGE: Performed by: PSYCHIATRY & NEUROLOGY

## 2023-09-16 PROCEDURE — 95816 EEG AWAKE AND DROWSY: CPT

## 2023-09-16 PROCEDURE — 84100 ASSAY OF PHOSPHORUS: CPT | Performed by: STUDENT IN AN ORGANIZED HEALTH CARE EDUCATION/TRAINING PROGRAM

## 2023-09-16 PROCEDURE — 83735 ASSAY OF MAGNESIUM: CPT | Performed by: STUDENT IN AN ORGANIZED HEALTH CARE EDUCATION/TRAINING PROGRAM

## 2023-09-16 RX ORDER — HYDRALAZINE HYDROCHLORIDE 20 MG/ML
10 INJECTION INTRAMUSCULAR; INTRAVENOUS EVERY 6 HOURS PRN
Status: DISCONTINUED | OUTPATIENT
Start: 2023-09-16 | End: 2023-09-17 | Stop reason: HOSPADM

## 2023-09-16 RX ORDER — GADOBUTROL 604.72 MG/ML
9.5 INJECTION INTRAVENOUS
Status: COMPLETED | OUTPATIENT
Start: 2023-09-16 | End: 2023-09-16

## 2023-09-16 RX ADMIN — BRINZOLAMIDE/BRIMONIDINE TARTRATE 1 DROP: 10; 2 SUSPENSION/ DROPS OPHTHALMIC at 17:16

## 2023-09-16 RX ADMIN — FLUTICASONE PROPIONATE 1 SPRAY: 50 SPRAY, METERED NASAL at 08:12

## 2023-09-16 RX ADMIN — ATORVASTATIN CALCIUM 10 MG: 10 TABLET, FILM COATED ORAL at 08:08

## 2023-09-16 RX ADMIN — GABAPENTIN 100 MG: 100 CAPSULE ORAL at 17:13

## 2023-09-16 RX ADMIN — PROPYLENE GLYCOL 1 DROP: 0.06 SOLUTION/ DROPS OPHTHALMIC at 11:37

## 2023-09-16 RX ADMIN — CHLORHEXIDINE GLUCONATE 15 ML: 1.2 SOLUTION ORAL at 08:08

## 2023-09-16 RX ADMIN — INSULIN DETEMIR 66 UNITS: 100 INJECTION, SOLUTION SUBCUTANEOUS at 08:08

## 2023-09-16 RX ADMIN — GABAPENTIN 100 MG: 100 CAPSULE ORAL at 08:08

## 2023-09-16 RX ADMIN — INSULIN LISPRO 1 UNITS: 100 INJECTION, SOLUTION INTRAVENOUS; SUBCUTANEOUS at 11:35

## 2023-09-16 RX ADMIN — GADOBUTROL 9.5 ML: 604.72 INJECTION INTRAVENOUS at 16:46

## 2023-09-16 RX ADMIN — BRINZOLAMIDE/BRIMONIDINE TARTRATE 1 DROP: 10; 2 SUSPENSION/ DROPS OPHTHALMIC at 11:37

## 2023-09-16 RX ADMIN — INSULIN LISPRO 1 UNITS: 100 INJECTION, SOLUTION INTRAVENOUS; SUBCUTANEOUS at 07:41

## 2023-09-16 RX ADMIN — INSULIN LISPRO 1 UNITS: 100 INJECTION, SOLUTION INTRAVENOUS; SUBCUTANEOUS at 21:25

## 2023-09-16 RX ADMIN — PROPYLENE GLYCOL 1 DROP: 0.06 SOLUTION/ DROPS OPHTHALMIC at 17:16

## 2023-09-16 RX ADMIN — CHLORHEXIDINE GLUCONATE 15 ML: 1.2 SOLUTION ORAL at 21:36

## 2023-09-16 NOTE — PROGRESS NOTES
[address] [facility]  Progress Note: Critical Care  Name: Philipp Perez 70 y.o. male I MRN: [id number]  [facility] I Date of Admission: 9/15/2023   Date of Service: 9/16/2023  Hospital Day: 1    Assessment/Plan   Neuro:   • Diagnosis: Stroke-like symptoms  ? Presented with left sided weakness and developed thunderclap headache in ED  ? With significant family history of aneurysmal SAH  ? CTH and CTA H/N negative on admission  ? CSF RBC decreased appropriately; reduces suspicion for [facility]  ? Transfer to [facility] for arteriogram/neurosurgery evaluation  ? Agram not needed as LP and imaging do not suggest SAH as etiology for patient's symptoms  ? Neurology consulted, appreciate recommendations  ? SBP goal <160; able to maintain this without cardene gtt  ? STAT CTH for changes in neuro exam/decline in GCS by >2 points   ? Follow up MRI/MRA today  ? Follow up spot EEG  ? Avoid AC/AP at this time  ? No pharmacologic DVT ppx        CV:   • Diagnosis: Hypertension  ? Patient significantly hypertensive on admission  ? Home antihypertensive regimen includes: lisinopril 10mg  ? Patient able to maintain goal SBP <160 without cardene gtt  ? Can resume home lisinopril        Pulm:  • Diagnosis: no acute issues  ? Stable on RA at this time  ? Monitor O2 requirement        GI:   • Diagnosis: no acute issues  ? Monitor BM frequency  ? No bowel regimen at this time        :   • Diagnosis: no acute issues  ? Kidney function at baseline  ? Daily BMP  ? Avoid nephrotoxic agents  ? Monitor UO closely        F/E/N:    • F  ? No fluids  • E  ? Replete to keep K>4, P>3, Mg>2  • N  ? Diabetic diet        Heme/Onc:   • Diagnosis: no acute issues  ? Holding pharmacologic DVT ppx for now  ? SCDs  ? Can initiate lovenox for DVT ppx when able from neuro standpoint        Endo:   • Diagnosis: Type 2 Diabetes  ? Home regimen includes Levemir 66U daily, metformin 1000mg BID, pioglitazone 15mg daily  ?  Continue home insulin  ? SSI  ? Monitor BG with meals, inpatient goal 140-180  ? Patient may benefit from establishing with endocrinology inpatient to maintain optimal BG control at home and arrange outpatient follow up     ID:   • Diagnosis: no acute issues  ? No concern for active ongoing infection  ? Trend fever curve  ? Daily CBC as needed  ? Follow LP culture/gram stain        MSK/Skin:   • Diagnosis: no acute issues  ? OOB with assistance  ? PT/OT eval  ? Encourage daily ambulation        Disposition: Med Surg    ICU Core Measures     A: Assess, Prevent, and Manage Pain · Has pain been assessed? Yes  · Need for changes to pain regimen? No   B: Both SAT/SAT  · N/A   C: Choice of Sedation · RASS Goal: 0 Alert and Calm  · Need for changes to sedation or analgesia regimen? No   D: Delirium · CAM-ICU: Negative   E: Early Mobility  · Plan for early mobility? Yes   F: Family Engagement · Plan for family engagement today? Yes         Prophylaxis:  VTE Contraindicated secondary to: concern for [facility]   Stress Ulcer  not ordered          Subjective   HPI/24hr events:   Joaquin Claire is a 70 y.o. M with PMH of HTN, HLD, DM2, Gilbert syndrome, melanoma of the ear s/p excision, chronic hearing loss who initially presented to [facility] complaining of acute thunderclap headache and left sided sensory loss. Last known normal was about 2.5 hours prior to initial presentation. Vitals in the ED noted significant hypertension with SBP >190. Stroke alert was called in the ED given presenting symptoms and family history of aneurysmal SAH in father, for which CT head and CTA head/neck were negative for acute abnormalities/high grade stenosis/aneurysms. However, due to family history and presentation with thunderclap headache in setting of significant hypertension, there is still a concern for [facility] vs [facility].  LP performed in [facility] ED prior to transfer, and patient is being transferred to [facility] for arteriogram (& possible subsequent neurosurgery evaluation if needed).      Overnight, patient remained at goal BP without cardene gtt. His symptoms have since resolved and he is not having any headaches or left sided numbness. Review of Systems   Constitutional: Negative for chills and fever. HENT: Negative for ear pain and sore throat. Eyes: Negative for pain and visual disturbance. Respiratory: Negative for cough and shortness of breath. Cardiovascular: Negative for chest pain and palpitations. Gastrointestinal: Negative for abdominal pain, nausea and vomiting. Genitourinary: Negative for dysuria and hematuria. Musculoskeletal: Negative for arthralgias and back pain. Skin: Negative for color change and rash. Neurological: Negative for dizziness, syncope, light-headedness, numbness and headaches. All other systems reviewed and are negative. Objective                            Vitals I/O      Most Recent Min/Max in 24hrs   Temp 98 °F (36.7 °C) Temp  Min: 98 °F (36.7 °C)  Max: 99.3 °F (37.4 °C)   Pulse 65 Pulse  Min: 61  Max: 86   Resp 16 Resp  Min: 16  Max: 20   /69 BP  Min: 97/55  Max: 172/75   O2 Sat 99 % SpO2  Min: 93 %  Max: 99 %      Intake/Output Summary (Last 24 hours) at 9/16/2023 1412  Last data filed at 9/16/2023 1302  Gross per 24 hour   Intake 480 ml   Output --   Net 480 ml         Diet Farzad/CHO Controlled; Consistent Carbohydrate Diet Level 2 (5 carb servings/75 grams CHO/meal)     Invasive Monitoring Physical exam    Physical Exam  Vitals and nursing note reviewed. Constitutional:       General: He is not in acute distress. Appearance: He is well-developed. HENT:      Head: Normocephalic and atraumatic. Eyes:      Conjunctiva/sclera: Conjunctivae normal.   Cardiovascular:      Rate and Rhythm: Normal rate and regular rhythm. Heart sounds: S1 normal and S2 normal. No murmur heard. Pulmonary:      Effort: Pulmonary effort is normal. No respiratory distress.       Breath sounds: Normal breath sounds. Abdominal:      General: Bowel sounds are normal.      Palpations: Abdomen is soft. Tenderness: There is no abdominal tenderness. Musculoskeletal:         General: No swelling. Cervical back: Neck supple. Right lower leg: No edema. Left lower leg: No edema. Skin:     General: Skin is warm and dry. Capillary Refill: Capillary refill takes less than 2 seconds. Neurological:      General: No focal deficit present. Mental Status: He is alert and oriented to person, place, and time. Cranial Nerves: Cranial nerves 2-12 are intact. Sensory: Sensation is intact. Motor: Motor function is intact. Psychiatric:         Mood and Affect: Mood normal.            Diagnostic Studies      EKG:   Imaging:  I have personally reviewed pertinent reports.        Medications:  Scheduled PRN   atorvastatin, 10 mg, Daily  Brinzolamide-Brimonidine, 1 drop, BID  chlorhexidine, 15 mL, Q12H NATIVIDAD  fluticasone, 1 spray, Daily  gabapentin, 100 mg, BID  insulin detemir, 66 Units, Daily  insulin lispro, 1-6 Units, TID AC  insulin lispro, 1-6 Units, HS  Propylene Glycol (PF), 1 drop, BID      hydrALAZINE, 10 mg, Q6H PRN  sodium chloride, 1 spray, Q1H PRN       Continuous    niCARdipine, 1-15 mg/hr, Last Rate: Stopped (09/15/23 2130)         Labs:    CBC    Recent Labs     09/15/23  1149 09/16/23  0601   WBC 11.93* 6.16   HGB 12.9 11.9*   HCT 39.4 36.2*    300     BMP    Recent Labs     09/15/23  1149 09/16/23  0601   SODIUM 139 139   K 4.0 4.0    107   CO2 25 24   AGAP 9 8   BUN 22 17   CREATININE 1.11 1.04   CALCIUM 9.2 8.2*       Coags    Recent Labs     09/15/23  1149   INR 0.97   PTT 27        Additional Electrolytes  Recent Labs     09/16/23  0601   MG 1.6*   PHOS 3.0          Blood Gas    No recent results  No recent results LFTs  Recent Labs     09/16/23  0601   ALT 16   AST 18   ALKPHOS 44   ALB 3.7   TBILI 1.68*       Infectious  No recent results Glucose  Recent Labs     09/15/23  1149 09/16/23  0601   GLUC 54* 188*               Critical Care Time Delivered: Upon my evaluation, this patient had a high probability of imminent or life-threatening deterioration due to rule out SAH, possible seizure, which required my direct attention, intervention, and personal management. I have personally provided 45 minutes of critical care time, exclusive of procedures, teaching, family meetings, and any prior time recorded by providers other than myself.      Dai Zamudio, DO

## 2023-09-16 NOTE — ASSESSMENT & PLAN NOTE
Patient strong FH aSAH presents with thunderclap HA  · Also c/o left sided paresthesias, confusion, and "out of body experience"  · H/o DM, hypertensive on arrival  · Currently c/o mild HA but all other symptoms have resolved  · On exam, GCS 15 and neuro intact. Imaging:  · CTA head and neck w/wo, 9/15/23: Patent major vessels of the [place] of weeks without high-grade stenosis. No aneurysm. No hemodynamically significant stenosis or dissection of cervical carotid and vertebral arteries. Plan:  · Continue to monitor neurological exam  · LP obtained 9/15, results not concerning for aSAH  · RBC 34 in tube 1 and 6 in tube 4  · MRI brain w/wo, MRA ordered and pending  · SBP per primary team, 110-160  · Maintain tight glycemic control  · Medical management per primary team  · PT/OT evaluation  · DVT ppx: SCDs    Discussed with neuroendovascular attending. Given negative CTA and benign LP, no cerebral angiogram is indicated at this time. Neurosurgery will sign off at this time. Please call with questions or concerns.

## 2023-09-16 NOTE — PROGRESS NOTES
Critical Care Interval Transfer Note:    Please refer to progress note from earlier today for full details. Brief hospital course:  Sima Norman is a 70 y.o. M with PMH of HTN, HLD, DM2, Gilbert syndrome, melanoma of the ear s/p excision, chronic hearing loss who initially presented to [facility] complaining of acute thunderclap headache and left sided sensory loss. Last known normal was about 2.5 hours prior to initial presentation. Vitals in the ED noted significant hypertension with SBP >190. Stroke alert was called in the ED given presenting symptoms and family history of aneurysmal SAH in father, for which CT head and CTA head/neck were negative for acute abnormalities/high grade stenosis/aneurysms. LP performed in [facility] ED prior to transfer does not reveal evidence of SAH. However, due to family history and presentation with thunderclap headache in setting of significant hypertension, there is still a concern for [facility] vs [facility]. [facility] for endovascular evaluation via neurosurg. Since arrival, his BP has been controlled off cardene and further evaluation reveals more likely hypoglycemic equivalent/possible seizure rather than SAH. Neuro is following. His symptoms have resolved now. MRI/MRA ordered for today and hopefully spot EEG to further rule in/out seizure. Barriers to discharge:   · MRI/MRA today  · Spot EEG  · Resume home lisinopril  · Consider endocrinology consult/referral for outpatient follow up     Consults: [address] TO NEUROSURGERY  IP CONSULT TO NEUROLOGY  IP CONSULT TO NEUROLOGY    Recommended to review admission imaging for incidental findings and document in discharge navigator: Chart reviewed, no known incidental findings noted at this time. Discharge Plan: Anticipate discharge later today or tomorrow to home. Patient seen and evaluated by Critical Care today and deemed to be appropriate for transfer to Med Surg.  Spoke to Dr. Azalia Schulte from [facility] to accept transfer. Critical care can be contacted via [employer] with any questions or concerns.

## 2023-09-16 NOTE — CONSULTS
[address] [facility]  Consult  Name: Chris Alcala 70 y.o. male I MRN: [id number]  Unit/Bed#: [facility] 915-73 I Date of Admission: 9/15/2023   Date of Service: 9/16/2023 I Hospital Day: 1    Inpatient consult to Neurosurgery  Consult performed by: Mirna Ruth PA-C  Consult ordered by: Yolis Sheffield DO      Imaging personally reviewed 9/16/2023 at 11:00 AM  Patient examined at bedside 9/16/2023 at 11:30 AM    Assessment/Plan   Thunderclap headache  Assessment & Plan  Patient strong FH aSAH presents with thunderclap HA  · Also c/o left sided paresthesias, confusion, and "out of body experience"  · H/o DM, hypertensive on arrival  · Currently c/o mild HA but all other symptoms have resolved  · On exam, GCS 15 and neuro intact. Imaging:  · CTA head and neck w/wo, 9/15/23: Patent major vessels of the [place] of weeks without high-grade stenosis. No aneurysm. No hemodynamically significant stenosis or dissection of cervical carotid and vertebral arteries. Plan:  · Continue to monitor neurological exam  · LP obtained 9/15, results not concerning for aSAH  · RBC 34 in tube 1 and 6 in tube 4  · MRI brain w/wo, MRA ordered and pending  · SBP per primary team, 110-160  · Maintain tight glycemic control  · Medical management per primary team  · PT/OT evaluation  · DVT ppx: SCDs    Discussed with neuroendovascular attending. Given negative CTA and benign LP, no cerebral angiogram is indicated at this time. Neurosurgery will sign off at this time. Please call with questions or concerns. History of Present Illness     HPI: Chris Alcala is a 70y.o. year old male with PMH including diabetes, hypertension, hyperlipidemia, melanoma who presents with complaint of severe headache with associated left-sided numbness and tingling as well as feeling as though he was having out of body experience. This occurred yesterday. It started 2 hours prior to presentation.   In the ED, he was noted to have blood pressure over 190. Stroke alert was called. Symptoms were concerning for aneurysmal SAH. He has a strong family history of this. His father passed away years ago from an aneurysmal SAH and his sister passed away last year from the same. CTA was obtained and negative for aneurysm. He underwent LP which was also negative given low RBC count. MRIs are pending. Neurosurgery was consulted for consideration of cerebral angiogram.    Currently he has a mild headache. He states that his left-sided numbness and tingling have completely resolved at this time and he feels at his baseline. Review of Systems   Constitutional: Negative for chills and fever. HENT: Negative for ear pain and sore throat. Eyes: Negative for pain and visual disturbance. Respiratory: Negative for cough and shortness of breath. Cardiovascular: Negative for chest pain and palpitations. Gastrointestinal: Negative for abdominal pain and vomiting. Genitourinary: Negative for dysuria and hematuria. Musculoskeletal: Negative for arthralgias and back pain. Skin: Negative for color change and rash. Neurological: Positive for headaches. Negative for seizures and syncope. All other systems reviewed and are negative.       Historical Information   Past Medical History:   Diagnosis Date   • Arthritis    • Cancer [facility])    • Colon polyp    • Diabetes mellitus ([address])    • Ear problems    • Essential hypertension 02/05/2020   • Glaucoma    • HL (hearing loss)    • Hyperlipidemia 02/05/2020   • Hypertension    • Kidney stone    • Melanoma of ear, left [facility])    • Nasal polyp    • Tinnitus      Past Surgical History:   Procedure Laterality Date   • ARTHROSCOPY KNEE Bilateral    • BASAL CELL CARCINOMA EXCISION  03/2020    left arm   • CYSTOSCOPY      kidney stone removal   • FOOT TENDON SURGERY Left 06/22/2021    Procedure: ANTERIOR TALOFIBULAR LIGAMENT REPAIR;  Surgeon: Rene Vazquez DPM;  Location: [facility];  Service: Podiatry   • FRACTURE SURGERY  2023   • KNEE SURGERY     • NASAL POLYP EXCISION     • IL COLONOSCOPY FLX DX W/COLLJ SPEC WHEN PFRMD N/A 2017    Procedure: COLONOSCOPY;  Surgeon: Nandini Downey MD;  Location: [facility];   Service: Colorectal   • IL RPR TDN FLXR FOOT 1/2 W/O FREE GRAFG EACH TENDON Left 2017    Procedure: ANKLE PERONEUS LONGUS TENDON REPAIR with allograft;  RETINACULUM REPAIR SECONDARY  Application of Provena Vac;  Surgeon: Kaleigh Rodriguez DPM;  Location: [facility];  Service: Podiatry   • ROTATOR CUFF REPAIR Bilateral    • SHOULDER ARTHROSCOPY     • WISDOM TOOTH EXTRACTION       Social History     Substance and Sexual Activity   Alcohol Use Yes    Comment: Occaisonally one drink     Social History     Substance and Sexual Activity   Drug Use No     Social History     Tobacco Use   Smoking Status Former   • Packs/day: 1.00   • Years: 30.00   • Total pack years: 30.00   • Types: Cigarettes   • Start date: 1974   • Quit date: 2007   • Years since quittin.2   Smokeless Tobacco Current   • Types: Chew   Tobacco Comments    quit 15 years ago     Family History   Problem Relation Age of Onset   • Heart disease Mother         MOTHER   • Diabetes Mother    • Melanoma Mother    • Cancer Mother    • Aortic aneurysm Father         abdominal    • Prostate cancer Father    • Colon cancer Father        Meds/Allergies   all current active meds have been reviewed, current meds:   Current Facility-Administered Medications   Medication Dose Route Frequency   • atorvastatin (LIPITOR) tablet 10 mg  10 mg Oral Daily   • Brinzolamide-Brimonidine 1-0.2 % SUSP 1 drop  1 drop Both Eyes BID   • chlorhexidine (PERIDEX) 0.12 % oral rinse 15 mL  15 mL Mouth/Throat Q12H NATIVIDAD   • fluticasone (FLONASE) 50 mcg/act nasal spray 1 spray  1 spray Nasal Daily   • gabapentin (NEURONTIN) capsule 100 mg  100 mg Oral BID   • insulin detemir (LEVEMIR) subcutaneous injection 66 Units  66 Units Subcutaneous Daily   • insulin lispro (HumaLOG) 100 units/mL subcutaneous injection 1-6 Units  1-6 Units Subcutaneous TID AC   • insulin lispro (HumaLOG) 100 units/mL subcutaneous injection 1-6 Units  1-6 Units Subcutaneous HS   • niCARdipine (CARDENE) 25 mg (STANDARD CONCENTRATION) in sodium chloride 0.9% 250 mL  1-15 mg/hr Intravenous Titrated   • Propylene Glycol (PF) 0.6 % SOLN 1 drop  1 drop Both Eyes BID   • sodium chloride (OCEAN) 0.65 % nasal spray 1 spray  1 spray Each Nare Q1H PRN    and PTA meds:   Prior to Admission Medications   Prescriptions Last Dose Informant Patient Reported? Taking?    BD Pen Needle Meghana 2nd Gen 32G X 4 MM MISC Unknown Self No No   Sig: DAILY USE AS DIRECTED   Cholecalciferol 125 MCG (5000 UT) capsule Unknown Self Yes No   Sig: Take 5,000 Units by mouth daily   Continuous Blood Gluc  (FreeStyle Kalpana 14 Day [place]) CAMRON Unknown Self No No   Sig: Use 1 application 4 (four) times a day Patient to check his blood sugar 4 times a day   Continuous Blood Gluc  (FreeStyle [place]ay 2 [place]) CAMRON Unknown  No No   Sig: Check blood sugars multiple times per day   Continuous Blood Gluc Sensor (FreeStyle Kalpana 14 Day Sensor) MISC Unknown  No No   Sig: USE 1 EVERY 14 DAYS   Continuous Blood Gluc Sensor (FreeStyle Kalpana 2 Sensor) MISC Unknown  No No   Sig: Check blood sugars multiple times per day   Diclofenac Sodium (VOLTAREN) 1 % Unknown Self No No   Sig: Apply 2 g topically 4 (four) times a day   Levemir FlexTouch 100 units/mL injection pen 9/15/2023 Self No Yes   Sig: Inject 66 Units under the skin daily   Lum[place] 0.01 % ophthalmic drops Not Taking Self Yes No   Patient not taking: Reported on 9/15/2023   Milk Thistle 150 MG CAPS 9/15/2023 Self Yes Yes   Sig: Take by mouth   Multiple Vitamins-Minerals (MULTIVITAMIN WITH MINERALS) tablet 9/15/2023 Self Yes Yes   Sig: Take 1 tablet by mouth daily   Specialty Vitamins Products (MAGNESIUM, AMINO ACID CHELATE,) 133 MG tablet Unknown Self Yes No   Sig: Take 1 tablet by mouth 2 (two) times a day   Vitamins-Lipotropics (B-100 PO) Unknown Self Yes No   Sig: Take by mouth   aspirin (ECOTRIN LOW STRENGTH) 81 mg EC tablet 9/15/2023 Self Yes Yes   Sig: Take 81 mg by mouth daily   atorvastatin (LIPITOR) 10 mg tablet 9/15/2023 Self No Yes   Sig: TAKE 1 TABLET BY MOUTH EVERY DAY   fluticasone (FLONASE) 50 mcg/act nasal spray 9/15/2023 Self Yes Yes   Si spray into each nostril daily   gabapentin (NEURONTIN) 100 mg capsule 9/15/2023  No Yes   Sig: TAKE 1 CAPSULE BY MOUTH TWICE A DAY   latanoprost (XALATAN) 0.005 % ophthalmic solution Not Taking Self Yes No   Sig: PUT 1 DROP INTO BOTH EYES IN THE EVENING   Patient not taking: Reported on 9/15/2023   lisinopril (ZESTRIL) 10 mg tablet 9/15/2023 Self No Yes   Sig: TAKE 1 TABLET BY MOUTH EVERY DAY   metFORMIN (GLUCOPHAGE-XR) 500 mg 24 hr tablet 9/15/2023 Self No Yes   Sig: TAKE 2 TABLETS BY MOUTH TWICE A DAY   pioglitazone (ACTOS) 15 mg tablet Not Taking Self No No   Sig: TAKE 1 TABLET BY MOUTH EVERY DAY   Patient not taking: Reported on 9/15/2023   sodium chloride (OCEAN) 0.65 % nasal spray 9/15/2023 Self Yes Yes   Si spray into each nostril as needed for congestion      Facility-Administered Medications Last Administration Doses Remaining   bupivacaine (PF) (MARCAINE) 0.25 % injection 1 mL 2023  2:00 PM    triamcinolone acetonide (KENALOG-40) 40 mg/mL injection 20 mg 2023  2:00 PM         No Known Allergies    Objective   I/O        0701  09/15 0700 09/15 0701   0700  07 0700    P. O.  0 240    Total Intake  0 240    Net  0 +240                 Physical Exam  Vitals and nursing note reviewed. Constitutional:       Appearance: Normal appearance. He is well-developed and normal weight. HENT:      Head: Normocephalic and atraumatic. Eyes:      Extraocular Movements: Extraocular movements intact. Pupils: Pupils are equal, round, and reactive to light. Cardiovascular:      Rate and Rhythm: Normal rate. Pulmonary:      Effort: Pulmonary effort is normal. No respiratory distress. Abdominal:      Palpations: Abdomen is soft. Musculoskeletal:         General: Normal range of motion. Cervical back: Normal range of motion. Skin:     General: Skin is warm and dry. Neurological:      General: No focal deficit present. Mental Status: He is alert and oriented to person, place, and time. Cranial Nerves: Cranial nerves 2-12 are intact. Motor: Motor strength is normal.     Coordination: Finger-Nose-Finger Test normal.      Deep Tendon Reflexes:      Reflex Scores:       Brachioradialis reflexes are 2+ on the right side and 2+ on the left side. Patellar reflexes are 2+ on the right side and 2+ on the left side. Psychiatric:         Mood and Affect: Mood normal.         Speech: Speech normal.         Behavior: Behavior normal.         Thought Content: Thought content normal.         Judgment: Judgment normal.       Neurologic Exam     Mental Status   Oriented to person, place, and time. Follows 2 step commands. Attention: normal. Concentration: normal.   Speech: speech is normal   Level of consciousness: alert  Knowledge: good. Able to perform simple calculations. Able to name object. Able to repeat. Normal comprehension. Cranial Nerves   Cranial nerves II through XII intact. CN III, IV, VI   Pupils are equal, round, and reactive to light. Motor Exam   Muscle bulk: normal  Overall muscle tone: normal  Right arm pronator drift: absent  Left arm pronator drift: absent    Strength   Strength 5/5 throughout.      Sensory Exam   Light touch normal.   DST and JPS intact bilaterally     Gait, Coordination, and Reflexes     Coordination   Finger to nose coordination: normal    Tremor   Resting tremor: absent    Reflexes   Right brachioradialis: 2+  Left brachioradialis: 2+  Right patellar: 2+  Left patellar: 2+  Right Rosario: absent  Left Rosario: absent  Right ankle clonus: absent  Left ankle clonus: absent        Vitals:Blood pressure (!) 138/110, pulse 68, temperature 98 °F (36.7 °C), resp. rate 18, SpO2 98 %. ,There is no height or weight on file to calculate BMI. Lab Results:   Results from last 7 days   Lab Units 09/16/23  0601 09/15/23  1149   WBC Thousand/uL 6.16 11.93*   HEMOGLOBIN g/dL 11.9* 12.9   HEMATOCRIT % 36.2* 39.4   PLATELETS Thousands/uL 300 335   NEUTROS PCT % 65  --    MONOS PCT % 8  --    EOS PCT % 2  --      Results from last 7 days   Lab Units 09/16/23  0601 09/15/23  1149   POTASSIUM mmol/L 4.0 4.0   CHLORIDE mmol/L 107 105   CO2 mmol/L 24 25   BUN mg/dL 17 22   CREATININE mg/dL 1.04 1.11   CALCIUM mg/dL 8.2* 9.2   ALK PHOS U/L 44  --    ALT U/L 16  --    AST U/L 18  --      Results from last 7 days   Lab Units 09/16/23  0601   MAGNESIUM mg/dL 1.6*     Results from last 7 days   Lab Units 09/16/23  0601   PHOSPHORUS mg/dL 3.0     Results from last 7 days   Lab Units 09/15/23  1149   INR  0.97   PTT seconds 27     No results found for: "TROPONINT"  ABG:No results found for: "PHART", "RAM9KDO", "PO2ART", "QHQ7IFH", "W5ZQNRBN", "BEART", "SOURCE"    Imaging Studies: I have personally reviewed pertinent reports. and I have personally reviewed pertinent films in PACS     CTA stroke alert (head/neck)    Result Date: 9/15/2023  Narrative: CTA NECK AND BRAIN WITH CONTRAST INDICATION: Stroke Alert COMPARISON:   None. TECHNIQUE:   Post contrast imaging was performed after administration of iodinated contrast through the neck and brain. Post contrast axial 0.625 mm images timed to opacify the arterial system. 3D rendering was performed on an independent workstation. MIP reconstructions performed. Coronal reconstructions were performed of the noncontrast portion of the brain. Radiation dose length product (DLP) for this visit:  0569 mGy-cm .   This examination, like all CT scans performed in the [facility], was performed utilizing techniques to minimize radiation dose exposure, including the use of iterative reconstruction and automated exposure control. IV Contrast:  85 mL of iohexol (OMNIPAQUE) IMAGE QUALITY:   Diagnostic FINDINGS: CERVICAL VASCULATURE AORTIC ARCH AND GREAT VESSELS: Minimal atherosclerotic calcification of aortic arch. Great vessel origins are patent without significant stenosis. RIGHT VERTEBRAL ARTERY CERVICAL SEGMENT:The vessel origin is unremarkable. The vessel is patent throughout the neck without high-grade stenosis. LEFT VERTEBRAL ARTERY CERVICAL SEGMENT: Mild atherosclerotic narrowing at the origin. The vessel is patent throughout the neck without high-grade stenosis. RIGHT EXTRACRANIAL CAROTID SEGMENT: There is partially calcified plaque at the bifurcation. No hemodynamically significant stenosis of cervical ICA by NASCET criteria. LEFT EXTRACRANIAL CAROTID SEGMENT: There is partially calcified plaque at the bifurcation. No hemodynamically significant stenosis of cervical ICA by NASCET criteria. INTRACRANIAL VASCULATURE INTERNAL CAROTID ARTERIES: Mild atherosclerotic disease of cavernous and supraclinoid segments of bilateral internal carotid arteries without critical stenosis. Normal ophthalmic artery origins. ANTERIOR CIRCULATION:  Normal A1 segments. Bilateral anterior cerebral arteries are unremarkable. Normal anterior comminuting artery. MIDDLE CEREBRAL ARTERY CIRCULATION: Bilateral M1 segments and major M2 branches are patent without high-grade stenosis. DISTAL VERTEBRAL ARTERIES:  Distal vertebral arteries are patent without high-grade stenosis. Posterior inferior cerebellar artery origins are patent. BASILAR ARTERY:  Basilar artery is unremarkable. Normal superior cerebellar arteries. POSTERIOR CEREBRAL ARTERIES: Small P1 segments with persistent fetal origin of posterior cerebral arteries. Bilateral posterior cerebral arteries are patent without high-grade stenosis. Normal posterior communicating arteries. DURAL VENOUS SINUSES:  Unremarkable. NON VASCULAR ANATOMY BONY STRUCTURES: Degenerative changes of cervical spine. No acute or aggressive appearing osseous abnormality. SOFT TISSUES OF THE NECK:  Unremarkable. THORACIC INLET: There is 3 mm right upper lobe nodule (series 301 image 9). Impression: 1. Patent major vessels of the [place] of weeks without high-grade stenosis. No aneurysm. 2.  No hemodynamically significant stenosis or dissection of cervical carotid and vertebral arteries. 3.  Incidental 3 mm right upper lobe pulmonary nodule. Patient has history of basal cell carcinoma. Although lung metastasis is rare in basal cell carcinoma, recommend 3 months follow-up low-dose chest CT to ensure stability. This study was marked in [facility] for notification and follow-up. Findings were directly discussed with Magno Machado at 12:15 p.m. Workstation performed: [id number]     CT stroke alert brain    Result Date: 9/15/2023  Narrative: CT BRAIN - STROKE ALERT PROTOCOL INDICATION:   Stroke Alert. COMPARISON:  None. TECHNIQUE:  CT examination of the brain was performed. In addition to axial images, coronal reformatted images were created and submitted for interpretation. Radiation dose length product (DLP) for this visit:  1022 mGy-cm . This examination, like all CT scans performed in the [facility], was performed utilizing techniques to minimize radiation dose exposure, including the use of iterative reconstruction and automated exposure control. IMAGE QUALITY:  Diagnostic. FINDINGS: PARENCHYMA:  No intracranial mass, mass effect or midline shift. No CT signs of acute infarction. No acute parenchymal hemorrhage. VENTRICLES AND EXTRA-AXIAL SPACES:  Normal for the patient's age. VISUALIZED ORBITS: Normal visualized orbits. PARANASAL SINUSES: Mild mucosal thickening of the visualized paranasal sinuses.  CALVARIUM AND EXTRACRANIAL SOFT TISSUES:   Normal. Impression: No acute intracranial CT abnormality. Findings were directly discussed with [facility] at 12:15 p.m. Workstation performed: [id number]     EKG, Pathology, and Other Studies: I have personally reviewed pertinent reports. VTE Prophylaxis: Sequential compression device Jt Rebecca)     Code Status: Level 3 - DNAR and DNI  Advance Directive and Living Will:      Power of :    POLST:      Counseling / Coordination of Care  I spent 30 minutes with the patient.

## 2023-09-16 NOTE — CONSULTS
Please see original stroke alert consult note from 9/15/23 at [facility] and progress notes thereafter for treatment plan updates.

## 2023-09-16 NOTE — PLAN OF CARE
Problem: NEUROSENSORY - ADULT  Goal: Achieves stable or improved neurological status  Description: INTERVENTIONS  - Monitor and report changes in neurological status  - Monitor vital signs such as temperature, blood pressure, glucose, and any other labs ordered   - Initiate measures to prevent increased intracranial pressure  - Monitor for seizure activity and implement precautions if appropriate      Outcome: Progressing  Goal: Remains free of injury related to seizures activity  Description: INTERVENTIONS  - Maintain airway, patient safety  and administer oxygen as ordered  - Monitor patient for seizure activity, document and report duration and description of seizure to physician/advanced practitioner  - If seizure occurs,  ensure patient safety during seizure  - Reorient patient post seizure  - Seizure pads on all 4 side rails  - Instruct patient/family to notify RN of any seizure activity including if an aura is experienced  - Instruct patient/family to call for assistance with activity based on nursing assessment  - Administer anti-seizure medications if ordered    Outcome: Progressing  Goal: Achieves maximal functionality and self care  Description: INTERVENTIONS  - Monitor swallowing and airway patency with patient fatigue and changes in neurological status  - Encourage and assist patient to increase activity and self care.    - Encourage visually impaired, hearing impaired and aphasic patients to use assistive/communication devices  Outcome: Progressing     Problem: SAFETY ADULT  Goal: Patient will remain free of falls  Description: INTERVENTIONS:  - Educate patient/family on patient safety including physical limitations  - Instruct patient to call for assistance with activity   - Consult OT/PT to assist with strengthening/mobility   - Keep Call bell within reach  - Keep bed low and locked with side rails adjusted as appropriate  - Keep care items and personal belongings within reach  - Initiate and maintain comfort rounds  - Make Fall Risk Sign visible to staff  - Apply yellow socks and bracelet for high fall risk patients  - Consider moving patient to room near nurses station  Outcome: Progressing  Goal: Maintain or return to baseline ADL function  Description: INTERVENTIONS:  -  Assess patient's ability to carry out ADLs; assess patient's baseline for ADL function and identify physical deficits which impact ability to perform ADLs (bathing, care of mouth/teeth, toileting, grooming, dressing, etc.)  - Assess/evaluate cause of self-care deficits   - Assess range of motion  - Assess patient's mobility; develop plan if impaired  - Assess patient's need for assistive devices and provide as appropriate  - Encourage maximum independence but intervene and supervise when necessary  - Involve family in performance of ADLs  - Assess for home care needs following discharge   - Consider OT consult to assist with ADL evaluation and planning for discharge  - Provide patient education as appropriate  Outcome: Progressing  Goal: Maintains/Returns to pre admission functional level  Description: INTERVENTIONS:  - Perform BMAT or MOVE assessment daily.   - Set and communicate daily mobility goal to care team and patient/family/caregiver.    - Collaborate with rehabilitation services on mobility goals if consulted  - Out of bed for toileting  - Record patient progress and toleration of activity level   Outcome: Progressing

## 2023-09-16 NOTE — PROGRESS NOTES
NEUROLOGY RESIDENCY PROGRESS NOTE     Name: Nataliia Leo   Age & Sex: 70 y.o. male   MRN: [id number]  Unit/Bed#: [facility] 543-39   Encounter: [id number]    Bard Soares will need a follow up with General Neurology in 4-6 weeks with an attending/AP. Pending for discharge:     ASSESSMENT & PLAN     Thunderclap headache  Assessment & Plan  · 70 y.o. R handed male w/ hx of HTN, dyslipidemia, T2DM, current smoker, chronic hearing loss, melanoma (ear s/p excision) who presented as a stroke alert on 09/15/23 at 11:46AM.  LKW 2.5hrs ago. Initial presenting deficits: thunderclap headache, L sided sensory loss. On ASA 81mg QD (last dose this morning). Family hx significant for aneurysmal SAH in father, sister (passed away from it). · Per EMS/ED, initial Blood Pressure: (!) 192/91 and persistently 189/81 afterwards. Pulse 61. Glucose 115. NIHSS 1 (L sided sensory loss). · CTH wo contrast unremarkable particularly for [facility] or IVH, or any other acute ischemic changes. · CTA h/n unremarkable for LVO, dissection, aneurysms. · Not a TNK candidate due to: concern for [facility]. · On exam, patient does complain of some neck stiffness and nausea. However, no light sensitivity, no anisocoria, no cranial nerve deficits, no focal motor or sensory deficits noted. · Hunt & Vanegas Scale: 1 (mild headache, slight nuchal rigidity)     Sudden onset holocephalic headache with a crescendo pattern w/ L sided sensory loss w/ SBP 180s-190s in a patient w/ strong family hx of aneurysmal bleeds concerning for [facility] vs [facility].  Even in the case of CTH and LP being negative, there would remain concern for a sentinel headache. Complicated migraine also on the differential, but in this case will most certainly be a diagnosis of exclusion. Plan, stroke pathway:  · LP results not concerning for [facility].   · -160, MAP > 65;  · STAT CTH if changes in exam/decline in GCS > 2 pts/1hr  · DVT ppx: SCDs only  · Neuro checks Q1H  · Patient doing well overall. Patient was transferred to \[facility]\"" from [address] [place] [address]. · MRI/MRA pending. · Neurosurgery evaluated the patient and do not plan an angiogram at this time considering the patients improved symptoms and negative test results so far. SUBJECTIVE     70 y.o. 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 Patient was seen and examined at [facility]. . Patient reports no headache this morning. The patient reports his headache started improving shortly after his Blood Glucose was corrected. The patient has a history of 2 immediate family members dying with "brain bleeds". The concern for possible SAH was elevated in the setting of his family history and the patient was transferred to [address] [place] [address] for neurosurgical evaluation. The patient's symptoms resolved and he was doing well. Patient was seen later in day and reported some intermittent minor headaches when he gets "worked up" or "stressed". Review of Systems   Constitutional: Negative for chills and fever. HENT: Negative for ear pain and sore throat. Eyes: Negative for pain and visual disturbance. Respiratory: Negative for cough and shortness of breath. Cardiovascular: Negative for chest pain and palpitations. Gastrointestinal: Negative for abdominal pain and vomiting. Genitourinary: Negative for dysuria and hematuria. Musculoskeletal: Negative for arthralgias and back pain. Skin: Negative for color change and rash. Neurological: Positive for headaches. All other systems reviewed and are negative. OBJECTIVE     Patient ID: Anthony Kimbrough is a 70 y.o. male.     Vitals:    09/16/23 1100 09/16/23 1200 09/16/23 1230 09/16/23 1300   BP: (!) 138/110 166/68 156/70 157/69   BP Location: Right arm Right arm Right arm Right arm   Pulse: 68 72 72 65   Resp: 18 16     Temp: 98 °F (36.7 °C)      TempSrc:       SpO2: 98% 99% 98% 99%      Temperature:   Temp (24hrs), Av.5 °F (36.9 °C), Min:98 °F (36.7 °C), Max:99.3 °F (37.4 °C)    Temperature: 98 °F (36.7 °C)      Physical Exam  Vitals reviewed. Constitutional:       Appearance: Normal appearance. HENT:      Head: Normocephalic and atraumatic. Mouth/Throat:      Mouth: Mucous membranes are moist.      Pharynx: Oropharynx is clear. Eyes:      Conjunctiva/sclera: Conjunctivae normal.      Pupils: Pupils are equal, round, and reactive to light. Neurological:      Mental Status: He is alert and oriented to person, place, and time. Cranial Nerves: Cranial nerves 2-12 are intact. Motor: Motor strength is normal.  Psychiatric:         Speech: Speech normal.          Neurologic Exam     Mental Status   Oriented to person, place, and time. Follows 3 step commands. Attention: normal. Concentration: normal.   Speech: speech is normal   Level of consciousness: alert  Knowledge: good. Able to name object. Able to read. Able to repeat. Normal comprehension. Cranial Nerves   Cranial nerves II through XII intact. CN III, IV, VI   Pupils are equal, round, and reactive to light. Motor Exam   Muscle bulk: normal  Overall muscle tone: normal    Strength   Strength 5/5 throughout. Sensory Exam   Light touch normal.        LABORATORY DATA     Labs: I have personally reviewed pertinent reports.     Results from last 7 days   Lab Units 23  0601 09/15/23  1149   WBC Thousand/uL 6.16 11.93*   HEMOGLOBIN g/dL 11.9* 12.9   HEMATOCRIT % 36.2* 39.4   PLATELETS Thousands/uL 300 335   NEUTROS PCT % 65  --    MONOS PCT % 8  --    EOS PCT % 2  --       Results from last 7 days   Lab Units 23  0601 09/15/23  1149   SODIUM mmol/L 139 139   POTASSIUM mmol/L 4.0 4.0   CHLORIDE mmol/L 107 105   CO2 mmol/L 24 25   BUN mg/dL 17 22   CREATININE mg/dL 1.04 1.11   CALCIUM mg/dL 8.2* 9.2   ALK PHOS U/L 44  --    ALT U/L 16  --    AST U/L 18  --      Results from last 7 days   Lab Units 23 MAGNESIUM mg/dL 1.6*     Results from last 7 days   Lab Units 09/16/23  0601   PHOSPHORUS mg/dL 3.0      Results from last 7 days   Lab Units 09/15/23  1149   INR  0.97   PTT seconds 27               IMAGING & DIAGNOSTIC TESTING     Radiology Results: I have personally reviewed pertinent reports. MRA head wo contrast    (Results Pending)   MRI brain w wo contrast    (Results Pending)       Other Diagnostic Testing: I have personally reviewed pertinent reports. ACTIVE MEDICATIONS     Current Facility-Administered Medications   Medication Dose Route Frequency   • atorvastatin (LIPITOR) tablet 10 mg  10 mg Oral Daily   • Brinzolamide-Brimonidine 1-0.2 % SUSP 1 drop  1 drop Both Eyes BID   • chlorhexidine (PERIDEX) 0.12 % oral rinse 15 mL  15 mL Mouth/Throat Q12H NATIVIDAD   • fluticasone (FLONASE) 50 mcg/act nasal spray 1 spray  1 spray Nasal Daily   • gabapentin (NEURONTIN) capsule 100 mg  100 mg Oral BID   • hydrALAZINE (APRESOLINE) injection 10 mg  10 mg Intravenous Q6H PRN   • insulin detemir (LEVEMIR) subcutaneous injection 66 Units  66 Units Subcutaneous Daily   • insulin lispro (HumaLOG) 100 units/mL subcutaneous injection 1-6 Units  1-6 Units Subcutaneous TID AC   • insulin lispro (HumaLOG) 100 units/mL subcutaneous injection 1-6 Units  1-6 Units Subcutaneous HS   • niCARdipine (CARDENE) 25 mg (STANDARD CONCENTRATION) in sodium chloride 0.9% 250 mL  1-15 mg/hr Intravenous Titrated   • Propylene Glycol (PF) 0.6 % SOLN 1 drop  1 drop Both Eyes BID   • sodium chloride (OCEAN) 0.65 % nasal spray 1 spray  1 spray Each Nare Q1H PRN       Prior to Admission medications    Medication Sig Start Date End Date Taking?  Authorizing Provider   aspirin (ECOTRIN LOW STRENGTH) 81 mg EC tablet Take 81 mg by mouth daily   Yes Historical Provider, MD   atorvastatin (LIPITOR) 10 mg tablet TAKE 1 TABLET BY MOUTH EVERY DAY 5/23/23  Yes Sobia Martin PA-C   fluticasone (FLONASE) 50 mcg/act nasal spray 1 spray into each nostril daily   Yes Historical Provider, MD   gabapentin (NEURONTIN) 100 mg capsule TAKE 1 CAPSULE BY MOUTH TWICE A DAY 8/1/23  Yes Ayesha Geronimo PA-C   Levemir FlexTouch 100 units/mL injection pen Inject 66 Units under the skin daily 10/21/22  Yes Ayesha Geronimo PA-C   lisinopril (ZESTRIL) 10 mg tablet TAKE 1 TABLET BY MOUTH EVERY DAY 5/23/23  Yes Ayesha Geronimo PA-C   metFORMIN (GLUCOPHAGE-XR) 500 mg 24 hr tablet TAKE 2 TABLETS BY MOUTH TWICE A DAY 7/5/23  Yes Ayesha Geronmio PA-C   Milk Thistle 150 MG CAPS Take by mouth 12/7/17  Yes Historical Provider, MD   Multiple Vitamins-Minerals (MULTIVITAMIN WITH MINERALS) tablet Take 1 tablet by mouth daily   Yes Historical Provider, MD   sodium chloride (OCEAN) 0.65 % nasal spray 1 spray into each nostril as needed for congestion   Yes Historical Provider, MD   BD Pen Needle Meghana 2nd Gen 32G X 4 MM MISC DAILY USE AS DIRECTED 6/29/23   Ayesha Geronimo PA-C   Cholecalciferol 125 MCG (5000 UT) capsule Take 5,000 Units by mouth daily    Historical Provider, MD   Continuous Blood Gluc  (FreeStyle [place] 14 Day [place]) CAMRON Use 1 application 4 (four) times a day Patient to check his blood sugar 4 times a day 2/20/23   Ayesha Geronimo PA-C   Continuous Blood Gluc  (FreeStyle [place] 2 [place]) CAMRON Check blood sugars multiple times per day 8/17/23   Ayesha Geronimo PA-C   Continuous Blood Gluc Sensor (FreeStyle [place] 14 Day Sensor) MISC USE 1 EVERY 14 DAYS 8/16/23   Ayesha Geronimo PA-C   Continuous Blood Gluc Sensor (FreeStyle [place] 2 Sensor) MISC Check blood sugars multiple times per day 8/17/23   Ayesha Geronimo PA-C   Diclofenac Sodium (VOLTAREN) 1 % Apply 2 g topically 4 (four) times a day 5/6/22   Anjelica Thurston DO   latanoprost (XALATAN) 0.005 % ophthalmic solution PUT 1 DROP INTO BOTH EYES IN THE EVENING  Patient not taking: Reported on 9/15/2023 11/18/19   Historical Provider, MD Johansen 0.01 % ophthalmic drops  6/2/23 Historical Provider, MD   pioglitazone (ACTOS) 15 mg tablet TAKE 1 TABLET BY MOUTH EVERY DAY  Patient not taking: Reported on 9/15/2023 4/24/23   Milton Avila PA-C   Specialty Vitamins Products (MAGNESIUM, AMINO ACID CHELATE,) 133 MG tablet Take 1 tablet by mouth 2 (two) times a day    Historical Provider, MD   Vitamins-Lipotropics (B-100 PO) Take by mouth    Historical Provider, MD         VTE Pharmacologic Prophylaxis: Pharmacologic VTE Prophylaxis contraindicated due to Concern for brain bleed. VTE Mechanical Prophylaxis: sequential compression device    ======    I have discussed the patient's history, physical exam findings, assessment, and plan in detail with attending, Dr. Clare Ortega    Thank you for allowing me to participate in the care of your patient, Robert Amato.     Andrea Preciado Neurology Residency, PGY-2

## 2023-09-16 NOTE — ASSESSMENT & PLAN NOTE
· 70 y.o. R handed male w/ hx of HTN, dyslipidemia, T2DM, current smoker, chronic hearing loss, melanoma (ear s/p excision) who presented as a stroke alert on 09/15/23 at 11:46AM.  LKW 2.5hrs ago. Initial presenting deficits: thunderclap headache, L sided sensory loss. On ASA 81mg QD (last dose this morning). Family hx significant for aneurysmal SAH in father, sister (passed away from it). · Per EMS/ED, initial Blood Pressure: (!) 192/91 and persistently 189/81 afterwards. Pulse 61. Glucose 115. NIHSS 1 (L sided sensory loss). · CTH wo contrast unremarkable particularly for [facility] or IVH, or any other acute ischemic changes. · CTA h/n unremarkable for LVO, dissection, aneurysms. · Not a TNK candidate due to: concern for [facility]. · On exam, patient does complain of some neck stiffness and nausea. However, no light sensitivity, no anisocoria, no cranial nerve deficits, no focal motor or sensory deficits noted. · Hunt & Vanegas Scale: 1 (mild headache, slight nuchal rigidity)     Sudden onset holocephalic headache with a crescendo pattern w/ L sided sensory loss w/ SBP 180s-190s in a patient w/ strong family hx of aneurysmal bleeds concerning for [facility] vs [facility].  Even in the case of CTH and LP being negative, there would remain concern for a sentinel headache. Complicated migraine also on the differential, but in this case will most certainly be a diagnosis of exclusion. Plan, stroke pathway:  · LP results not concerning for [facility]. · -160, MAP > 65;  · STAT CTH if changes in exam/decline in GCS > 2 pts/1hr  · DVT ppx: SCDs only  · Neuro checks Q1H  · Patient doing well overall. Patient was transferred to [facility] from [address] [place] [address]. · MRI/MRA pending. · Neurosurgery evaluated the patient and do not plan an angiogram at this time considering the patients improved symptoms and negative test results so far.

## 2023-09-17 VITALS
SYSTOLIC BLOOD PRESSURE: 151 MMHG | HEART RATE: 63 BPM | TEMPERATURE: 98 F | RESPIRATION RATE: 16 BRPM | OXYGEN SATURATION: 98 % | DIASTOLIC BLOOD PRESSURE: 61 MMHG

## 2023-09-17 PROBLEM — R91.1 LUNG NODULE: Status: ACTIVE | Noted: 2023-09-17

## 2023-09-17 LAB
BACTERIA CSF CULT: NO GROWTH
GLUCOSE SERPL-MCNC: 158 MG/DL (ref 65–140)
GLUCOSE SERPL-MCNC: 191 MG/DL (ref 65–140)

## 2023-09-17 PROCEDURE — 82948 REAGENT STRIP/BLOOD GLUCOSE: CPT

## 2023-09-17 PROCEDURE — 99238 HOSP IP/OBS DSCHRG MGMT 30/<: CPT | Performed by: PHYSICIAN ASSISTANT

## 2023-09-17 RX ORDER — INSULIN DETEMIR 100 [IU]/ML
60 INJECTION, SOLUTION SUBCUTANEOUS DAILY
Qty: 15 ML | Refills: 0
Start: 2023-09-17

## 2023-09-17 RX ADMIN — BRINZOLAMIDE/BRIMONIDINE TARTRATE 1 DROP: 10; 2 SUSPENSION/ DROPS OPHTHALMIC at 08:41

## 2023-09-17 RX ADMIN — PROPYLENE GLYCOL 1 DROP: 0.06 SOLUTION/ DROPS OPHTHALMIC at 08:41

## 2023-09-17 RX ADMIN — CHLORHEXIDINE GLUCONATE 15 ML: 1.2 SOLUTION ORAL at 08:38

## 2023-09-17 RX ADMIN — FLUTICASONE PROPIONATE 1 SPRAY: 50 SPRAY, METERED NASAL at 08:39

## 2023-09-17 RX ADMIN — INSULIN LISPRO 2 UNITS: 100 INJECTION, SOLUTION INTRAVENOUS; SUBCUTANEOUS at 08:40

## 2023-09-17 RX ADMIN — ATORVASTATIN CALCIUM 10 MG: 10 TABLET, FILM COATED ORAL at 08:38

## 2023-09-17 RX ADMIN — GABAPENTIN 100 MG: 100 CAPSULE ORAL at 08:39

## 2023-09-17 RX ADMIN — INSULIN DETEMIR 66 UNITS: 100 INJECTION, SOLUTION SUBCUTANEOUS at 08:38

## 2023-09-17 NOTE — ASSESSMENT & PLAN NOTE
Incidentally noted on CT head/neck: 3 mm right upper lobe pulmonary nodule  · Repeat CT chest in 3 months, defer to PCP

## 2023-09-17 NOTE — ASSESSMENT & PLAN NOTE
Lab Results   Component Value Date    HGBA1C 6.8 (H) 04/07/2023       Recent Labs     09/16/23  1056 09/16/23  1714 09/16/23  2101 09/17/23  0837   POCGLU 163* 71 173* 191*       Blood Sugar Average: Last 72 hrs:  (P) 149.7207448120811855     · Hold oral medications while inpatient   · Continued on home insulin regimen Levemir 66 units daily with overall stable blood sugars at this time   · Patient with hypoglycemia episodes outpatient, with concern for diabetic seizure as above   · Recommended decrease Levemir to 60 units daily on discharge, and follow-up closely with PCP within 1 week for further management   · Patient declined referral to endocrinology

## 2023-09-17 NOTE — ASSESSMENT & PLAN NOTE
Concern for hypertensive emergency on arrival, BP now much improved off of medication  · Required Cardene gtt initially, discontinued yesterday   · Home regimen includes lisinopril 10 mg daily which is currently on hold, resume on discharge   · Encouraged patient to keep daily BP log and bring with to PCP visit in 1 week

## 2023-09-17 NOTE — DISCHARGE SUMMARY
[address] [facility]  Discharge- Alexandre Fruit 1955, 70 y.o. male MRN: [id number]  Unit/Bed#: [facility] 967-85 Encounter: [id number]  Primary Care Provider: Nati Marlow PA-C   Date and time admitted to hospital: 9/15/2023  4:31 PM    * Thunderclap headache  Assessment & Plan  Presented with sudden onset holocephalic headache with a crescendo pattern associated with L sided sensory loss and SBP 180s-190s in patient with strong family hx of aneurysmal bleed. Due to concern for [facility] and possible intervention.    · CTH and CTA head and neck unremarkable   · MRI /MRA brain unremarkable   · LP not consistent with SAH; meningitis panel negative   · Spot EEG negative   · Neurosurgery evaluated and singed off, deferred cerebral angiogram   · Appreciate neurology inputs, no need for further inpatient workup and cleared for discharge   · Per ICU notes, consideration for diabetic seizure in the setting of hypoglycemia, see plan below    Type 2 diabetes mellitus without complication, with long-term current use of insulin [facility])  Assessment & Plan  Lab Results   Component Value Date    HGBA1C 6.8 (H) 04/07/2023       Recent Labs     09/16/23  1056 09/16/23  1714 09/16/23  2101 09/17/23  0837   POCGLU 163* 71 173* 191*       Blood Sugar Average: Last 72 hrs:  (P) 149.9539874387833225     · Metformin held while inpatient, resume on discharge   · Continued on home insulin regimen Levemir 66 units daily with overall stable blood sugars while hospitalized  · Patient reported long-standing history of hypoglycemia episodes outpatient; note concern for diabetic seizure as above   · Recommended decrease Levemir to 60 units daily on discharge, and follow-up closely with PCP within 1 week for further management   · Patient declined referral to endocrinology at time of discharge     Lung nodule  Assessment & Plan  Incidentally noted on CT head/neck: 3 mm right upper lobe pulmonary nodule  · Repeat CT chest in 3 months, defer to PCP     Essential hypertension  Assessment & Plan  Concern for hypertensive emergency on arrival, BP now much improved and remains stable   · Required Cardene gtt initially, eventually discontinued   · Home regimen includes lisinopril 10 mg daily which has been on hold since admission, resume on discharge   · Encouraged patient to keep daily BP log and bring with to PCP visit in 1 week     Hyperlipidemia  Assessment & Plan  · Continue statin     Medical Problems     Resolved Problems  Date Reviewed: 9/17/2023   None       Discharging Physician / Practitioner: Charity Ellis PA-C  PCP: Sobia Martin PA-C  Admission Date:   Admission Orders (From admission, onward)     Ordered        09/15/23 1633  Inpatient Admission  Once                      Discharge Date: 09/17/23    Consultations During Hospital Stay:  · Neurology   · Neurosurgery     Procedures Performed:   · None     Significant Findings / Test Results:   · Outlined above     Incidental Findings:   · Pulmonary nodule      Test Results Pending at Discharge (will require follow up): · None      Outpatient Tests Requested:  · Repeat CT chest in 3 months - defer to PCP to coordinate     Complications:  None     Reason for Admission: thunderclap headache, stroke like symptoms     Hospital Course:   Lion Guzman is a 70 y.o. male patient who originally presented to the hospital on 9/15/2023 due to acute onset thunderclap headache associated with left sided sensory deficits. He was a stroke alert on arrival. There was concern for [facility] vs [facility] in the setting of strong family history with elevated BP. He was started on Cardene gtt and transferred to [facility] for neurovascular evaluation. Additional work-up with CTA head/neck, MRI/MRA brain were unremarkable. LP was not consistent with SAH. He was evaluated by neurosurgery, who recommended no further workup or intervention.  He was also seen in consultation by neurology, as there was suspicion for diabetic seizure in the setting of hypoglycemia. EEG was negative and they cleared him for DC without need for further workup. BP significantly improved and stable off of Cardene infusion. He will resume his home dose Lisinopril and reduced dose of Levemir on discharge. He was advised to keep BP and blood sugar log and follow-up with PCP within 1 week for further management. He declined Endocrinology referral.     Please see above list of diagnoses and related plan for additional information. Condition at Discharge: good    Discharge Day Visit / Exam:   Subjective:  Patient offers no complaints this morning. Denies headache, numbness or tingling. He notes he has struggled with hypoglycemia in the past, but usually responds to PO intake. Suggested he decrease dose of Levemir to 60 units daily and keep long and follow-up with PCP on discharge, he is in agreement. Vitals: Blood Pressure: 151/61 (09/17/23 1040)  Pulse: 63 (09/17/23 1040)  Temperature: 98 °F (36.7 °C) (09/17/23 0850)  Temp Source: Oral (09/17/23 0850)  Respirations: 16 (09/17/23 0850)  SpO2: 98 % (09/17/23 1040)  Exam:   Physical Exam  Vitals and nursing note reviewed. Constitutional:       General: He is not in acute distress. Appearance: Normal appearance. Cardiovascular:      Rate and Rhythm: Normal rate. Pulmonary:      Effort: Pulmonary effort is normal. No respiratory distress. Neurological:      General: No focal deficit present. Mental Status: He is alert and oriented to person, place, and time. Mental status is at baseline. Psychiatric:         Mood and Affect: Affect is flat. Discussion with Family: Updated  (wife) at bedside. Discharge instructions/Information to patient and family:   See after visit summary for information provided to patient and family.       Provisions for Follow-Up Care:  See after visit summary for information related to follow-up care and any pertinent home health orders. Disposition:   Home    Planned Readmission: no     Discharge Statement:  I spent 25 minutes discharging the patient. This time was spent on the day of discharge. I had direct contact with the patient on the day of discharge. Greater than 50% of the total time was spent examining patient, answering all patient questions, arranging and discussing plan of care with patient as well as directly providing post-discharge instructions. Additional time then spent on discharge activities. Discharge Medications:  See after visit summary for reconciled discharge medications provided to patient and/or family.       **Please Note: This note may have been constructed using a voice recognition system**

## 2023-09-17 NOTE — PLAN OF CARE
Problem: SAFETY ADULT  Goal: Patient will remain free of falls  Description: INTERVENTIONS:  - Educate patient/family on patient safety including physical limitations  - Instruct patient to call for assistance with activity   - Consult OT/PT to assist with strengthening/mobility   - Keep Call bell within reach  - Keep bed low and locked with side rails adjusted as appropriate  - Keep care items and personal belongings within reach  - Initiate and maintain comfort rounds  - Make Fall Risk Sign visible to staff  - Apply yellow socks and bracelet for high fall risk patients  - Consider moving patient to room near nurses station  Outcome: Progressing  Goal: Maintain or return to baseline ADL function  Description: INTERVENTIONS:  -  Assess patient's ability to carry out ADLs; assess patient's baseline for ADL function and identify physical deficits which impact ability to perform ADLs (bathing, care of mouth/teeth, toileting, grooming, dressing, etc.)  - Assess/evaluate cause of self-care deficits   - Assess range of motion  - Assess patient's mobility; develop plan if impaired  - Assess patient's need for assistive devices and provide as appropriate  - Encourage maximum independence but intervene and supervise when necessary  - Involve family in performance of ADLs  - Assess for home care needs following discharge   - Consider OT consult to assist with ADL evaluation and planning for discharge  - Provide patient education as appropriate  Outcome: Progressing  Goal: Maintains/Returns to pre admission functional level  Description: INTERVENTIONS:  - Perform BMAT or MOVE assessment daily.   - Set and communicate daily mobility goal to care team and patient/family/caregiver.    - Collaborate with rehabilitation services on mobility goals if consulted  - Out of bed for toileting  - Record patient progress and toleration of activity level   Outcome: Progressing     Problem: NEUROSENSORY - ADULT  Goal: Achieves stable or improved neurological status  Description: INTERVENTIONS  - Monitor and report changes in neurological status  - Monitor vital signs such as temperature, blood pressure, glucose, and any other labs ordered   - Initiate measures to prevent increased intracranial pressure  - Monitor for seizure activity and implement precautions if appropriate      Outcome: Progressing  Goal: Remains free of injury related to seizures activity  Description: INTERVENTIONS  - Maintain airway, patient safety  and administer oxygen as ordered  - Monitor patient for seizure activity, document and report duration and description of seizure to physician/advanced practitioner  - If seizure occurs,  ensure patient safety during seizure  - Reorient patient post seizure  - Seizure pads on all 4 side rails  - Instruct patient/family to notify RN of any seizure activity including if an aura is experienced  - Instruct patient/family to call for assistance with activity based on nursing assessment  - Administer anti-seizure medications if ordered    Outcome: Progressing  Goal: Achieves maximal functionality and self care  Description: INTERVENTIONS  - Monitor swallowing and airway patency with patient fatigue and changes in neurological status  - Encourage and assist patient to increase activity and self care.    - Encourage visually impaired, hearing impaired and aphasic patients to use assistive/communication devices  Outcome: Progressing

## 2023-09-17 NOTE — CASE MANAGEMENT
Case Management Progress Note    Patient name Lake Bird  Location [facility] 714/PPHP 434-72 MRN [id number]  : 1955 Date 2023       LOS (days): 2  Geometric Mean LOS (GMLOS) (days):   Days to GMLOS:        OBJECTIVE:        Current admission status: Inpatient  Preferred Pharmacy:   [facility]/pharmacy #[id number]- [place], [place] - [address] [address]  Phone: [phone] Fax: [phone]    Primary Care Provider: Ayesha Geronimo PA-C    Primary Insurance: MEDICARE  Secondary Insurance: AARP    PROGRESS NOTE:    CM consulted for "disposition planning."    Per chart, discharge order written. No CM needs identified at this time. CM department available for any DCP needs. Consult closed at this time.     JOSE LUIS Kennedy, CHRIS, ACCATALINA, CCM  23 11:34 AM

## 2023-09-17 NOTE — ASSESSMENT & PLAN NOTE
Presented with sudden onset holocephalic headache with a crescendo pattern w/ L sided sensory loss and SBP 180s-190s in a patient w/ strong family hx of aneurysmal bleeds concerning for [facility] vs [facility]. Transferred to [facility] for neurovascular evaluation and possible intervention.    · CTH and CTA head and neck unremarkable   · MRI /MRA brain unremarkable   · LP not consistent with SAH   · EEG negative   · Neurosurgery evaluated and singed off, did not recommend cerebral angiogram   · Appreciate neurology inputs, no need for further inpatient workup and cleared for discharge   · Per ICU notes, consideration for diabetic seizure in the setting of hypoglycemia, see plan below

## 2023-09-18 ENCOUNTER — TELEPHONE (OUTPATIENT)
Age: 68
End: 2023-09-18

## 2023-09-18 ENCOUNTER — TRANSITIONAL CARE MANAGEMENT (OUTPATIENT)
Dept: FAMILY MEDICINE CLINIC | Facility: CLINIC | Age: 68
End: 2023-09-18

## 2023-09-18 LAB — BACTERIA CSF CULT: NO GROWTH

## 2023-09-18 NOTE — TELEPHONE ENCOUNTER
Spoke to pts wife, Ruperto Paniagua. He does need the labs Kristal Sheth ordered. Advised to fast prior.

## 2023-09-18 NOTE — TELEPHONE ENCOUNTER
Per Escamilla was in hospital for a diabetic seizure. He has a f/u on Oct 6 with Cristiano Washington   Wife, Rufina Shah would like to know since he already had bloodwork in the hospital does he still need to get the bloodwork that Cristiano Washington order for him?   Please call Rufina Shah at  [phone]     Thank You

## 2023-09-20 LAB
ATRIAL RATE: 67 BPM
P AXIS: 25 DEGREES
PR INTERVAL: 144 MS
QRS AXIS: -29 DEGREES
QRSD INTERVAL: 122 MS
QT INTERVAL: 428 MS
QTC INTERVAL: 452 MS
T WAVE AXIS: 33 DEGREES
VENTRICULAR RATE: 67 BPM

## 2023-09-20 PROCEDURE — 93010 ELECTROCARDIOGRAM REPORT: CPT | Performed by: INTERNAL MEDICINE

## 2023-09-25 ENCOUNTER — OFFICE VISIT (OUTPATIENT)
Dept: FAMILY MEDICINE CLINIC | Facility: CLINIC | Age: 68
End: 2023-09-25
Payer: MEDICARE

## 2023-09-25 VITALS
OXYGEN SATURATION: 99 % | TEMPERATURE: 97.6 F | DIASTOLIC BLOOD PRESSURE: 60 MMHG | BODY MASS INDEX: 27.3 KG/M2 | SYSTOLIC BLOOD PRESSURE: 128 MMHG | HEIGHT: 73 IN | HEART RATE: 63 BPM | WEIGHT: 206 LBS

## 2023-09-25 DIAGNOSIS — Z23 ENCOUNTER FOR IMMUNIZATION: ICD-10-CM

## 2023-09-25 DIAGNOSIS — Z79.4 TYPE 2 DIABETES MELLITUS WITH BOTH EYES AFFECTED BY MILD NONPROLIFERATIVE RETINOPATHY WITHOUT MACULAR EDEMA, WITH LONG-TERM CURRENT USE OF INSULIN (HCC): ICD-10-CM

## 2023-09-25 DIAGNOSIS — G44.53 THUNDERCLAP HEADACHE: Primary | ICD-10-CM

## 2023-09-25 DIAGNOSIS — R91.1 LUNG NODULE: ICD-10-CM

## 2023-09-25 DIAGNOSIS — E16.2 HYPOGLYCEMIA: ICD-10-CM

## 2023-09-25 DIAGNOSIS — E11.3293 TYPE 2 DIABETES MELLITUS WITH BOTH EYES AFFECTED BY MILD NONPROLIFERATIVE RETINOPATHY WITHOUT MACULAR EDEMA, WITH LONG-TERM CURRENT USE OF INSULIN (HCC): ICD-10-CM

## 2023-09-25 DIAGNOSIS — E78.5 HYPERLIPIDEMIA, UNSPECIFIED HYPERLIPIDEMIA TYPE: ICD-10-CM

## 2023-09-25 LAB — SL AMB POCT HEMOGLOBIN AIC: 6.8 (ref ?–6.5)

## 2023-09-25 PROCEDURE — 90662 IIV NO PRSV INCREASED AG IM: CPT

## 2023-09-25 PROCEDURE — G0008 ADMIN INFLUENZA VIRUS VAC: HCPCS

## 2023-09-25 PROCEDURE — 83036 HEMOGLOBIN GLYCOSYLATED A1C: CPT | Performed by: PHYSICIAN ASSISTANT

## 2023-09-25 PROCEDURE — 99495 TRANSJ CARE MGMT MOD F2F 14D: CPT | Performed by: PHYSICIAN ASSISTANT

## 2023-09-25 NOTE — PROGRESS NOTES
Assessment & Plan     1. Thunderclap headache  Comments:  Further symptoms. Stroke work-up was negative    2. Type 2 diabetes mellitus with both eyes affected by mild nonproliferative retinopathy without macular edema, with long-term current use of insulin ([facility])  -     POCT hemoglobin A1c    3. Lung nodule  Comments: The dental finding of a 3 mm right upper lobe lung nodule. Recheck CT scan in 3 months  Orders:  -     CT chest wo contrast; Future; Expected date: 09/25/2023    4. Hyperlipidemia, unspecified hyperlipidemia type  Comments:  Continue statin and low-fat diet    5. Hypoglycemia  Comments:  Levemir was decreased to 60 units. Will phone alarm for hypoglycemia         Subjective     Transitional Care Management Review:   Chris Alcala is a 70 y.o. male here for TCM follow up. During the TCM phone call patient stated:  TCM Call     Date and time call was made  9/18/2023  9:53 AM    Hospital care reviewed  Records reviewed    Patient was hospitialized at  [address] [place] [address]    Date of Admission  09/15/23    Date of discharge  09/17/23    Diagnosis  Thunderclap headache    Disposition  Home    Were the patients medications reviewed and updated  Yes    Current Symptoms  None      TCM Call     Post hospital issues  None    Should patient be enrolled in anticoag monitoring? No    Scheduled for follow up?   Yes    Did you obtain your prescribed medications  Yes    Do you need help managing your prescriptions or medications  No    Is transportation to your appointment needed  No    I have advised the patient to call PCP with any new or worsening symptoms  [address] [facility] or [facility] other    Are you recieving any outpatient services  No    Are you recieving home care services  No    Are you using any community resources  No    Current waiver services  No    Have you fallen in the last 12 months  No    Interperter language line needed  No    Counseling  Patient Counseling topics  Importance of RX compliance        Presents in the office with his wife for transition of care management. Patient was hospitalized timbre 15th through the 17th. In with strokelike symptoms . Had a thunderclap headache. CTA of the head and neck were normal MRI I and MRA were normal lumbar puncture normal as well. Was felt to be due to hypoglycemic episode that patient was having. Patient states it was very difficult for them to get his blood sugar to elevate. Sent home with Levemir at 60 units. He is to take metformin  twice daily and the neuropathy he is on gabapentin 100 mg twice daily. He has a continuous glucometer reader. No alarm went off. We will need to check the settings of his cell phone and meter. Incidental finding of a 3 mm right upper Marky nodule. Neurologist recommending CT scan in 3 months due to history of basal cell carcinoma. For hypertension he is on lisinopril 10 mg. Had elevated blood pressure in the hospital.  He monitors his blood pressure at home his systolic blood pressures have been in the 140s. Diastolics are normal he remains on atorvastatin 10 mg for lipid control    Review of Systems   Constitutional: Negative for activity change, appetite change, chills, fatigue and fever. HENT: Negative for ear pain and sore throat. Eyes: Negative for visual disturbance. Respiratory: Negative for cough and shortness of breath. Cardiovascular: Negative for chest pain, palpitations and leg swelling. Gastrointestinal: Negative for abdominal pain, blood in stool, constipation, diarrhea and nausea. Genitourinary: Negative for difficulty urinating. Musculoskeletal: Negative for arthralgias, back pain and myalgias. Skin: Negative for rash. Neurological: Negative for dizziness, syncope and headaches. Psychiatric/Behavioral: Negative for sleep disturbance.        Objective     /60   Pulse 63   Temp 97.6 °F (36.4 °C) (Temporal)   Ht 6' 1" (1.854 m)   Wt 93.4 kg (206 lb)   SpO2 99%   BMI 27.18 kg/m²      Physical Exam  Vitals and nursing note reviewed. Constitutional:       Appearance: Normal appearance. He is not ill-appearing. HENT:      Head: Normocephalic and atraumatic. Right Ear: Tympanic membrane, ear canal and external ear normal.      Left Ear: Tympanic membrane, ear canal and external ear normal.   Eyes:      Pupils: Pupils are equal, round, and reactive to light. Neck:      Thyroid: No thyromegaly. Vascular: No carotid bruit. Cardiovascular:      Rate and Rhythm: Normal rate and regular rhythm. Pulses: Normal pulses. Heart sounds: Normal heart sounds. No murmur heard. Pulmonary:      Effort: Pulmonary effort is normal.      Breath sounds: Normal breath sounds. Abdominal:      General: Bowel sounds are normal.      Palpations: Abdomen is soft. There is no mass. Tenderness: There is no abdominal tenderness. Musculoskeletal:      Right lower leg: No edema. Left lower leg: No edema. Skin:     General: Skin is warm and dry. Coloration: Skin is not pale. Findings: No erythema. Neurological:      General: No focal deficit present. Mental Status: He is alert and oriented to person, place, and time. Psychiatric:         Mood and Affect: Mood normal.         Behavior: Behavior normal.         Thought Content:  Thought content normal.         Judgment: Judgment normal.       Medications have been reviewed by provider in current encounter    Rosio Pickett PA-C

## 2023-10-21 ENCOUNTER — APPOINTMENT (OUTPATIENT)
Dept: LAB | Facility: MEDICAL CENTER | Age: 68
End: 2023-10-21
Payer: MEDICARE

## 2023-10-21 DIAGNOSIS — I10 ESSENTIAL HYPERTENSION: ICD-10-CM

## 2023-10-21 DIAGNOSIS — E78.5 HYPERLIPIDEMIA, UNSPECIFIED HYPERLIPIDEMIA TYPE: ICD-10-CM

## 2023-10-21 DIAGNOSIS — E11.3293 TYPE 2 DIABETES MELLITUS WITH BOTH EYES AFFECTED BY MILD NONPROLIFERATIVE RETINOPATHY WITHOUT MACULAR EDEMA, WITH LONG-TERM CURRENT USE OF INSULIN (HCC): ICD-10-CM

## 2023-10-21 DIAGNOSIS — Z79.4 TYPE 2 DIABETES MELLITUS WITH BOTH EYES AFFECTED BY MILD NONPROLIFERATIVE RETINOPATHY WITHOUT MACULAR EDEMA, WITH LONG-TERM CURRENT USE OF INSULIN (HCC): ICD-10-CM

## 2023-10-21 LAB
ALBUMIN SERPL BCP-MCNC: 3.9 G/DL (ref 3.5–5)
ALP SERPL-CCNC: 42 U/L (ref 34–104)
ALT SERPL W P-5'-P-CCNC: 23 U/L (ref 7–52)
ANION GAP SERPL CALCULATED.3IONS-SCNC: 10 MMOL/L
AST SERPL W P-5'-P-CCNC: 19 U/L (ref 13–39)
BILIRUB SERPL-MCNC: 0.84 MG/DL (ref 0.2–1)
BUN SERPL-MCNC: 20 MG/DL (ref 5–25)
CALCIUM SERPL-MCNC: 8.6 MG/DL (ref 8.4–10.2)
CHLORIDE SERPL-SCNC: 108 MMOL/L (ref 96–108)
CHOLEST SERPL-MCNC: 98 MG/DL
CO2 SERPL-SCNC: 24 MMOL/L (ref 21–32)
CREAT SERPL-MCNC: 1.15 MG/DL (ref 0.6–1.3)
GFR SERPL CREATININE-BSD FRML MDRD: 64 ML/MIN/1.73SQ M
GLUCOSE P FAST SERPL-MCNC: 182 MG/DL (ref 65–99)
HDLC SERPL-MCNC: 55 MG/DL
LDLC SERPL CALC-MCNC: 28 MG/DL (ref 0–100)
POTASSIUM SERPL-SCNC: 4.3 MMOL/L (ref 3.5–5.3)
PROT SERPL-MCNC: 6 G/DL (ref 6.4–8.4)
SODIUM SERPL-SCNC: 142 MMOL/L (ref 135–147)
TRIGL SERPL-MCNC: 74 MG/DL

## 2023-10-21 PROCEDURE — 80061 LIPID PANEL: CPT

## 2023-10-21 PROCEDURE — 80053 COMPREHEN METABOLIC PANEL: CPT

## 2023-10-21 PROCEDURE — 36415 COLL VENOUS BLD VENIPUNCTURE: CPT

## 2023-10-25 PROBLEM — G44.53 THUNDERCLAP HEADACHE: Status: RESOLVED | Noted: 2023-09-15 | Resolved: 2023-10-25

## 2023-10-26 ENCOUNTER — RA CDI HCC (OUTPATIENT)
Dept: OTHER | Facility: HOSPITAL | Age: 68
End: 2023-10-26

## 2023-10-27 ENCOUNTER — OFFICE VISIT (OUTPATIENT)
Dept: FAMILY MEDICINE CLINIC | Facility: CLINIC | Age: 68
End: 2023-10-27
Payer: MEDICARE

## 2023-10-27 VITALS
WEIGHT: 201.2 LBS | HEIGHT: 72 IN | SYSTOLIC BLOOD PRESSURE: 116 MMHG | BODY MASS INDEX: 27.25 KG/M2 | OXYGEN SATURATION: 99 % | TEMPERATURE: 97.2 F | HEART RATE: 67 BPM | DIASTOLIC BLOOD PRESSURE: 60 MMHG

## 2023-10-27 DIAGNOSIS — I10 ESSENTIAL HYPERTENSION: ICD-10-CM

## 2023-10-27 DIAGNOSIS — S46.312A STRAIN OF LEFT TRICEPS, INITIAL ENCOUNTER: ICD-10-CM

## 2023-10-27 DIAGNOSIS — Z79.4 TYPE 2 DIABETES MELLITUS WITH BOTH EYES AFFECTED BY MILD NONPROLIFERATIVE RETINOPATHY WITHOUT MACULAR EDEMA, WITH LONG-TERM CURRENT USE OF INSULIN (HCC): Primary | ICD-10-CM

## 2023-10-27 DIAGNOSIS — E11.3293 TYPE 2 DIABETES MELLITUS WITH BOTH EYES AFFECTED BY MILD NONPROLIFERATIVE RETINOPATHY WITHOUT MACULAR EDEMA, WITH LONG-TERM CURRENT USE OF INSULIN (HCC): Primary | ICD-10-CM

## 2023-10-27 DIAGNOSIS — E78.5 HYPERLIPIDEMIA, UNSPECIFIED HYPERLIPIDEMIA TYPE: ICD-10-CM

## 2023-10-27 PROCEDURE — 99214 OFFICE O/P EST MOD 30 MIN: CPT | Performed by: PHYSICIAN ASSISTANT

## 2023-10-27 NOTE — PROGRESS NOTES
Assessment/Plan:     Diagnoses and all orders for this visit:    Type 2 diabetes mellitus with both eyes affected by mild nonproliferative retinopathy without macular edema, with long-term current use of insulin ([facility])  Comments:  Diabetes is at goal continue current regimen  Orders:  -     Hemoglobin A1C; Future  -     Comprehensive metabolic panel; Future  -     CBC and differential; Future  -     Albumin / creatinine urine ratio; Future  -     Lipid Panel with Direct LDL reflex; Future    Essential hypertension  Comments:  Saran Band is at goal continue current regimen  Orders:  -     Comprehensive metabolic panel; Future    Hyperlipidemia, unspecified hyperlipidemia type  Comments:  Lipids are at goal continue statin therapy and low-fat diet  Orders:  -     Lipid Panel with Direct LDL reflex; Future    Strain of left triceps, initial encounter  Comments:  Observe          Subjective:      Patient ID: Isela Velasquez is a 70 y.o. male. Patient presents in the office for follow-up chronic conditions. Patient has diabetes on insulin therapy. He has complications of retinopathy and neuropathy. Current regimen includes metformin  of them twice a day and Levemir 60 units. Has a continuous glucometer reader. His current A1c is 6.8. Neuropathy he is on gabapentin 100 mg twice daily. Pressure is well controlled with lisinopril 10 mg. For hyperlipidemia he is on atorvastatin 10 mg. He is normal.  Panel is at goal.  States he was to start his leaf blower and the record became stuck. He felt a snap in the bicep area of his left upper arm. His pain and swelling.         The following portions of the patient's history were reviewed and updated as appropriate: He   Patient Active Problem List    Diagnosis Date Noted    Lung nodule 09/17/2023    Glaucoma 07/15/2021    Type 2 diabetes mellitus 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, bilateral ([address]) 06/01/2021    Skin cancer, basal cell 02/07/2020    Decreased hearing of both ears 02/07/2020    Essential hypertension 02/05/2020    Hyperlipidemia 02/05/2020    Type 2 diabetes mellitus without complication, with long-term current use of insulin ([address]) 02/05/2020    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     Current Outpatient Medications   Medication Sig Dispense Refill    aspirin (ECOTRIN LOW STRENGTH) 81 mg EC tablet Take 81 mg by mouth daily      atorvastatin (LIPITOR) 10 mg tablet TAKE 1 TABLET BY MOUTH EVERY DAY 90 tablet 1    BD Pen Needle Meghana 2nd Gen 32G X 4 MM MISC DAILY USE AS DIRECTED 100 each 3    Cholecalciferol 125 MCG (5000 UT) capsule Take 5,000 Units by mouth daily      Continuous Blood Gluc  (FreeStyle Kalpana 14 Day [place]) CAMRON Use 1 application 4 (four) times a day Patient to check his blood sugar 4 times a day 1 each 0    Continuous Blood Gluc Sensor (FreeStyle Kalpana 14 Day Sensor) MISC USE 1 EVERY 14 DAYS 1 each 3    fluticasone (FLONASE) 50 mcg/act nasal spray 1 spray into each nostril daily      gabapentin (NEURONTIN) 100 mg capsule TAKE 1 CAPSULE BY MOUTH TWICE A DAY 60 capsule 2    Levemir FlexTouch 100 units/mL injection pen Inject 60 Units under the skin daily 15 mL 0    lisinopril (ZESTRIL) 10 mg tablet TAKE 1 TABLET BY MOUTH EVERY DAY 90 tablet 1    metFORMIN (GLUCOPHAGE-XR) 500 mg 24 hr tablet TAKE 2 TABLETS BY MOUTH TWICE A  tablet 1    Milk Thistle 150 MG CAPS Take by mouth      Multiple Vitamins-Minerals (MULTIVITAMIN WITH MINERALS) tablet Take 1 tablet by mouth daily      sodium chloride (OCEAN) 0.65 % nasal spray 1 spray into each nostril as needed for congestion      Vitamins-Lipotropics (B-100 PO) Take by mouth      Continuous Blood Gluc  (FreeStyle Kalpana 2 [place]) CAMRON Check blood sugars multiple times per day 1 each 0    Continuous Blood Gluc Sensor (FreeStyle Kalpana 2 Sensor) MISC Check blood sugars multiple times per day 6 each 3    Diclofenac Sodium (VOLTAREN) 1 % Apply 2 g topically 4 (four) times a day (Patient not taking: Reported on 9/25/2023) 100 g 2    Specialty Vitamins Products (MAGNESIUM, AMINO ACID CHELATE,) 133 MG tablet Take 1 tablet by mouth 2 (two) times a day (Patient not taking: Reported on 9/25/2023)       Current Facility-Administered Medications   Medication Dose Route Frequency Provider Last Rate Last Admin    bupivacaine (PF) (MARCAINE) 0.25 % injection 1 mL  1 mL Intra-articular  Angel Leghorn Castelan, DPM   1 mL at 06/02/23 1400    triamcinolone acetonide (KENALOG-40) 40 mg/mL injection 20 mg  20 mg Intra-articular  Franklin Leghorn Castelan, DPM   20 mg at 06/02/23 1400     He has No Known Allergies. .    Review of Systems   Constitutional:  Negative for activity change, appetite change, chills, fatigue and fever. HENT:  Negative for ear pain and sore throat. Eyes:  Negative for visual disturbance. Respiratory:  Negative for cough and shortness of breath. Cardiovascular:  Negative for chest pain, palpitations and leg swelling. Gastrointestinal:  Negative for abdominal pain, blood in stool, constipation, diarrhea and nausea. Genitourinary:  Negative for difficulty urinating. Musculoskeletal:  Negative for arthralgias, back pain and myalgias. Pulled   left   tricep. Skin:  Negative for rash. Neurological:  Negative for dizziness, syncope and headaches. Psychiatric/Behavioral:  Negative for sleep disturbance. Objective:        Physical Exam  Vitals and nursing note reviewed. Constitutional:       General: He is not in acute distress. Appearance: Normal appearance. He is well-developed. He is not ill-appearing. HENT:      Head: Normocephalic and atraumatic. Comments: Lateral hearing aids     Right Ear: External ear normal.      Left Ear: External ear normal.   Eyes:      Conjunctiva/sclera: Conjunctivae normal.      Pupils: Pupils are equal, round, and reactive to light. Neck:      Thyroid: No thyromegaly. Vascular: No carotid bruit. Cardiovascular:      Rate and Rhythm: Normal rate and regular rhythm. Pulses: no weak pulses          Dorsalis pedis pulses are 2+ on the right side and 2+ on the left side. Heart sounds: Normal heart sounds. No murmur heard. Pulmonary:      Effort: Pulmonary effort is normal.      Breath sounds: Normal breath sounds. No wheezing. Abdominal:      General: Bowel sounds are normal.      Palpations: Abdomen is soft. There is no mass. Tenderness: There is no abdominal tenderness. Musculoskeletal:      Right lower leg: No edema. Left lower leg: No edema. Comments: Tenderness  left  triceps. Feet:      Right foot:      Skin integrity: No ulcer, skin breakdown, erythema, warmth, callus or dry skin. Left foot:      Skin integrity: No ulcer, skin breakdown, erythema, warmth, callus or dry skin. Lymphadenopathy:      Cervical: No cervical adenopathy. Skin:     General: Skin is warm and dry. Neurological:      General: No focal deficit present. Mental Status: He is alert and oriented to person, place, and time. Psychiatric:         Mood and Affect: Mood normal.         Behavior: Behavior normal.         Thought Content: Thought content normal.         Judgment: Judgment normal.     Diabetic Foot Exam    Patient's shoes and socks removed. Right Foot/Ankle   Right Foot Inspection  Skin Exam: skin normal and skin intact. No dry skin, no warmth, no callus, no erythema, no maceration, no abnormal color, no pre-ulcer, no ulcer and no callus. Toe Exam: right toe deformity. No swelling and erythema    Sensory   Vibration: intact  Monofilament testing: intact    Vascular  The right DP pulse is 2+. Left Foot/Ankle  Left Foot Inspection  Skin Exam: skin normal and skin intact. No dry skin, no warmth, no erythema, no maceration, normal color, no pre-ulcer, no ulcer and no callus. Toe Exam: left toe deformity. No swelling and no erythema.      Sensory   Vibration: intact  Monofilament testing: intact    Vascular  The left DP pulse is 2+.      Assign Risk Category  Deformity present  No loss of protective sensation  No weak pulses  Risk: 0

## 2023-11-09 DIAGNOSIS — Z79.4 TYPE 2 DIABETES MELLITUS WITH BOTH EYES AFFECTED BY MILD NONPROLIFERATIVE RETINOPATHY WITHOUT MACULAR EDEMA, WITH LONG-TERM CURRENT USE OF INSULIN (HCC): ICD-10-CM

## 2023-11-09 DIAGNOSIS — E11.3293 TYPE 2 DIABETES MELLITUS WITH BOTH EYES AFFECTED BY MILD NONPROLIFERATIVE RETINOPATHY WITHOUT MACULAR EDEMA, WITH LONG-TERM CURRENT USE OF INSULIN (HCC): ICD-10-CM

## 2023-11-09 RX ORDER — INSULIN DETEMIR 100 [IU]/ML
INJECTION, SOLUTION SUBCUTANEOUS
Qty: 15 ML | Refills: 5 | Status: SHIPPED | OUTPATIENT
Start: 2023-11-09

## 2023-11-16 ENCOUNTER — TELEPHONE (OUTPATIENT)
Age: 68
End: 2023-11-16

## 2023-11-16 ENCOUNTER — HOSPITAL ENCOUNTER (OUTPATIENT)
Dept: CT IMAGING | Facility: HOSPITAL | Age: 68
End: 2023-11-16
Payer: MEDICARE

## 2023-11-16 DIAGNOSIS — E11.9 TYPE 2 DIABETES MELLITUS WITHOUT COMPLICATION, WITH LONG-TERM CURRENT USE OF INSULIN (HCC): Primary | ICD-10-CM

## 2023-11-16 DIAGNOSIS — R91.1 LUNG NODULE: ICD-10-CM

## 2023-11-16 DIAGNOSIS — M79.671 PAIN IN BOTH FEET: ICD-10-CM

## 2023-11-16 DIAGNOSIS — M79.672 PAIN IN BOTH FEET: ICD-10-CM

## 2023-11-16 DIAGNOSIS — Z79.4 TYPE 2 DIABETES MELLITUS WITHOUT COMPLICATION, WITH LONG-TERM CURRENT USE OF INSULIN (HCC): Primary | ICD-10-CM

## 2023-11-16 PROCEDURE — 71250 CT THORAX DX C-: CPT

## 2023-11-16 PROCEDURE — G1004 CDSM NDSC: HCPCS

## 2023-11-16 RX ORDER — GABAPENTIN 100 MG/1
100 CAPSULE ORAL 2 TIMES DAILY
Qty: 60 CAPSULE | Refills: 2 | Status: SHIPPED | OUTPATIENT
Start: 2023-11-16

## 2023-11-16 RX ORDER — PIOGLITAZONEHYDROCHLORIDE 15 MG/1
15 TABLET ORAL DAILY
Qty: 90 TABLET | Refills: 1 | Status: SHIPPED | OUTPATIENT
Start: 2023-11-16

## 2023-11-16 NOTE — TELEPHONE ENCOUNTER
The patient called he needs a refill on   pioglitazone  hcl  15 mg   --the pharmacy will not fill it    the patient does not know what he takes this for and does he still need it ?   Please call the patient thank you

## 2023-11-28 ENCOUNTER — TELEPHONE (OUTPATIENT)
Dept: FAMILY MEDICINE CLINIC | Facility: CLINIC | Age: 68
End: 2023-11-28

## 2023-11-28 DIAGNOSIS — I31.39 PERICARDIAL EFFUSION: Primary | ICD-10-CM

## 2023-11-28 NOTE — TELEPHONE ENCOUNTER
Spoke  with patient. Ct  lungs  show  stable  pulmonary  nodules. Pericardial  effusion  vs  thickening.   Echo  ordered

## 2023-12-13 ENCOUNTER — HOSPITAL ENCOUNTER (OUTPATIENT)
Dept: NON INVASIVE DIAGNOSTICS | Facility: CLINIC | Age: 68
Discharge: HOME | End: 2023-12-13
Payer: MEDICARE

## 2023-12-13 VITALS
SYSTOLIC BLOOD PRESSURE: 116 MMHG | DIASTOLIC BLOOD PRESSURE: 60 MMHG | WEIGHT: 201 LBS | HEART RATE: 70 BPM | BODY MASS INDEX: 27.22 KG/M2 | HEIGHT: 72 IN

## 2023-12-13 DIAGNOSIS — I31.39 PERICARDIAL EFFUSION: ICD-10-CM

## 2023-12-13 LAB
AORTIC ROOT: 3.4 CM
APICAL FOUR CHAMBER EJECTION FRACTION: 66 %
ASCENDING AORTA: 3.2 CM
E WAVE DECELERATION TIME: 341 MS
E/A RATIO: 1.4
FRACTIONAL SHORTENING: 31 (ref 28–44)
INTERVENTRICULAR SEPTUM IN DIASTOLE (PARASTERNAL SHORT AXIS VIEW): 1.3 CM
INTERVENTRICULAR SEPTUM: 1.3 CM (ref 0.6–1.1)
LAAS-AP2: 17.3 CM2
LAAS-AP4: 21.5 CM2
LEFT ATRIUM SIZE: 4.3 CM
LEFT ATRIUM VOLUME (MOD BIPLANE): 55 ML
LEFT ATRIUM VOLUME INDEX (MOD BIPLANE): 25.8 ML/M2
LEFT INTERNAL DIMENSION IN SYSTOLE: 3.6 CM (ref 2.1–4)
LEFT VENTRICULAR INTERNAL DIMENSION IN DIASTOLE: 5.2 CM (ref 3.5–6)
LEFT VENTRICULAR POSTERIOR WALL IN END DIASTOLE: 1.3 CM
LEFT VENTRICULAR STROKE VOLUME: 76 ML
LVSV (TEICH): 76 ML
MV E'TISSUE VEL-SEP: 6 CM/S
MV PEAK A VEL: 0.55 M/S
MV PEAK E VEL: 77 CM/S
RIGHT ATRIUM AREA SYSTOLE A4C: 15 CM2
RIGHT VENTRICLE ID DIMENSION: 3.9 CM
SL CV LEFT ATRIUM LENGTH A2C: 5.5 CM
SL CV LV EF: 60
SL CV PED ECHO LEFT VENTRICLE DIASTOLIC VOLUME (MOD BIPLANE) 2D: 132 ML
SL CV PED ECHO LEFT VENTRICLE SYSTOLIC VOLUME (MOD BIPLANE) 2D: 56 ML
TR MAX PG: 19 MMHG
TR PEAK VELOCITY: 2.2 M/S
TRICUSPID ANNULAR PLANE SYSTOLIC EXCURSION: 2.3 CM
TRICUSPID VALVE PEAK REGURGITATION VELOCITY: 2.15 M/S

## 2023-12-13 PROCEDURE — 93306 TTE W/DOPPLER COMPLETE: CPT

## 2023-12-13 PROCEDURE — 93306 TTE W/DOPPLER COMPLETE: CPT | Performed by: INTERNAL MEDICINE

## 2023-12-31 DIAGNOSIS — I10 ESSENTIAL HYPERTENSION: ICD-10-CM

## 2024-01-02 RX ORDER — LISINOPRIL 10 MG/1
TABLET ORAL
Qty: 90 TABLET | Refills: 1 | Status: SHIPPED | OUTPATIENT
Start: 2024-01-02

## 2024-01-19 ENCOUNTER — VBI (OUTPATIENT)
Dept: ADMINISTRATIVE | Facility: OTHER | Age: 69
End: 2024-01-19

## 2024-01-19 LAB
LEFT EYE DIABETIC RETINOPATHY: POSITIVE
RIGHT EYE DIABETIC RETINOPATHY: POSITIVE
SEVERITY (EYE EXAM): NORMAL

## 2024-01-26 ENCOUNTER — OFFICE VISIT (OUTPATIENT)
Dept: AUDIOLOGY | Age: 69
End: 2024-01-26

## 2024-01-26 DIAGNOSIS — H90.3 SENSORY HEARING LOSS, BILATERAL: Primary | ICD-10-CM

## 2024-01-26 NOTE — PROGRESS NOTES
Hearing Aid Visit:    Name:  Rich Abel  :  1955  Age:  68 y.o.  MRN:  [id number]  Date of Evaluation: 24     HISTORY:    Rich Abel was seen today (2024) for a(n) in-warranty hearing aid check of his bilateral hearing aids. Today, Rich reports some static from the right hearing aid and a hallow feeling n the left ear..    DEVICE INFORMATION:    Patient is fit with Oticon Real 3 miniRITE-R hearing aid(s).   Right serial number [id number]. Left serial number [id number].   Warranty date: 2026 (Loss/Damage and repair). 3X85 r&L, 10 mm DB     Left Device Right Device   Hearing Aid Make: Oticon  Oticon    Hearing Aid Model: Real 3 miniRITE-R rEAL 3 miniRITE-R   Serial Number: [id number]   Repair Warranty Date: 2026   Loss/Damage Warranty Date:  Active  Active        Length/Output 3x85 3x85   Wax System: Prowax minifit Prowax minifit   Dome Size/Style: 10 mm DB 10 mm DB   Battery: Lithium-ion Rechargeable Lithium-ion Rechargeable      Earmold Serial Number: N/A N/A   Earmold Warranty Date:  N/A N/A    Serial Number:  [id number]    Warranty Date: 2026    Accessories: N/A       ACTION/ADJUSTMENTS:    Hearing aids were cleaned and checked. Domes and wax guards were replaced. Right  was replaced. Left hearing is working properly.    A courtesy tymp was run and there is no fluid or infection. No wax was observed. A updated hearing test was recommended. He is not interested in pursuing at this time.    RECOMMENDATIONS:     Follow up in 6 months; sooner if concerns arise.      Servando Ramos., [facility]-A  Clinical Audiologist  [facility] AUDIOLOGY  [address] [place] [address] RD  [place] [id number]

## 2024-01-28 DIAGNOSIS — E11.3293 TYPE 2 DIABETES MELLITUS WITH BOTH EYES AFFECTED BY MILD NONPROLIFERATIVE RETINOPATHY WITHOUT MACULAR EDEMA, WITH LONG-TERM CURRENT USE OF INSULIN (HCC): ICD-10-CM

## 2024-01-28 DIAGNOSIS — Z79.4 TYPE 2 DIABETES MELLITUS WITH BOTH EYES AFFECTED BY MILD NONPROLIFERATIVE RETINOPATHY WITHOUT MACULAR EDEMA, WITH LONG-TERM CURRENT USE OF INSULIN (HCC): ICD-10-CM

## 2024-01-29 RX ORDER — METFORMIN HYDROCHLORIDE 500 MG/1
TABLET, EXTENDED RELEASE ORAL
Qty: 360 TABLET | Refills: 1 | Status: SHIPPED | OUTPATIENT
Start: 2024-01-29

## 2024-01-31 DIAGNOSIS — E78.5 HYPERLIPIDEMIA, UNSPECIFIED HYPERLIPIDEMIA TYPE: ICD-10-CM

## 2024-01-31 DIAGNOSIS — Z79.4 TYPE 2 DIABETES MELLITUS WITHOUT COMPLICATION, WITH LONG-TERM CURRENT USE OF INSULIN (HCC): ICD-10-CM

## 2024-01-31 DIAGNOSIS — E11.9 TYPE 2 DIABETES MELLITUS WITHOUT COMPLICATION, WITH LONG-TERM CURRENT USE OF INSULIN (HCC): ICD-10-CM

## 2024-01-31 RX ORDER — ATORVASTATIN CALCIUM 10 MG/1
TABLET, FILM COATED ORAL
Qty: 90 TABLET | Refills: 1 | Status: SHIPPED | OUTPATIENT
Start: 2024-01-31

## 2024-01-31 RX ORDER — PIOGLITAZONEHYDROCHLORIDE 15 MG/1
15 TABLET ORAL DAILY
Qty: 90 TABLET | Refills: 0 | Status: SHIPPED | OUTPATIENT
Start: 2024-01-31

## 2024-03-04 DIAGNOSIS — E11.3293 TYPE 2 DIABETES MELLITUS WITH BOTH EYES AFFECTED BY MILD NONPROLIFERATIVE RETINOPATHY WITHOUT MACULAR EDEMA, WITH LONG-TERM CURRENT USE OF INSULIN (HCC): Primary | ICD-10-CM

## 2024-03-04 DIAGNOSIS — Z79.4 TYPE 2 DIABETES MELLITUS WITH BOTH EYES AFFECTED BY MILD NONPROLIFERATIVE RETINOPATHY WITHOUT MACULAR EDEMA, WITH LONG-TERM CURRENT USE OF INSULIN (HCC): Primary | ICD-10-CM

## 2024-03-04 RX ORDER — INSULIN GLARGINE 100 [IU]/ML
66 INJECTION, SOLUTION SUBCUTANEOUS DAILY
Qty: 15 ML | Refills: 3 | Status: SHIPPED | OUTPATIENT
Start: 2024-03-04

## 2024-03-05 ENCOUNTER — TELEPHONE (OUTPATIENT)
Age: 69
End: 2024-03-05

## 2024-03-07 DIAGNOSIS — M79.672 PAIN IN BOTH FEET: ICD-10-CM

## 2024-03-07 DIAGNOSIS — M79.671 PAIN IN BOTH FEET: ICD-10-CM

## 2024-03-07 RX ORDER — GABAPENTIN 100 MG/1
100 CAPSULE ORAL 2 TIMES DAILY
Qty: 60 CAPSULE | Refills: 2 | Status: SHIPPED | OUTPATIENT
Start: 2024-03-07

## 2024-03-23 DIAGNOSIS — Z79.4 TYPE 2 DIABETES MELLITUS WITH BOTH EYES AFFECTED BY MILD NONPROLIFERATIVE RETINOPATHY WITHOUT MACULAR EDEMA, WITH LONG-TERM CURRENT USE OF INSULIN (HCC): ICD-10-CM

## 2024-03-23 DIAGNOSIS — E11.3293 TYPE 2 DIABETES MELLITUS WITH BOTH EYES AFFECTED BY MILD NONPROLIFERATIVE RETINOPATHY WITHOUT MACULAR EDEMA, WITH LONG-TERM CURRENT USE OF INSULIN (HCC): ICD-10-CM

## 2024-03-24 RX ORDER — INSULIN GLARGINE 100 [IU]/ML
INJECTION, SOLUTION SUBCUTANEOUS
Qty: 45 ML | Refills: 2 | Status: SHIPPED | OUTPATIENT
Start: 2024-03-24

## 2024-03-25 DIAGNOSIS — E11.9 TYPE 2 DIABETES MELLITUS WITHOUT COMPLICATION, WITH LONG-TERM CURRENT USE OF INSULIN (HCC): ICD-10-CM

## 2024-03-25 DIAGNOSIS — Z79.4 TYPE 2 DIABETES MELLITUS WITHOUT COMPLICATION, WITH LONG-TERM CURRENT USE OF INSULIN (HCC): ICD-10-CM

## 2024-03-25 RX ORDER — FLASH GLUCOSE SENSOR
KIT MISCELLANEOUS
Qty: 2 EACH | Refills: 6 | Status: SHIPPED | OUTPATIENT
Start: 2024-03-25

## 2024-04-12 ENCOUNTER — APPOINTMENT (OUTPATIENT)
Dept: LAB | Facility: MEDICAL CENTER | Age: 69
End: 2024-04-12
Payer: MEDICARE

## 2024-04-12 DIAGNOSIS — E78.5 HYPERLIPIDEMIA, UNSPECIFIED HYPERLIPIDEMIA TYPE: ICD-10-CM

## 2024-04-12 DIAGNOSIS — E11.3293 TYPE 2 DIABETES MELLITUS WITH BOTH EYES AFFECTED BY MILD NONPROLIFERATIVE RETINOPATHY WITHOUT MACULAR EDEMA, WITH LONG-TERM CURRENT USE OF INSULIN (HCC): ICD-10-CM

## 2024-04-12 DIAGNOSIS — Z79.4 TYPE 2 DIABETES MELLITUS WITH BOTH EYES AFFECTED BY MILD NONPROLIFERATIVE RETINOPATHY WITHOUT MACULAR EDEMA, WITH LONG-TERM CURRENT USE OF INSULIN (HCC): ICD-10-CM

## 2024-04-12 DIAGNOSIS — I10 ESSENTIAL HYPERTENSION: ICD-10-CM

## 2024-04-12 LAB
ALBUMIN SERPL BCP-MCNC: 3.7 G/DL (ref 3.5–5)
ALP SERPL-CCNC: 53 U/L (ref 34–104)
ALT SERPL W P-5'-P-CCNC: 26 U/L (ref 7–52)
ANION GAP SERPL CALCULATED.3IONS-SCNC: 10 MMOL/L (ref 4–13)
AST SERPL W P-5'-P-CCNC: 24 U/L (ref 13–39)
BASOPHILS # BLD AUTO: 0.05 THOUSANDS/ÂΜL (ref 0–0.1)
BASOPHILS NFR BLD AUTO: 1 % (ref 0–1)
BILIRUB SERPL-MCNC: 0.67 MG/DL (ref 0.2–1)
BUN SERPL-MCNC: 17 MG/DL (ref 5–25)
CALCIUM SERPL-MCNC: 8.3 MG/DL (ref 8.4–10.2)
CHLORIDE SERPL-SCNC: 108 MMOL/L (ref 96–108)
CHOLEST SERPL-MCNC: 103 MG/DL
CO2 SERPL-SCNC: 24 MMOL/L (ref 21–32)
CREAT SERPL-MCNC: 1.09 MG/DL (ref 0.6–1.3)
CREAT UR-MCNC: 145.4 MG/DL
EOSINOPHIL # BLD AUTO: 0.22 THOUSAND/ÂΜL (ref 0–0.61)
EOSINOPHIL NFR BLD AUTO: 4 % (ref 0–6)
ERYTHROCYTE [DISTWIDTH] IN BLOOD BY AUTOMATED COUNT: 13.4 % (ref 11.6–15.1)
EST. AVERAGE GLUCOSE BLD GHB EST-MCNC: 151 MG/DL
GFR SERPL CREATININE-BSD FRML MDRD: 68 ML/MIN/1.73SQ M
GLUCOSE P FAST SERPL-MCNC: 91 MG/DL (ref 65–99)
HBA1C MFR BLD: 6.9 %
HCT VFR BLD AUTO: 36.2 % (ref 36.5–49.3)
HDLC SERPL-MCNC: 58 MG/DL
HGB BLD-MCNC: 11.7 G/DL (ref 12–17)
IMM GRANULOCYTES # BLD AUTO: 0.02 THOUSAND/UL (ref 0–0.2)
IMM GRANULOCYTES NFR BLD AUTO: 0 % (ref 0–2)
LDLC SERPL CALC-MCNC: 37 MG/DL (ref 0–100)
LYMPHOCYTES # BLD AUTO: 1.74 THOUSANDS/ÂΜL (ref 0.6–4.47)
LYMPHOCYTES NFR BLD AUTO: 32 % (ref 14–44)
MCH RBC QN AUTO: 30.9 PG (ref 26.8–34.3)
MCHC RBC AUTO-ENTMCNC: 32.3 G/DL (ref 31.4–37.4)
MCV RBC AUTO: 96 FL (ref 82–98)
MICROALBUMIN UR-MCNC: 30.1 MG/L
MICROALBUMIN/CREAT 24H UR: 21 MG/G CREATININE (ref 0–30)
MONOCYTES # BLD AUTO: 0.44 THOUSAND/ÂΜL (ref 0.17–1.22)
MONOCYTES NFR BLD AUTO: 8 % (ref 4–12)
NEUTROPHILS # BLD AUTO: 2.94 THOUSANDS/ÂΜL (ref 1.85–7.62)
NEUTS SEG NFR BLD AUTO: 55 % (ref 43–75)
NRBC BLD AUTO-RTO: 0 /100 WBCS
PLATELET # BLD AUTO: 325 THOUSANDS/UL (ref 149–390)
PMV BLD AUTO: 10.3 FL (ref 8.9–12.7)
POTASSIUM SERPL-SCNC: 4.2 MMOL/L (ref 3.5–5.3)
PROT SERPL-MCNC: 5.3 G/DL (ref 6.4–8.4)
RBC # BLD AUTO: 3.79 MILLION/UL (ref 3.88–5.62)
SODIUM SERPL-SCNC: 142 MMOL/L (ref 135–147)
TRIGL SERPL-MCNC: 39 MG/DL
WBC # BLD AUTO: 5.41 THOUSAND/UL (ref 4.31–10.16)

## 2024-04-12 PROCEDURE — 83036 HEMOGLOBIN GLYCOSYLATED A1C: CPT

## 2024-04-12 PROCEDURE — 36415 COLL VENOUS BLD VENIPUNCTURE: CPT

## 2024-04-12 PROCEDURE — 80061 LIPID PANEL: CPT

## 2024-04-12 PROCEDURE — 82570 ASSAY OF URINE CREATININE: CPT

## 2024-04-12 PROCEDURE — 85025 COMPLETE CBC W/AUTO DIFF WBC: CPT

## 2024-04-12 PROCEDURE — 80053 COMPREHEN METABOLIC PANEL: CPT

## 2024-04-12 PROCEDURE — 82043 UR ALBUMIN QUANTITATIVE: CPT

## 2024-04-19 ENCOUNTER — RA CDI HCC (OUTPATIENT)
Dept: OTHER | Facility: HOSPITAL | Age: 69
End: 2024-04-19

## 2024-04-24 PROBLEM — E11.319 TYPE 2 DIABETES MELLITUS WITH RETINOPATHY, WITH LONG-TERM CURRENT USE OF INSULIN (HCC): Status: ACTIVE | Noted: 2020-02-05

## 2024-04-26 ENCOUNTER — OFFICE VISIT (OUTPATIENT)
Dept: FAMILY MEDICINE CLINIC | Facility: CLINIC | Age: 69
End: 2024-04-26
Payer: MEDICARE

## 2024-04-26 ENCOUNTER — APPOINTMENT (OUTPATIENT)
Dept: LAB | Facility: MEDICAL CENTER | Age: 69
End: 2024-04-26
Payer: MEDICARE

## 2024-04-26 VITALS
RESPIRATION RATE: 16 BRPM | BODY MASS INDEX: 29.54 KG/M2 | HEIGHT: 71 IN | WEIGHT: 211 LBS | HEART RATE: 78 BPM | TEMPERATURE: 97.4 F | DIASTOLIC BLOOD PRESSURE: 60 MMHG | OXYGEN SATURATION: 98 % | SYSTOLIC BLOOD PRESSURE: 136 MMHG

## 2024-04-26 DIAGNOSIS — Z79.4 TYPE 2 DIABETES MELLITUS WITH MILD NONPROLIFERATIVE RETINOPATHY, WITH LONG-TERM CURRENT USE OF INSULIN, MACULAR EDEMA PRESENCE UNSPECIFIED, UNSPECIFIED LATERALITY (HCC): ICD-10-CM

## 2024-04-26 DIAGNOSIS — Z79.4 TYPE 2 DIABETES MELLITUS WITH BOTH EYES AFFECTED BY MILD NONPROLIFERATIVE RETINOPATHY WITHOUT MACULAR EDEMA, WITH LONG-TERM CURRENT USE OF INSULIN (HCC): ICD-10-CM

## 2024-04-26 DIAGNOSIS — E11.3293 TYPE 2 DIABETES MELLITUS WITH BOTH EYES AFFECTED BY MILD NONPROLIFERATIVE RETINOPATHY WITHOUT MACULAR EDEMA, WITH LONG-TERM CURRENT USE OF INSULIN (HCC): ICD-10-CM

## 2024-04-26 DIAGNOSIS — E83.51 HYPOCALCEMIA: ICD-10-CM

## 2024-04-26 DIAGNOSIS — I10 ESSENTIAL HYPERTENSION: ICD-10-CM

## 2024-04-26 DIAGNOSIS — E78.5 HYPERLIPIDEMIA, UNSPECIFIED HYPERLIPIDEMIA TYPE: ICD-10-CM

## 2024-04-26 DIAGNOSIS — R71.0 DECREASED HEMOGLOBIN: ICD-10-CM

## 2024-04-26 DIAGNOSIS — E11.3299 TYPE 2 DIABETES MELLITUS WITH MILD NONPROLIFERATIVE RETINOPATHY, WITH LONG-TERM CURRENT USE OF INSULIN, MACULAR EDEMA PRESENCE UNSPECIFIED, UNSPECIFIED LATERALITY (HCC): ICD-10-CM

## 2024-04-26 DIAGNOSIS — Z12.5 SCREENING FOR PROSTATE CANCER: ICD-10-CM

## 2024-04-26 DIAGNOSIS — Z00.00 MEDICARE ANNUAL WELLNESS VISIT, SUBSEQUENT: Primary | ICD-10-CM

## 2024-04-26 LAB
25(OH)D3 SERPL-MCNC: 36.8 NG/ML (ref 30–100)
BASOPHILS # BLD AUTO: 0.03 THOUSANDS/ÂΜL (ref 0–0.1)
BASOPHILS NFR BLD AUTO: 0 % (ref 0–1)
CALCIUM SERPL-MCNC: 9.4 MG/DL (ref 8.4–10.2)
EOSINOPHIL # BLD AUTO: 0.11 THOUSAND/ÂΜL (ref 0–0.61)
EOSINOPHIL NFR BLD AUTO: 1 % (ref 0–6)
ERYTHROCYTE [DISTWIDTH] IN BLOOD BY AUTOMATED COUNT: 13.6 % (ref 11.6–15.1)
FERRITIN SERPL-MCNC: 10 NG/ML (ref 24–336)
HCT VFR BLD AUTO: 38.2 % (ref 36.5–49.3)
HGB BLD-MCNC: 12.4 G/DL (ref 12–17)
IMM GRANULOCYTES # BLD AUTO: 0.04 THOUSAND/UL (ref 0–0.2)
IMM GRANULOCYTES NFR BLD AUTO: 1 % (ref 0–2)
IRON SATN MFR SERPL: 20 % (ref 15–50)
IRON SERPL-MCNC: 80 UG/DL (ref 50–212)
LYMPHOCYTES # BLD AUTO: 1.75 THOUSANDS/ÂΜL (ref 0.6–4.47)
LYMPHOCYTES NFR BLD AUTO: 20 % (ref 14–44)
MCH RBC QN AUTO: 31.4 PG (ref 26.8–34.3)
MCHC RBC AUTO-ENTMCNC: 32.5 G/DL (ref 31.4–37.4)
MCV RBC AUTO: 97 FL (ref 82–98)
MONOCYTES # BLD AUTO: 0.66 THOUSAND/ÂΜL (ref 0.17–1.22)
MONOCYTES NFR BLD AUTO: 8 % (ref 4–12)
NEUTROPHILS # BLD AUTO: 6.03 THOUSANDS/ÂΜL (ref 1.85–7.62)
NEUTS SEG NFR BLD AUTO: 70 % (ref 43–75)
NRBC BLD AUTO-RTO: 0 /100 WBCS
PLATELET # BLD AUTO: 327 THOUSANDS/UL (ref 149–390)
PMV BLD AUTO: 10.6 FL (ref 8.9–12.7)
PSA SERPL-MCNC: 1.59 NG/ML (ref 0–4)
PTH-INTACT SERPL-MCNC: 30.2 PG/ML (ref 12–88)
RBC # BLD AUTO: 3.95 MILLION/UL (ref 3.88–5.62)
TIBC SERPL-MCNC: 409 UG/DL (ref 250–450)
UIBC SERPL-MCNC: 329 UG/DL (ref 155–355)
WBC # BLD AUTO: 8.62 THOUSAND/UL (ref 4.31–10.16)

## 2024-04-26 PROCEDURE — G0103 PSA SCREENING: HCPCS | Performed by: PHYSICIAN ASSISTANT

## 2024-04-26 PROCEDURE — G0438 PPPS, INITIAL VISIT: HCPCS | Performed by: PHYSICIAN ASSISTANT

## 2024-04-26 PROCEDURE — 99214 OFFICE O/P EST MOD 30 MIN: CPT | Performed by: PHYSICIAN ASSISTANT

## 2024-04-26 PROCEDURE — 36415 COLL VENOUS BLD VENIPUNCTURE: CPT | Performed by: PHYSICIAN ASSISTANT

## 2024-04-26 PROCEDURE — 85025 COMPLETE CBC W/AUTO DIFF WBC: CPT | Performed by: PHYSICIAN ASSISTANT

## 2024-04-26 PROCEDURE — 82306 VITAMIN D 25 HYDROXY: CPT | Performed by: PHYSICIAN ASSISTANT

## 2024-04-26 PROCEDURE — 83550 IRON BINDING TEST: CPT

## 2024-04-26 PROCEDURE — 82728 ASSAY OF FERRITIN: CPT

## 2024-04-26 PROCEDURE — 83970 ASSAY OF PARATHORMONE: CPT

## 2024-04-26 PROCEDURE — 83540 ASSAY OF IRON: CPT

## 2024-04-26 RX ORDER — BLOOD-GLUCOSE SENSOR
1 EACH MISCELLANEOUS
Qty: 6 EACH | Refills: 3 | Status: SHIPPED | OUTPATIENT
Start: 2024-04-26 | End: 2024-05-03

## 2024-04-26 RX ORDER — BRINZOLAMIDE/BRIMONIDINE TARTRATE 10; 2 MG/ML; MG/ML
SUSPENSION/ DROPS OPHTHALMIC
COMMUNITY
Start: 2024-03-29

## 2024-04-26 RX ORDER — KETOROLAC TROMETHAMINE 30 MG/ML
1 INJECTION, SOLUTION INTRAMUSCULAR; INTRAVENOUS ONCE
Qty: 1 EACH | Refills: 0 | Status: SHIPPED | OUTPATIENT
Start: 2024-04-26 | End: 2024-05-03

## 2024-04-26 NOTE — PROGRESS NOTES
Assessment and Plan:     Problem List Items Addressed This Visit          Cardiovascular and Mediastinum    Essential hypertension       Endocrine    Type 2 diabetes mellitus 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, bilateral (HCC)    Relevant Medications    Simbrinza 1-0.2 % SUSP    Continuous Blood Gluc Sensor (FreeStyle Kalpana 3 Sensor) MISC    Continuous Blood Gluc  (FreeStyle Kalpana 3 [place]) CAMRON    Type 2 diabetes mellitus with retinopathy, with long-term current use of insulin (HCC)    Relevant Medications    Simbrinza 1-0.2 % SUSP    Continuous Blood Gluc Sensor (FreeStyle Kalpana 3 Sensor) MISC    Continuous Blood Gluc  (FreeStyle Kalpana 3 [place]) CAMRON       Other    Hyperlipidemia     Other Visit Diagnoses       Medicare annual wellness visit, subsequent    -  Primary    Decreased hemoglobin        Relevant Orders    CBC and differential    Iron Panel (Includes Ferritin, Iron Sat%, Iron, and TIBC)    Hypocalcemia        Relevant Orders    PTH, intact    Calcium    Vitamin D 25 hydroxy    Screening for prostate cancer        Relevant Orders    PSA, Total Screen            Depression Screening and Follow-up Plan: Patient was screened for depression during today's encounter. They screened negative with a PHQ-2 score of 0.    Tobacco Cessation Counseling: Tobacco cessation counseling was provided. The patient is sincerely urged to quit consumption of tobacco. He is not ready to quit tobacco. chews      Preventive health issues were discussed with patient, and age appropriate screening tests were ordered as noted in patient's After Visit Summary.  Personalized health advice and appropriate referrals for health education or preventive services given if needed, as noted in patient's After Visit Summary.     History of Present Illness:     Patient presents for a Medicare Wellness Visit    Patient presents in the office for follow-up chronic conditions.  Patient has  Deandra which is insulin-dependent and he has complications of retinopathy and peripheral neuropathy.  Current regimen includes glargine 66 units a day, metformin  to 1000 mg twice daily and Actos 15 mg.  Does have a continuous glucometer reading.  Has had multiple low blood sugar readings mainly at night.  Will discontinue the Actos 15 mg.  Taking gabapentin 100 mg twice daily successfully for his neuropathy.  He is also on lisinopril 10 mg for renal protection.  For hyperlipidemia he is on atorvastatin 10 mg.  Viewed labs with patient.  Lipid panel is at goal.  Urine microalbumin slightly positive but greatly improved over the years.  Hepatic functions are normal.  Renal functions are normal.  Calcium is low at 8.3.  Globin A1c at 6.9 of note CBC his hemoglobin has dropped to 11.7.  Episodes of gross bleeding.  No black stool or blood in the stool.  Will check CBC and iron panel.  Repeat calcium and check parathyroid hormone       Patient Care Team:  Coleen Barrera PA-C as PCP - General (Family Medicine)  Jeff Rosen MD as Endoscopist     Review of Systems:     Review of Systems   Constitutional:  Negative for activity change, appetite change, chills, fatigue and fever.   HENT:  Negative for ear pain and sore throat.    Eyes:  Negative for visual disturbance.   Respiratory:  Negative for cough and shortness of breath.    Cardiovascular:  Negative for chest pain, palpitations and leg swelling.   Gastrointestinal:  Negative for abdominal pain, blood in stool, constipation, diarrhea and nausea.   Genitourinary:  Negative for difficulty urinating.   Musculoskeletal:  Positive for arthralgias. Negative for back pain and myalgias.        Wrist  / thumbs   Skin:  Negative for rash.   Neurological:  Negative for dizziness, syncope and headaches.   Psychiatric/Behavioral:  Negative for self-injury, sleep disturbance and suicidal ideas. The patient is not nervous/anxious.         Problem List:     Patient  Active Problem List   Diagnosis    Essential hypertension    Hyperlipidemia    Type 2 diabetes mellitus with retinopathy, with long-term current use of insulin (HCC)    Gilbert syndrome    Skin cancer, basal cell    Decreased hearing of both ears    Type 2 diabetes mellitus 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, bilateral (HCC)    Glaucoma    Lung nodule      Past Medical and Surgical History:     Past Medical History:   Diagnosis Date    Arthritis     Cancer (HCC)     Colon polyp     Diabetes mellitus (HCC)     Ear problems     Essential hypertension 02/05/2020    Glaucoma     HL (hearing loss)     Hyperlipidemia 02/05/2020    Hypertension     Kidney stone     Melanoma of ear, left (HCC)     Nasal polyp     Tinnitus      Past Surgical History:   Procedure Laterality Date    ARTHROSCOPY KNEE Bilateral     BASAL CELL CARCINOMA EXCISION  03/2020    left arm    CYSTOSCOPY      kidney stone removal    FOOT TENDON SURGERY Left 06/22/2021    Procedure: ANTERIOR TALOFIBULAR LIGAMENT REPAIR;  Surgeon: Trent Obregon DPM;  Location: [facility];  Service: Podiatry    FRACTURE SURGERY  March 2023    KNEE SURGERY      NASAL POLYP EXCISION      CO COLONOSCOPY FLX DX W/COLLJ SPEC WHEN PFRMD N/A 08/08/2017    Procedure: COLONOSCOPY;  Surgeon: Jeff Rosen MD;  Location: [facility];  Service: Colorectal    CO RPR TDN FLXR FOOT 1/2 W/O FREE GRAFG EACH TENDON Left 12/08/2017    Procedure: ANKLE PERONEUS LONGUS TENDON REPAIR with allograft;  RETINACULUM REPAIR SECONDARY  Application of Provena Vac;  Surgeon: Trent Obregon DPM;  Location: [facility];  Service: Podiatry    ROTATOR CUFF REPAIR Bilateral     SHOULDER ARTHROSCOPY      WISDOM TOOTH EXTRACTION        Family History:     Family History   Problem Relation Age of Onset    Heart disease Mother         MOTHER    Diabetes Mother     Melanoma Mother     Cancer Mother     Aortic aneurysm Father         abdominal     Prostate cancer Father      Colon cancer Father       Social History:     Social History     Socioeconomic History    Marital status: /Civil Union     Spouse name: None    Number of children: None    Years of education: None    Highest education level: None   Occupational History    None   Tobacco Use    Smoking status: Former     Current packs/day: 0.00     Average packs/day: 1 pack/day for 33.0 years (33.0 ttl pk-yrs)     Types: Cigarettes     Start date: 1974     Quit date: 2007     Years since quittin.8     Passive exposure: Past    Smokeless tobacco: Current     Types: Chew    Tobacco comments:     quit 15 years ago   Vaping Use    Vaping status: Never Used   Substance and Sexual Activity    Alcohol use: Yes     Comment: Occaisonally one drink    Drug use: No    Sexual activity: Yes     Partners: Female     Birth control/protection: None   Other Topics Concern    None   Social History Narrative    None     Social Determinants of Health     Financial Resource Strain: Low Risk  (2023)    Overall Financial Resource Strain (CARDIA)     Difficulty of Paying Living Expenses: Not hard at all   Food Insecurity: No Food Insecurity (2024)    Hunger Vital Sign     Worried About Running Out of Food in the Last Year: Never true     Ran Out of Food in the Last Year: Never true   Transportation Needs: No Transportation Needs (2024)    PRAPARE - Transportation     Lack of Transportation (Medical): No     Lack of Transportation (Non-Medical): No   Physical Activity: Not on file   Stress: Not on file   Social Connections: Not on file   Intimate Partner Violence: Not on file   Housing Stability: Low Risk  (2024)    Housing Stability Vital Sign     Unable to Pay for Housing in the Last Year: No     Number of Places Lived in the Last Year: 1     Unstable Housing in the Last Year: No      Medications and Allergies:     Current Outpatient Medications   Medication Sig Dispense Refill    aspirin (ECOTRIN LOW STRENGTH) 81  mg EC tablet Take 81 mg by mouth daily      atorvastatin (LIPITOR) 10 mg tablet TAKE 1 TABLET BY MOUTH EVERY DAY 90 tablet 1    BD Pen Needle Meghana 2nd Gen 32G X 4 MM MISC DAILY USE AS DIRECTED 100 each 3    Cholecalciferol 125 MCG (5000 UT) capsule Take 5,000 Units by mouth daily      Continuous Blood Gluc  (FreeStyle Kalpana 3 [place]) CAMRON Use 1 each 1 (one) time for 1 dose 1 each 0    Continuous Blood Gluc Sensor (FreeStyle Kalpana 3 Sensor) MISC Use 1 each every 14 (fourteen) days 6 each 3    fluticasone (FLONASE) 50 mcg/act nasal spray 1 spray into each nostril daily      gabapentin (NEURONTIN) 100 mg capsule TAKE 1 CAPSULE BY MOUTH TWICE A DAY 60 capsule 2    Insulin Glargine Solostar (Basaglar KwikPen) 100 UNIT/ML SOPN INJECT 66 UNITS UNDER THE SKIN IN THE MORNING 45 mL 2    lisinopril (ZESTRIL) 10 mg tablet TAKE 1 TABLET BY MOUTH EVERY DAY 90 tablet 1    MAGNESIUM PO Take by mouth      metFORMIN (GLUCOPHAGE-XR) 500 mg 24 hr tablet TAKE 2 TABLETS BY MOUTH TWICE A  tablet 1    Milk Thistle 150 MG CAPS Take by mouth      Multiple Vitamins-Minerals (MULTIVITAMIN WITH MINERALS) tablet Take 1 tablet by mouth daily      Simbrinza 1-0.2 % SUSP instill 1 drop into both eyes twice a day      sodium chloride (OCEAN) 0.65 % nasal spray 1 spray into each nostril as needed for congestion      Vitamins-Lipotropics (B-100 PO) Take by mouth      Diclofenac Sodium (VOLTAREN) 1 % Apply 2 g topically 4 (four) times a day (Patient not taking: Reported on 9/25/2023) 100 g 2    Specialty Vitamins Products (MAGNESIUM, AMINO ACID CHELATE,) 133 MG tablet Take 1 tablet by mouth 2 (two) times a day (Patient not taking: Reported on 9/25/2023)       Current Facility-Administered Medications   Medication Dose Route Frequency Provider Last Rate Last Admin    bupivacaine (PF) (MARCAINE) 0.25 % injection 1 mL  1 mL Intra-articular  Truman Castelan DPM   1 mL at 06/02/23 1400    triamcinolone acetonide (KENALOG-40) 40 mg/mL  injection 20 mg  20 mg Intra-articular  Truman Castelan, DPM   20 mg at 06/02/23 1400     No Known Allergies   Immunizations:     Immunization History   Administered Date(s) Administered    COVID-19 MODERNA VACC 0.5 ML IM 01/21/2021, 02/23/2021, 10/27/2021    DT (pediatric) 04/16/2014    INFLUENZA 10/01/2015, 10/04/2016    Influenza Quadrivalent Preservative Free 3 years and older IM 09/30/2014, 10/01/2015, 09/13/2017    Influenza Split High Dose Preservative Free IM 10/04/2016, 09/20/2019    Influenza, high dose seasonal 0.7 mL 09/18/2020, 10/08/2021, 10/21/2022, 09/25/2023    Influenza, recombinant, quadrivalent,injectable, preservative free 09/21/2018    Influenza, seasonal, injectable, preservative free 09/17/2013    Pneumococcal Conjugate 13-Valent 09/20/2019    Pneumococcal Polysaccharide PPV23 02/11/2014      Health Maintenance:         Topic Date Due    Hepatitis C Screening  Never done    Colorectal Cancer Screening  03/26/2031         Topic Date Due    Pneumococcal Vaccine: 65+ Years (3 of 3 - PPSV23 or PCV20) 09/20/2020    COVID-19 Vaccine (4 - 2023-24 season) 09/01/2023      Medicare Screening Tests and Risk Assessments:     Rich is here for his Subsequent Wellness visit.     Health Risk Assessment:   Patient rates overall health as good. Patient feels that their physical health rating is same. Patient is satisfied with their life. Eyesight was rated as same. Hearing was rated as same. Patient feels that their emotional and mental health rating is same. Patients states they are sometimes angry. Patient states they are sometimes unusually tired/fatigued. Pain experienced in the last 7 days has been some. Patient's pain rating has been 3/10. Patient states that he has experienced weight loss or gain in last 6 months.     Depression Screening:   PHQ-2 Score: 0      Fall Risk Screening:   In the past year, patient has experienced: history of falling in past year    Injured during fall?: No    Feels  unsteady when standing or walking?: No    Worried about falling?: No      Home Safety:  Patient does not have trouble with stairs inside or outside of their home. Patient has working smoke alarms and has working carbon monoxide detector. Home safety hazards include: none.     Nutrition:   Current diet is Diabetic.     Medications:   Patient is currently taking over-the-counter supplements. OTC medications include: see medication list. Patient is able to manage medications.     Activities of Daily Living (ADLs)/Instrumental Activities of Daily Living (IADLs):   Walk and transfer into and out of bed and chair?: Yes  Dress and groom yourself?: Yes    Bathe or shower yourself?: Yes    Feed yourself? Yes  Do your laundry/housekeeping?: Yes  Manage your money, pay your bills and track your expenses?: Yes  Make your own meals?: Yes    Do your own shopping?: Yes    Previous Hospitalizations:   Any hospitalizations or ED visits within the last 12 months?: Yes    How many hospitalizations have you had in the last year?: 1-2    Hospitalization Comments: bs    Advance Care Planning:   Living will: Yes    Durable POA for healthcare: Yes    Advanced directive: Yes      Cognitive Screening:   Provider or family/friend/caregiver concerned regarding cognition?: No    PREVENTIVE SCREENINGS      Cardiovascular Screening:    General: History Lipid Disorder and Screening Current      Diabetes Screening:     General: History Diabetes and Screening Current      Colorectal Cancer Screening:     General: Screening Current      Prostate Cancer Screening:      Due for: PSA      Abdominal Aortic Aneurysm (AAA) Screening:    Risk factors include: age between 65-74 yo and tobacco use        General: Screening Current      Lung Cancer Screening:     General: Screening Not Indicated    Screening, Brief Intervention, and Referral to Treatment (SBIRT)    Screening  Typical number of drinks in a day: 0  Typical number of drinks in a week:  "0  Interpretation: Low risk drinking behavior.    AUDIT-C Screenin) How often did you have a drink containing alcohol in the past year? monthly or less  2) How many drinks did you have on a typical day when you were drinking in the past year? 1 to 2  3) How often did you have 6 or more drinks on one occasion in the past year? never    AUDIT-C Score: 1  Interpretation: Score 0-3 (male): Negative screen for alcohol misuse    Single Item Drug Screening:  How often have you used an illegal drug (including marijuana) or a prescription medication for non-medical reasons in the past year? never    Single Item Drug Screen Score: 0  Interpretation: Negative screen for possible drug use disorder    No results found.     Physical Exam:     /60 (BP Location: Right arm, Patient Position: Sitting, Cuff Size: Large)   Pulse 78   Temp (!) 97.4 °F (36.3 °C) (Temporal)   Resp 16   Ht 5' 10.75\" (1.797 m)   Wt 95.7 kg (211 lb)   SpO2 98%   BMI 29.64 kg/m²     Physical Exam  Vitals and nursing note reviewed.   Constitutional:       Appearance: Normal appearance. He is not ill-appearing.   HENT:      Head: Normocephalic and atraumatic.      Comments: Bilateral   hearing  aides     Right Ear: External ear normal.      Left Ear: External ear normal.   Eyes:      Extraocular Movements: Extraocular movements intact.      Pupils: Pupils are equal, round, and reactive to light.   Neck:      Thyroid: No thyromegaly.      Vascular: No carotid bruit.   Cardiovascular:      Rate and Rhythm: Normal rate and regular rhythm.      Pulses: Normal pulses.      Heart sounds: Normal heart sounds. No murmur heard.  Pulmonary:      Effort: Pulmonary effort is normal.      Breath sounds: Normal breath sounds.      Comments: Tenderness   right lower  ribs.  Chest:      Chest wall: Tenderness present.   Abdominal:      General: Bowel sounds are normal.      Palpations: Abdomen is soft. There is no mass.      Tenderness: There is no abdominal " tenderness.   Musculoskeletal:      Right lower leg: No edema.      Left lower leg: No edema.   Skin:     General: Skin is warm and dry.      Coloration: Skin is not pale.      Findings: No erythema.   Neurological:      General: No focal deficit present.      Mental Status: He is alert and oriented to person, place, and time.   Psychiatric:         Mood and Affect: Mood normal.         Behavior: Behavior normal.         Thought Content: Thought content normal.         Judgment: Judgment normal.          Coleen Barrera PA-C

## 2024-04-26 NOTE — PATIENT INSTRUCTIONS
Medicare Preventive Visit Patient Instructions  Thank you for completing your Welcome to Medicare Visit or Medicare Annual Wellness Visit today. Your next wellness visit will be due in one year (4/27/2025).  The screening/preventive services that you may require over the next 5-10 years are detailed below. Some tests may not apply to you based off risk factors and/or age. Screening tests ordered at today's visit but not completed yet may show as past due. Also, please note that scanned in results may not display below.  Preventive Screenings:  Service Recommendations Previous Testing/Comments   Colorectal Cancer Screening  Colonoscopy    Fecal Occult Blood Test (FOBT)/Fecal Immunochemical Test (FIT)  Fecal DNA/Cologuard Test  Flexible Sigmoidoscopy Age: 45-75 years old   Colonoscopy: every 10 years (May be performed more frequently if at higher risk)  OR  FOBT/FIT: every 1 year  OR  Cologuard: every 3 years  OR  Sigmoidoscopy: every 5 years  Screening may be recommended earlier than age 45 if at higher risk for colorectal cancer. Also, an individualized decision between you and your healthcare provider will decide whether screening between the ages of 76-85 would be appropriate. Colonoscopy: 03/26/2021  FOBT/FIT: Not on file  Cologuard: Not on file  Sigmoidoscopy: Not on file    Screening Current     Prostate Cancer Screening Individualized decision between patient and health care provider in men between ages of 55-69   Medicare will cover every 12 months beginning on the day after your 50th birthday PSA: 1.2 ng/mL           Hepatitis C Screening Once for adults born between 1945 and 1965  More frequently in patients at high risk for Hepatitis C Hep C Antibody: Not on file        Diabetes Screening 1-2 times per year if you're at risk for diabetes or have pre-diabetes Fasting glucose: 91 mg/dL (4/12/2024)  A1C: 6.9 % (4/12/2024)  Screening Not Indicated  History Diabetes   Cholesterol Screening Once every 5 years if  you don't have a lipid disorder. May order more often based on risk factors. Lipid panel: 04/12/2024  Screening Not Indicated  History Lipid Disorder      Other Preventive Screenings Covered by Medicare:  Abdominal Aortic Aneurysm (AAA) Screening: covered once if your at risk. You're considered to be at risk if you have a family history of AAA or a male between the age of 65-75 who smoking at least 100 cigarettes in your lifetime.  Lung Cancer Screening: covers low dose CT scan once per year if you meet all of the following conditions: (1) Age 55-77; (2) No signs or symptoms of lung cancer; (3) Current smoker or have quit smoking within the last 15 years; (4) You have a tobacco smoking history of at least 20 pack years (packs per day x number of years you smoked); (5) You get a written order from a healthcare provider.  Glaucoma Screening: covered annually if you're considered high risk: (1) You have diabetes OR (2) Family history of glaucoma OR (3)  aged 50 and older OR (4)  American aged 65 and older  Osteoporosis Screening: covered every 2 years if you meet one of the following conditions: (1) Have a vertebral abnormality; (2) On glucocorticoid therapy for more than 3 months; (3) Have primary hyperparathyroidism; (4) On osteoporosis medications and need to assess response to drug therapy.  HIV Screening: covered annually if you're between the age of 15-65. Also covered annually if you are younger than 15 and older than 65 with risk factors for HIV infection. For pregnant patients, it is covered up to 3 times per pregnancy.    Immunizations:  Immunization Recommendations   Influenza Vaccine Annual influenza vaccination during flu season is recommended for all persons aged >= 6 months who do not have contraindications   Pneumococcal Vaccine   * Pneumococcal conjugate vaccine = PCV13 (Prevnar 13), PCV15 (Vaxneuvance), PCV20 (Prevnar 20)  * Pneumococcal polysaccharide vaccine = PPSV23  (Pneumovax) Adults 19-65 yo with certain risk factors or if 65+ yo  If never received any pneumonia vaccine: recommend Prevnar 20 (PCV20)  Give PCV20 if previously received 1 dose of PCV13 or PPSV23   Hepatitis B Vaccine 3 dose series if at intermediate or high risk (ex: diabetes, end stage renal disease, liver disease)   Respiratory syncytial virus (RSV) Vaccine - COVERED BY MEDICARE PART D  * RSVPreF3 (Arexvy) CDC recommends that adults 60 years of age and older may receive a single dose of RSV vaccine using shared clinical decision-making (SCDM)   Tetanus (Td) Vaccine - COST NOT COVERED BY MEDICARE PART B Following completion of primary series, a booster dose should be given every 10 years to maintain immunity against tetanus. Td may also be given as tetanus wound prophylaxis.   Tdap Vaccine - COST NOT COVERED BY MEDICARE PART B Recommended at least once for all adults. For pregnant patients, recommended with each pregnancy.   Shingles Vaccine (Shingrix) - COST NOT COVERED BY MEDICARE PART B  2 shot series recommended in those 19 years and older who have or will have weakened immune systems or those 50 years and older     Health Maintenance Due:      Topic Date Due   • Hepatitis C Screening  Never done   • Colorectal Cancer Screening  03/26/2031     Immunizations Due:      Topic Date Due   • Pneumococcal Vaccine: 65+ Years (3 of 3 - PPSV23 or PCV20) 09/20/2020   • COVID-19 Vaccine (4 - 2023-24 season) 09/01/2023     Advance Directives   What are advance directives?  Advance directives are legal documents that state your wishes and plans for medical care. These plans are made ahead of time in case you lose your ability to make decisions for yourself. Advance directives can apply to any medical decision, such as the treatments you want, and if you want to donate organs.   What are the types of advance directives?  There are many types of advance directives, and each state has rules about how to use them. You may  choose a combination of any of the following:  Living will:  This is a written record of the treatment you want. You can also choose which treatments you do not want, which to limit, and which to stop at a certain time. This includes surgery, medicine, IV fluid, and tube feedings.   Durable power of  for healthcare (DPAHC):  This is a written record that states who you want to make healthcare choices for you when you are unable to make them for yourself. This person, called a proxy, is usually a family member or a friend. You may choose more than 1 proxy.  Do not resuscitate (DNR) order:  A DNR order is used in case your heart stops beating or you stop breathing. It is a request not to have certain forms of treatment, such as CPR. A DNR order may be included in other types of advance directives.  Medical directive:  This covers the care that you want if you are in a coma, near death, or unable to make decisions for yourself. You can list the treatments you want for each condition. Treatment may include pain medicine, surgery, blood transfusions, dialysis, IV or tube feedings, and a ventilator (breathing machine).  Values history:  This document has questions about your views, beliefs, and how you feel and think about life. This information can help others choose the care that you would choose.  Why are advance directives important?  An advance directive helps you control your care. Although spoken wishes may be used, it is better to have your wishes written down. Spoken wishes can be misunderstood, or not followed. Treatments may be given even if you do not want them. An advance directive may make it easier for your family to make difficult choices about your care.   Fall Prevention    Fall prevention  includes ways to make your home and other areas safer. It also includes ways you can move more carefully to prevent a fall. Health conditions that cause changes in your blood pressure, vision, or muscle  strength and coordination may increase your risk for falls. Medicines may also increase your risk for falls if they make you dizzy, weak, or sleepy.   Fall prevention tips:   Stand or sit up slowly.    Use assistive devices as directed.    Wear shoes that fit well and have soles that .    Wear a personal alarm.    Stay active.    Manage your medical conditions.    Home Safety Tips:  Add items to prevent falls in the bathroom.    Keep paths clear.    Install bright lights in your home.    Keep items you use often on shelves within reach.    Paint or place reflective tape on the edges of your stairs.    How to Quit Using Smokeless Tobacco   Why it is important to stop using smokeless tobacco:  Smokeless tobacco comes in many forms. Examples include chew, snuff, dip, dissolvable tobacco, and snus. All smokeless tobacco products contain nicotine and may contain as much nicotine as 3 cigarettes. You may be physically dependent on nicotine. You may also be emotionally addicted to it. The cravings can be strong, but it is important to quit using smokeless tobacco. You will improve your health and decrease your cancer, stroke, and heart attack risk. Mouth sores and tooth problems will also improve when you quit. You can benefit from quitting no matter how long you have used smokeless tobacco.   Prepare to stop using smokeless tobacco:  Nicotine is a highly addictive drug. Withdrawal symptoms can happen when you stop and make it hard to quit. The following can help keep you on track:  Set a quit date.    Tell friends, family, and coworkers that you plan to quit.    Remove all smokeless tobacco products from your home, car, and workplace.    Manage weight gain after you quit:  Nicotine can affect your metabolism. You may gain a few pounds after you quit. The following can help you control your weight:  Eat healthy foods.    Drink water before, during, and between meals.    Exercise as directed.      Weight Management    Why it is important to manage your weight:  Being overweight increases your risk of health conditions such as heart disease, high blood pressure, type 2 diabetes, and certain types of cancer. It can also increase your risk for osteoarthritis, sleep apnea, and other respiratory problems. Aim for a slow, steady weight loss. Even a small amount of weight loss can lower your risk of health problems.  How to lose weight safely:  A safe and healthy way to lose weight is to eat fewer calories and get regular exercise. You can lose up about 1 pound a week by decreasing the number of calories you eat by 500 calories each day.   Healthy meal plan for weight management:  A healthy meal plan includes a variety of foods, contains fewer calories, and helps you stay healthy. A healthy meal plan includes the following:  Eat whole-grain foods more often.  A healthy meal plan should contain fiber. Fiber is the part of grains, fruits, and vegetables that is not broken down by your body. Whole-grain foods are healthy and provide extra fiber in your diet. Some examples of whole-grain foods are whole-wheat breads and pastas, oatmeal, brown rice, and bulgur.  Eat a variety of vegetables every day.  Include dark, leafy greens such as spinach, kale, pat greens, and mustard greens. Eat yellow and orange vegetables such as carrots, sweet potatoes, and winter squash.   Eat a variety of fruits every day.  Choose fresh or canned fruit (canned in its own juice or light syrup) instead of juice. Fruit juice has very little or no fiber.  Eat low-fat dairy foods.  Drink fat-free (skim) milk or 1% milk. Eat fat-free yogurt and low-fat cottage cheese. Try low-fat cheeses such as mozzarella and other reduced-fat cheeses.  Choose meat and other protein foods that are low in fat.  Choose beans or other legumes such as split peas or lentils. Choose fish, skinless poultry (chicken or turkey), or lean cuts of red meat (beef or pork). Before you cook  meat or poultry, cut off any visible fat.   Use less fat and oil.  Try baking foods instead of frying them. Add less fat, such as margarine, sour cream, regular salad dressing and mayonnaise to foods. Eat fewer high-fat foods. Some examples of high-fat foods include french fries, doughnuts, ice cream, and cakes.  Eat fewer sweets.  Limit foods and drinks that are high in sugar. This includes candy, cookies, regular soda, and sweetened drinks.  Exercise:  Exercise at least 30 minutes per day on most days of the week. Some examples of exercise include walking, biking, dancing, and swimming. You can also fit in more physical activity by taking the stairs instead of the elevator or parking farther away from stores. Ask your healthcare provider about the best exercise plan for you.      © Copyright [employer] 2018 Information is for End User's use only and may not be sold, redistributed or otherwise used for commercial purposes. All illustrations and images included in CareNotes® are the copyrighted property of [employer].A[employer] Inc[employer] or [employer]

## 2024-04-29 ENCOUNTER — TELEPHONE (OUTPATIENT)
Dept: FAMILY MEDICINE CLINIC | Facility: CLINIC | Age: 69
End: 2024-04-29

## 2024-04-29 NOTE — TELEPHONE ENCOUNTER
Spoke  with pt's   wife  repeat  lab  are  good.  Ferritin   a little  low.   Continue  multi  vitamin. Add   otc  iron  65 mg  daily

## 2024-05-01 DIAGNOSIS — E78.5 HYPERLIPIDEMIA, UNSPECIFIED HYPERLIPIDEMIA TYPE: ICD-10-CM

## 2024-05-02 RX ORDER — ATORVASTATIN CALCIUM 10 MG/1
TABLET, FILM COATED ORAL
Qty: 90 TABLET | Refills: 1 | Status: SHIPPED | OUTPATIENT
Start: 2024-05-02

## 2024-05-03 RX ORDER — BLOOD-GLUCOSE SENSOR
1 EACH MISCELLANEOUS
Qty: 6 EACH | Refills: 3 | Status: SHIPPED | OUTPATIENT
Start: 2024-05-03

## 2024-05-03 RX ORDER — KETOROLAC TROMETHAMINE 30 MG/ML
INJECTION, SOLUTION INTRAMUSCULAR; INTRAVENOUS
Qty: 1 EACH | Refills: 0 | Status: SHIPPED | OUTPATIENT
Start: 2024-05-03

## 2024-05-21 DIAGNOSIS — Z79.4 TYPE 2 DIABETES MELLITUS WITH BOTH EYES AFFECTED BY MILD NONPROLIFERATIVE RETINOPATHY WITHOUT MACULAR EDEMA, WITH LONG-TERM CURRENT USE OF INSULIN (HCC): ICD-10-CM

## 2024-05-21 DIAGNOSIS — E11.3293 TYPE 2 DIABETES MELLITUS WITH BOTH EYES AFFECTED BY MILD NONPROLIFERATIVE RETINOPATHY WITHOUT MACULAR EDEMA, WITH LONG-TERM CURRENT USE OF INSULIN (HCC): ICD-10-CM

## 2024-05-22 RX ORDER — INSULIN GLARGINE 100 [IU]/ML
INJECTION, SOLUTION SUBCUTANEOUS
Qty: 67.5 ML | Refills: 1 | Status: SHIPPED | OUTPATIENT
Start: 2024-05-22

## 2024-05-31 ENCOUNTER — OFFICE VISIT (OUTPATIENT)
Dept: AUDIOLOGY | Age: 69
End: 2024-05-31
Payer: MEDICARE

## 2024-05-31 ENCOUNTER — OFFICE VISIT (OUTPATIENT)
Dept: AUDIOLOGY | Age: 69
End: 2024-05-31

## 2024-05-31 DIAGNOSIS — H90.3 SENSORY HEARING LOSS, BILATERAL: Primary | ICD-10-CM

## 2024-05-31 PROCEDURE — 92556 SPEECH AUDIOMETRY COMPLETE: CPT

## 2024-05-31 PROCEDURE — 92567 TYMPANOMETRY: CPT

## 2024-05-31 PROCEDURE — 92552 PURE TONE AUDIOMETRY AIR: CPT

## 2024-05-31 NOTE — PROGRESS NOTES
Hearing Aid Visit:    Name:  Rich Abel  :  1955  Age:  68 y.o.  MRN:  [id number]  Date of Evaluation: 24     HISTORY:    Rich Abel was seen today (2024) for a(n) in-warranty hearing aid check of his bilateral hearing aids. Today, Rich reports no issues with the hearing aids.    DEVICE INFORMATION:        Left Device Right Device   Hearing Aid Make: [employer] Assist  [employer] Assist    Hearing Aid Model: Real 3 miniRITE-R rEAL 3 miniRITE-R   Serial Number: [id number]   Repair Warranty Date: 2026   Loss/Damage Warranty Date:  Active  Active         Length/Output 3x85 3x85   Wax System: Ribbit minifit Prowax minifit   Dome Size/Style: 10 mm DB 10 mm DB   Battery: Lithium-ion Rechargeable Lithium-ion Rechargeable       [employer] Serial Number: N/A N/A   [employer] Warranty Date:  N/A N/A    Serial Number:  [id number]    Warranty Date: 2026    Accessories: N/A          ACTION/ADJUSTMENTS:    Visual inspection of the device(s) revealed no noticeable damage or defects.     A listening check revealed good sound quality from the device(s).    The hearing aids were cleaned and checked. The patients wax filters and domes were replaced bilaterally.     No adjustments were made at this time.     RECOMMENDATIONS:     Follow up in 6 months      Milady Ramos, [facility]  Clinical Audiologist  [facility]  [facility] [place] [id number]

## 2024-05-31 NOTE — PROGRESS NOTES
Diagnostic Hearing Evaluation    Name:  Rich Abel  :  1955  Age:  68 y.o.   MRN:  [id number]  Date of Evaluation: 24     HISTORY:     Reason for visit: Known Hearing Loss binaurally    Rich Abel is being seen today at the request of Dr. Mckeon for an initial  evaluation of hearing. Patient reports no changes to his hearing since his last visit. He does note that his tinnitus is worse some days compared to others. He has a know mild sloping to severe sensorineural hearing loss, bilaterally.    EVALUATION:    Otoscopic Evaluation:   Right Ear: Unremarkable, canal clear   Left Ear: Unremarkable, canal clear    Tympanometry:   Right Ear: Type A; normal middle ear pressure and static compliance    Left Ear: Type A; normal middle ear pressure and static compliance     Speech Audiometry:  Speech Reception (SRT)    Right Ear: 30 dB HL    Left Ear: 45 dB HL    Word Recognition Scores (WRS):  Right Ear: excellent (96 % correct)     Left Ear: excellent (96 % correct)    Stimuli: [id number]    Pure Tone Audiometry:  Conventional pure tone audiometry from 250 - 8000 Hz  was obtained with good reliability and revealed the following:     Right Ear: Mild sloping to severe sensorineural hearing loss (SNHL)    Left Ear: Mild sloping to severe sensorineural hearing loss (SNHL)      Hearing is stable.    *see attached audiogram    RECOMMENDATIONS:  Annual hearing eval and Return to [facility]. for F/U    PATIENT EDUCATION:   The results of today's results and recommendations were reviewed with the patient and his hearing thresholds were explained at length. Treatment options, including amplification and communication strategies, were discussed as appropriate. The patient voiced understanding of his test results. Questions were addressed and the patient was encouraged to contact our department should concerns arise.      Servando Ramos., [facility]  Clinical Audiologist  [facility]UDIOLOG[facility] & [facility]  [place]  [address] [place] [phone]

## 2024-06-02 DIAGNOSIS — M79.671 PAIN IN BOTH FEET: ICD-10-CM

## 2024-06-02 DIAGNOSIS — M79.672 PAIN IN BOTH FEET: ICD-10-CM

## 2024-06-02 RX ORDER — GABAPENTIN 100 MG/1
100 CAPSULE ORAL 2 TIMES DAILY
Qty: 60 CAPSULE | Refills: 2 | Status: SHIPPED | OUTPATIENT
Start: 2024-06-02

## 2024-06-27 DIAGNOSIS — I10 ESSENTIAL HYPERTENSION: ICD-10-CM

## 2024-06-27 RX ORDER — LISINOPRIL 10 MG/1
TABLET ORAL
Qty: 90 TABLET | Refills: 1 | Status: SHIPPED | OUTPATIENT
Start: 2024-06-27

## 2024-07-05 DIAGNOSIS — Z79.4 TYPE 2 DIABETES MELLITUS WITH BOTH EYES AFFECTED BY MILD NONPROLIFERATIVE RETINOPATHY WITHOUT MACULAR EDEMA, WITH LONG-TERM CURRENT USE OF INSULIN (HCC): ICD-10-CM

## 2024-07-05 DIAGNOSIS — E11.3293 TYPE 2 DIABETES MELLITUS WITH BOTH EYES AFFECTED BY MILD NONPROLIFERATIVE RETINOPATHY WITHOUT MACULAR EDEMA, WITH LONG-TERM CURRENT USE OF INSULIN (HCC): ICD-10-CM

## 2024-07-05 RX ORDER — INSULIN GLARGINE 100 [IU]/ML
INJECTION, SOLUTION SUBCUTANEOUS
Qty: 90 ML | Refills: 1 | Status: SHIPPED | OUTPATIENT
Start: 2024-07-05

## 2024-07-06 DIAGNOSIS — Z79.4 TYPE 2 DIABETES MELLITUS WITH BOTH EYES AFFECTED BY MILD NONPROLIFERATIVE RETINOPATHY WITHOUT MACULAR EDEMA, WITH LONG-TERM CURRENT USE OF INSULIN (HCC): ICD-10-CM

## 2024-07-06 DIAGNOSIS — E11.3293 TYPE 2 DIABETES MELLITUS WITH BOTH EYES AFFECTED BY MILD NONPROLIFERATIVE RETINOPATHY WITHOUT MACULAR EDEMA, WITH LONG-TERM CURRENT USE OF INSULIN (HCC): ICD-10-CM

## 2024-07-07 RX ORDER — PEN NEEDLE, DIABETIC 32GX 5/32"
NEEDLE, DISPOSABLE MISCELLANEOUS
Qty: 100 EACH | Refills: 1 | Status: SHIPPED | OUTPATIENT
Start: 2024-07-07

## 2024-07-19 ENCOUNTER — OFFICE VISIT (OUTPATIENT)
Dept: PODIATRY | Facility: CLINIC | Age: 69
End: 2024-07-19
Payer: MEDICARE

## 2024-07-19 VITALS
OXYGEN SATURATION: 99 % | HEART RATE: 59 BPM | WEIGHT: 213 LBS | BODY MASS INDEX: 29.82 KG/M2 | SYSTOLIC BLOOD PRESSURE: 138 MMHG | DIASTOLIC BLOOD PRESSURE: 68 MMHG | HEIGHT: 71 IN

## 2024-07-19 DIAGNOSIS — M19.072 DJD (DEGENERATIVE JOINT DISEASE), ANKLE AND FOOT, LEFT: ICD-10-CM

## 2024-07-19 DIAGNOSIS — M79.672 LEFT FOOT PAIN: ICD-10-CM

## 2024-07-19 DIAGNOSIS — M77.42 METATARSALGIA, LEFT FOOT: Primary | ICD-10-CM

## 2024-07-19 PROCEDURE — 99213 OFFICE O/P EST LOW 20 MIN: CPT | Performed by: PODIATRIST

## 2024-07-19 PROCEDURE — 20600 DRAIN/INJ JOINT/BURSA W/O US: CPT | Performed by: PODIATRIST

## 2024-07-19 RX ORDER — TRIAMCINOLONE ACETONIDE 40 MG/ML
20 INJECTION, SUSPENSION INTRA-ARTICULAR; INTRAMUSCULAR
Status: SHIPPED | OUTPATIENT
Start: 2024-07-19

## 2024-07-19 RX ORDER — BUPIVACAINE HYDROCHLORIDE 2.5 MG/ML
1 INJECTION, SOLUTION EPIDURAL; INFILTRATION; INTRACAUDAL
Status: SHIPPED | OUTPATIENT
Start: 2024-07-19

## 2024-07-19 RX ADMIN — BUPIVACAINE HYDROCHLORIDE 1 ML: 2.5 INJECTION, SOLUTION EPIDURAL; INFILTRATION; INTRACAUDAL at 14:15

## 2024-07-19 RX ADMIN — TRIAMCINOLONE ACETONIDE 20 MG: 40 INJECTION, SUSPENSION INTRA-ARTICULAR; INTRAMUSCULAR at 14:15

## 2024-07-19 NOTE — PROGRESS NOTES
Assessment/Plan:     On clinical examination today, there is moderate tenderness palpation to the articulation of the cuboid to the fourth and fifth metatarsal bases. There is no erythema, no edema, no calor, no ecchymosis. There is decreased epicritic sensation to the patient's left third fourth and fifth toes. There are contracture deformities of the lesser digits of the left foot. There is no tenderness with palpation along the peroneal tendons and over the Achilles tendon laterally. There is no tenderness palpation of the ATFL or the CFL of the lateral left ankle. He has a pes cavus foot type.     The patient's symptomatology is consistent with recurrent metatarsalgia to the left midfoot. Treatment options were discussed and he was agreeable to a repeat corticosteroid injection.  After prepping the skin with alcohol, a CSI was administered without complication.  The patient tolerated the injection well.     Follow up in 2-3 weeks, or as needed.     Diagnoses and all orders for this visit:    Metatarsalgia, left foot    DJD (degenerative joint disease), ankle and foot, left    Left foot pain    Other orders  -     Small joint arthrocentesis          Subjective:     Patient ID: Rich Abel is a 69 y.o. male.    Patient presents today for recurrent left lateral midfoot pain that has been present for several weeks. The last injection from June 2023 lasted for over 9 months. There is no recent history of injury nor trauma.         PAST MEDICAL HISTORY:  Past Medical History:   Diagnosis Date    Arthritis     Cancer (HCC)     Colon polyp     Diabetes mellitus (HCC)     Ear problems     Essential hypertension 02/05/2020    Glaucoma     HL (hearing loss)     Hyperlipidemia 02/05/2020    Hypertension     Kidney stone     Melanoma of ear, left (HCC)     Nasal polyp     Tinnitus        PAST SURGICAL HISTORY:  Past Surgical History:   Procedure Laterality Date    ARTHROSCOPY KNEE Bilateral     BASAL CELL CARCINOMA  EXCISION  03/2020    left arm    CYSTOSCOPY      kidney stone removal    FOOT TENDON SURGERY Left 06/22/2021    Procedure: ANTERIOR TALOFIBULAR LIGAMENT REPAIR;  Surgeon: Trent Obregon DPM;  Location: [facility];  Service: Podiatry    FRACTURE SURGERY  March 2023    KNEE SURGERY      NASAL POLYP EXCISION      VT COLONOSCOPY FLX DX W/COLLJ SPEC WHEN PFRMD N/A 08/08/2017    Procedure: COLONOSCOPY;  Surgeon: Jeff Rosen MD;  Location: [facility];  Service: Colorectal    VT RPR TDN FLXR FOOT 1/2 W/O FREE GRAFG EACH TENDON Left 12/08/2017    Procedure: ANKLE PERONEUS LONGUS TENDON REPAIR with allograft;  RETINACULUM REPAIR SECONDARY  Application of Provena Vac;  Surgeon: Trent Obregon DPM;  Location: [facility];  Service: Podiatry    ROTATOR CUFF REPAIR Bilateral     SHOULDER ARTHROSCOPY      WISDOM TOOTH EXTRACTION          ALLERGIES:  Patient has no known allergies.    MEDICATIONS:  Current Outpatient Medications   Medication Sig Dispense Refill    aspirin (ECOTRIN LOW STRENGTH) 81 mg EC tablet Take 81 mg by mouth daily      atorvastatin (LIPITOR) 10 mg tablet TAKE 1 TABLET BY MOUTH EVERY DAY 90 tablet 1    Basaglar KwikPen 100 units/mL SOPN INJECT 66 UNITS UNDER THE SKIN IN THE MORNING 90 mL 1    BD Pen Needle Meghana 2nd Gen 32G X 4 MM MISC DAILY USE AS DIRECTED 100 each 1    Cholecalciferol 125 MCG (5000 UT) capsule Take 5,000 Units by mouth daily      Continuous Glucose  (FreeStyle Kalpana 3 [place]) CAMRON Check  blood sugar  4 times  per day 1 each 0    Continuous Glucose Sensor (FreeStyle Kalpana 3 Sensor) MISC Use 1 each every 14 (fourteen) days 6 each 3    fluticasone (FLONASE) 50 mcg/act nasal spray 1 spray into each nostril daily      gabapentin (NEURONTIN) 100 mg capsule TAKE 1 CAPSULE BY MOUTH TWICE A DAY 60 capsule 2    lisinopril (ZESTRIL) 10 mg tablet TAKE 1 TABLET BY MOUTH EVERY DAY 90 tablet 1    MAGNESIUM PO Take by mouth      metFORMIN (GLUCOPHAGE-XR) 500 mg 24 hr tablet TAKE 2 TABLETS BY MOUTH  TWICE A  tablet 1    Milk Thistle 150 MG CAPS Take by mouth      Multiple Vitamins-Minerals (MULTIVITAMIN WITH MINERALS) tablet Take 1 tablet by mouth daily      Simbrinza 1-0.2 % SUSP instill 1 drop into both eyes twice a day      sodium chloride (OCEAN) 0.65 % nasal spray 1 spray into each nostril as needed for congestion      Vitamins-Lipotropics (B-100 PO) Take by mouth      Diclofenac Sodium (VOLTAREN) 1 % Apply 2 g topically 4 (four) times a day (Patient not taking: Reported on 2023) 100 g 2    Specialty Vitamins Products (MAGNESIUM, AMINO ACID CHELATE,) 133 MG tablet Take 1 tablet by mouth 2 (two) times a day (Patient not taking: Reported on 2023)       Current Facility-Administered Medications   Medication Dose Route Frequency Provider Last Rate Last Admin    bupivacaine (PF) (MARCAINE) 0.25 % injection 1 mL  1 mL Intra-articular  Truman Santiago Castelan, DPM   1 mL at 23 1400    triamcinolone acetonide (KENALOG-40) 40 mg/mL injection 20 mg  20 mg Intra-articular  Truman Castelan, DPM   20 mg at 23 1400       SOCIAL HISTORY:  Social History     Socioeconomic History    Marital status: /Civil Union     Spouse name: None    Number of children: None    Years of education: None    Highest education level: None   Occupational History    None   Tobacco Use    Smoking status: Former     Current packs/day: 0.00     Average packs/day: 1 pack/day for 33.0 years (33.0 ttl pk-yrs)     Types: Cigarettes     Start date: 1974     Quit date: 2007     Years since quittin.0     Passive exposure: Past    Smokeless tobacco: Current     Types: Chew    Tobacco comments:     quit 15 years ago   Vaping Use    Vaping status: Never Used   Substance and Sexual Activity    Alcohol use: Yes     Comment: Occaisonally one drink    Drug use: No    Sexual activity: Yes     Partners: Female     Birth control/protection: None   Other Topics Concern    None   Social History Narrative    None      Social Determinants of Health     Financial Resource Strain: Low Risk  (4/21/2023)    Overall Financial Resource Strain (CARDIA)     Difficulty of Paying Living Expenses: Not hard at all   Food Insecurity: No Food Insecurity (4/20/2024)    Hunger Vital Sign     Worried About Running Out of Food in the Last Year: Never true     Ran Out of Food in the Last Year: Never true   Transportation Needs: No Transportation Needs (4/20/2024)    PRAPARE - Transportation     Lack of Transportation (Medical): No     Lack of Transportation (Non-Medical): No   Physical Activity: Not on file   Stress: Not on file   Social Connections: Not on file   Intimate Partner Violence: Not on file   Housing Stability: Low Risk  (4/20/2024)    Housing Stability Vital Sign     Unable to Pay for Housing in the Last Year: No     Number of Times Moved in the Last Year: 1     Homeless in the Last Year: No        Review of Systems   Constitutional: Negative.    HENT: Negative.     Eyes: Negative.    Respiratory: Negative.     Cardiovascular: Negative.    Endocrine: Negative.    Musculoskeletal: Negative.    Neurological: Negative.    Hematological: Negative.    Psychiatric/Behavioral: Negative.           Objective:     Physical Exam  Vitals reviewed.   Constitutional:       Appearance: Normal appearance.   HENT:      Head: Normocephalic and atraumatic.      Nose: Nose normal.   Eyes:      Conjunctiva/sclera: Conjunctivae normal.      Pupils: Pupils are equal, round, and reactive to light.   Cardiovascular:      Pulses:           Dorsalis pedis pulses are 2+ on the left side.        Posterior tibial pulses are 2+ on the left side.   Pulmonary:      Effort: Pulmonary effort is normal.   Musculoskeletal:        Feet:    Feet:      Comments: On clinical examination today, there is moderate tenderness palpation to the articulation of the cuboid to the fourth and fifth metatarsal bases. There is no erythema, no edema, no calor, no ecchymosis. There is  "decreased epicritic sensation to the patient's left third fourth and fifth toes. There are contracture deformities of the lesser digits of the left foot. There is no tenderness with palpation along the peroneal tendons and over the Achilles tendon laterally. There is no tenderness palpation of the ATFL or the CFL of the lateral left ankle. He has a pes cavus foot type.     Skin:     General: Skin is warm.      Capillary Refill: Capillary refill takes less than 2 seconds.   Neurological:      General: No focal deficit present.      Mental Status: He is alert and oriented to person, place, and time.   Psychiatric:         Mood and Affect: Mood normal.         Behavior: Behavior normal.         Thought Content: Thought content normal.         Small joint arthrocentesis: L intertarsal  Universal Protocol:  Consent: Verbal consent obtained.  Risks and benefits: risks, benefits and alternatives were discussed  Consent given by: patient  Time out: Immediately prior to procedure a \"time out\" was called to verify the correct patient, procedure, equipment, support staff and site/side marked as required.  Timeout called at: 7/19/2024 2:10 PM.  Patient understanding: patient states understanding of the procedure being performed  Patient identity confirmed: verbally with patient and provided demographic data  Supporting Documentation  Indications: pain   Procedure Details  Location: foot - L intertarsal  Preparation: Patient was prepped and draped in the usual sterile fashion  Needle size: 25 G  Ultrasound guidance: no  Approach: dorsal  Medications administered: 1 mL bupivacaine (PF) 0.25 %; 20 mg triamcinolone acetonide 40 mg/mL    Patient tolerance: patient tolerated the procedure well with no immediate complications          "

## 2024-08-01 PROBLEM — E11.3293 TYPE 2 DIABETES MELLITUS WITH MILD NONPROLIFERATIVE DIABETIC RETINOPATHY WITHOUT MACULAR EDEMA, BILATERAL (HCC): Status: ACTIVE | Noted: 2020-02-05

## 2024-08-02 ENCOUNTER — OFFICE VISIT (OUTPATIENT)
Dept: FAMILY MEDICINE CLINIC | Facility: CLINIC | Age: 69
End: 2024-08-02
Payer: MEDICARE

## 2024-08-02 VITALS
HEART RATE: 93 BPM | DIASTOLIC BLOOD PRESSURE: 58 MMHG | BODY MASS INDEX: 28.84 KG/M2 | TEMPERATURE: 97.3 F | WEIGHT: 206 LBS | HEIGHT: 71 IN | OXYGEN SATURATION: 99 % | SYSTOLIC BLOOD PRESSURE: 136 MMHG

## 2024-08-02 DIAGNOSIS — E78.5 HYPERLIPIDEMIA, UNSPECIFIED HYPERLIPIDEMIA TYPE: ICD-10-CM

## 2024-08-02 DIAGNOSIS — M79.672 PAIN IN BOTH FEET: ICD-10-CM

## 2024-08-02 DIAGNOSIS — Z79.4 TYPE 2 DIABETES MELLITUS WITH BOTH EYES AFFECTED BY MILD NONPROLIFERATIVE RETINOPATHY WITHOUT MACULAR EDEMA, WITH LONG-TERM CURRENT USE OF INSULIN (HCC): Primary | ICD-10-CM

## 2024-08-02 DIAGNOSIS — M79.671 PAIN IN BOTH FEET: ICD-10-CM

## 2024-08-02 DIAGNOSIS — I10 ESSENTIAL HYPERTENSION: ICD-10-CM

## 2024-08-02 DIAGNOSIS — E11.3293 TYPE 2 DIABETES MELLITUS WITH BOTH EYES AFFECTED BY MILD NONPROLIFERATIVE RETINOPATHY WITHOUT MACULAR EDEMA, WITH LONG-TERM CURRENT USE OF INSULIN (HCC): Primary | ICD-10-CM

## 2024-08-02 LAB — SL AMB POCT HEMOGLOBIN AIC: 5.8 (ref ?–6.5)

## 2024-08-02 PROCEDURE — 83036 HEMOGLOBIN GLYCOSYLATED A1C: CPT | Performed by: PHYSICIAN ASSISTANT

## 2024-08-02 PROCEDURE — 99214 OFFICE O/P EST MOD 30 MIN: CPT | Performed by: PHYSICIAN ASSISTANT

## 2024-08-02 NOTE — PROGRESS NOTES
Diabetic Foot Exam    Patient's shoes and socks removed.    Right Foot/Ankle   Right Foot Inspection  Skin Exam: skin normal and skin intact. No dry skin, no warmth, no callus, no erythema, no maceration, no abnormal color, no pre-ulcer, no ulcer and no callus.     Toe Exam: right toe deformity.     Sensory   Vibration: intact  Monofilament testing: intact    Vascular  The right DP pulse is 2+.     Left Foot/Ankle  Left Foot Inspection  Skin Exam: skin normal and skin intact. No dry skin, no warmth, no erythema, no maceration, normal color, no pre-ulcer, no ulcer and no callus.     Toe Exam: left toe deformity.     Sensory   Vibration: intact  Monofilament testing: intact    Vascular  The left DP pulse is 2+.     Assign Risk Category  Deformity present  No loss of protective sensation  No weak pulses  Risk: 0  Assessment/Plan:     Diagnoses and all orders for this visit:    Type 2 diabetes mellitus with both eyes affected by mild nonproliferative retinopathy without macular edema, with long-term current use of insulin ([facility])  Comments:  Diabetes is at goal continue current regimen  Orders:  -     POCT hemoglobin A1c  -     Hemoglobin A1C; Future  -     Comprehensive metabolic panel; Future  -     CBC and differential; Future  -     Lipid Panel with Direct LDL reflex; Future    Essential hypertension  Comments:  Pressure at goal continue lisinopril 10 mg  Orders:  -     Comprehensive metabolic panel; Future  -     CBC and differential; Future    Hyperlipidemia, unspecified hyperlipidemia type  Comments:  Continue statin therapy and low-fat diet  Orders:  -     Lipid Panel with Direct LDL reflex; Future    Pain in both feet  Comments:  Continue gabapentin 100 mg twice daily.  Patient under care of podiatry          Subjective:      Patient ID: Rich Abel is a 69 y.o. male.    Patient presents in the office for follow-up chronic conditions.  Patient has diabetes type 2 which is insulin-dependent.  He also has  complications of retinopathy and neuropathy.  Has a continuous glucometer reader.  Is currently on metformin  mg 2 of them twice a day and Basaglar insulin 66 units/day.  Globin A1c is 5.8.  Sugar has been running good.  Decided we will cut back to 60 units.  She will be going on a "[employer] cruise.  He may need to cut back his insulin 55 units since he will be more physically active.  For hypertension he is on lisinopril 10 mg.  Blood pressure is controlled.  Hyperlipidemia he is on atorvastatin 10 mg.  His neuropathy he takes gabapentin 100 mg twice daily.        The following portions of the patient's history were reviewed and updated as appropriate: He  has a past medical history of Arthritis, Cancer (HCC), Colon polyp, Diabetes mellitus (HCC), Ear problems, Essential hypertension (02/05/2020), Glaucoma, HL (hearing loss), Hyperlipidemia (02/05/2020), Hypertension, Kidney stone, Melanoma of ear, left (HCC), Nasal polyp, and Tinnitus.  Current Outpatient Medications   Medication Sig Dispense Refill    aspirin (ECOTRIN LOW STRENGTH) 81 mg EC tablet Take 81 mg by mouth daily      atorvastatin (LIPITOR) 10 mg tablet TAKE 1 TABLET BY MOUTH EVERY DAY 90 tablet 1    Basaglar KwikPen 100 units/mL SOPN INJECT 66 UNITS UNDER THE SKIN IN THE MORNING 90 mL 1    Cholecalciferol 125 MCG (5000 UT) capsule Take 5,000 Units by mouth daily      fluticasone (FLONASE) 50 mcg/act nasal spray 1 spray into each nostril daily      gabapentin (NEURONTIN) 100 mg capsule TAKE 1 CAPSULE BY MOUTH TWICE A DAY 60 capsule 2    lisinopril (ZESTRIL) 10 mg tablet TAKE 1 TABLET BY MOUTH EVERY DAY 90 tablet 1    MAGNESIUM PO Take by mouth      metFORMIN (GLUCOPHAGE-XR) 500 mg 24 hr tablet TAKE 2 TABLETS BY MOUTH TWICE A  tablet 1    Milk Thistle 150 MG CAPS Take by mouth      Multiple Vitamins-Minerals (MULTIVITAMIN WITH MINERALS) tablet Take 1 tablet by mouth daily      Simbrinza 1-0.2 % SUSP instill 1 drop into both eyes twice a  day      sodium chloride (OCEAN) 0.65 % nasal spray 1 spray into each nostril as needed for congestion      Vitamins-Lipotropics (B-100 PO) Take by mouth      BD Pen Needle Meghana 2nd Gen 32G X 4 MM MISC DAILY USE AS DIRECTED (Patient not taking: Reported on 8/2/2024) 100 each 1    Continuous Glucose  (FreeStyle Kalpana 3 [place]) CAMRON Check  blood sugar  4 times  per day (Patient not taking: Reported on 8/2/2024) 1 each 0    Continuous Glucose Sensor (FreeStyle Kalpana 3 Sensor) MISC Use 1 each every 14 (fourteen) days (Patient not taking: Reported on 8/2/2024) 6 each 3    Diclofenac Sodium (VOLTAREN) 1 % Apply 2 g topically 4 (four) times a day (Patient not taking: Reported on 9/25/2023) 100 g 2    Specialty Vitamins Products (MAGNESIUM, AMINO ACID CHELATE,) 133 MG tablet Take 1 tablet by mouth 2 (two) times a day (Patient not taking: Reported on 9/25/2023)       Current Facility-Administered Medications   Medication Dose Route Frequency Provider Last Rate Last Admin    bupivacaine (PF) (MARCAINE) 0.25 % injection 1 mL  1 mL Intra-articular  Truman Santiago Castlean, DPM   1 mL at 06/02/23 1400    bupivacaine (PF) (MARCAINE) 0.25 % injection 1 mL  1 mL Intra-articular     1 mL at 07/19/24 1415    triamcinolone acetonide (KENALOG-40) 40 mg/mL injection 20 mg  20 mg Intra-articular  Truman Castelan, DPM   20 mg at 06/02/23 1400    triamcinolone acetonide (KENALOG-40) 40 mg/mL injection 20 mg  20 mg Intra-articular     20 mg at 07/19/24 1415     He has No Known Allergies..    Review of Systems   Constitutional:  Negative for activity change, appetite change, chills, fatigue and fever.   HENT:  Negative for ear pain and sore throat.    Eyes:  Negative for visual disturbance.   Respiratory:  Negative for cough and shortness of breath.    Cardiovascular:  Negative for chest pain, palpitations and leg swelling.   Gastrointestinal:  Negative for abdominal pain, blood in stool, constipation, diarrhea and nausea.   Genitourinary:   Negative for difficulty urinating.   Musculoskeletal:  Negative for arthralgias, back pain and myalgias.   Skin:  Negative for rash.   Neurological:  Negative for dizziness, syncope and headaches.   Psychiatric/Behavioral:  Negative for self-injury, sleep disturbance and suicidal ideas. The patient is not nervous/anxious.          Objective:        Physical Exam  Vitals and nursing note reviewed.   Constitutional:       General: He is not in acute distress.     Appearance: Normal appearance. He is well-developed. He is not ill-appearing.   HENT:      Head: Normocephalic and atraumatic.      Right Ear: Tympanic membrane, ear canal and external ear normal.      Left Ear: Tympanic membrane, ear canal and external ear normal.      Mouth/Throat:      Pharynx: No posterior oropharyngeal erythema.   Eyes:      Conjunctiva/sclera: Conjunctivae normal.      Pupils: Pupils are equal, round, and reactive to light.   Neck:      Thyroid: No thyromegaly.      Vascular: No carotid bruit.   Cardiovascular:      Rate and Rhythm: Normal rate and regular rhythm.      Pulses: no weak pulses.           Dorsalis pedis pulses are 2+ on the right side and 2+ on the left side.      Heart sounds: Normal heart sounds. No murmur heard.  Pulmonary:      Effort: Pulmonary effort is normal.      Breath sounds: Normal breath sounds. No wheezing.   Abdominal:      General: Bowel sounds are normal.      Palpations: Abdomen is soft. There is no mass.      Tenderness: There is no abdominal tenderness.   Musculoskeletal:      Right lower leg: No edema.      Left lower leg: No edema.   Feet:      Right foot:      Skin integrity: No ulcer, skin breakdown, erythema, warmth, callus or dry skin.      Left foot:      Skin integrity: No ulcer, skin breakdown, erythema, warmth, callus or dry skin.   Lymphadenopathy:      Cervical: No cervical adenopathy.   Skin:     General: Skin is warm and dry.   Neurological:      General: No focal deficit present.       Mental Status: He is alert and oriented to person, place, and time.   Psychiatric:         Mood and Affect: Mood normal.         Behavior: Behavior normal.         Thought Content: Thought content normal.         Judgment: Judgment normal.

## 2024-08-10 DIAGNOSIS — E11.3293 TYPE 2 DIABETES MELLITUS WITH BOTH EYES AFFECTED BY MILD NONPROLIFERATIVE RETINOPATHY WITHOUT MACULAR EDEMA, WITH LONG-TERM CURRENT USE OF INSULIN (HCC): ICD-10-CM

## 2024-08-10 DIAGNOSIS — Z79.4 TYPE 2 DIABETES MELLITUS WITH BOTH EYES AFFECTED BY MILD NONPROLIFERATIVE RETINOPATHY WITHOUT MACULAR EDEMA, WITH LONG-TERM CURRENT USE OF INSULIN (HCC): ICD-10-CM

## 2024-08-10 RX ORDER — METFORMIN HYDROCHLORIDE 500 MG/1
TABLET, EXTENDED RELEASE ORAL
Qty: 360 TABLET | Refills: 1 | Status: SHIPPED | OUTPATIENT
Start: 2024-08-10

## 2024-09-08 DIAGNOSIS — M79.672 PAIN IN BOTH FEET: ICD-10-CM

## 2024-09-08 DIAGNOSIS — M79.671 PAIN IN BOTH FEET: ICD-10-CM

## 2024-09-08 RX ORDER — GABAPENTIN 100 MG/1
100 CAPSULE ORAL 2 TIMES DAILY
Qty: 60 CAPSULE | Refills: 5 | Status: SHIPPED | OUTPATIENT
Start: 2024-09-08

## 2024-10-18 ENCOUNTER — APPOINTMENT (OUTPATIENT)
Dept: RADIOLOGY | Facility: MEDICAL CENTER | Age: 69
End: 2024-10-18
Payer: MEDICARE

## 2024-10-18 ENCOUNTER — OFFICE VISIT (OUTPATIENT)
Dept: FAMILY MEDICINE CLINIC | Facility: CLINIC | Age: 69
End: 2024-10-18
Payer: MEDICARE

## 2024-10-18 VITALS
SYSTOLIC BLOOD PRESSURE: 134 MMHG | OXYGEN SATURATION: 99 % | HEIGHT: 71 IN | TEMPERATURE: 97.6 F | DIASTOLIC BLOOD PRESSURE: 52 MMHG | WEIGHT: 212.8 LBS | HEART RATE: 61 BPM | BODY MASS INDEX: 29.79 KG/M2

## 2024-10-18 DIAGNOSIS — M25.552 PAIN OF LEFT HIP: Primary | ICD-10-CM

## 2024-10-18 DIAGNOSIS — M25.552 PAIN OF LEFT HIP: ICD-10-CM

## 2024-10-18 PROCEDURE — 73502 X-RAY EXAM HIP UNI 2-3 VIEWS: CPT

## 2024-10-18 PROCEDURE — G2211 COMPLEX E/M VISIT ADD ON: HCPCS | Performed by: PHYSICIAN ASSISTANT

## 2024-10-18 PROCEDURE — 99213 OFFICE O/P EST LOW 20 MIN: CPT | Performed by: PHYSICIAN ASSISTANT

## 2024-10-18 NOTE — PROGRESS NOTES
"Ambulatory Visit  Name: Rich Abel      : 1955      MRN: [id number]  Encounter Provider: Coleen Barrera PA-C  Encounter Date: 10/18/2024   Encounter department: [facility]    Assessment & Plan  Pain of left hip  Status post fall.  Obtain x-ray.  Refer to orthopedic.   Orders:    Ambulatory Referral to Orthopedic Surgery; Future    XR hip/pelv 2-3 vws left if performed; Future       History of Present Illness     Patient presents in the office with left hip pain for 2 months.  Patient states he stepped off a ladder.  Landed in the driveway on his hip.  No over-the-counter meds taken.  Patient states now the hip pain is worse.  Hurts a lot when he is lying and sitting.  Feels better when he is standing.   Po  steroids   declined.          Review of Systems   Musculoskeletal:  Positive for arthralgias (hip  pain  left).           Objective     /52 (BP Location: Right arm, Patient Position: Sitting, Cuff Size: Large)   Pulse 61   Temp 97.6 °F (36.4 °C) (Temporal)   Ht 5' 10.75\" (1.797 m)   Wt 96.5 kg (212 lb 12.8 oz)   SpO2 99%   BMI 29.89 kg/m²     Physical Exam  Vitals and nursing note reviewed.   Constitutional:       General: He is not in acute distress.     Appearance: Normal appearance. He is not ill-appearing.   HENT:      Right Ear: External ear normal.      Left Ear: External ear normal.   Eyes:      Conjunctiva/sclera: Conjunctivae normal.   Pulmonary:      Effort: Pulmonary effort is normal.   Musculoskeletal:      Right lower leg: No edema.      Left lower leg: No edema.      Comments: Patient has left hip tenderness.  Patient has pain with range of motion.  There is no lumbar spine tenderness or left buttocks tenderness.  Lower extremity strength intact to passive resistance.  Hip pain  worse  use   Neurological:      General: No focal deficit present.      Mental Status: He is alert and oriented to person, place, and time.   Psychiatric:         Mood and Affect: " Mood normal.         Behavior: Behavior normal.         Thought Content: Thought content normal.         Judgment: Judgment normal.

## 2024-10-25 ENCOUNTER — APPOINTMENT (OUTPATIENT)
Dept: LAB | Facility: MEDICAL CENTER | Age: 69
End: 2024-10-25
Payer: MEDICARE

## 2024-10-25 ENCOUNTER — OFFICE VISIT (OUTPATIENT)
Dept: OBGYN CLINIC | Facility: MEDICAL CENTER | Age: 69
End: 2024-10-25
Payer: MEDICARE

## 2024-10-25 VITALS
WEIGHT: 212 LBS | HEIGHT: 70 IN | BODY MASS INDEX: 30.35 KG/M2 | HEART RATE: 62 BPM | DIASTOLIC BLOOD PRESSURE: 65 MMHG | SYSTOLIC BLOOD PRESSURE: 131 MMHG

## 2024-10-25 DIAGNOSIS — E78.5 HYPERLIPIDEMIA, UNSPECIFIED HYPERLIPIDEMIA TYPE: ICD-10-CM

## 2024-10-25 DIAGNOSIS — M16.12 PRIMARY OSTEOARTHRITIS OF LEFT HIP: Primary | ICD-10-CM

## 2024-10-25 DIAGNOSIS — Z01.818 PRE-OP TESTING: Primary | ICD-10-CM

## 2024-10-25 DIAGNOSIS — Z01.810 PRE-OPERATIVE CARDIOVASCULAR EXAMINATION: ICD-10-CM

## 2024-10-25 DIAGNOSIS — E11.3293 TYPE 2 DIABETES MELLITUS WITH BOTH EYES AFFECTED BY MILD NONPROLIFERATIVE RETINOPATHY WITHOUT MACULAR EDEMA, WITH LONG-TERM CURRENT USE OF INSULIN (HCC): ICD-10-CM

## 2024-10-25 DIAGNOSIS — Z51.81 ANTICOAGULATION MANAGEMENT ENCOUNTER: ICD-10-CM

## 2024-10-25 DIAGNOSIS — Z79.01 ANTICOAGULATION MANAGEMENT ENCOUNTER: ICD-10-CM

## 2024-10-25 DIAGNOSIS — Z47.1 AFTERCARE FOLLOWING LEFT HIP JOINT REPLACEMENT SURGERY: ICD-10-CM

## 2024-10-25 DIAGNOSIS — Z96.642 AFTERCARE FOLLOWING LEFT HIP JOINT REPLACEMENT SURGERY: ICD-10-CM

## 2024-10-25 DIAGNOSIS — I10 ESSENTIAL HYPERTENSION: ICD-10-CM

## 2024-10-25 DIAGNOSIS — Z01.812 PRE-OPERATIVE LABORATORY EXAMINATION: ICD-10-CM

## 2024-10-25 DIAGNOSIS — M25.552 PAIN OF LEFT HIP: ICD-10-CM

## 2024-10-25 DIAGNOSIS — Z79.4 TYPE 2 DIABETES MELLITUS WITH BOTH EYES AFFECTED BY MILD NONPROLIFERATIVE RETINOPATHY WITHOUT MACULAR EDEMA, WITH LONG-TERM CURRENT USE OF INSULIN (HCC): ICD-10-CM

## 2024-10-25 LAB
ALBUMIN SERPL BCG-MCNC: 3.9 G/DL (ref 3.5–5)
ALP SERPL-CCNC: 46 U/L (ref 34–104)
ALT SERPL W P-5'-P-CCNC: 25 U/L (ref 7–52)
ANION GAP SERPL CALCULATED.3IONS-SCNC: 10 MMOL/L (ref 4–13)
AST SERPL W P-5'-P-CCNC: 23 U/L (ref 13–39)
BASOPHILS # BLD AUTO: 0.05 THOUSANDS/ΜL (ref 0–0.1)
BASOPHILS NFR BLD AUTO: 1 % (ref 0–1)
BILIRUB SERPL-MCNC: 1.31 MG/DL (ref 0.2–1)
BUN SERPL-MCNC: 14 MG/DL (ref 5–25)
CALCIUM SERPL-MCNC: 8.6 MG/DL (ref 8.4–10.2)
CHLORIDE SERPL-SCNC: 108 MMOL/L (ref 96–108)
CHOLEST SERPL-MCNC: 98 MG/DL
CO2 SERPL-SCNC: 24 MMOL/L (ref 21–32)
CREAT SERPL-MCNC: 0.93 MG/DL (ref 0.6–1.3)
EOSINOPHIL # BLD AUTO: 0.19 THOUSAND/ΜL (ref 0–0.61)
EOSINOPHIL NFR BLD AUTO: 3 % (ref 0–6)
ERYTHROCYTE [DISTWIDTH] IN BLOOD BY AUTOMATED COUNT: 13.1 % (ref 11.6–15.1)
EST. AVERAGE GLUCOSE BLD GHB EST-MCNC: 123 MG/DL
GFR SERPL CREATININE-BSD FRML MDRD: 82 ML/MIN/1.73SQ M
GLUCOSE P FAST SERPL-MCNC: 94 MG/DL (ref 65–99)
HBA1C MFR BLD: 5.9 %
HCT VFR BLD AUTO: 39.4 % (ref 36.5–49.3)
HDLC SERPL-MCNC: 54 MG/DL
HGB BLD-MCNC: 12.6 G/DL (ref 12–17)
IMM GRANULOCYTES # BLD AUTO: 0.01 THOUSAND/UL (ref 0–0.2)
IMM GRANULOCYTES NFR BLD AUTO: 0 % (ref 0–2)
LDLC SERPL CALC-MCNC: 33 MG/DL (ref 0–100)
LYMPHOCYTES # BLD AUTO: 1.71 THOUSANDS/ΜL (ref 0.6–4.47)
LYMPHOCYTES NFR BLD AUTO: 27 % (ref 14–44)
MCH RBC QN AUTO: 30.9 PG (ref 26.8–34.3)
MCHC RBC AUTO-ENTMCNC: 32 G/DL (ref 31.4–37.4)
MCV RBC AUTO: 97 FL (ref 82–98)
MONOCYTES # BLD AUTO: 0.51 THOUSAND/ΜL (ref 0.17–1.22)
MONOCYTES NFR BLD AUTO: 8 % (ref 4–12)
NEUTROPHILS # BLD AUTO: 3.95 THOUSANDS/ΜL (ref 1.85–7.62)
NEUTS SEG NFR BLD AUTO: 61 % (ref 43–75)
NRBC BLD AUTO-RTO: 0 /100 WBCS
PLATELET # BLD AUTO: 320 THOUSANDS/UL (ref 149–390)
PMV BLD AUTO: 10.3 FL (ref 8.9–12.7)
POTASSIUM SERPL-SCNC: 4 MMOL/L (ref 3.5–5.3)
PROT SERPL-MCNC: 5.8 G/DL (ref 6.4–8.4)
RBC # BLD AUTO: 4.08 MILLION/UL (ref 3.88–5.62)
SODIUM SERPL-SCNC: 142 MMOL/L (ref 135–147)
TRIGL SERPL-MCNC: 54 MG/DL
WBC # BLD AUTO: 6.42 THOUSAND/UL (ref 4.31–10.16)

## 2024-10-25 PROCEDURE — 83036 HEMOGLOBIN GLYCOSYLATED A1C: CPT

## 2024-10-25 PROCEDURE — 36415 COLL VENOUS BLD VENIPUNCTURE: CPT

## 2024-10-25 PROCEDURE — 85025 COMPLETE CBC W/AUTO DIFF WBC: CPT

## 2024-10-25 PROCEDURE — 99204 OFFICE O/P NEW MOD 45 MIN: CPT | Performed by: ORTHOPAEDIC SURGERY

## 2024-10-25 PROCEDURE — 80053 COMPREHEN METABOLIC PANEL: CPT

## 2024-10-25 PROCEDURE — 80061 LIPID PANEL: CPT

## 2024-10-25 RX ORDER — ASCORBATE CALCIUM 500 MG
500 TABLET ORAL 2 TIMES DAILY
Qty: 60 TABLET | Refills: 0 | Status: SHIPPED | OUTPATIENT
Start: 2024-10-25 | End: 2024-11-24

## 2024-10-25 RX ORDER — ENOXAPARIN SODIUM 100 MG/ML
40 INJECTION SUBCUTANEOUS DAILY
Qty: 11.2 ML | Refills: 0 | Status: CANCELLED | OUTPATIENT
Start: 2024-10-25 | End: 2024-11-22

## 2024-10-25 RX ORDER — SODIUM CHLORIDE, SODIUM LACTATE, POTASSIUM CHLORIDE, CALCIUM CHLORIDE 600; 310; 30; 20 MG/100ML; MG/100ML; MG/100ML; MG/100ML
125 INJECTION, SOLUTION INTRAVENOUS CONTINUOUS
OUTPATIENT
Start: 2024-10-25

## 2024-10-25 RX ORDER — FOLIC ACID 1 MG/1
1 TABLET ORAL DAILY
Qty: 30 TABLET | Refills: 0 | Status: SHIPPED | OUTPATIENT
Start: 2024-10-25 | End: 2024-11-24

## 2024-10-25 RX ORDER — CHLORHEXIDINE GLUCONATE ORAL RINSE 1.2 MG/ML
15 SOLUTION DENTAL ONCE
OUTPATIENT
Start: 2024-10-25 | End: 2024-10-25

## 2024-10-25 RX ORDER — TRANEXAMIC ACID 10 MG/ML
1000 INJECTION, SOLUTION INTRAVENOUS ONCE
OUTPATIENT
Start: 2024-10-25 | End: 2024-10-25

## 2024-10-25 RX ORDER — FERROUS SULFATE 324(65)MG
TABLET, DELAYED RELEASE (ENTERIC COATED) ORAL
Qty: 30 TABLET | Refills: 0 | Status: CANCELLED | OUTPATIENT
Start: 2024-10-25

## 2024-10-25 RX ORDER — ASCORBIC ACID 500 MG
500 TABLET ORAL 2 TIMES DAILY
Qty: 60 TABLET | Refills: 0 | Status: CANCELLED | OUTPATIENT
Start: 2024-10-25

## 2024-10-25 RX ORDER — ENOXAPARIN SODIUM 100 MG/ML
40 INJECTION SUBCUTANEOUS DAILY
Qty: 11.2 ML | Refills: 0 | Status: SHIPPED | OUTPATIENT
Start: 2024-10-25 | End: 2024-11-22

## 2024-10-25 RX ORDER — TRANEXAMIC ACID 10 MG/ML
1000 INJECTION, SOLUTION INTRAVENOUS ONCE
Status: CANCELLED | OUTPATIENT
Start: 2024-10-25 | End: 2024-10-25

## 2024-10-25 RX ORDER — FERROUS SULFATE 324(65)MG
324 TABLET, DELAYED RELEASE (ENTERIC COATED) ORAL
Qty: 30 TABLET | Refills: 0 | Status: SHIPPED | OUTPATIENT
Start: 2024-10-25 | End: 2024-11-24

## 2024-10-25 RX ORDER — GABAPENTIN 300 MG/1
300 CAPSULE ORAL ONCE
OUTPATIENT
Start: 2024-10-25 | End: 2024-10-25

## 2024-10-25 RX ORDER — CHLORHEXIDINE GLUCONATE ORAL RINSE 1.2 MG/ML
15 SOLUTION DENTAL ONCE
Status: CANCELLED | OUTPATIENT
Start: 2024-10-25 | End: 2024-10-25

## 2024-10-25 RX ORDER — MUPIROCIN 20 MG/G
OINTMENT TOPICAL 2 TIMES DAILY
Qty: 15 G | Refills: 0 | Status: SHIPPED | OUTPATIENT
Start: 2024-10-25

## 2024-10-25 RX ORDER — ACETAMINOPHEN 325 MG/1
975 TABLET ORAL ONCE
OUTPATIENT
Start: 2024-10-25 | End: 2024-10-25

## 2024-10-25 RX ORDER — MUPIROCIN 20 MG/G
OINTMENT TOPICAL 2 TIMES DAILY
Qty: 15 G | Refills: 0 | Status: CANCELLED | OUTPATIENT
Start: 2024-10-25

## 2024-10-25 RX ORDER — CEFAZOLIN SODIUM 2 G/50ML
2000 SOLUTION INTRAVENOUS ONCE
Status: CANCELLED | OUTPATIENT
Start: 2024-10-25 | End: 2024-10-25

## 2024-10-25 RX ORDER — FOLIC ACID 1 MG/1
1 TABLET ORAL DAILY
Qty: 30 TABLET | Refills: 0 | Status: CANCELLED | OUTPATIENT
Start: 2024-10-25

## 2024-10-25 NOTE — PROGRESS NOTES
Assessment:  1. Primary osteoarthritis of left hip        2. Pain of left hip  Ambulatory Referral to Orthopedic Surgery      3. Pre-operative laboratory examination        4. Pre-operative cardiovascular examination        5. Aftercare following left hip joint replacement surgery            Plan:  Left hip osteoarthritis  The patient will be scheduled for left total hip arthroplasty.  We feel the patient will find significant relief per current symptoms, findings on imaging and exam.  Risks, benefits, precautions and expectations were discussed. Risks include blood loss, potential infection and blood clots.  Preoperative vitamins were prescribed.     The patient should follow up after surgery.        To do next visit:  Return for post-op with x-rays.    The above stated was discussed in layman's terms and the patient expressed understanding.  All questions were answered to the patient's satisfaction.       Scribe Attestation      I,:  Jigar Blue MA am acting as a scribe while in the presence of the attending physician.:       I,:  Abiel Green MD personally performed the services described in this documentation    as scribed in my presence.:               Subjective:   Rich Abel is a 69 y.o. male who presents for initial evaluation of left hip.  He has had symptoms for several weeks following an event this summer when he was power washing his house while standing on a ladder.  He fell landing on the lateral aspect of his left hip.  Since then he continues to describe onset of pain in the left hip and groin area.  He has difficulties rising from a seated position, and has considerable pain with attempts at the left hip and groin area while entering and exiting his automobile.  Tincture of time as well as modified therapeutic activities have resulted in no relief of his current left hip symptoms today he complains of very troublesome left anterior groin pain.  His pain effects his daily activity  and sleep.  Flexing at him, getting shoes on and climbing ladders aggravates while rest alleviates.  He has tried activity and activity and oral medications with limited benefit.          Review of systems negative unless otherwise specified in HPI    Past Medical History:   Diagnosis Date    Arthritis     Cancer (HCC)     Colon polyp     Diabetes mellitus (HCC)     Ear problems     Essential hypertension 02/05/2020    Glaucoma     HL (hearing loss)     Hyperlipidemia 02/05/2020    Hypertension     Kidney stone     Melanoma of ear, left (HCC)     Nasal polyp     Tinnitus        Past Surgical History:   Procedure Laterality Date    ARTHROSCOPY KNEE Bilateral     BASAL CELL CARCINOMA EXCISION  03/2020    left arm    CYSTOSCOPY      kidney stone removal    FOOT TENDON SURGERY Left 06/22/2021    Procedure: ANTERIOR TALOFIBULAR LIGAMENT REPAIR;  Surgeon: Trent Obregon DPM;  Location: [facility];  Service: Podiatry    FRACTURE SURGERY  March 2023    KNEE SURGERY      NASAL POLYP EXCISION      SC COLONOSCOPY FLX DX W/COLLJ SPEC WHEN PFRMD N/A 08/08/2017    Procedure: COLONOSCOPY;  Surgeon: Jeff Rosen MD;  Location: [facility];  Service: Colorectal    SC RPR TDN FLXR FOOT 1/2 W/O FREE GRAFG EACH TENDON Left 12/08/2017    Procedure: ANKLE PERONEUS LONGUS TENDON REPAIR with allograft;  RETINACULUM REPAIR SECONDARY  Application of Provena Vac;  Surgeon: Trent Obregon DPM;  Location: [facility];  Service: Podiatry    ROTATOR CUFF REPAIR Bilateral     SHOULDER ARTHROSCOPY      WISDOM TOOTH EXTRACTION         Family History   Problem Relation Age of Onset    Heart disease Mother         MOTHER    Diabetes Mother     Melanoma Mother     Cancer Mother     Aortic aneurysm Father         abdominal     Prostate cancer Father     Colon cancer Father        Social History     Occupational History    Not on file   Tobacco Use    Smoking status: Former     Current packs/day: 0.00     Average packs/day: 1 pack/day for 33.0 years  (33.0 ttl pk-yrs)     Types: Cigarettes     Start date: 1974     Quit date: 2007     Years since quittin.3     Passive exposure: Past    Smokeless tobacco: Current     Types: Chew    Tobacco comments:     quit 15 years ago   Vaping Use    Vaping status: Never Used   Substance and Sexual Activity    Alcohol use: Yes     Comment: Occaisonally one drink    Drug use: No    Sexual activity: Yes     Partners: Female     Birth control/protection: None         Current Outpatient Medications:     aspirin (ECOTRIN LOW STRENGTH) 81 mg EC tablet, Take 81 mg by mouth daily, Disp: , Rfl:     atorvastatin (LIPITOR) 10 mg tablet, TAKE 1 TABLET BY MOUTH EVERY DAY, Disp: 90 tablet, Rfl: 1    Basaglar KwikPen 100 units/mL SOPN, INJECT 66 UNITS UNDER THE SKIN IN THE MORNING, Disp: 90 mL, Rfl: 1    Cholecalciferol 125 MCG (5000 UT) capsule, Take 5,000 Units by mouth daily, Disp: , Rfl:     fluticasone (FLONASE) 50 mcg/act nasal spray, 1 spray into each nostril daily, Disp: , Rfl:     gabapentin (NEURONTIN) 100 mg capsule, TAKE 1 CAPSULE BY MOUTH TWICE A DAY, Disp: 60 capsule, Rfl: 5    lisinopril (ZESTRIL) 10 mg tablet, TAKE 1 TABLET BY MOUTH EVERY DAY, Disp: 90 tablet, Rfl: 1    MAGNESIUM PO, Take by mouth, Disp: , Rfl:     metFORMIN (GLUCOPHAGE-XR) 500 mg 24 hr tablet, TAKE 2 TABLETS BY MOUTH TWICE A DAY, Disp: 360 tablet, Rfl: 1    Milk Thistle 150 MG CAPS, Take by mouth, Disp: , Rfl:     Multiple Vitamins-Minerals (MULTIVITAMIN WITH MINERALS) tablet, Take 1 tablet by mouth daily, Disp: , Rfl:     Simbrinza 1-0.2 % SUSP, instill 1 drop into both eyes twice a day, Disp: , Rfl:     sodium chloride (OCEAN) 0.65 % nasal spray, 1 spray into each nostril as needed for congestion, Disp: , Rfl:     Vitamins-Lipotropics (B-100 PO), Take by mouth, Disp: , Rfl:     BD Pen Needle Meghana 2nd Gen 32G X 4 MM MISC, DAILY USE AS DIRECTED (Patient not taking: Reported on 2024), Disp: 100 each, Rfl: 1    Continuous Glucose   (FreeStyle Kalpana 3 Azalea) CAMRON, Check  blood sugar  4 times  per day (Patient not taking: Reported on 8/2/2024), Disp: 1 each, Rfl: 0    Continuous Glucose Sensor (FreeStyle Kalpana 3 Sensor) MISC, Use 1 each every 14 (fourteen) days (Patient not taking: Reported on 8/2/2024), Disp: 6 each, Rfl: 3    Diclofenac Sodium (VOLTAREN) 1 %, Apply 2 g topically 4 (four) times a day (Patient not taking: Reported on 9/25/2023), Disp: 100 g, Rfl: 2    Specialty Vitamins Products (MAGNESIUM, AMINO ACID CHELATE,) 133 MG tablet, Take 1 tablet by mouth 2 (two) times a day (Patient not taking: Reported on 9/25/2023), Disp: , Rfl:     Current Facility-Administered Medications:     bupivacaine (PF) (MARCAINE) 0.25 % injection 1 mL, 1 mL, Intra-articular, , Truman Castelan, DPM, 1 mL at 06/02/23 1400    bupivacaine (PF) (MARCAINE) 0.25 % injection 1 mL, 1 mL, Intra-articular, , , 1 mL at 07/19/24 1415    triamcinolone acetonide (KENALOG-40) 40 mg/mL injection 20 mg, 20 mg, Intra-articular, , Truman Castelan, DPM, 20 mg at 06/02/23 1400    triamcinolone acetonide (KENALOG-40) 40 mg/mL injection 20 mg, 20 mg, Intra-articular, , , 20 mg at 07/19/24 1415    No Known Allergies         Vitals:    10/25/24 0909   BP: 131/65   Pulse: 62       Objective:  Physical exam  General: Awake, Alert, Oriented  Eyes: Pupils equal, round and reactive to light  Heart: regular rate and rhythm  Lungs: No audible wheezing  Abdomen: soft                    Ortho Exam  Right hip:  Mild restriction with no groin pain    Left hip:  Apprehension with ROM  Groin pain with IR  ER with flexion  Calf compartments soft and supple  Sensation intact  Toes are warm sensate and mobile      Diagnostics, reviewed and taken today if performed as documented:    The attending physician has personally reviewed the pertinent films in PACS and interpretation is as follows:  Left hip x-ray:  Moderate-severe arthritic changes with greater than 50% reduction of joint space,  "subchondral sclerosis and cystic changes.      Procedures, if performed today:    Procedures    None performed      Portions of the record may have been created with voice recognition software.  Occasional wrong word or \"sound a like\" substitutions may have occurred due to the inherent limitations of voice recognition software.  Read the chart carefully and recognize, using context, where substitutions have occurred.    "

## 2024-11-01 LAB
LEFT EYE DIABETIC RETINOPATHY: NORMAL
RIGHT EYE DIABETIC RETINOPATHY: POSITIVE

## 2024-11-05 ENCOUNTER — TELEPHONE (OUTPATIENT)
Dept: OBGYN CLINIC | Facility: HOSPITAL | Age: 69
End: 2024-11-05

## 2024-11-08 ENCOUNTER — OFFICE VISIT (OUTPATIENT)
Dept: FAMILY MEDICINE CLINIC | Facility: CLINIC | Age: 69
End: 2024-11-08
Payer: MEDICARE

## 2024-11-08 VITALS
HEART RATE: 93 BPM | BODY MASS INDEX: 30.26 KG/M2 | HEIGHT: 70 IN | TEMPERATURE: 97.7 F | WEIGHT: 211.4 LBS | OXYGEN SATURATION: 97 % | DIASTOLIC BLOOD PRESSURE: 58 MMHG | SYSTOLIC BLOOD PRESSURE: 118 MMHG

## 2024-11-08 DIAGNOSIS — M16.12 OSTEOARTHRITIS OF LEFT HIP, UNSPECIFIED OSTEOARTHRITIS TYPE: ICD-10-CM

## 2024-11-08 DIAGNOSIS — E11.3293 TYPE 2 DIABETES MELLITUS WITH BOTH EYES AFFECTED BY MILD NONPROLIFERATIVE RETINOPATHY WITHOUT MACULAR EDEMA, WITH LONG-TERM CURRENT USE OF INSULIN (HCC): Primary | ICD-10-CM

## 2024-11-08 DIAGNOSIS — Z79.4 TYPE 2 DIABETES MELLITUS WITH BOTH EYES AFFECTED BY MILD NONPROLIFERATIVE RETINOPATHY WITHOUT MACULAR EDEMA, WITH LONG-TERM CURRENT USE OF INSULIN (HCC): Primary | ICD-10-CM

## 2024-11-08 DIAGNOSIS — E78.5 HYPERLIPIDEMIA, UNSPECIFIED HYPERLIPIDEMIA TYPE: ICD-10-CM

## 2024-11-08 DIAGNOSIS — I10 ESSENTIAL HYPERTENSION: ICD-10-CM

## 2024-11-08 PROCEDURE — G2211 COMPLEX E/M VISIT ADD ON: HCPCS | Performed by: PHYSICIAN ASSISTANT

## 2024-11-08 PROCEDURE — 99214 OFFICE O/P EST MOD 30 MIN: CPT | Performed by: PHYSICIAN ASSISTANT

## 2024-11-08 NOTE — ASSESSMENT & PLAN NOTE
Lab Results   Component Value Date    HGBA1C 5.9 (H) 10/25/2024   Diabetes is at goal.  Decrease Basaglar to 50 units.    Orders:    CBC and differential; Future    Hemoglobin A1C; Future    Albumin / creatinine urine ratio; Future

## 2024-11-08 NOTE — PROGRESS NOTES
Diabetic Foot Exam    Patient's shoes and socks removed.    Right Foot/Ankle   Right Foot Inspection  Skin Exam: skin normal and skin intact. No dry skin, no warmth, no callus, no erythema, no maceration, no abnormal color, no pre-ulcer, no ulcer and no callus.     Toe Exam: No tenderness and  no right toe deformity    Sensory   Vibration: intact  Monofilament testing: intact    Vascular  The right DP pulse is 2+.     Left Foot/Ankle  Left Foot Inspection  Skin Exam: skin normal and skin intact. No dry skin, no warmth, no erythema, no maceration, normal color, no pre-ulcer, no ulcer and no callus.     Toe Exam: No tenderness and no left toe deformity.     Sensory   Vibration: intact  Monofilament testing: intact    Vascular  The left DP pulse is 2+.     Assign Risk Category  No deformity present  No loss of protective sensation  No weak pulses  Risk: 0  Ambulatory Visit  Name: Rich Abel      : 1955      MRN: [id number]  Encounter Provider: Coleen Barrera PA-C  Encounter Date: 2024   Encounter department: [facility]    Assessment & Plan  Type 2 diabetes mellitus with both eyes affected by mild nonproliferative retinopathy without macular edema, with long-term current use of insulin ([facility])    Lab Results   Component Value Date    HGBA1C 5.9 (H) 10/25/2024   Diabetes is at goal.  Decrease Basaglar to 50 units.    Orders:    CBC and differential; Future    Hemoglobin A1C; Future    Albumin / creatinine urine ratio; Future    Essential hypertension  Blood pressure is at goal continue lisinopril 10 mg  Orders:    CBC and differential; Future    Comprehensive metabolic panel; Future    Hyperlipidemia, unspecified hyperlipidemia type  Lipids at goal continue statin therapy and low-fat diet  Orders:    Lipid panel; Future    Osteoarthritis of left hip, unspecified osteoarthritis type  Patient is scheduled for hip replacement          History of Present Illness     Patient presents in the  "office for follow-up chronic conditions.  Patient has diabetes which is insulin-dependent.  Also has complications of retinopathy.  Current regimen is metformin  mg to twice a day.  Currently on Basaglar insulin 56 units/day.  His current A1c is 5.9.  He states his hypoglycemic episodes have greatly improved.  We will cut back the insulin to 50 units a day.  Exam is up-to-date.  Blood pressure is well-controlled with lisinopril at 10 mg.  For hyperlipidemia he is on atorvastatin 10 mg.  BMP and lipid panel are at goal.  Patient is having a total left hip replacement at the end of this month.  We back for his preop examination.          Review of Systems   Constitutional:  Negative for activity change, appetite change, chills, fatigue and fever.   HENT:  Negative for ear pain and sore throat.    Eyes:  Negative for visual disturbance.   Respiratory:  Negative for cough and shortness of breath.    Cardiovascular:  Negative for chest pain, palpitations and leg swelling.   Gastrointestinal:  Negative for abdominal pain, blood in stool, constipation, diarrhea and nausea.   Genitourinary:  Negative for difficulty urinating.   Musculoskeletal:  Positive for arthralgias. Negative for back pain and myalgias.        Left  hip pain   Skin:  Negative for rash.   Neurological:  Negative for dizziness, syncope and headaches.   Psychiatric/Behavioral:  Negative for self-injury, sleep disturbance and suicidal ideas. The patient is not nervous/anxious.            Objective     /58 (BP Location: Right arm, Patient Position: Sitting, Cuff Size: Large)   Pulse 93   Temp 97.7 °F (36.5 °C) (Temporal)   Ht 5' 10\" (1.778 m)   Wt 95.9 kg (211 lb 6.4 oz)   SpO2 97%   BMI 30.33 kg/m²     Physical Exam  Vitals and nursing note reviewed.   Constitutional:       General: He is not in acute distress.     Appearance: Normal appearance. He is well-developed. He is not ill-appearing.   HENT:      Head: Normocephalic and atraumatic. "      Comments: Bilateral  hearing   aids     Right Ear: External ear normal.      Left Ear: External ear normal.      Mouth/Throat:      Pharynx: No posterior oropharyngeal erythema.   Eyes:      Conjunctiva/sclera: Conjunctivae normal.      Pupils: Pupils are equal, round, and reactive to light.   Neck:      Thyroid: No thyromegaly.      Vascular: No carotid bruit.   Cardiovascular:      Rate and Rhythm: Normal rate and regular rhythm.      Pulses: no weak pulses.           Dorsalis pedis pulses are 2+ on the right side and 2+ on the left side.      Heart sounds: Normal heart sounds. No murmur heard.  Pulmonary:      Effort: Pulmonary effort is normal.      Breath sounds: Normal breath sounds. No wheezing.   Abdominal:      General: Bowel sounds are normal.      Palpations: Abdomen is soft. There is no mass.      Tenderness: There is no abdominal tenderness.   Musculoskeletal:      Right lower leg: No edema.      Left lower leg: No edema.   Feet:      Right foot:      Skin integrity: No ulcer, skin breakdown, erythema, warmth, callus or dry skin.      Left foot:      Skin integrity: No ulcer, skin breakdown, erythema, warmth, callus or dry skin.   Lymphadenopathy:      Cervical: No cervical adenopathy.   Skin:     General: Skin is warm and dry.   Neurological:      General: No focal deficit present.      Mental Status: He is alert and oriented to person, place, and time.   Psychiatric:         Mood and Affect: Mood normal.         Behavior: Behavior normal.         Thought Content: Thought content normal.         Judgment: Judgment normal.

## 2024-11-08 NOTE — ASSESSMENT & PLAN NOTE
Blood pressure is at goal continue lisinopril 10 mg  Orders:    CBC and differential; Future    Comprehensive metabolic panel; Future

## 2024-11-13 ENCOUNTER — TELEPHONE (OUTPATIENT)
Dept: PAIN MEDICINE | Facility: CLINIC | Age: 69
End: 2024-11-13

## 2024-11-13 ENCOUNTER — TELEPHONE (OUTPATIENT)
Dept: OBGYN CLINIC | Facility: MEDICAL CENTER | Age: 69
End: 2024-11-13

## 2024-11-13 NOTE — TELEPHONE ENCOUNTER
Patient's wife stopped in today for a few questions    They are asking when paperwork would be completed for [facility] and if they could receive a call once completed  They said it is supposed to be in to [facility] by Nov 15th   I did remind them of the turnaround time     # [phone]

## 2024-11-13 NOTE — TELEPHONE ENCOUNTER
Pts wife would like to know if there is any kind of special cushion pt can use after his JEMMA as he sinks into all his chairs and recliners at home. He was also inquiring if there is anything he needs to use as well to sleep.

## 2024-11-14 NOTE — TELEPHONE ENCOUNTER
Spoke to pt, relayed PA-C message. Pt agreeable to discuss questions at preop PT appt on 12/6 as well as with the inpatient PT team after surgery. No further questions at this time

## 2024-11-15 ENCOUNTER — LAB REQUISITION (OUTPATIENT)
Dept: LAB | Facility: HOSPITAL | Age: 69
End: 2024-11-15
Payer: MEDICARE

## 2024-11-15 ENCOUNTER — APPOINTMENT (OUTPATIENT)
Dept: LAB | Facility: MEDICAL CENTER | Age: 69
End: 2024-11-15
Payer: MEDICARE

## 2024-11-15 DIAGNOSIS — I10 ESSENTIAL HYPERTENSION: ICD-10-CM

## 2024-11-15 DIAGNOSIS — Z79.4 TYPE 2 DIABETES MELLITUS WITH BOTH EYES AFFECTED BY MILD NONPROLIFERATIVE RETINOPATHY WITHOUT MACULAR EDEMA, WITH LONG-TERM CURRENT USE OF INSULIN (HCC): ICD-10-CM

## 2024-11-15 DIAGNOSIS — Z51.81 ANTICOAGULATION MANAGEMENT ENCOUNTER: ICD-10-CM

## 2024-11-15 DIAGNOSIS — E11.3293 TYPE 2 DIABETES MELLITUS WITH BOTH EYES AFFECTED BY MILD NONPROLIFERATIVE RETINOPATHY WITHOUT MACULAR EDEMA, WITH LONG-TERM CURRENT USE OF INSULIN (HCC): ICD-10-CM

## 2024-11-15 DIAGNOSIS — M16.12 UNILATERAL PRIMARY OSTEOARTHRITIS, LEFT HIP: ICD-10-CM

## 2024-11-15 DIAGNOSIS — E78.5 HYPERLIPIDEMIA, UNSPECIFIED HYPERLIPIDEMIA TYPE: ICD-10-CM

## 2024-11-15 DIAGNOSIS — M16.12 PRIMARY OSTEOARTHRITIS OF LEFT HIP: ICD-10-CM

## 2024-11-15 DIAGNOSIS — Z79.01 ANTICOAGULATION MANAGEMENT ENCOUNTER: ICD-10-CM

## 2024-11-15 LAB
ABO GROUP BLD: NORMAL
ALBUMIN SERPL BCG-MCNC: 4.3 G/DL (ref 3.5–5)
ALP SERPL-CCNC: 45 U/L (ref 34–104)
ALT SERPL W P-5'-P-CCNC: 21 U/L (ref 7–52)
ANION GAP SERPL CALCULATED.3IONS-SCNC: 7 MMOL/L (ref 4–13)
APTT PPP: 30 SECONDS (ref 23–34)
AST SERPL W P-5'-P-CCNC: 20 U/L (ref 13–39)
BASOPHILS # BLD AUTO: 0.04 THOUSANDS/ÂΜL (ref 0–0.1)
BASOPHILS NFR BLD AUTO: 1 % (ref 0–1)
BILIRUB SERPL-MCNC: 1.49 MG/DL (ref 0.2–1)
BLD GP AB SCN SERPL QL: NEGATIVE
BUN SERPL-MCNC: 19 MG/DL (ref 5–25)
CALCIUM SERPL-MCNC: 8.7 MG/DL (ref 8.4–10.2)
CHLORIDE SERPL-SCNC: 107 MMOL/L (ref 96–108)
CO2 SERPL-SCNC: 26 MMOL/L (ref 21–32)
CREAT SERPL-MCNC: 1.22 MG/DL (ref 0.6–1.3)
EOSINOPHIL # BLD AUTO: 0.13 THOUSAND/ÂΜL (ref 0–0.61)
EOSINOPHIL NFR BLD AUTO: 2 % (ref 0–6)
ERYTHROCYTE [DISTWIDTH] IN BLOOD BY AUTOMATED COUNT: 13 % (ref 11.6–15.1)
EST. AVERAGE GLUCOSE BLD GHB EST-MCNC: 123 MG/DL
GFR SERPL CREATININE-BSD FRML MDRD: 59 ML/MIN/1.73SQ M
GLUCOSE P FAST SERPL-MCNC: 94 MG/DL (ref 65–99)
HBA1C MFR BLD: 5.9 %
HCT VFR BLD AUTO: 40.6 % (ref 36.5–49.3)
HGB BLD-MCNC: 13.3 G/DL (ref 12–17)
IMM GRANULOCYTES # BLD AUTO: 0.02 THOUSAND/UL (ref 0–0.2)
IMM GRANULOCYTES NFR BLD AUTO: 0 % (ref 0–2)
INR PPP: 1.02 (ref 0.85–1.19)
LYMPHOCYTES # BLD AUTO: 2.1 THOUSANDS/ÂΜL (ref 0.6–4.47)
LYMPHOCYTES NFR BLD AUTO: 25 % (ref 14–44)
MCH RBC QN AUTO: 31.1 PG (ref 26.8–34.3)
MCHC RBC AUTO-ENTMCNC: 32.8 G/DL (ref 31.4–37.4)
MCV RBC AUTO: 95 FL (ref 82–98)
MONOCYTES # BLD AUTO: 0.53 THOUSAND/ÂΜL (ref 0.17–1.22)
MONOCYTES NFR BLD AUTO: 6 % (ref 4–12)
NEUTROPHILS # BLD AUTO: 5.58 THOUSANDS/ÂΜL (ref 1.85–7.62)
NEUTS SEG NFR BLD AUTO: 66 % (ref 43–75)
NRBC BLD AUTO-RTO: 0 /100 WBCS
PLATELET # BLD AUTO: 335 THOUSANDS/UL (ref 149–390)
PMV BLD AUTO: 10.8 FL (ref 8.9–12.7)
POTASSIUM SERPL-SCNC: 4.4 MMOL/L (ref 3.5–5.3)
PROT SERPL-MCNC: 6.2 G/DL (ref 6.4–8.4)
PROTHROMBIN TIME: 13.7 SECONDS (ref 12.3–15)
RBC # BLD AUTO: 4.27 MILLION/UL (ref 3.88–5.62)
RH BLD: POSITIVE
SODIUM SERPL-SCNC: 140 MMOL/L (ref 135–147)
SPECIMEN EXPIRATION DATE: NORMAL
WBC # BLD AUTO: 8.4 THOUSAND/UL (ref 4.31–10.16)

## 2024-11-15 PROCEDURE — 86900 BLOOD TYPING SEROLOGIC ABO: CPT | Performed by: ORTHOPAEDIC SURGERY

## 2024-11-15 PROCEDURE — 83036 HEMOGLOBIN GLYCOSYLATED A1C: CPT

## 2024-11-15 PROCEDURE — 36415 COLL VENOUS BLD VENIPUNCTURE: CPT

## 2024-11-15 PROCEDURE — 85610 PROTHROMBIN TIME: CPT

## 2024-11-15 PROCEDURE — 85730 THROMBOPLASTIN TIME PARTIAL: CPT

## 2024-11-15 PROCEDURE — 85025 COMPLETE CBC W/AUTO DIFF WBC: CPT

## 2024-11-15 PROCEDURE — 86901 BLOOD TYPING SEROLOGIC RH(D): CPT | Performed by: ORTHOPAEDIC SURGERY

## 2024-11-15 PROCEDURE — 86850 RBC ANTIBODY SCREEN: CPT | Performed by: ORTHOPAEDIC SURGERY

## 2024-11-15 PROCEDURE — 80053 COMPREHEN METABOLIC PANEL: CPT

## 2024-11-15 PROCEDURE — 87081 CULTURE SCREEN ONLY: CPT

## 2024-11-16 LAB — MRSA NOSE QL CULT: NORMAL

## 2024-11-19 ENCOUNTER — ANESTHESIA EVENT (OUTPATIENT)
Age: 69
End: 2024-11-19
Payer: MEDICARE

## 2024-11-20 NOTE — PRE-PROCEDURE INSTRUCTIONS
Pre-Surgery Instructions:   Medication Instructions    aspirin (ECOTRIN LOW STRENGTH) 81 mg EC tablet Stop taking 7 days prior to surgery.    atorvastatin (LIPITOR) 10 mg tablet Take day of surgery.    Basaglar KwikPen 100 units/mL SOPN Take day of surgery.    Calcium Ascorbate (VITAMIN C) 500 mg tablet Hold day of surgery.    Cholecalciferol 125 MCG (5000 UT) capsule Stop taking 7 days prior to surgery.    ferrous sulfate 324 (65 Fe) mg Hold day of surgery.    fluticasone (FLONASE) 50 mcg/act nasal spray Take day of surgery.    folic acid (KP Folic Acid) 1 mg tablet Hold day of surgery.    gabapentin (NEURONTIN) 100 mg capsule Take day of surgery.    lisinopril (ZESTRIL) 10 mg tablet Hold day of surgery.    MAGNESIUM PO Stop taking 7 days prior to surgery.    metFORMIN (GLUCOPHAGE-XR) 500 mg 24 hr tablet Hold day of surgery.    Milk Thistle 150 MG CAPS Stop taking 7 days prior to surgery.    Multiple Vitamins-Minerals (MULTIVITAMIN WITH MINERALS) tablet Hold day of surgery.    mupirocin (BACTROBAN) 2 % ointment Instructions provided by MD    Simbrinza 1-0.2 % SUSP Take day of surgery.    sodium chloride (OCEAN) 0.65 % nasal spray Uses PRN- OK to take day of surgery    TURMERIC PO Stop taking 7 days prior to surgery.    Vitamins-Lipotropics (B-100 PO) Stop taking 7 days prior to surgery.   Medication instructions for day surgery reviewed. Please use only a sip of water to take your instructed medications. Avoid all over the counter vitamins, supplements and NSAIDS for one week prior to surgery per anesthesia guidelines. Tylenol is ok to take as needed.     You will receive a call one business day prior to surgery with an arrival time and hospital directions. If your surgery is scheduled on a Monday, the hospital will be calling you on the Friday prior to your surgery. If you have not heard from anyone by 8pm, please call the hospital supervisor through the hospital  at [phone]. ([place] [phone] or  [place] [phone]).    Do not eat or drink anything after midnight the night before your surgery, including candy, mints, lifesavers, or chewing gum. Do not drink alcohol 24hrs before your surgery. Try not to smoke at least 24hrs before your surgery.       Follow the pre surgery showering instructions as listed in the “My Surgical Experience Booklet” or otherwise provided by your surgeon's office. Do not use a blade to shave the surgical area 1 week before surgery. It is okay to use a clean electric clippers up to 24 hours before surgery. Do not apply any lotions, creams, including makeup, cologne, deodorant, or perfumes after showering on the day of your surgery. Do not use dry shampoo, hair spray, hair gel, or any type of hair products.     No contact lenses, eye make-up, or artificial eyelashes. Remove nail polish, including gel polish, and any artificial, gel, or acrylic nails if possible. Remove all jewelry including rings and body piercing jewelry.     Wear causal clothing that is easy to take on and off. Consider your type of surgery.    Keep any valuables, jewelry, piercings at home. Please bring any specially ordered equipment (sling, braces) if indicated.    Arrange for a responsible person to drive you to and from the hospital on the day of your surgery. Please confirm the visitor policy for the day of your procedure when you receive your phone call with an arrival time.     Call the surgeon's office with any new illnesses, exposures, or additional questions prior to surgery.    Please reference your “My Surgical Experience Booklet” for additional information to prepare for your upcoming surgery.     Has walker and will bring DOS.    See Geriatric Assessment below...  Cognitive Assessment:   CAM:   TUG <15 sec:  Falls (last 6 months): 1  Hand  score:  -Attempt 1:  -Attempt 2:  -Attempt 3:  Mickey Total Score: 20  PHQ- 9 Depression Scale:0  Nutrition Assessment Score:  METS: 7.34  Incentive  "Spirometry Level:   Health goals:  -What are your overall health goals? (quit smoking, wt. loss, rest, decrease stress)  \"To be pain free\"  -What brings you strength? (family, friends, Scientologist, health)  \"My wife, being outdoors\"  -What activities are important to you? (exercise, reading, travel, work)  \"Being outdoors and traveling\"     "

## 2024-11-21 ENCOUNTER — CONSULT (OUTPATIENT)
Dept: FAMILY MEDICINE CLINIC | Facility: CLINIC | Age: 69
End: 2024-11-21
Payer: MEDICARE

## 2024-11-21 VITALS
OXYGEN SATURATION: 97 % | TEMPERATURE: 97.8 F | HEART RATE: 73 BPM | WEIGHT: 211 LBS | SYSTOLIC BLOOD PRESSURE: 124 MMHG | BODY MASS INDEX: 30.21 KG/M2 | DIASTOLIC BLOOD PRESSURE: 70 MMHG | HEIGHT: 70 IN

## 2024-11-21 DIAGNOSIS — M16.12 OSTEOARTHRITIS OF LEFT HIP, UNSPECIFIED OSTEOARTHRITIS TYPE: ICD-10-CM

## 2024-11-21 DIAGNOSIS — E11.3293 TYPE 2 DIABETES MELLITUS WITH BOTH EYES AFFECTED BY MILD NONPROLIFERATIVE RETINOPATHY WITHOUT MACULAR EDEMA, WITH LONG-TERM CURRENT USE OF INSULIN (HCC): ICD-10-CM

## 2024-11-21 DIAGNOSIS — I10 ESSENTIAL HYPERTENSION: ICD-10-CM

## 2024-11-21 DIAGNOSIS — Z01.818 PREOPERATIVE EXAMINATION: Primary | ICD-10-CM

## 2024-11-21 DIAGNOSIS — Z79.4 TYPE 2 DIABETES MELLITUS WITH BOTH EYES AFFECTED BY MILD NONPROLIFERATIVE RETINOPATHY WITHOUT MACULAR EDEMA, WITH LONG-TERM CURRENT USE OF INSULIN (HCC): ICD-10-CM

## 2024-11-21 PROCEDURE — 93000 ELECTROCARDIOGRAM COMPLETE: CPT | Performed by: PHYSICIAN ASSISTANT

## 2024-11-21 PROCEDURE — 99214 OFFICE O/P EST MOD 30 MIN: CPT | Performed by: PHYSICIAN ASSISTANT

## 2024-11-21 NOTE — PROGRESS NOTES
Name: Rich Abel      : 1955      MRN: [id number]  Encounter Provider: Coleen Barrera PA-C  Encounter Date: 2024   Encounter department: [facility]  :  Assessment & Plan  Preoperative examination  Patient is cleared for proposed left total hip surgery.  EKG is normal.  Preadmission testing within normal range  Orders:    POCT ECG    Osteoarthritis of left hip, unspecified osteoarthritis type         Essential hypertension  Blood pressure is at goal with lisinopril 10 mg       Type 2 diabetes mellitus with both eyes affected by mild nonproliferative retinopathy without macular edema, with long-term current use of insulin (HCC)  Diabetes is at goal continue current regimen  Lab Results   Component Value Date    HGBA1C 5.9 (H) 11/15/2024                   History of Present Illness     Presents in the office for preop examination.  Patient is scheduled for a left total hip on  with Dr. Green.  Reviewed patient's pre-[place] testing and they are all within range.  Is known diabetic.  His blood sugar is under good control.  EKG shows normal sinus rhythm with no acute changes.      Review of Systems   Constitutional:  Negative for activity change, appetite change, chills, fatigue and fever.   HENT:  Negative for ear pain and sore throat.    Eyes:  Negative for visual disturbance.   Respiratory:  Negative for cough and shortness of breath.    Cardiovascular:  Negative for chest pain, palpitations and leg swelling.   Gastrointestinal:  Negative for abdominal pain, blood in stool, constipation, diarrhea and nausea.   Genitourinary:  Negative for difficulty urinating.   Musculoskeletal:  Positive for arthralgias (left  hip pain). Negative for back pain and myalgias.   Skin:  Negative for rash.   Neurological:  Negative for dizziness, syncope and headaches.   Psychiatric/Behavioral:  Negative for sleep disturbance.           Objective   /70   Pulse 73   Temp 97.8 °F (36.6  "°C) (Temporal)   Ht 5' 10\" (1.778 m)   Wt 95.7 kg (211 lb)   SpO2 97%   BMI 30.28 kg/m²      Physical Exam  Vitals and nursing note reviewed.   Constitutional:       General: He is not in acute distress.     Appearance: Normal appearance. He is well-developed. He is not ill-appearing.   HENT:      Head: Normocephalic and atraumatic.      Right Ear: Tympanic membrane, ear canal and external ear normal.      Left Ear: Tympanic membrane, ear canal and external ear normal.   Eyes:      Conjunctiva/sclera: Conjunctivae normal.      Pupils: Pupils are equal, round, and reactive to light.   Neck:      Thyroid: No thyromegaly.      Vascular: No carotid bruit.   Cardiovascular:      Rate and Rhythm: Normal rate and regular rhythm.      Heart sounds: Normal heart sounds. No murmur heard.  Pulmonary:      Effort: Pulmonary effort is normal.      Breath sounds: Normal breath sounds. No wheezing.   Abdominal:      General: Bowel sounds are normal.      Palpations: Abdomen is soft. There is no mass.      Tenderness: There is no abdominal tenderness.   Musculoskeletal:      Right lower leg: No edema.      Left lower leg: No edema.   Lymphadenopathy:      Cervical: No cervical adenopathy.   Skin:     General: Skin is warm and dry.   Neurological:      General: No focal deficit present.      Mental Status: He is alert and oriented to person, place, and time.   Psychiatric:         Mood and Affect: Mood normal.         Behavior: Behavior normal.         Thought Content: Thought content normal.         Judgment: Judgment normal.         "

## 2024-11-21 NOTE — ASSESSMENT & PLAN NOTE
Diabetes is at goal continue current regimen  Lab Results   Component Value Date    HGBA1C 5.9 (H) 11/15/2024

## 2024-12-06 ENCOUNTER — EVALUATION (OUTPATIENT)
Dept: PHYSICAL THERAPY | Facility: CLINIC | Age: 69
End: 2024-12-06
Payer: MEDICARE

## 2024-12-06 ENCOUNTER — OFFICE VISIT (OUTPATIENT)
Dept: AUDIOLOGY | Age: 69
End: 2024-12-06

## 2024-12-06 DIAGNOSIS — H90.3 SENSORY HEARING LOSS, BILATERAL: Primary | ICD-10-CM

## 2024-12-06 DIAGNOSIS — M16.12 PRIMARY OSTEOARTHRITIS OF LEFT HIP: ICD-10-CM

## 2024-12-06 PROCEDURE — 97161 PT EVAL LOW COMPLEX 20 MIN: CPT

## 2024-12-06 PROCEDURE — 97530 THERAPEUTIC ACTIVITIES: CPT

## 2024-12-06 PROCEDURE — 97110 THERAPEUTIC EXERCISES: CPT

## 2024-12-06 NOTE — DISCHARGE INSTR - AVS FIRST PAGE
Discharge Instructions: Hip replacement with Dr. Green    Weight Bearing Status:                                           Weight Bearing as tolerated to left lower extremity with assistive devices.     Be sure to maintain posterior hip precautions: no bending at waist, no crossing legs, on internally rotating surgical leg    Pain Management/Medications  You may resume your usual medications.  You may stop pre-operative vitamins (Folic acid, Ferrous sulfate, and Vitamin C).   Please take the following medications:  Anti-coagulation (blood clot prevention) - Complete Lovenox injections for 28 days. Continue home Aspirin 81 mg daily.   Pain medication:  Oxycodone 5 m tablet every 6 hours as needed for severe pain  Robaxin (Muscle relaxer) 500 m tablet up to 3 times a day as needed for mild pain and muscle discomfort  Tylenol 1000 mg: up to three times daily as needed for mild to moderate pain. Do not exceed 3000mg daily   Continue applying ice to your hip on and off for about 20 minutes as needed.  Continue to elevate your operative leg, with ankle above the level of your heart when possible.  If you have questions or pain concerns, please contact the office.   If you need refills of your medications prior to your next office visit, please contact the office.      Showering/Dressing Instructions:   Do not shower until first follow up appointment.  Leave bandage covering surgical incision on until seen in office.   No baths, swimming, or submerging your leg until cleared to do so.      Driving Instructions:  No driving until cleared by Orthopaedic Surgery.    PT/OT:  Continue PT/OT on outpatient basis as directed    Appt Instructions:   Follow up as scheduled on 2024 in [place]   If you need to change or cancel your appointment for any reason, please call the clinic at [phone]    Please contact the office if you experience any of the following:  Excessive bleeding (bleeding through your  dressing)  Fever greater than 101 degrees F after 48 hours (low grade fevers the day or two after surgery are normal)  Persistent nausea or vomiting  Decreased sensation or discoloration of the operative limb  Pain or swelling that is getting worse and not better with medication    Miscellaneous:  Advise use of abduction pillow (pink pillow) for 6 weeks after surgery when sleeping.    Advise against any dental cleanings or procedures for 3 months after surgery.   - If there is a dental emergency, please contact the office for further instructions.

## 2024-12-06 NOTE — PROGRESS NOTES
Hearing Aid Visit:    Name:  Rich Abel  :  1955  Age:  69 y.o.  MRN:  [id number]  Date of Evaluation: 24     HISTORY:    Rich Abel was seen today (2024) for a(n) in-warranty hearing aid check of his bilateral hearing aids. Today, Rich sound not being clear. He also notes that his ears get irritated from the domes.    DEVICE INFORMATION:         Left Device Right Device   Hearing Aid Make: OtSageQuest  OtSageQuest    Hearing Aid Model: Real 3 miniRITE-R rEAL 3 miniRITE-R   Serial Number: [id number]   Repair Warranty Date: 2026   Loss/Damage Warranty Date:  Active  Active         Length/Output 3x85 3x85   Wax System: [employer] minifit Prowax minifit   Dome Size/Style: 10 mm DB 10 mm DB   Battery: Lithium-ion Rechargeable Lithium-ion Rechargeable       [employer] Serial Number: N/A N/A   [employer] Warranty Date:  N/A N/A    Serial Number:  [id number]    Warranty Date: 2026    Accessories: N/A           ACTION/ADJUSTMENTS:    Visual inspection of the device(s) revealed no noticeable damage or defects.     A listening check revealed good sound quality from the device(s) after the receivers were replaced.    The hearing aids were cleaned and checked. The patients domes were replaced bilaterally.     Patient voiced better sound quality.    Patient was advised to use the lotion at night when he removes the hearing aids to help with irritation.      RECOMMENDATIONS:     JAGDEEP/MONCHO in 6 months      Servando Ramos., [facility]  Clinical Audiologist  [facility]  [address]  [place] [address]

## 2024-12-06 NOTE — PROGRESS NOTES
PT Evaluation     Today's date: 2024  Patient name: Rich Abel  : 1955  MRN: [id number]  Referring provider: Abiel Green,*  Dx:   Encounter Diagnosis     ICD-10-CM    1. Primary osteoarthritis of left hip  M16.12 Ambulatory referral to Physical Therapy          Start Time: 810  Stop Time: 852  Total time in clinic (min): 42 minutes    Assessment  Impairments: abnormal or restricted ROM, activity intolerance, impaired physical strength, lacks appropriate home exercise program, pain with function, poor body mechanics, unable to perform ADL, activity limitations and endurance  Symptom irritability: moderate    Assessment details: Rich Abel is a 69 y.o. male that presents to PT pre operative L JEMMA on 24. Patient reports symptoms of pain with putting his socks on. Patient reports their pain is located in their L hip. Patient's L hip pain has been occurring for the last 6-8 months. Patient has been using Tylenol to alleviate symptoms.     Rich demonstrates decreased L hip strength and mobility. Patient's symptoms are consistent with the referring diagnosis of  osteoarthritis of the L hip. Patient would benefit from physical therapy to address their decreased mobility. Patient will receive PT interventions listed below to assist with ADL performance and to improve pain tolerance.     Patient was given an HEP and educated on the exercises and physiological effects following those exercises. Patient was educated on proper biomechanics and body placement with their ADLs as well.   Understanding of Dx/Px/POC: good     Prognosis: good    Goals  STG   Patient will increase their L hip strength to 4/5 MMT in 4 weeks  Patient will decrease worst pain to 5/10 in 4 weeks.   Patient will increase L hip ROM in all planes by 3-5 degrees in 4 weeks.   Patient will improve FOTO score by 50 % in 4 weeks.   Patient will be independent with their HEP.       LTG  Patient will be able to put his socks  and shoes on pain free in 8 weeks.   Patient will meet =/> their FOTO goal score in 8 weeks.    Patient will decrease their worst pain to 2/10 in 8 weeks.  Patient will increase L hip ROM in all planes by 5-10 degrees in 8 weeks.   Patient will increase their L hip strength to +4/5 in 8 weeks.      Plan  Patient would benefit from: skilled physical therapy  Planned modality interventions: biofeedback, cryotherapy, ultrasound, traction, TENS and thermotherapy: hydrocollator packs    Planned therapy interventions: IASTM, joint mobilization, kinesiology taping, manual therapy, massage, neuromuscular re-education, patient/caregiver education, strengthening, stretching, therapeutic activities, therapeutic exercise, therapeutic training, home exercise program, functional ROM exercises, flexibility, compression, body mechanics training, balance/weight bearing training, balance, activity modification, abdominal trunk stabilization and ADL training    Frequency: 1x week  Duration in weeks: 1  Plan of Care beginning date: 2024  Plan of Care expiration date: 2024  Treatment plan discussed with: patient        Subjective Evaluation    History of Present Illness  Mechanism of injury: Rich reports to PT with c/o L hip pain. Patient reports having pain for 6-8 months. Patient mentions that his pain is a constant dull pain and sharp pain occasionally. Patient mentioned that he has pain with putting socks on. Patient states that he has no numbness or tingling. Rich reports that his pain is worst at night. Patient mentioned that his L hip pain occasionally may interrupt his sleep. Rich states that he has no night sweats and he uses Tylenol for pain relief.    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 No acute osseous abnormality)        Objective     Active Range of Motion   Left Hip   Flexion: 95 degrees   Abduction: 15 degrees   Adduction: 20 degrees     Right Hip   Flexion: 111 degrees   Abduction: 25 degrees   Adduction: 28 degrees     Passive Range of Motion   Left Hip   Flexion: 105 degrees with pain  Abduction: 18 degrees with pain  Adduction: 25 degrees     Right Hip   Flexion: 115 degrees   Abduction: 27 degrees   Adduction: 26 degrees     Strength/Myotome Testing     Left Hip   Planes of Motion   Flexion: 4-  Abduction: 4  Adduction: 4    Right Hip   Normal muscle strength  Planes of Motion   Flexion: 4-  Abduction: 4  Adduction: 4    Left Knee   Flexion: 4  Extension: 4    Right Knee   Flexion: 4+  Extension: 4+    Left Ankle/Foot   Dorsiflexion: 4+  Plantar flexion: 4    Right Ankle/Foot   Dorsiflexion: 4+  Plantar flexion: 4+    Functional Assessment        Forward Step Down 6\"   Left Leg  Positive Trendelenburg, valgus and contralateral toe touch first.     Right Leg  Positive Trendelenburg and contralateral toe touch first.     Comments  Pre OP  12/6/24   5xSTS   TUG 8.82 NO AD             POC expires Unit limit Auth  expiration date PT/OT + Visit Limit?   12/13/24 N/a 12/31/24 BOMN                 Visit/Unit Tracking  AUTH Status:  Date 12/6              PENDING  Used 1               Remaining                   Precautions: HTN, T2DM, Skin Cancer       Manuals 12/6            L hip PROM                                                    Neuro Re-Ed             Weight Shifting                                                                                            Ther Ex             Pt. Education  HEP, " Pathophysiology, Prognosis, precautions, preparation checklist            Nu-Step for cardiovascular endurance and LE strength              SLR             Heel Slides              Hip 4 way             LAQ HEP            Quad Set HEP            Glute Set HEP            Ankle Pumps HEP            Ther Activity             STS/ squats              TG / Wall squats             LSU             Side step                                        5x STS, TUG TW            Gait Training                                       Modalities             CP/ MHP                          Access Code: [email or web address]  URL: [email or web address]  Date: 12/06/2024  Prepared by: Cam Walker     Exercises  - Seated Ankle Pumps  - 2 x daily - 7 x weekly - 3 sets - 10 reps  - Supine Gluteal Sets  - 2 x daily - 7 x weekly - 2 sets - 10 reps - 5 hold  - Supine Quadricep Sets  - 2 x daily - 7 x weekly - 2 sets - 10 reps - 5 hold  - Seated Long Arc Quad  - 2 x daily - 7 x weekly - 3 sets - 10 reps    Patient Education  - JEMMA Posterior Precautions  - Total Hip Replacement (Posterior Approach): Surgery Overview

## 2024-12-09 ENCOUNTER — HOSPITAL ENCOUNTER (OUTPATIENT)
Age: 69
Setting detail: OUTPATIENT SURGERY
Discharge: HOME | End: 2024-12-10
Attending: ORTHOPAEDIC SURGERY | Admitting: ORTHOPAEDIC SURGERY
Payer: MEDICARE

## 2024-12-09 ENCOUNTER — ANESTHESIA (OUTPATIENT)
Age: 69
End: 2024-12-09
Payer: MEDICARE

## 2024-12-09 DIAGNOSIS — M16.12 PRIMARY OSTEOARTHRITIS OF LEFT HIP: Primary | ICD-10-CM

## 2024-12-09 LAB
GLUCOSE SERPL-MCNC: 123 MG/DL (ref 65–140)
GLUCOSE SERPL-MCNC: 24 MG/DL (ref 65–140)
GLUCOSE SERPL-MCNC: 32 MG/DL (ref 65–140)
GLUCOSE SERPL-MCNC: 393 MG/DL (ref 65–140)
GLUCOSE SERPL-MCNC: 47 MG/DL (ref 65–140)
GLUCOSE SERPL-MCNC: 53 MG/DL (ref 65–140)
GLUCOSE SERPL-MCNC: 77 MG/DL (ref 65–140)
GLUCOSE SERPL-MCNC: 95 MG/DL (ref 65–140)

## 2024-12-09 PROCEDURE — C1776 JOINT DEVICE (IMPLANTABLE): HCPCS | Performed by: ORTHOPAEDIC SURGERY

## 2024-12-09 PROCEDURE — 27130 TOTAL HIP ARTHROPLASTY: CPT | Performed by: ORTHOPAEDIC SURGERY

## 2024-12-09 PROCEDURE — 97167 OT EVAL HIGH COMPLEX 60 MIN: CPT

## 2024-12-09 PROCEDURE — C1713 ANCHOR/SCREW BN/BN,TIS/BN: HCPCS | Performed by: ORTHOPAEDIC SURGERY

## 2024-12-09 PROCEDURE — 97163 PT EVAL HIGH COMPLEX 45 MIN: CPT | Performed by: PHYSICAL THERAPIST

## 2024-12-09 PROCEDURE — 82948 REAGENT STRIP/BLOOD GLUCOSE: CPT

## 2024-12-09 PROCEDURE — NC001 PR NO CHARGE: Performed by: ORTHOPAEDIC SURGERY

## 2024-12-09 PROCEDURE — 99222 1ST HOSP IP/OBS MODERATE 55: CPT | Performed by: PHYSICIAN ASSISTANT

## 2024-12-09 DEVICE — PINNACLE CANCELLOUS BONE SCREW 6.5MM X 30MM
Type: IMPLANTABLE DEVICE | Site: HIP | Status: FUNCTIONAL
Brand: PINNACLE

## 2024-12-09 DEVICE — PINNACLE HIP SOLUTIONS ALTRX LD POLYETHYLENE ACETABULAR LINER +4 10 DEGREE 40MM ID 56MM OD
Type: IMPLANTABLE DEVICE | Site: HIP | Status: FUNCTIONAL
Brand: PINNACLE ALTRX

## 2024-12-09 DEVICE — M-SPEC METAL FEMORAL HEAD 12/14 TAPER DIAMETER 40MM +5 OFFSET: Type: IMPLANTABLE DEVICE | Site: HIP | Status: FUNCTIONAL

## 2024-12-09 DEVICE — PINNACLE POROCOAT ACETABULAR SHELL SECTOR II 56MM OD
Type: IMPLANTABLE DEVICE | Site: HIP | Status: FUNCTIONAL
Brand: PINNACLE POROCOAT

## 2024-12-09 DEVICE — CORAIL HIP SYSTEM CEMENTLESS FEMORAL STEM 12/14 AMT 135 DEGREES KHO SIZE 14 HA COATED HIGH OFFSET NO COLLAR
Type: IMPLANTABLE DEVICE | Site: HIP | Status: FUNCTIONAL
Brand: CORAIL

## 2024-12-09 RX ORDER — TRANEXAMIC ACID 10 MG/ML
1000 INJECTION, SOLUTION INTRAVENOUS ONCE
Status: COMPLETED | OUTPATIENT
Start: 2024-12-09 | End: 2024-12-09

## 2024-12-09 RX ORDER — DEXTROSE MONOHYDRATE 25 G/50ML
INJECTION, SOLUTION INTRAVENOUS
Status: COMPLETED
Start: 2024-12-09 | End: 2024-12-09

## 2024-12-09 RX ORDER — ONDANSETRON 2 MG/ML
4 INJECTION INTRAMUSCULAR; INTRAVENOUS ONCE AS NEEDED
Status: DISCONTINUED | OUTPATIENT
Start: 2024-12-09 | End: 2024-12-09 | Stop reason: HOSPADM

## 2024-12-09 RX ORDER — FENTANYL CITRATE/PF 50 MCG/ML
25 SYRINGE (ML) INJECTION
Status: DISCONTINUED | OUTPATIENT
Start: 2024-12-09 | End: 2024-12-09 | Stop reason: HOSPADM

## 2024-12-09 RX ORDER — CHLORHEXIDINE GLUCONATE ORAL RINSE 1.2 MG/ML
15 SOLUTION DENTAL ONCE
Status: COMPLETED | OUTPATIENT
Start: 2024-12-09 | End: 2024-12-09

## 2024-12-09 RX ORDER — GABAPENTIN 100 MG/1
100 CAPSULE ORAL EVERY 8 HOURS
Status: DISCONTINUED | OUTPATIENT
Start: 2024-12-09 | End: 2024-12-10 | Stop reason: HOSPADM

## 2024-12-09 RX ORDER — DOCUSATE SODIUM 100 MG/1
100 CAPSULE, LIQUID FILLED ORAL 2 TIMES DAILY
Status: DISCONTINUED | OUTPATIENT
Start: 2024-12-09 | End: 2024-12-10 | Stop reason: HOSPADM

## 2024-12-09 RX ORDER — SODIUM CHLORIDE, SODIUM LACTATE, POTASSIUM CHLORIDE, CALCIUM CHLORIDE 600; 310; 30; 20 MG/100ML; MG/100ML; MG/100ML; MG/100ML
125 INJECTION, SOLUTION INTRAVENOUS CONTINUOUS
Status: DISCONTINUED | OUTPATIENT
Start: 2024-12-09 | End: 2024-12-10 | Stop reason: HOSPADM

## 2024-12-09 RX ORDER — DEXTROSE MONOHYDRATE 25 G/50ML
25 INJECTION, SOLUTION INTRAVENOUS
Status: DISCONTINUED | OUTPATIENT
Start: 2024-12-09 | End: 2024-12-09

## 2024-12-09 RX ORDER — ONDANSETRON 2 MG/ML
INJECTION INTRAMUSCULAR; INTRAVENOUS AS NEEDED
Status: DISCONTINUED | OUTPATIENT
Start: 2024-12-09 | End: 2024-12-09

## 2024-12-09 RX ORDER — LIDOCAINE HYDROCHLORIDE 10 MG/ML
INJECTION, SOLUTION EPIDURAL; INFILTRATION; INTRACAUDAL; PERINEURAL AS NEEDED
Status: DISCONTINUED | OUTPATIENT
Start: 2024-12-09 | End: 2024-12-09

## 2024-12-09 RX ORDER — DEXAMETHASONE SODIUM PHOSPHATE 10 MG/ML
INJECTION, SOLUTION INTRAMUSCULAR; INTRAVENOUS AS NEEDED
Status: DISCONTINUED | OUTPATIENT
Start: 2024-12-09 | End: 2024-12-09

## 2024-12-09 RX ORDER — ENOXAPARIN SODIUM 100 MG/ML
40 INJECTION SUBCUTANEOUS DAILY
Status: DISCONTINUED | OUTPATIENT
Start: 2024-12-10 | End: 2024-12-10 | Stop reason: HOSPADM

## 2024-12-09 RX ORDER — DEXTROSE MONOHYDRATE 25 G/50ML
25 INJECTION, SOLUTION INTRAVENOUS
Status: DISCONTINUED | OUTPATIENT
Start: 2024-12-09 | End: 2024-12-10 | Stop reason: HOSPADM

## 2024-12-09 RX ORDER — OXYCODONE HYDROCHLORIDE 10 MG/1
10 TABLET ORAL EVERY 4 HOURS PRN
Status: DISCONTINUED | OUTPATIENT
Start: 2024-12-09 | End: 2024-12-10 | Stop reason: HOSPADM

## 2024-12-09 RX ORDER — INSULIN LISPRO 100 [IU]/ML
1-6 INJECTION, SOLUTION INTRAVENOUS; SUBCUTANEOUS EVERY 6 HOURS SCHEDULED
Status: DISCONTINUED | OUTPATIENT
Start: 2024-12-10 | End: 2024-12-09

## 2024-12-09 RX ORDER — PROPOFOL 10 MG/ML
INJECTION, EMULSION INTRAVENOUS AS NEEDED
Status: DISCONTINUED | OUTPATIENT
Start: 2024-12-09 | End: 2024-12-09

## 2024-12-09 RX ORDER — OXYCODONE HYDROCHLORIDE 5 MG/1
5 TABLET ORAL EVERY 4 HOURS PRN
Status: DISCONTINUED | OUTPATIENT
Start: 2024-12-09 | End: 2024-12-10 | Stop reason: HOSPADM

## 2024-12-09 RX ORDER — INSULIN GLARGINE 100 [IU]/ML
50 INJECTION, SOLUTION SUBCUTANEOUS EVERY MORNING
Status: DISCONTINUED | OUTPATIENT
Start: 2024-12-10 | End: 2024-12-10 | Stop reason: HOSPADM

## 2024-12-09 RX ORDER — DEXTROSE MONOHYDRATE AND SODIUM CHLORIDE 5; .9 G/100ML; G/100ML
100 INJECTION, SOLUTION INTRAVENOUS CONTINUOUS
Status: DISCONTINUED | OUTPATIENT
Start: 2024-12-09 | End: 2024-12-09

## 2024-12-09 RX ORDER — MAGNESIUM HYDROXIDE 1200 MG/15ML
LIQUID ORAL AS NEEDED
Status: DISCONTINUED | OUTPATIENT
Start: 2024-12-09 | End: 2024-12-09 | Stop reason: HOSPADM

## 2024-12-09 RX ORDER — GABAPENTIN 300 MG/1
300 CAPSULE ORAL ONCE
Status: COMPLETED | OUTPATIENT
Start: 2024-12-09 | End: 2024-12-09

## 2024-12-09 RX ORDER — HYDROMORPHONE HCL/PF 1 MG/ML
0.5 SYRINGE (ML) INJECTION EVERY 2 HOUR PRN
Status: DISCONTINUED | OUTPATIENT
Start: 2024-12-09 | End: 2024-12-10 | Stop reason: HOSPADM

## 2024-12-09 RX ORDER — ATORVASTATIN CALCIUM 20 MG/1
10 TABLET, FILM COATED ORAL DAILY
Status: DISCONTINUED | OUTPATIENT
Start: 2024-12-10 | End: 2024-12-10 | Stop reason: HOSPADM

## 2024-12-09 RX ORDER — METFORMIN HYDROCHLORIDE 500 MG/1
1000 TABLET, EXTENDED RELEASE ORAL 2 TIMES DAILY
Status: DISCONTINUED | OUTPATIENT
Start: 2024-12-10 | End: 2024-12-10 | Stop reason: HOSPADM

## 2024-12-09 RX ORDER — INSULIN LISPRO 100 [IU]/ML
1-6 INJECTION, SOLUTION INTRAVENOUS; SUBCUTANEOUS EVERY 6 HOURS SCHEDULED
Status: DISCONTINUED | OUTPATIENT
Start: 2024-12-09 | End: 2024-12-10 | Stop reason: HOSPADM

## 2024-12-09 RX ORDER — ACETAMINOPHEN 325 MG/1
975 TABLET ORAL ONCE
Status: COMPLETED | OUTPATIENT
Start: 2024-12-09 | End: 2024-12-09

## 2024-12-09 RX ORDER — ACETAMINOPHEN 325 MG/1
975 TABLET ORAL EVERY 6 HOURS PRN
Status: DISCONTINUED | OUTPATIENT
Start: 2024-12-09 | End: 2024-12-10 | Stop reason: HOSPADM

## 2024-12-09 RX ORDER — ACETAMINOPHEN 500 MG
1000 TABLET ORAL EVERY 6 HOURS PRN
Qty: 90 TABLET | Refills: 0 | Status: SHIPPED | OUTPATIENT
Start: 2024-12-09

## 2024-12-09 RX ORDER — CEFAZOLIN SODIUM 1 G/50ML
1000 SOLUTION INTRAVENOUS EVERY 8 HOURS
Status: COMPLETED | OUTPATIENT
Start: 2024-12-09 | End: 2024-12-10

## 2024-12-09 RX ORDER — ONDANSETRON 2 MG/ML
4 INJECTION INTRAMUSCULAR; INTRAVENOUS EVERY 6 HOURS PRN
Status: DISCONTINUED | OUTPATIENT
Start: 2024-12-09 | End: 2024-12-10 | Stop reason: HOSPADM

## 2024-12-09 RX ORDER — INSULIN GLARGINE 100 [IU]/ML
50 INJECTION, SOLUTION SUBCUTANEOUS
Status: DISCONTINUED | OUTPATIENT
Start: 2024-12-10 | End: 2024-12-09

## 2024-12-09 RX ORDER — OXYCODONE HYDROCHLORIDE 5 MG/1
5 TABLET ORAL EVERY 6 HOURS PRN
Qty: 30 TABLET | Refills: 0 | Status: SHIPPED | OUTPATIENT
Start: 2024-12-09 | End: 2024-12-19

## 2024-12-09 RX ORDER — DEXTROSE MONOHYDRATE 25 G/50ML
50 INJECTION, SOLUTION INTRAVENOUS ONCE
Status: COMPLETED | OUTPATIENT
Start: 2024-12-09 | End: 2024-12-09

## 2024-12-09 RX ORDER — MIDAZOLAM HYDROCHLORIDE 2 MG/2ML
INJECTION, SOLUTION INTRAMUSCULAR; INTRAVENOUS AS NEEDED
Status: DISCONTINUED | OUTPATIENT
Start: 2024-12-09 | End: 2024-12-09

## 2024-12-09 RX ORDER — FENTANYL CITRATE 50 UG/ML
INJECTION, SOLUTION INTRAMUSCULAR; INTRAVENOUS AS NEEDED
Status: DISCONTINUED | OUTPATIENT
Start: 2024-12-09 | End: 2024-12-09

## 2024-12-09 RX ORDER — METHOCARBAMOL 500 MG/1
500 TABLET, FILM COATED ORAL EVERY 6 HOURS SCHEDULED
Status: DISCONTINUED | OUTPATIENT
Start: 2024-12-09 | End: 2024-12-10 | Stop reason: HOSPADM

## 2024-12-09 RX ORDER — CALCIUM CARBONATE 500 MG/1
1000 TABLET, CHEWABLE ORAL DAILY PRN
Status: DISCONTINUED | OUTPATIENT
Start: 2024-12-09 | End: 2024-12-10 | Stop reason: HOSPADM

## 2024-12-09 RX ORDER — HYDROMORPHONE HCL/PF 1 MG/ML
0.5 SYRINGE (ML) INJECTION
Status: DISCONTINUED | OUTPATIENT
Start: 2024-12-09 | End: 2024-12-09 | Stop reason: HOSPADM

## 2024-12-09 RX ORDER — METHOCARBAMOL 500 MG/1
500 TABLET, FILM COATED ORAL 3 TIMES DAILY PRN
Qty: 60 TABLET | Refills: 0 | Status: SHIPPED | OUTPATIENT
Start: 2024-12-09

## 2024-12-09 RX ORDER — CEFAZOLIN SODIUM 2 G/50ML
2000 SOLUTION INTRAVENOUS ONCE
Status: COMPLETED | OUTPATIENT
Start: 2024-12-09 | End: 2024-12-09

## 2024-12-09 RX ADMIN — DEXTROSE MONOHYDRATE 50 ML: 25 INJECTION, SOLUTION INTRAVENOUS at 13:15

## 2024-12-09 RX ADMIN — METHOCARBAMOL TABLETS 500 MG: 500 TABLET, COATED ORAL at 23:07

## 2024-12-09 RX ADMIN — SODIUM CHLORIDE, SODIUM LACTATE, POTASSIUM CHLORIDE, AND CALCIUM CHLORIDE 125 ML/HR: .6; .31; .03; .02 INJECTION, SOLUTION INTRAVENOUS at 22:59

## 2024-12-09 RX ADMIN — DEXTROSE AND SODIUM CHLORIDE 100 ML/HR: 5; .9 INJECTION, SOLUTION INTRAVENOUS at 13:15

## 2024-12-09 RX ADMIN — FENTANYL CITRATE 50 MCG: 50 INJECTION INTRAMUSCULAR; INTRAVENOUS at 11:41

## 2024-12-09 RX ADMIN — PHENYLEPHRINE HYDROCHLORIDE 30 MCG/MIN: 10 INJECTION INTRAVENOUS at 12:02

## 2024-12-09 RX ADMIN — DEXTROSE MONOHYDRATE 25 ML: 25 INJECTION, SOLUTION INTRAVENOUS at 09:56

## 2024-12-09 RX ADMIN — HYDROMORPHONE HYDROCHLORIDE 0.5 MG: 1 INJECTION, SOLUTION INTRAMUSCULAR; INTRAVENOUS; SUBCUTANEOUS at 14:30

## 2024-12-09 RX ADMIN — SODIUM CHLORIDE, SODIUM LACTATE, POTASSIUM CHLORIDE, AND CALCIUM CHLORIDE 125 ML/HR: .6; .31; .03; .02 INJECTION, SOLUTION INTRAVENOUS at 09:35

## 2024-12-09 RX ADMIN — FENTANYL CITRATE 25 MCG: 50 INJECTION INTRAMUSCULAR; INTRAVENOUS at 13:31

## 2024-12-09 RX ADMIN — DEXAMETHASONE SODIUM PHOSPHATE 10 MG: 10 INJECTION, SOLUTION INTRAMUSCULAR; INTRAVENOUS at 11:44

## 2024-12-09 RX ADMIN — INSULIN LISPRO 6 UNITS: 100 INJECTION, SOLUTION INTRAVENOUS; SUBCUTANEOUS at 23:09

## 2024-12-09 RX ADMIN — MEPIVACAINE HYDROCHLORIDE 3 ML: 15 INJECTION, SOLUTION EPIDURAL; INFILTRATION at 11:39

## 2024-12-09 RX ADMIN — METHOCARBAMOL TABLETS 500 MG: 500 TABLET, COATED ORAL at 15:22

## 2024-12-09 RX ADMIN — OXYCODONE HYDROCHLORIDE 10 MG: 10 TABLET ORAL at 14:42

## 2024-12-09 RX ADMIN — HYDROMORPHONE HYDROCHLORIDE 0.5 MG: 1 INJECTION, SOLUTION INTRAMUSCULAR; INTRAVENOUS; SUBCUTANEOUS at 16:36

## 2024-12-09 RX ADMIN — CEFAZOLIN SODIUM 2000 MG: 2 SOLUTION INTRAVENOUS at 11:41

## 2024-12-09 RX ADMIN — MIDAZOLAM 2 MG: 1 INJECTION INTRAMUSCULAR; INTRAVENOUS at 11:30

## 2024-12-09 RX ADMIN — ONDANSETRON 4 MG: 2 INJECTION INTRAMUSCULAR; INTRAVENOUS at 14:39

## 2024-12-09 RX ADMIN — GABAPENTIN 300 MG: 300 CAPSULE ORAL at 09:33

## 2024-12-09 RX ADMIN — TRANEXAMIC ACID 1000 MG: 10 INJECTION, SOLUTION INTRAVENOUS at 11:44

## 2024-12-09 RX ADMIN — PROPOFOL 50 MG: 10 INJECTION, EMULSION INTRAVENOUS at 11:41

## 2024-12-09 RX ADMIN — PROPOFOL 80 MCG/KG/MIN: 10 INJECTION, EMULSION INTRAVENOUS at 11:43

## 2024-12-09 RX ADMIN — LIDOCAINE HYDROCHLORIDE 30 MG: 10 INJECTION, SOLUTION EPIDURAL; INFILTRATION; INTRACAUDAL; PERINEURAL at 11:41

## 2024-12-09 RX ADMIN — ACETAMINOPHEN 325MG 975 MG: 325 TABLET ORAL at 09:33

## 2024-12-09 RX ADMIN — FENTANYL CITRATE 25 MCG: 50 INJECTION INTRAMUSCULAR; INTRAVENOUS at 13:37

## 2024-12-09 RX ADMIN — CHLORHEXIDINE GLUCONATE 15 ML: 1.2 RINSE ORAL at 09:33

## 2024-12-09 RX ADMIN — SODIUM CHLORIDE, SODIUM LACTATE, POTASSIUM CHLORIDE, AND CALCIUM CHLORIDE: .6; .31; .03; .02 INJECTION, SOLUTION INTRAVENOUS at 12:15

## 2024-12-09 RX ADMIN — CEFAZOLIN SODIUM 1000 MG: 1 SOLUTION INTRAVENOUS at 19:45

## 2024-12-09 RX ADMIN — GABAPENTIN 100 MG: 100 CAPSULE ORAL at 22:53

## 2024-12-09 RX ADMIN — DOCUSATE SODIUM 100 MG: 100 CAPSULE, LIQUID FILLED ORAL at 18:27

## 2024-12-09 RX ADMIN — ONDANSETRON 4 MG: 2 INJECTION INTRAMUSCULAR; INTRAVENOUS at 12:50

## 2024-12-09 RX ADMIN — GABAPENTIN 100 MG: 100 CAPSULE ORAL at 15:22

## 2024-12-09 NOTE — OCCUPATIONAL THERAPY NOTE
Occupational Therapy Evaluation     Patient Name: Rich Abel  Today's Date: 12/9/2024  Problem List  Principal Problem:    Primary osteoarthritis of left hip    Past Medical History  Past Medical History:   Diagnosis Date    Arthritis     Cancer (HCC)     Colon polyp     Diabetes mellitus (HCC)     Ear problems     Essential hypertension 02/05/2020    Glaucoma     HL (hearing loss)     Hyperlipidemia 02/05/2020    Hypertension     Kidney stone     Melanoma of ear, left (HCC)     Nasal polyp     Tinnitus      Past Surgical History  Past Surgical History:   Procedure Laterality Date    ARTHROSCOPY KNEE Bilateral     BASAL CELL CARCINOMA EXCISION  03/2020    left arm    CYSTOSCOPY      kidney stone removal    FOOT TENDON SURGERY Left 06/22/2021    Procedure: ANTERIOR TALOFIBULAR LIGAMENT REPAIR;  Surgeon: Trent Obregon DPM;  Location: [facility];  Service: Podiatry    FRACTURE SURGERY  March 2023    KNEE SURGERY      NASAL POLYP EXCISION      HI COLONOSCOPY FLX DX W/COLLJ SPEC WHEN PFRMD N/A 08/08/2017    Procedure: COLONOSCOPY;  Surgeon: Jeff Rosen MD;  Location: [facility];  Service: Colorectal    HI RPR TDN FLXR FOOT 1/2 W/O FREE GRAFG EACH TENDON Left 12/08/2017    Procedure: ANKLE PERONEUS LONGUS TENDON REPAIR with allograft;  RETINACULUM REPAIR SECONDARY  Application of Provena Vac;  Surgeon: Trent Obregon DPM;  Location: [facility];  Service: Podiatry    ROTATOR CUFF REPAIR Bilateral     SHOULDER ARTHROSCOPY      WISDOM TOOTH EXTRACTION          12/09/24 1616   OT Last Visit   OT Visit Date 12/09/24   Note Type   Note type Evaluation   Additional Comments Pt greeted in supine, agreeable to OT evaluation   Pain Assessment   Pain Assessment Tool 0-10   Pain Score 7   Pain Location/Orientation Orientation: Left;Location: Hip   Hospital Pain Intervention(s) Repositioned;Cold applied;Ambulation/increased activity;Emotional support;Rest   Restrictions/Precautions   Weight Bearing Precautions Per Order  Yes   LLE Weight Bearing Per Order WBAT   Braces or Orthoses Other (Comment)  (hip abduction pillow)   Other Precautions Chair Alarm;Bed Alarm;THR;WBS;Multiple lines;Fall Risk;Pain  (posterior hip precautions)   Home Living   Type of Home House  (ranch with basement)   Home Layout Two level;Performs ADLs on one level;Able to live on main level with bedroom/bathroom;Stairs to enter with rails  (3 SAMANTHA with bilateral handrails)   Bathroom Shower/Tub Tub/shower unit   Bathroom Toilet Standard  (w/ toilet raiser)   Bathroom Equipment Toilet raiser;Shower chair   Bathroom Accessibility Accessible   Home Equipment Walker;Cane;Crutches;Other (Comment)  (knee scooter)   Additional Comments Pt reports living with spouse in a ranch style house with a basement. Pt plans to stay on main level with bed/bath.   Prior Function   Level of [place] Independent with ADLs;Independent with functional mobility;Independent with IADLS   Lives With Spouse   Receives Help From Family   IADLs Independent with driving;Independent with meal prep;Independent with medication management   Falls in the last 6 months 0   Vocational Full time employment  ([employer])   Comments (+) , no AD use at baseline   Lifestyle   Autonomy Independent with all ADLs/IADLs, no AD use at baseline   Reciprocal Relationships Spouse   Service to Others FTE   General   Family/Caregiver Present Yes   ADL   Where Assessed Edge of bed   Eating Assistance 5  Supervision/Setup   Grooming Assistance 5  Supervision/Setup   UB Bathing Assistance 5  Supervision/Setup   LB Bathing Assistance 4  Minimal Assistance   UB Dressing Assistance 5  Supervision/Setup   LB Dressing Assistance 4  Minimal Assistance   Toileting Assistance  4  Minimal Assistance   Bed Mobility   Supine to Sit 4  Minimal assistance   Additional items Assist x 1;Bedrails;HOB elevated;Increased time required;Verbal cues;LE management   Sit to Supine Unable to assess   Additional Comments Pt  greeted in supine. BP: 176/73.   Transfers   Sit to Stand 4  Minimal assistance   Additional items Assist x 1;HOB elevated;Increased time required;Verbal cues  (RW)   Stand to Sit 4  Minimal assistance   Additional items Assist x 1;Increased time required;Verbal cues;Armrests  (RW)   Additional Comments (S)  verbal cues for hand placement and safety. EOB pt with incident of emesis (+) nausea. After emesis pt repoirting feeling better and would like to use the bathroom, RN [email or web address]. BP EOB: 131/82.   Functional Mobility   Functional Mobility 5  Supervision   Additional Comments Pt completed functional mobility in room distance from EOB to bathroom and back with use of RW and supervision.   Additional items Rolling walker   Balance   Static Sitting Good   Dynamic Sitting Fair +   Static Standing Fair   Dynamic Standing Fair -   Ambulatory Fair -   Activity Tolerance   Activity Tolerance Patient tolerated treatment well;Treatment limited secondary to medical complications (Comment)   Medical Staff Made Aware PT Eugenie, Pt seen for co-evaluation/treatment with skilled Physical Therapy due to pt's medical complexity, decreased endurance, overall functional level, overall safety, and post surgical day #0.   Nurse Made Aware OLU TRONCOSO Assessment   RUE Assessment WFL   LUE Assessment   LUE Assessment WFL   Hand Function   Gross Motor Coordination Functional   Fine Motor Coordination Functional   Cognition   Overall Cognitive Status WFL   Arousal/Participation Alert;Cooperative   Attention Within functional limits   Orientation Level Oriented X4   Memory Within functional limits   Following Commands Follows all commands and directions without difficulty   Comments Cooperative but in increased pain and (+) nausea/emesis during session   Assessment   Limitation Decreased ADL status;Decreased endurance;Decreased self-care trans;Decreased high-level ADLs   Prognosis Good   Assessment Pt is a 69 y.o. male seen for OT  evaluation s/p adm to [facility] on 12/9/2024 w/ Primary osteoarthritis of left hip . Comorbidities affecting pt’s functional performance include a significant PMH of arthritis, cancer, DM, HTN, hearing loss, tinnitus, hyperlipidemia. Pt with active OT orders and activity orders for Activity beginning POD #0. Pt reports living with spouse in a ranch style house with a basement. Pt plans to stay on main level with bed/bath. At baseline, pt was independent with all ADLs/IADLs. Pt completed supine to sit with Shalom. EOB pt with incident of emesis (+) nausea. After emesis pt repoirting feeling better and would like to use the bathroom, RN okayed. Pt completed sit to stand with use of RW and Shalom. Pt completed functional mobility from EOB to bathroom and use of urinal in standing with Shalom and use of RW for stability. Pt completed functional mobility back to chair with supervision and use of RW. Upon evaluation, pt currently requires S for UB ADLs, Shalom for LB ADLs, Shalom for toileting, Shalom for bed mobility, S for functional mobility, and Shalom for transfers 2* the following deficits impacting occupational performance: weakness, decreased strength , decreased balance, decreased activity tolerance, increased pain, orthopedic restrictions, and nausea. These impairments, as well at pt’s personal factors of: SAMANTHA home environment, difficulty performing ADLs, difficulty performing IADLs, difficulty performing transfers/mobility, WBS, fall risk , and new use of AD for functional transfers/mobility limit pt’s ability to safely engage in all baseline areas of occupation. Based on the aforementioned OT evaluation, functional performance deficits, and assessments, pt has been identified as a high complexity evaluation. Pt to continue to benefit from continued acute OT services during hospital stay to address defined deficits and to maximize level of functional independence in the following Occupational Performance areas:  grooming, bathing/shower, toilet hygiene, dressing, health maintenance, functional mobility, community mobility, clothing management, cleaning, meal prep, household maintenance, and job performance/volunteering. From OT standpoint, recommend III; Minimum Resource Intensity (pending progress) upon D/C. OT will continue to follow pt 3-5x/wk.   Goals   Patient Goals to use the bathroom   STG Time Frame 1-3   Short Term Goal #1 Pt will demonstrate 100% adherence to 3/3 posterolateral hip precautions during all functional activities w/o cues from therapist   Short Term Goal #2 Pt will be able to state 3/3 posterolateral hip precautions w/o cues from therapist 100% of the time each session to increase safety at home and reduce risk of injury   Short Term Goal  Pt will improve activity tolerance to G for min 30 min treatment sessions for increase engagement in functional tasks   LTG Time Frame 3-7   Long Term Goal #1 Pt will complete bed mobility at a mod I level w/ G balance/safety demonstrated to decrease caregiver assistance required   Long Term Goal #2 Pt will complete LB dressing/self care w/ mod I using adaptive device and DME as needed   Long Term Goal Pt will complete toileting w/ mod I w/ G hygiene/thoroughness using DME as needed   Plan   Treatment Interventions ADL retraining;Functional transfer training;Endurance training;Patient/family training;Equipment evaluation/education;Compensatory technique education;Continued evaluation;Energy conservation;Activityengagement   Goal Expiration Date 12/16/24   OT Treatment Day 0   OT Frequency 3-5x/wk   Discharge Recommendation   Rehab Resource Intensity Level, OT III (Minimum Resource Intensity)  (pending progress)   Additional Comments  The patient's raw score on the AM-PAC Daily Activity Inpatient Short Form is 19 . A raw score of greater than or equal to 19 suggests the patient may benefit from discharge to home. Please refer to the recommendation of the Occupational  Therapist for safe discharge planning.   AM-PAC Daily Activity Inpatient   Lower Body Dressing 2   Bathing 3   Toileting 3   Upper Body Dressing 4   Grooming 3   Eating 4   Daily Activity Raw Score 19   Daily Activity Standardized Score (Calc for Raw Score >=11) 40.22   AM-PAC Applied Cognition Inpatient   Following a Speech/Presentation 4   Understanding Ordinary Conversation 4   Taking Medications 4   Remembering Where Things Are Placed or Put Away 4   Remembering List of 4-5 Errands 4   Taking Care of Complicated Tasks 4   Applied Cognition Raw Score 24   Applied Cognition Standardized Score 62.21   End of Consult   Education Provided Yes   Patient Position at End of Consult Bedside chair;All needs within reach;Bed/Chair alarm activated   Nurse Communication Nurse aware of consult   End of Consult Comments Pt seated OOB in chair with chair alarm activated at end of session. Call bell and phone within reach. All needs met and pt reports no further questions for OT at this time.   Anna Ulloa, OT

## 2024-12-09 NOTE — H&P
H&P Exam - Orthopedics   Rich Abel 69 y.o. male MRN: [id number]      Assessment/Plan   Assessment:  left Hip Osteoarthritis in this adult male who continues to have both pain and dysfunction despite appropriate nonsurgical treatments    Plan:  left posterior Total Hip Arthroplasty.  Patient is [place] with risks, benefits, alternatives      TOTAL HIP REPLACEMENT INDICATIONS AND RISKS  We had a lengthy discussion with the patient regarding the potential options for treatment.  Based on current presentation, radiographic exam, and lack of sufficient response to nonsurgical management including activity modification, NSAIDs and therapeutic exercise, I would offer treatment in the form of total hip arthroplasty at this time.      The potential risks and benefits were discussed in detail.    While no guarantees can be made, total hip replacement has a very high success rate in terms of relieving a patient's hip pain and returning them to a more active, independent lifestyle for 10-15 years or more. All surgery carries some risk; for hip replacement, the complication rate is low but may include: death (very rare), infection, bleeding requiring transfusion, blood clots in legs traveling to lungs, nerve and/or blood vessel damage, bone fracture, leg length difference, prosthetic hip dislocation, persistent hip pain and/or stiffness, and repeat surgery(ies). The risk of a major complication is generally 1-2 per 1000 cases. Total hip replacement should only be done if conservative treatment has failed. The predicted revision rate is approximately 1% per year; in other words, 90% of hip replacements last 10 years or more, 80% last 20 years or more, and so on, assuming no injury. Additionally, we discussed anesthesia related complications which will be discussed in greater detail with the anesthesia team before surgery. The patient voiced their understanding of the surgical plan and potential complications and wishes to  proceed with surgery.    History of Present Illness   HPI:  Rich Abel is a 69 y.o. male who presents with pain in the left hip. Patient is no longer getting adequate relief from non-operative modalities. Patient is continuing to have debilitating pain from their hip, interfering with daily activities and sleep. Patient denies any concerns with infections, new neuropathies, or any acute injuries.     Historical Information  Review Of Systems:   Skin: Normal  Neuro: See HPI  Musculoskeletal: See HPI  14 point review of systems negative except as stated above     Past Medical History:   Past Medical History:   Diagnosis Date    Arthritis     Cancer (HCC)     Colon polyp     Diabetes mellitus (HCC)     Ear problems     Essential hypertension 02/05/2020    Glaucoma     HL (hearing loss)     Hyperlipidemia 02/05/2020    Hypertension     Kidney stone     Melanoma of ear, left (HCC)     Nasal polyp     Tinnitus        Past Surgical History:   Past Surgical History:   Procedure Laterality Date    ARTHROSCOPY KNEE Bilateral     BASAL CELL CARCINOMA EXCISION  03/2020    left arm    CYSTOSCOPY      kidney stone removal    FOOT TENDON SURGERY Left 06/22/2021    Procedure: ANTERIOR TALOFIBULAR LIGAMENT REPAIR;  Surgeon: Trent Obregon DPM;  Location: [facility];  Service: Podiatry    FRACTURE SURGERY  March 2023    KNEE SURGERY      NASAL POLYP EXCISION      NC COLONOSCOPY FLX DX W/COLLJ SPEC WHEN PFRMD N/A 08/08/2017    Procedure: COLONOSCOPY;  Surgeon: Jeff Rosen MD;  [facility]: [facility];  Service: Colorectal    NC RPR TDN FLXR FOOT 1/2 W/O FREE GRAFG EACH TENDON Left 12/08/2017    Procedure: ANKLE PERONEUS LONGUS TENDON REPAIR with allograft;  RETINACULUM REPAIR SECONDARY  Application of Provena Vac;  Surgeon: Trent Obregon DPM;  [facility];  Service: Podiatry    ROTATOR CUFF REPAIR Bilateral     SHOULDER ARTHROSCOPY      WISDOM TOOTH EXTRACTION         Family History:  Family history reviewed and  non-contributory  Family History   Problem Relation Age of Onset    Heart disease Mother         MOTHER    Diabetes Mother     Melanoma Mother     Cancer Mother     Aortic aneurysm Father         abdominal     Prostate cancer Father     Colon cancer Father        Social History:  Social History     Socioeconomic History    Marital status: /Civil Union     Spouse name: None    Number of children: None    Years of education: None    Highest education level: None   Occupational History    None   Tobacco Use    Smoking status: Former     Current packs/day: 0.00     Average packs/day: 1 pack/day for 33.0 years (33.0 ttl pk-yrs)     Types: Cigarettes     Start date: 1974     Quit date: 2007     Years since quittin.4     Passive exposure: Past    Smokeless tobacco: Current     Types: Chew    Tobacco comments:     quit 15 years ago   Vaping Use    Vaping status: Never Used   Substance and Sexual Activity    Alcohol use: Yes     Alcohol/week: 2.0 standard drinks of alcohol     Types: 2 Standard drinks or equivalent per week    Drug use: No    Sexual activity: Yes     Partners: Female     Birth control/protection: None   Other Topics Concern    None   Social History Narrative    None     Social Drivers of Health     Financial Resource Strain: Low Risk  (2023)    Overall Financial Resource Strain (CARDIA)     Difficulty of Paying Living Expenses: Not hard at all   Food Insecurity: No Food Insecurity (2024)    Nursing - Inadequate Food Risk Classification     Worried About Running Out of Food in the Last Year: Never true     Ran Out of Food in the Last Year: Never true     Ran Out of Food in the Last Year: Not on file   Transportation Needs: No Transportation Needs (2024)    PRAPARE - Transportation     Lack of Transportation (Medical): No     Lack of Transportation (Non-Medical): No   Physical Activity: Not on file   Stress: Not on file   Social Connections: Not on file   Intimate Partner  "Violence: Not on file   Housing Stability: Low Risk  (4/20/2024)    Housing Stability Vital Sign     Unable to Pay for Housing in the Last Year: No     Number of Times Moved in the Last Year: 1     Homeless in the Last Year: No       Allergies:   No Known Allergies        Labs:  0   Lab Value Date/Time    HCT 40.6 11/15/2024 0903    HCT 39.4 10/25/2024 0756    HCT 38.2 04/26/2024 0914    HCT 42.4 01/07/2016 0634    HCT 38.7 01/06/2016 1403    HCT 40.8 01/05/2016 1434    HGB 13.3 11/15/2024 0903    HGB 12.6 10/25/2024 0756    HGB 12.4 04/26/2024 0914    HGB 14.3 01/07/2016 0634    HGB 13.0 01/06/2016 1403    HGB 13.9 01/05/2016 1434    INR 1.02 11/15/2024 0903    INR 1.05 01/05/2016 1434    WBC 8.40 11/15/2024 0903    WBC 6.42 10/25/2024 0756    WBC 8.62 04/26/2024 0914    WBC 9.82 01/07/2016 0634    WBC 7.93 01/06/2016 1403    WBC 9.74 01/05/2016 1434       Meds:    Current Facility-Administered Medications:     ceFAZolin (ANCEF) IVPB (premix in dextrose) 2,000 mg 50 mL, 2,000 mg, Intravenous, Once, Jerzy Ruiz MD    dextrose 50 % IV solution 25 mL, 25 mL, Intravenous, Q30 Min PRN, Macy Thorne CRNA, 25 mL at 12/09/24 0956    lactated ringers infusion, 125 mL/hr, Intravenous, Continuous, Jerzy Ruiz MD    lactated ringers infusion, 125 mL/hr, Intravenous, Continuous, Abiel Green MD, Last Rate: 125 mL/hr at 12/09/24 1025, Continue from Pre-op at 12/09/24 1025    tranexamic acid (CYKLOKAPRON) 1000-0.7 MG/100ML-% injection 1,000 mg, 1,000 mg, Intravenous, Once, Abiel Green MD    Blood Culture:   No results found for: \"BLOODCX\"    Wound Culture:   No results found for: \"WOUNDCULT\"    Ins and Outs:  No intake/output data recorded.          Physical Exam  BP (!) 174/73   Pulse 58   Temp 97.6 °F (36.4 °C) (Temporal)   Resp 18   Ht 5' 11\" (1.803 m)   Wt 94.3 kg (208 lb)   SpO2 97%   BMI 29.01 kg/m²   BP (!) 174/73   Pulse 58   Temp 97.6 °F (36.4 °C) (Temporal)   Resp 18   Ht " "5' 11\" (1.803 m)   Wt 94.3 kg (208 lb)   SpO2 97%   BMI 29.01 kg/m²   Gen: No acute distress, resting comfortably in bed  HEENT: Eyes clear, moist mucus membranes, hearing intact  Respiratory: No audible wheezing or stridor  Cardiovascular: Well Perfused peripherally, 2+ distal pulse  Abdomen: nondistended, no peritoneal signs  Ortho Exam: limited hip ROM due to pain and mechanical blocking  Neuro Exam: intact    Lab Results: Reviewed  Imaging: Reviewed   "

## 2024-12-09 NOTE — PLAN OF CARE
Problem: OCCUPATIONAL THERAPY ADULT  Goal: Performs self-care activities at highest level of function for planned discharge setting.  See evaluation for individualized goals.  Description: Treatment Interventions: ADL retraining, Functional transfer training, Endurance training, Patient/family training, Equipment evaluation/education, Compensatory technique education, Continued evaluation, Energy conservation, Activityengagement          See flowsheet documentation for full assessment, interventions and recommendations.   Note: Limitation: Decreased ADL status, Decreased endurance, Decreased self-care trans, Decreased high-level ADLs  Prognosis: Good  Assessment: Pt is a 69 y.o. male seen for OT evaluation s/p adm to [facility] on 12/9/2024 w/ Primary osteoarthritis of left hip . Comorbidities affecting pt’s functional performance include a significant PMH of arthritis, cancer, DM, HTN, hearing loss, tinnitus, hyperlipidemia. Pt with active OT orders and activity orders for Activity beginning POD #0. Pt reports living with spouse in a ranch style house with a basement. Pt plans to stay on main level with bed/bath. At baseline, pt was independent with all ADLs/IADLs. Pt completed supine to sit with Shalom. EOB pt with incident of emesis (+) nausea. After emesis pt repoirting feeling better and would like to use the bathroom, RN okayed. Pt completed sit to stand with use of RW and Shalom. Pt completed functional mobility from EOB to bathroom and use of urinal in standing with Shalom and use of RW for stability. Pt completed functional mobility back to chair with supervision and use of RW. Upon evaluation, pt currently requires S for UB ADLs, Shalom for LB ADLs, Shalom for toileting, Shalom for bed mobility, S for functional mobility, and Sahlom for transfers 2* the following deficits impacting occupational performance: weakness, decreased strength , decreased balance, decreased activity tolerance, increased pain, orthopedic  restrictions, and nausea. These impairments, as well at pt’s personal factors of: SAMANTHA home environment, difficulty performing ADLs, difficulty performing IADLs, difficulty performing transfers/mobility, WBS, fall risk , and new use of AD for functional transfers/mobility limit pt’s ability to safely engage in all baseline areas of occupation. Based on the aforementioned OT evaluation, functional performance deficits, and assessments, pt has been identified as a high complexity evaluation. Pt to continue to benefit from continued acute OT services during hospital stay to address defined deficits and to maximize level of functional independence in the following Occupational Performance areas: grooming, bathing/shower, toilet hygiene, dressing, health maintenance, functional mobility, community mobility, clothing management, cleaning, meal prep, household maintenance, and job performance/volunteering. From OT standpoint, recommend III; Minimum Resource Intensity (pending progress) upon D/C. OT will continue to follow pt 3-5x/wk.     Rehab Resource Intensity Level, OT: III (Minimum Resource Intensity) (pending progress)

## 2024-12-09 NOTE — ANESTHESIA PROCEDURE NOTES
Spinal Block    Patient location during procedure: OR  Start time: 12/9/2024 11:39 AM  Reason for block: procedure for pain and at surgeon's request  Staffing  Performed by: Simón Ferrer CRNA  Authorized by: Jerzy Ruiz MD    Preanesthetic Checklist  Completed: patient identified, IV checked, site marked, risks and benefits discussed, surgical consent, monitors and equipment checked, pre-op evaluation and timeout performed  Spinal Block  Patient position: sitting  Prep: ChloraPrep and site prepped and draped  Patient monitoring: frequent blood pressure checks, continuous pulse ox and heart rate  Approach: midline  Location: L3-4  Needle  Needle type: Pencan   Needle gauge: 24 G  Needle length: 4 in  Assessment  Sensory level: T4  Injection Assessment:  negative aspiration for heme, no paresthesia on injection and positive aspiration for clear CSF.  Post-procedure:  site cleaned

## 2024-12-09 NOTE — PHYSICAL THERAPY NOTE
Physical Therapy Cancellation Note         12/09/24 1555   PT Last Visit   PT Visit Date 12/09/24   Note Type   Note type Cancelled Session   Cancel Reasons Medical status         Eugenie Chino, PT

## 2024-12-09 NOTE — PHYSICAL THERAPY NOTE
PT EVALUATION    Pt. Name: Rich Abel  Pt. Age: 69 y.o.  MRN: [id number]  LENGTH OF STAY: 0    Patient Active Problem List   Diagnosis    Essential hypertension    Hyperlipidemia    Gilbert syndrome    Skin cancer, basal cell    Decreased hearing of both ears    Type 2 diabetes mellitus 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, bilateral (HCC)    Glaucoma    Lung nodule    Primary osteoarthritis of left hip       Admitting Diagnoses:   Primary osteoarthritis of left hip [M16.12]    Past Medical History:   Diagnosis Date    Arthritis     Cancer (HCC)     Colon polyp     Diabetes mellitus (HCC)     Ear problems     Essential hypertension 02/05/2020    Glaucoma     HL (hearing loss)     Hyperlipidemia 02/05/2020    Hypertension     Kidney stone     Melanoma of ear, left (HCC)     Nasal polyp     Tinnitus        Past Surgical History:   Procedure Laterality Date    ARTHROSCOPY KNEE Bilateral     BASAL CELL CARCINOMA EXCISION  03/2020    left arm    CYSTOSCOPY      kidney stone removal    FOOT TENDON SURGERY Left 06/22/2021    Procedure: ANTERIOR TALOFIBULAR LIGAMENT REPAIR;  Surgeon: Trent Obregon DPM;  [facility];  Service: Podiatry    FRACTURE SURGERY  March 2023    KNEE SURGERY      NASAL POLYP EXCISION      [place] COLONOSCOPY FLX DX W/COLLJ SPEC WHEN PFRMD N/A 08/08/2017    Procedure: COLONOSCOPY;  Surgeon: Jeff Rosen MD;  [facility];  Service: Colorectal    N[place] RPR TDN FLXR FOOT 1/2 W/O FREE GRAFG EACH TENDON Left 12/08/2017    Procedure: ANKLE PERONEUS LONGUS TENDON REPAIR with allograft;  RETINACULUM REPAIR SECONDARY  Application of Provena Vac;  Surgeon: Trent Obregon DPM;  [facility];  Service: Podiatry    ROTATOR CUFF REPAIR Bilateral     SHOULDER ARTHROSCOPY      WISDOM TOOTH EXTRACTION         Imaging Studies:  No orders to display        12/09/24 1635   PT Last Visit   PT Visit Date 12/09/24   Note Type   Note type Evaluation   Pain  Assessment   Pain Assessment Tool 0-10   Pain Score 7   Pain Location/Orientation Orientation: Left;Location: Hip   Hospital Pain Intervention(s) Repositioned;Cold applied;Ambulation/increased activity;Elevated;Emotional support;Rest   Restrictions/Precautions   Weight Bearing Precautions Per Order Yes   LLE Weight Bearing Per Order WBAT   Braces or Orthoses   (hip abduction pillow)   Other Precautions Chair Alarm;Bed Alarm;WBS;THR;Multiple lines;Fall Risk;Pain  (posterior hip precautions)   Home Living   Type of Home House  (Ranch with basement)   Home Layout Two level;Performs ADLs on one level;Able to live on main level with bedroom/bathroom;Stairs to enter with rails  (3 SAMANTHA with bilateral hand rails)   Bathroom Shower/Tub Tub/shower unit   Bathroom Toilet Standard  (w/ toilet raiser)   Bathroom Equipment Shower chair;Toilet raiser   Home Equipment Walker;Cane;Crutches;Other (Comment)  (knee scooter)   Additional Comments Pt reports living with spouse in a ranch style house with a basement. Pt plans to stay on main level with bed/bath.   Prior Function   Level of [place] Independent with ADLs;Independent with functional mobility;Independent with IADLS  (w/o AD)   Lives With Spouse   Receives Help From Family   IADLs Independent with driving;Independent with meal prep;Independent with medication management   Falls in the last 6 months 0   Vocational Full time employment   General   Family/Caregiver Present No   Cognition   Overall Cognitive Status WFL   Arousal/Participation Alert   Attention Within functional limits   Orientation Level Oriented X4   Following Commands Follows all commands and directions without difficulty   Subjective   Subjective I need to use the bathroom.   RUE Assessment   RUE Assessment   (refer to OT)   LUE Assessment   LUE Assessment   (refer to OT)   RLE Assessment   RLE Assessment WFL  (5/5 grossly)   LLE Assessment   LLE Assessment X   Strength LLE   L Knee Flexion 3/5   L Knee  Extension 3/5   L Ankle Dorsiflexion 4+/5   L Ankle Plantar Flexion 4+/5   Light Touch   RLE Light Touch Grossly intact   LLE Light Touch Grossly intact   Bed Mobility   Supine to Sit 4  Minimal assistance   Additional items Assist x 1;HOB elevated;Bedrails;Increased time required;Verbal cues;LE management   Additional Comments Pt greeted in supine. /73. BP /82 with inc nausea and (+) emesis. RN aware and at bedside.   Transfers   Sit to Stand 4  Minimal assistance   Additional items Assist x 1;Bedrails;Increased time required;Verbal cues  (w/ RW)   Stand to Sit 4  Minimal assistance   Additional items Assist x 1;Armrests;Increased time required;Verbal cues  (w/ RW)   Additional Comments cues for hand placement. pt stood to void   Ambulation/Elevation   Gait pattern Improper Weight shift;Antalgic;Decreased foot clearance;Decreased L stance;Short stride;Step to   Gait Assistance 5  Supervision   Additional items Verbal cues   Assistive Device Rolling walker   Distance 10'x2   Ambulation/Elevation Additional Comments cues for turning with RW and maintaining posterior hip precautions   Balance   Static Sitting Good   Dynamic Sitting Fair +   Static Standing Fair  (w/ RW)   Dynamic Standing Fair -  (w/ RW)   Ambulatory Fair -  (w/ RW)   Activity Tolerance   Activity Tolerance Patient tolerated treatment well   Medical Staff Made Aware OT Anna   Nurse Made Aware OLU Horne   Assessment   Prognosis Good   Problem List Decreased strength;Decreased range of motion;Decreased endurance;Impaired balance;Decreased mobility;Orthopedic restrictions;Pain   Assessment Pt. 69 y.o.male presents for elective surgery. Past medical hx includes hx of CA, DM, HTN. Pt admitted for Primary osteoarthritis of left hip w/ Primary osteoarthritis of left hip (M16.12). S/p L elective THR (posterior) POD #0. Pt referred to PT for functional mobility evaluation & D/C planning w/ orders of activity as tolerated. WBAT LLE. Posterior hip  precautions with use of hip abduction pillow. PTA, pt reports being I w/o AD. Personal factors affecting pt at time of IE include: decreased independence in ADLs/IADLs and steps to enter. During evaluation, deficits included dec mobility, balance, ambulation. Required minAx1 for supine to sit, sit<>stand, and S for ambulation. Able to ambulate 10'x2 with RW in room. Antalgic step to gait with no gross LOB noted. Good understanding of posterior hip precautions. Pt demonstrated dec endurance and tolerance to activity. Denies reports of dizziness or SOB t/o session. Pt was educated on fall precautions and reinforced w/ good understanding. Pt would benefit from continued PT to address deficits as defined above and maximize level of independence with functional mobility and safety. Based on pt presentation and impaired function, pt would benefit from level III, (minimum resource intensity) at D/C. The patient's AM-PAC Basic Mobility Inpatient Short Form Raw Score is 19. A Raw score of greater than 16 suggests the patient may benefit from discharge to home. Please also refer to the recommendation of the Physical Therapist for safe discharge planning. Nsg staff to continue to mobilized pt (OOB in chair for all meals & ambulate in room/unit) as tolerated to prevent further decline in function. Nsg notified. Co-eval performed to complete this PT evaluation for the pts best interest given pts medical complexity and functional level.   Barriers to Discharge Inaccessible home environment   Barriers to Discharge Comments SAMANTHA   Goals   Patient Goals to use the bathroom   STG Expiration Date 12/16/24   Short Term Goal #1 1) Inc overall LE strength by 1/2 MMT grade to improve functional mobility; 2) Pt will demonstrate improved bed mobility with mod I to dec caregiver burden; 3) Pt will demonstrate improved transfers w/ mod I for inc safety; 4) Pt will be able to amb w/ mod I >150' w/ RW for household distances to inc safety and  dec caregiver burden; 5) Pt will be able to navigate stairs with mod I to dec caregiver burden and inc safety with functional mobility; 6) Improve general balance by 1 grade to inc safety; 7) PT for ongoing patient and caregiver education   PT Treatment Day 0   Plan   Treatment/Interventions Functional transfer training;LE strengthening/ROM;Elevations;Therapeutic exercise;Endurance training;Patient/family training;Bed mobility;Gait training;Spoke to nursing;OT   PT Frequency Twice a day  (PRN)   Discharge Recommendation   Rehab Resource Intensity Level, PT III (Minimum Resource Intensity)   Equipment Recommended Walker  (pt owns)   AM-PAC Basic Mobility Inpatient   Turning in Flat Bed Without Bedrails 4   Lying on Back to Sitting on Edge of Flat Bed Without Bedrails 3   Moving Bed to Chair 3   Standing Up From Chair Using Arms 3   Walk in Room 3   Climb 3-5 Stairs With Railing 3   Basic Mobility Inpatient Raw Score 19   Basic Mobility Standardized Score 42.48   [facility] Highest Level Of Mobility   -HLM Goal 6: Walk 10 steps or more   [facility]-HLM Achieved 6: Walk 10 steps or more   End of Consult   Patient Position at End of Consult Bedside chair;Bed/Chair alarm activated;All needs within reach       Hx/personal factors: co-morbidities, inaccessible home, advanced age, mutliple lines, use of AD, pain, total hip precautions, and fall risk, coping styles, social background, past experience, behavior pattern, post op precautions  Examination: dec mobility, dec balance, dec endurance, dec amb, risk for falls, pain, assessed body system, balance, endurance, amb, D/C disposition & fall risk, impairements in locomotion, musculoskeletal, balance, endurance, posture, coordination, assessed cognition, impairments in systems including multiple body structures involved; musculoskeletal (ROM, strength, posture, BMI), neuromuscular (balance,locomotion, gait, transfers, motor control and learning, sensation), joint integrity,  integumentary (skin integrity, presence of scars or wounds), cardiopulmonary (vitals, edema); activity limitations (difficulties executing an action); participation restrictions (problems associated w involvement in life situations)  Clinical: unpredictable (ongoing medical status, risk for falls, POD #0, and pain mgt)  Complexity: high      Eugenie Chino, PT

## 2024-12-09 NOTE — OP NOTE
OPERATIVE REPORT  PATIENT NAME: Rich Abel  : 1955  MRN: [id number]  Pt Location:  [facility] OR ROOM 05    Surgery Date: 2024    Surgeons and Role:     * Abiel Green MD - Primary     * Cordell Doll MD - Assisting     * Simi Mccarthy PA-C - Assisting     Preop Diagnosis:  Primary osteoarthritis of left hip [M16.12]    Post-Op Diagnosis Codes:     * Primary osteoarthritis of left hip [M16.12]    Procedure(s):  Left - ARTHROPLASTY HIP TOTAL    Specimens:  * No specimens in log *    Estimated Blood Loss:   Minimal    Drains:  Urethral Catheter Non-latex 16 Fr. (Active)   Number of days: 0       Anesthesia Type:   Spinal     Operative Indications:  Primary osteoarthritis of left hip [M16.12]    Operative Findings:  Depuy   Cup-56mm metal   Liner-10 degree lipped poly   Head/neck-40 +5mm metal   Femur-14 HO    Complications:   None      Hip Approach: Posterior        Procedure and Technique:  Following the induction of adequate level of spinal anesthesia, a Tineo catheter was then sterilely introduced into this patient's bladder.  Antibiotics were ministered.  He was then placed in the right lateral cubitus, left side up position.  An axillary roll was placed underneath the right axilla.  The left hip and lateral thigh were then prepped and draped sterilely.  A posterolateral approach was created in order to gain access to the hip.  Full-thickness flaps were raised to get to the tensor fascia.  This was split, expose the deep layer of the hip.  With the hip in internal rotation, the piriformis tendon was identified, transected, and retracted in a posterior fashion.  The remainder the short external rotators were sectioned as well.  A T-type capsulotomy was used to open up the hip.  The femoral head was then delivered posteriorly.  Utilizing the femoral neck osteotomy guide, the proper femoral neck cut was then made.  A posterior capsulectomy, anterior capsulotomy were then created in order to  circumference expose the acetabulum.  Reaming started at 54 and extended to 56 mm which point time a Hemisphere bleeding cancellous bone was encountered.  56 trial was inserted and noted to fit well, therefore the 56 mm insert was then hammered into place.  A single screw was then placed within the posterior superior quadrant for additional fixation.  The 10 degree lipped liner was then snapped into position.  The proximal femur was then prepared for insertion of press-fit component.  The box osteotome was used on the proximal femur.  Patient's canal sequentially broached.  With a #14 high offset, and a 40+5 femoral head and neck, the hip was located, taken through range of motion, found a quite stable.  The trial components removed if that hip was carefully dislocated.  The insert components were assembled and introduced the patient's left hip in standard fashion.  The hip was once more located, and taken the range of motion cuff adequate stable.  Satisfied with the extent of surgery, the wound was then flushed with saline and closed.  A Betadine soak initiated.  The piriformis tendon was reapproximated to the greater trochanter number Vicryl suture.  The tensor fascia was then closed with number Vicryl suture.  The subcu tissues were closed with a mixture #1 for deep layer, 2-0 Vicryl for the subcutaneous tissues, and skin staples for the skin.  Sterile dressings were applied.  He was then transferred to recovery room in stable condition with plans to include physical therapy for weightbearing to tolerance.  He will require DVT prophylaxis with Lovenox   I was present for the entire procedure.    Patient Disposition:  PACU               SIGNATURE: Abiel Green MD  DATE: December 9, 2024  TIME: 12:53 PM

## 2024-12-09 NOTE — ANESTHESIA PREPROCEDURE EVALUATION
Procedure:  ARTHROPLASTY HIP TOTAL (Left: Hip)    Relevant Problems   CARDIO   (+) Essential hypertension   (+) Hyperlipidemia        Physical Exam    Airway    Mallampati score: I  TM Distance: <3 FB  Neck ROM: full     Dental   No notable dental hx     Cardiovascular      Pulmonary      Other Findings        Anesthesia Plan  ASA Score- 2     Anesthesia Type- spinal with ASA Monitors.         Additional Monitors:     Airway Plan:            Plan Factors-Exercise tolerance (METS): >4 METS.    Chart reviewed. EKG reviewed.  Existing labs reviewed. Patient summary reviewed.    Patient is not a current smoker.      There is medical exclusion for perioperative obstructive sleep apnea risk education.        Induction- intravenous.    Postoperative Plan- Plan for postoperative opioid use.         Informed Consent- Anesthetic plan and risks discussed with patient.  I personally reviewed this patient with the CRNA. Discussed and agreed on the Anesthesia Plan with the CRNA..

## 2024-12-09 NOTE — PLAN OF CARE
Problem: PHYSICAL THERAPY ADULT  Goal: Performs mobility at highest level of function for planned discharge setting.  See evaluation for individualized goals.  Description: Treatment/Interventions: Functional transfer training, LE strengthening/ROM, Elevations, Therapeutic exercise, Endurance training, Patient/family training, Bed mobility, Gait training, Spoke to nursing, OT  Equipment Recommended: Walker (pt owns)       See flowsheet documentation for full assessment, interventions and recommendations.  Note: Prognosis: Good  Problem List: Decreased strength, Decreased range of motion, Decreased endurance, Impaired balance, Decreased mobility, Orthopedic restrictions, Pain  Assessment: Pt. 69 y.o.male presents for elective surgery. Past medical hx includes hx of CA, DM, HTN. Pt admitted for Primary osteoarthritis of left hip w/ Primary osteoarthritis of left hip (M16.12). S/p L elective THR (posterior) POD #0. Pt referred to PT for functional mobility evaluation & D/C planning w/ orders of activity as tolerated. WBAT LLE. Posterior hip precautions with use of hip abduction pillow. PTA, pt reports being I w/o AD. Personal factors affecting pt at time of IE include: decreased independence in ADLs/IADLs and steps to enter. During evaluation, deficits included dec mobility, balance, ambulation. Required minAx1 for supine to sit, sit<>stand, and S for ambulation. Able to ambulate 10'x2 with RW in room. Antalgic step to gait with no gross LOB noted. Good understanding of posterior hip precautions. Pt demonstrated dec endurance and tolerance to activity. Denies reports of dizziness or SOB t/o session. Pt was educated on fall precautions and reinforced w/ good understanding. Pt would benefit from continued PT to address deficits as defined above and maximize level of independence with functional mobility and safety. Based on pt presentation and impaired function, pt would benefit from level III, (minimum resource  intensity) at D/C. The patient's AM-PAC Basic Mobility Inpatient Short Form Raw Score is 19. A Raw score of greater than 16 suggests the patient may benefit from discharge to home. Please also refer to the recommendation of the Physical Therapist for safe discharge planning. Nsg staff to continue to mobilized pt (OOB in chair for all meals & ambulate in room/unit) as tolerated to prevent further decline in function. Nsg notified. Co-eval performed to complete this PT evaluation for the pts best interest given pts medical complexity and functional level.  Barriers to Discharge: Inaccessible home environment  Barriers to Discharge Comments: SAMANTHA  Rehab Resource Intensity Level, PT: III (Minimum Resource Intensity)    See flowsheet documentation for full assessment.

## 2024-12-09 NOTE — ANESTHESIA POSTPROCEDURE EVALUATION
Post-Op Assessment Note    CV Status:  Stable  Pain Score: 0    Pain management: adequate       Mental Status:  Awake   Hydration Status:  Euvolemic   PONV Controlled:  Controlled   Airway Patency:  Patent  Two or more mitigation strategies used for obstructive sleep apnea   Post Op Vitals Reviewed: Yes    No anethesia notable event occurred.    Staff: CRNA           Last Filed PACU Vitals:  Vitals Value Taken Time   Temp 97.5    Pulse 59 12/09/24 1305   /68 12/09/24 1305   Resp 17 12/09/24 1305   SpO2 98 % 12/09/24 1305   Vitals shown include unfiled device data.    Modified Sherly:  No data recorded

## 2024-12-10 ENCOUNTER — APPOINTMENT (OUTPATIENT)
Age: 69
End: 2024-12-10
Payer: MEDICARE

## 2024-12-10 VITALS
SYSTOLIC BLOOD PRESSURE: 174 MMHG | DIASTOLIC BLOOD PRESSURE: 62 MMHG | RESPIRATION RATE: 16 BRPM | OXYGEN SATURATION: 97 % | HEART RATE: 112 BPM | HEIGHT: 71 IN | WEIGHT: 208 LBS | BODY MASS INDEX: 29.12 KG/M2 | TEMPERATURE: 98.6 F

## 2024-12-10 LAB
ANION GAP SERPL CALCULATED.3IONS-SCNC: 8 MMOL/L (ref 4–13)
BUN SERPL-MCNC: 21 MG/DL (ref 5–25)
CALCIUM SERPL-MCNC: 8.7 MG/DL (ref 8.4–10.2)
CHLORIDE SERPL-SCNC: 104 MMOL/L (ref 96–108)
CO2 SERPL-SCNC: 25 MMOL/L (ref 21–32)
CREAT SERPL-MCNC: 1.04 MG/DL (ref 0.6–1.3)
ERYTHROCYTE [DISTWIDTH] IN BLOOD BY AUTOMATED COUNT: 13.1 % (ref 11.6–15.1)
GFR SERPL CREATININE-BSD FRML MDRD: 72 ML/MIN/1.73SQ M
GLUCOSE P FAST SERPL-MCNC: 279 MG/DL (ref 65–99)
GLUCOSE SERPL-MCNC: 268 MG/DL (ref 65–140)
GLUCOSE SERPL-MCNC: 279 MG/DL (ref 65–140)
HCT VFR BLD AUTO: 34 % (ref 36.5–49.3)
HGB BLD-MCNC: 11.4 G/DL (ref 12–17)
MCH RBC QN AUTO: 31 PG (ref 26.8–34.3)
MCHC RBC AUTO-ENTMCNC: 33.5 G/DL (ref 31.4–37.4)
MCV RBC AUTO: 92 FL (ref 82–98)
PLATELET # BLD AUTO: 317 THOUSANDS/UL (ref 149–390)
PMV BLD AUTO: 10.2 FL (ref 8.9–12.7)
POTASSIUM SERPL-SCNC: 4.3 MMOL/L (ref 3.5–5.3)
RBC # BLD AUTO: 3.68 MILLION/UL (ref 3.88–5.62)
SODIUM SERPL-SCNC: 137 MMOL/L (ref 135–147)
WBC # BLD AUTO: 11.35 THOUSAND/UL (ref 4.31–10.16)

## 2024-12-10 PROCEDURE — 99024 POSTOP FOLLOW-UP VISIT: CPT | Performed by: PHYSICIAN ASSISTANT

## 2024-12-10 PROCEDURE — 73502 X-RAY EXAM HIP UNI 2-3 VIEWS: CPT

## 2024-12-10 PROCEDURE — 97535 SELF CARE MNGMENT TRAINING: CPT

## 2024-12-10 PROCEDURE — 85027 COMPLETE CBC AUTOMATED: CPT

## 2024-12-10 PROCEDURE — 97530 THERAPEUTIC ACTIVITIES: CPT | Performed by: PHYSICAL THERAPIST

## 2024-12-10 PROCEDURE — 80048 BASIC METABOLIC PNL TOTAL CA: CPT

## 2024-12-10 PROCEDURE — 99232 SBSQ HOSP IP/OBS MODERATE 35: CPT | Performed by: PHYSICIAN ASSISTANT

## 2024-12-10 PROCEDURE — 97116 GAIT TRAINING THERAPY: CPT | Performed by: PHYSICAL THERAPIST

## 2024-12-10 PROCEDURE — 82948 REAGENT STRIP/BLOOD GLUCOSE: CPT

## 2024-12-10 RX ADMIN — HYDROMORPHONE HYDROCHLORIDE 0.5 MG: 1 INJECTION, SOLUTION INTRAMUSCULAR; INTRAVENOUS; SUBCUTANEOUS at 06:41

## 2024-12-10 RX ADMIN — ATORVASTATIN CALCIUM 10 MG: 20 TABLET, FILM COATED ORAL at 08:21

## 2024-12-10 RX ADMIN — CEFAZOLIN SODIUM 1000 MG: 1 SOLUTION INTRAVENOUS at 04:03

## 2024-12-10 RX ADMIN — METFORMIN HYDROCHLORIDE 1000 MG: 500 TABLET, EXTENDED RELEASE ORAL at 08:21

## 2024-12-10 RX ADMIN — INSULIN GLARGINE 50 UNITS: 100 INJECTION, SOLUTION SUBCUTANEOUS at 08:43

## 2024-12-10 RX ADMIN — ACETAMINOPHEN 975 MG: 325 TABLET ORAL at 08:21

## 2024-12-10 RX ADMIN — GABAPENTIN 100 MG: 100 CAPSULE ORAL at 06:35

## 2024-12-10 RX ADMIN — OXYCODONE HYDROCHLORIDE 10 MG: 10 TABLET ORAL at 05:30

## 2024-12-10 RX ADMIN — OXYCODONE 5 MG: 5 TABLET ORAL at 09:15

## 2024-12-10 RX ADMIN — METHOCARBAMOL TABLETS 500 MG: 500 TABLET, COATED ORAL at 05:30

## 2024-12-10 RX ADMIN — ENOXAPARIN SODIUM 40 MG: 40 INJECTION SUBCUTANEOUS at 08:21

## 2024-12-10 RX ADMIN — INSULIN LISPRO 3 UNITS: 100 INJECTION, SOLUTION INTRAVENOUS; SUBCUTANEOUS at 06:38

## 2024-12-10 RX ADMIN — DOCUSATE SODIUM 100 MG: 100 CAPSULE, LIQUID FILLED ORAL at 08:21

## 2024-12-10 NOTE — OCCUPATIONAL THERAPY NOTE
Occupational Therapy Progress Note     Patient Name: Rich Abel  Today's Date: 12/10/2024  Problem List  Principal Problem:    Primary osteoarthritis of left hip  Active Problems:    Primary hypertension    Hyperlipidemia    Type 2 diabetes mellitus 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, bilateral (HCC)          12/10/24 0820   OT Last Visit   OT Visit Date 12/10/24   Note Type   Note Type Treatment   Pain Assessment   Pain Assessment Tool 0-10   Pain Score 3  (6/10 after dressing)   Pain Location/Orientation Orientation: Left;Location: Hip   Hospital Pain Intervention(s) Repositioned;Ambulation/increased activity;Elevated;Emotional support;Rest   Restrictions/Precautions   Weight Bearing Precautions Per Order Yes   LLE Weight Bearing Per Order WBAT   Braces or Orthoses Other (Comment)  (hip abduction pillow)   Other Precautions Chair Alarm;Bed Alarm;WBS;THR;Fall Risk;Pain  (posterior hip precautions)   Lifestyle   Autonomy Independent with all ADLs/IADLs, no AD use at baseline   Reciprocal Relationships Spouse   Service to Others FTE   ADL   Where Assessed Chair   Equipment Provided Reacher;Sock aid;Long-handled shoe horn;Dressing stick   Eating Assistance 5  Supervision/Setup   Eating Deficit Setup   UB Dressing Assistance 5  Supervision/Setup   UB Dressing Deficit Setup;Supervision/safety   LB Dressing Assistance 5  Supervision/Setup   LB Dressing Deficit Setup;Requires assistive device for steadying;Verbal cueing;Supervision/safety;Increased time to complete;Use of adaptive equipment   Functional Standing Tolerance   Time ~1-2 min   Activity standing for dressing tasks   Comments use of RW for stability   Bed Mobility   Supine to Sit Unable to assess   Sit to Supine Unable to assess   Additional Comments Pt greeted OOB in chair at start of session.   Transfers   Sit to Stand 5  Supervision   Additional items Armrests;Increased time required;Verbal cues  (RW)   Stand to Sit  5  Supervision   Additional items Armrests;Increased time required;Verbal cues  (RW)   Toilet transfer 5  Supervision   Additional items Armrests;Increased time required;Verbal cues;Standard toilet;Commode;Other  (use of BSC over standard toilet to simulate home environment)   Additional Comments verbal cues for hand placement and safety.   Functional Mobility   Functional Mobility 5  Supervision   Additional Comments Pt completed short functional mobility in room from chair to bathroom and back with supervision and use of RW   Additional items Rolling walker   Toilet Transfers   Toilet Transfer From Other (Comment)  (Chair)   Toilet Transfer Type To and from   Toilet Transfer to Standard toilet  (BSC over)   Toilet Transfer Technique Ambulating   Toilet Transfers Supervision   Toilet Transfers Comments use of RW   Cognition   Overall Cognitive Status WFL   Arousal/Participation Alert;Cooperative   Attention Within functional limits   Orientation Level Oriented X4   Memory Within functional limits   Following Commands Follows all commands and directions without difficulty   Comments Cooperative and pleasant   Activity Tolerance   Activity Tolerance Patient tolerated treatment well   Medical Staff Made Aware RN Coleen   Assessment   Assessment Pt seen for OT treatment session focusing on ADLs/IADLs, functional mobility, functional standing tolerance, functional transfers, patient education, continued evaluation. Pt greeted OOB in recliner at start of session. Pt alert and cooperative throughout session. Pt with good sitting balance and fair - dynamic standing balance. Pt tolerated treatment well. Pt completed don of underwear and pants seated in chair with use of reacher to maintain posterior hip precautions. Pt then standing from chair with use of armrests to pull over waist and use of RW for stability. Pt completed don of shirt with supervision and setup in standing with use of RW for stability. Pt completed doff  of socks with dressing stick seated. Pt then educated on use of sock aid and practiced seated in chair with supervision. Pt completed sit to stand with superviison and use of RW. Pt completed functional mobility from chair to bathroom with supervision and use of RW. Pt completed toilet transfer onto [facility] over standard toilet to simulate home environment with supervision and use of RW. Pt completed functional mobility back to chair with use of RW. Dispensed hip kit for independence at home with ADLs/IADLs. Pt educated on ADLs/IADLs, transfers and LE management for independence at home. End of session pt setup with breakfast. Pt reports 3/10 pain and no dizziness. Pt's vitals include: stable.     Pt ended session seated OOB in recliner. Call bell and phone within reach. All needs met and pt reports no further questions at this time. Continue to recommend No post-acute rehabilitation needs when medically cleared. OT will continue to follow pt on caseload.   Plan   Treatment Interventions ADL retraining;Functional transfer training;Endurance training;Patient/family training;Equipment evaluation/education;Compensatory technique education;Continued evaluation;Energy conservation;Activityengagement   Goal Expiration Date 12/16/24   OT Treatment Day 1   OT Frequency 3-5x/wk   Discharge Recommendation   Rehab Resource Intensity Level, OT No post-acute rehabilitation needs   Equipment Recommended Hip Kit ($)   Additional Comments  The patient's raw score on the -PAC Daily Activity Inpatient Short Form is 21. A raw score of greater than or equal to 19 suggests the patient may benefit from discharge to home. Please refer to the recommendation of the Occupational Therapist for safe discharge planning.   AM-PAC Daily Activity Inpatient   Lower Body Dressing 3   Bathing 3   Toileting 3   Upper Body Dressing 4   Grooming 4   Eating 4   Daily Activity Raw Score 21   Daily Activity Standardized Score (Calc for Raw Score >=11) 44.27    AM-PAC Applied Cognition Inpatient   Following a Speech/Presentation 4   Understanding Ordinary Conversation 4   Taking Medications 4   Remembering Where Things Are Placed or Put Away 4   Remembering List of 4-5 Errands 4   Taking Care of Complicated Tasks 4   Applied Cognition Raw Score 24   Applied Cognition Standardized Score 62.21   End of Consult   Education Provided Yes   Patient Position at End of Consult Bedside chair;All needs within reach   Nurse Communication Nurse aware of consult   Anna Ulloa, OT

## 2024-12-10 NOTE — PLAN OF CARE
Problem: PAIN - ADULT  Goal: Verbalizes/displays adequate comfort level or baseline comfort level  Description: Interventions:  - Encourage patient to monitor pain and request assistance  - Assess pain using appropriate pain scale  - Administer analgesics based on type and severity of pain and evaluate response  - Implement non-pharmacological measures as appropriate and evaluate response  - Consider cultural and social influences on pain and pain management  - Notify physician/advanced practitioner if interventions unsuccessful or patient reports new pain  12/10/2024 1049 by Coleen Armnedariz RN  Outcome: Adequate for Discharge  12/10/2024 0854 by Coleen Armendariz RN  Outcome: Progressing     Problem: INFECTION - ADULT  Goal: Absence or prevention of progression during hospitalization  Description: INTERVENTIONS:  - Assess and monitor for signs and symptoms of infection  - Monitor lab/diagnostic results  - Monitor all insertion sites, i.e. indwelling lines, tubes, and drains  - Monitor endotracheal if appropriate and nasal secretions for changes in amount and color  - [place] appropriate cooling/warming therapies per order  - Administer medications as ordered  - Instruct and encourage patient and family to use good hand hygiene technique  - Identify and instruct in appropriate isolation precautions for identified infection/condition  Outcome: Adequate for Discharge  Goal: Absence of fever/infection during neutropenic period  Description: INTERVENTIONS:  - Monitor WBC    Outcome: Adequate for Discharge     Problem: SAFETY ADULT  Goal: Patient will remain free of falls  Description: INTERVENTIONS:  - Educate patient/family on patient safety including physical limitations  - Instruct patient to call for assistance with activity   - Consult OT/PT to assist with strengthening/mobility   - Keep Call bell within reach  - Keep bed low and locked with side rails adjusted as appropriate  - Keep care items and personal  belongings within reach  - Initiate and maintain comfort rounds  - Make Fall Risk Sign visible to staff  - Offer Toileting every 2 Hours, in advance of need  - Initiate/Maintain chair alarm  - Obtain necessary fall risk management equipment:   - Apply yellow socks and bracelet for high fall risk patients  - Consider moving patient to room near nurses station  Outcome: Adequate for Discharge  Goal: Maintain or return to baseline ADL function  Description: INTERVENTIONS:  -  Assess patient's ability to carry out ADLs; assess patient's baseline for ADL function and identify physical deficits which impact ability to perform ADLs (bathing, care of mouth/teeth, toileting, grooming, dressing, etc.)  - Assess/evaluate cause of self-care deficits   - Assess range of motion  - Assess patient's mobility; develop plan if impaired  - Assess patient's need for assistive devices and provide as appropriate  - Encourage maximum independence but intervene and supervise when necessary  - Involve family in performance of ADLs  - Assess for home care needs following discharge   - Consider OT consult to assist with ADL evaluation and planning for discharge  - Provide patient education as appropriate  Outcome: Adequate for Discharge  Goal: Maintains/Returns to pre admission functional level  Description: INTERVENTIONS:  - Perform AM-PAC 6 Click Basic Mobility/ Daily Activity assessment daily.  - Set and communicate daily mobility goal to care team and patient/family/caregiver.   - Collaborate with rehabilitation services on mobility goals if consulted  - Perform Range of Motion 3 times a day.  - Reposition patient every 2 hours.  - Dangle patient 3 times a day  - Stand patient 3 times a day  - Ambulate patient 3 times a day  - Out of bed to chair 3 times a day   - Out of bed for meals 3 times a day  - Out of bed for toileting  - Record patient progress and toleration of activity level   Outcome: Adequate for Discharge     Problem:  DISCHARGE PLANNING  Goal: Discharge to home or other facility with appropriate resources  Description: INTERVENTIONS:  - Identify barriers to discharge w/patient and caregiver  - Arrange for needed discharge resources and transportation as appropriate  - Identify discharge learning needs (meds, wound care, etc.)  - Arrange for interpretive services to assist at discharge as needed  - Refer to Case Management Department for coordinating discharge planning if the patient needs post-hospital services based on physician/advanced practitioner order or complex needs related to functional status, cognitive ability, or social support system  Outcome: Adequate for Discharge     Problem: Knowledge Deficit  Goal: Patient/family/caregiver demonstrates understanding of disease process, treatment plan, medications, and discharge instructions  Description: Complete learning assessment and assess knowledge base.  Interventions:  - Provide teaching at level of understanding  - Provide teaching via preferred learning methods  Outcome: Adequate for Discharge

## 2024-12-10 NOTE — PLAN OF CARE
Problem: PAIN - ADULT  Goal: Verbalizes/displays adequate comfort level or baseline comfort level  Description: Interventions:  - Encourage patient to monitor pain and request assistance  - Assess pain using appropriate pain scale  - Administer analgesics based on type and severity of pain and evaluate response  - Implement non-pharmacological measures as appropriate and evaluate response  - Consider cultural and social influences on pain and pain management  - Notify physician/advanced practitioner if interventions unsuccessful or patient reports new pain  Outcome: Progressing     Problem: INFECTION - ADULT  Goal: Absence or prevention of progression during hospitalization  Description: INTERVENTIONS:  - Assess and monitor for signs and symptoms of infection  - Monitor lab/diagnostic results  - Monitor all insertion sites, i.e. indwelling lines, tubes, and drains  - Monitor endotracheal if appropriate and nasal secretions for changes in amount and color  - [place] appropriate cooling/warming therapies per order  - Administer medications as ordered  - Instruct and encourage patient and family to use good hand hygiene technique  - Identify and instruct in appropriate isolation precautions for identified infection/condition  Outcome: Progressing  Goal: Absence of fever/infection during neutropenic period  Description: INTERVENTIONS:  - Monitor WBC    Outcome: Progressing     Problem: SAFETY ADULT  Goal: Patient will remain free of falls  Description: INTERVENTIONS:  - Educate patient/family on patient safety including physical limitations  - Instruct patient to call for assistance with activity   - Consult OT/PT to assist with strengthening/mobility   - Keep Call bell within reach  - Keep bed low and locked with side rails adjusted as appropriate  - Keep care items and personal belongings within reach  - Initiate and maintain comfort rounds  - Make Fall Risk Sign visible to staff  - Offer Toileting every  Hours,  in advance of need  - Initiate/Maintain alarm  - Obtain necessary fall risk management equipment: - Apply yellow socks and bracelet for high fall risk patients  - Consider moving patient to room near nurses station  Outcome: Progressing  Goal: Maintain or return to baseline ADL function  Description: INTERVENTIONS:  -  Assess patient's ability to carry out ADLs; assess patient's baseline for ADL function and identify physical deficits which impact ability to perform ADLs (bathing, care of mouth/teeth, toileting, grooming, dressing, etc.)  - Assess/evaluate cause of self-care deficits   - Assess range of motion  - Assess patient's mobility; develop plan if impaired  - Assess patient's need for assistive devices and provide as appropriate  - Encourage maximum independence but intervene and supervise when necessary  - Involve family in performance of ADLs  - Assess for home care needs following discharge   - Consider OT consult to assist with ADL evaluation and planning for discharge  - Provide patient education as appropriate  Outcome: Progressing  Goal: Maintains/Returns to pre admission functional level  Description: INTERVENTIONS:  - Perform AM-PAC 6 Click Basic Mobility/ Daily Activity assessment daily.  - Set and communicate daily mobility goal to care team and patient/family/caregiver.   - Collaborate with rehabilitation services on mobility goals if consulted  - Perform Range of Motion  times a day.  - Reposition patient every  hours.  - Dangle patient  times a day  - Stand patient  times a day  - Ambulate patient times a day  - Out of bed to chair  times a day   - Out of bed for meals  times a day  - Out of bed for toileting  - Record patient progress and toleration of activity level   Outcome: Progressing     Problem: DISCHARGE PLANNING  Goal: Discharge to home or other facility with appropriate resources  Description: INTERVENTIONS:  - Identify barriers to discharge w/patient and caregiver  - Arrange for  needed discharge resources and transportation as appropriate  - Identify discharge learning needs (meds, wound care, etc.)  - Arrange for interpretive services to assist at discharge as needed  - Refer to Case Management Department for coordinating discharge planning if the patient needs post-hospital services based on physician/advanced practitioner order or complex needs related to functional status, cognitive ability, or social support system  Outcome: Progressing     Problem: Knowledge Deficit  Goal: Patient/family/caregiver demonstrates understanding of disease process, treatment plan, medications, and discharge instructions  Description: Complete learning assessment and assess knowledge base.  Interventions:  - Provide teaching at level of understanding  - Provide teaching via preferred learning methods  Outcome: Progressing

## 2024-12-10 NOTE — ANESTHESIA POSTPROCEDURE EVALUATION
Post-Op Assessment Note    CV Status:  Stable    Pain management: adequate       Mental Status:  Alert and awake   Hydration Status:  Euvolemic   PONV Controlled:  Controlled   Airway Patency:  Patent     Post Op Vitals Reviewed: Yes    No anethesia notable event occurred.            Last Filed PACU Vitals:  Vitals Value Taken Time   Temp 99.4 °F (37.4 °C) 12/10/24 0237   Pulse 86 12/10/24 1036   /62 12/10/24 0243   Resp 16 12/09/24 1732   SpO2 96 % 12/10/24 1036   Vitals shown include unfiled device data.    Modified [place]:  Activity: 2 (12/9/2024  1:45 PM)  Respiration: 2 (12/9/2024  1:45 PM)  Circulation: 2 (12/9/2024  1:45 PM)  Consciousness: 2 (12/9/2024  1:45 PM)  Oxygen Saturation: 2 (12/9/2024  1:45 PM)  Modified Sherly Score: 10 (12/9/2024  1:45 PM)

## 2024-12-10 NOTE — PHYSICAL THERAPY NOTE
PT PROGRESS NOTE    Name: Rich Abel  AGE: 69 y.o.  MRN: [id number]  LENGTH OF STAY: 0     12/10/24 0934   PT Last Visit   PT Visit Date 12/10/24   Note Type   Note Type Treatment   Pain Assessment   Pain Assessment Tool 0-10   Pain Score 2   Pain Location/Orientation Orientation: Left;Location: Hip   Hospital Pain Intervention(s) Cold applied;Repositioned;Ambulation/increased activity;Elevated;Emotional support;Rest   Restrictions/Precautions   Weight Bearing Precautions Per Order Yes   LLE Weight Bearing Per Order WBAT   Braces or Orthoses   (hip abduction pillow)   Other Precautions Chair Alarm;THR;Fall Risk;Pain  (posterior hip precautions)   General   Chart Reviewed Yes   Response to Previous Treatment Patient with no complaints from previous session.   Family/Caregiver Present No   Cognition   Overall Cognitive Status WFL   Arousal/Participation Alert;Cooperative   Attention Within functional limits   Orientation Level Oriented X4   Following Commands Follows all commands and directions without difficulty   Subjective   Subjective I am doing better today.   Bed Mobility   Additional Comments Pt greeted OOB in chair.   Transfers   Sit to Stand 5  Supervision   Additional items Armrests;Increased time required;Verbal cues  (w/ RW)   Stand to Sit 5  Supervision   Additional items Armrests;Increased time required;Verbal cues  (w/ RW)   Additional Comments cues for hand placement   Ambulation/Elevation   Gait pattern Improper Weight shift;Antalgic;Decreased foot clearance;Decreased L stance;Step to;Step through pattern  (step to progressing to step through)   Gait Assistance 5  Supervision   Additional items Verbal cues   Assistive Device Rolling walker   Distance 120'x2   Stair Management Assistance 5  Supervision   Additional items Verbal cues   Stair Management Technique Two rails;Step to pattern;Foreward;Nonreciprocal   Number of Stairs 5  (x2 stair trainers)   Balance   Static Sitting Good   Dynamic  Sitting Fair +   Static Standing Fair  (w/ RW)   Dynamic Standing Fair -  (w/ RW)   Ambulatory Fair -  (w/ RW)   Activity Tolerance   Activity Tolerance Patient tolerated treatment well   Nurse Made Aware RN Coleen   Exercises   THR   (Reviewed HEP and provided handout. All questions and concerns addressed at this time.)   Assessment   Prognosis Good   Problem List Decreased strength;Decreased range of motion;Decreased endurance;Impaired balance;Decreased mobility;Orthopedic restrictions;Pain   Assessment Patient was seen today per POC. Overall, pt demonstrated improved mobility and inc tolerance to activity. Reviewed posterior hip precautions with good understanding. Pain 2/10 in L hip. Required S for sit<>stand, ambulation, and stair navigation. Pt able to ambulate 120'x2 with RW and S in unit. Antalgic step to progressing to step through gait with no gross LOB noted. Perform nonreciprocal stair navigation with bilateral hand rails to simulate home setup. Reviewed HEP and provided handout. All questions and concerns addressed at this time. No reports of dizziness t/o session. Will continue to see pt per POC as tolerated. From PT standpoint continued recommendation for level III, (minimum resource intensity) at D/C when medically cleared based current function. The patient's AM-PAC Basic Mobility Inpatient Short Form Raw Score is 23. A Raw score of greater than 16 suggests the patient may benefit from discharge to home. Please also refer to the recommendation of the Physical Therapist for safe discharge planning. Nsg staff to continue to mobilized pt (OOB in chair for all meals & ambulate in room/unit) as tolerated to prevent further decline in function. Nsg staff notified. From PT stand point, pt cleared functionally for D/C when medically appropriate.   Barriers to Discharge None   Barriers to Discharge Comments From PT stand point, pt cleared functionally for D/C when medically appropriate.   Goals   Patient  Goals to go home   STG Expiration Date 12/16/24   PT Treatment Day 1   Plan   Treatment/Interventions Functional transfer training;LE strengthening/ROM;Elevations;Therapeutic exercise;Endurance training;Patient/family training;Bed mobility;Gait training;Spoke to nursing;OT   Progress Progressing toward goals   PT Frequency 5-7x/wk   Discharge Recommendation   Rehab Resource Intensity Level, PT III (Minimum Resource Intensity)   AM-PAC Basic Mobility Inpatient   Turning in Flat Bed Without Bedrails 4   Lying on Back to Sitting on Edge of Flat Bed Without Bedrails 4   Moving Bed to Chair 4   Standing Up From Chair Using Arms 4   Walk in Room 4   Climb 3-5 Stairs With Railing 3   Basic Mobility Inpatient Raw Score 23   Basic Mobility Standardized Score 50.88   [facility] Highest Level Of Mobility   -HLM Goal 7: Walk 25 feet or more   -HLM Achieved 7: Walk 25 feet or more   Education   Education Provided Mobility training;Home exercise program;Precautions for total hip arthroplasty (JEMMA);Assistive device   Patient Demonstrates acceptance/verbal understanding   End of Consult   Patient Position at End of Consult Bedside chair;All needs within reach       Eugenie Chino, PT

## 2024-12-10 NOTE — ASSESSMENT & PLAN NOTE
POD 1 s/p left total hip arthroplasty   Patient was doing well but had acute increase in pain at 4am, no incident. Patient was sitting in chair at the time   Xray ordered this morning to evaluate hip - no dislocation or acute osseous abnormality    WBAT   Hgb at 11.4 from 13.3 pre-op, vitals stable   PT/OT   DVT ppx - aspirin   Pain control   Perioperative ancef

## 2024-12-10 NOTE — PLAN OF CARE
Problem: PHYSICAL THERAPY ADULT  Goal: Performs mobility at highest level of function for planned discharge setting.  See evaluation for individualized goals.  Description: Treatment/Interventions: Functional transfer training, LE strengthening/ROM, Elevations, Therapeutic exercise, Endurance training, Patient/family training, Bed mobility, Gait training, Spoke to nursing, OT  Equipment Recommended: Walker (pt owns)       See flowsheet documentation for full assessment, interventions and recommendations.  12/10/2024 1237 by Eugenie Chino PT  Outcome: Adequate for Discharge  Note: Prognosis: Good  Problem List: Decreased strength, Decreased range of motion, Decreased endurance, Impaired balance, Decreased mobility, Orthopedic restrictions, Pain  Assessment: Patient was seen today per POC. Overall, pt demonstrated improved mobility and inc tolerance to activity. Reviewed posterior hip precautions with good understanding. Pain 2/10 in L hip. Required S for sit<>stand, ambulation, and stair navigation. Pt able to ambulate 120'x2 with RW and S in unit. Antalgic step to progressing to step through gait with no gross LOB noted. Perform nonreciprocal stair navigation with bilateral hand rails to simulate home setup. Reviewed HEP and provided handout. All questions and concerns addressed at this time. No reports of dizziness t/o session. Will continue to see pt per POC as tolerated. From PT standpoint continued recommendation for level III, (minimum resource intensity) at D/C when medically cleared based current function. The patient's AM-PAC Basic Mobility Inpatient Short Form Raw Score is 23. A Raw score of greater than 16 suggests the patient may benefit from discharge to home. Please also refer to the recommendation of the Physical Therapist for safe discharge planning. Nsg staff to continue to mobilized pt (OOB in chair for all meals & ambulate in room/unit) as tolerated to prevent further decline in function. Nsg  staff notified. From PT stand point, pt cleared functionally for D/C when medically appropriate.  Barriers to Discharge: None  Barriers to Discharge Comments: From PT stand point, pt cleared functionally for D/C when medically appropriate.  Rehab Resource Intensity Level, PT: III (Minimum Resource Intensity)    See flowsheet documentation for full assessment.

## 2024-12-10 NOTE — PROGRESS NOTES
Internal Medicine Progress Note  Patient: Rich Abel    Patients overnight issues or events were reviewed with nursing or staff during rounds or morning huddle session. New onset left hip pain with any movement. He denies a specific incident or trauma.    Rich Abel  is seen and examined and management for following issues:    Status Post left Total HIP ARTHROPLASTY  Pain controlled over night yes, until ~ 4 am  Continue encourage incentive spirometry  DVT prophylaxis in place and reviewed  Hgb -   Lab Results   Component Value Date    HGB 11.4 (L) 12/10/2024           Other medically related issues include:    Assessment & Plan  Primary osteoarthritis of left hip  S/P left total hip arthroplasty  - Posterior hip precautions   - Continue post op pain control measures as prescribed  - Follow bowel regimen to help decrease narcotic induced constipation  - Encourage incentive spirometry  - DVT prophylaxis in place and reviewed  - Follow post operative CBC and BMP, treat accordingly      - stat XR ordered  - NPO       Hyperlipidemia  - resume medication   Type 2 diabetes mellitus 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, bilateral (HCC)  - follow fbs and resume medication when appropriate   - insulin sliding scale added  - D5 discontinued  - hypoglycemia protocol in place    Lab Results   Component Value Date    HGBA1C 5.9 (H) 11/15/2024     Primary hypertension  - will continue to follow trends       The above assessment and plan was reviewed and updated as determined by my evaluation of the patient on 12/10/2024.    Labs:   Recent Labs     12/10/24  0531   WBC 11.35*   HGB 11.4*      SODIUM 137   K 4.3      CO2 25   BUN 21   CREATININE 1.04   GLUC 279*   CALCIUM 8.7           Results from last 7 days   Lab Units 12/10/24  0535 12/09/24  2130 12/09/24  1605   POC GLUCOSE mg/dl 268* 393* 77       Review of Scheduled Meds:  Current Facility-Administered Medications    Medication Dose Route Frequency Provider Last Rate    acetaminophen  975 mg Oral Q6H PRN Simi Carleeno, PA-C      atorvastatin  10 mg Oral Daily Simi Carleeno, PA-C      calcium carbonate  1,000 mg Oral Daily PRN Simi Carleeno, PA-C      dextrose  25 mL Intravenous Q30 Min PRN Simi Ciano, PA-C      docusate sodium  100 mg Oral BID Simi Ciano, PA-C      enoxaparin  40 mg Subcutaneous Daily Simi Carleeno, PA-C      gabapentin  100 mg Oral Q8H Simi Carleeno, PA-C      HYDROmorphone  0.5 mg Intravenous Q2H PRN Simi Ciano, PA-C      insulin glargine  50 Units Subcutaneous QAM Simi Bejaranono, PA-C      insulin lispro  1-6 Units Subcutaneous Q6H [facility] FRANSISCO [place]-C      lactated ringers  1,000 mL Intravenous Once PRN Simi Ciano, PA-C      And    lactated ringers  1,000 mL Intravenous Once PRN Simi Ciano, PA-C      lactated ringers  125 mL/hr Intravenous Continuous Simi Carleeno, PA-C      lactated ringers  125 mL/hr Intravenous Continuous Jerzy Ruiz MD Stopped (12/10/24 0533)    lactated ringers  125 mL/hr Intravenous Continuous Abiel Green MD Stopped (12/09/24 1312)    lactated ringers  125 mL/hr Intravenous Continuous Jerzy Ruiz MD      metFORMIN  1,000 mg Oral BID Simi Bejaranono, PA-C      methocarbamol  500 mg Oral Q6H [facility] Simi Mccarthy PA-C      ondansetron  4 mg Intravenous Q6H PRN Simi Carleeno, PA-C      oxyCODONE  10 mg Oral Q4H PRN Simi Ciano, PA-C      oxyCODONE  5 mg Oral Q4H PRN Simi Ciano, PA-C      sodium chloride  1,000 mL Intravenous Once PRN Simi Ciano, PA-C      And    sodium chloride  1,000 mL Intravenous Once PRN Simi Ciano, PA-C         Physical Examination:    Vitals:    12/10/24 0243   BP: (!) 174/62   Pulse: (!) 112   Resp:    Temp:    SpO2: 97%        General Appearance: no distress, conversive  HEENT: PERRLA, conjuctiva normal; oropharynx clear; mucous membranes moist;   Lungs: CTA, normal respiratory effort, no retractions, expiratory effort normal  CV: regular rate and rhythm  , PMI normal   ABD: soft non tender, no masses , no hepatic or splenomegaly, BS x 4  EXT: DP pulses intact, no lymphadenopathy, no edema; left hip surgical dressing in place  Neuro: AAOx3      The above physical exam was reviewed and updated as determined by my evaluation of the patient on 12/10/2024.    VTE Pharmacologic Prophylaxis: Enoxaparin  Code Status: Level 1 - Full Code  Current Length of Stay: 0 day(s)      Total floor / unit time spent today 20 minutes  Coordination of patient's care was performed in conjunction with primary service. Time invested included review of patient's labs, vitals, and management of their comorbidities with continued monitoring, examination of patient as well as answering patient questions, documenting her findings and creating progress note in electronic medical record,  ordering appropriate diagnostic testing. Medical decision making for the day was made by supervising physician unless otherwise noted in their attestation statement.    ** Please Note:  voice to text software may have been used in the creation of this document. Although proof errors in transcription or interpretation are a potential of such software**

## 2024-12-10 NOTE — ASSESSMENT & PLAN NOTE
Lab Results   Component Value Date    HGBA1C 5.9 (H) 11/15/2024     Patient was hypoglycemic yesterday morning, given glucose. Overnight donell to 393, sliding scale insulin started by internal medicine, currently improving   Will continue to monitor

## 2024-12-10 NOTE — CONSULTS
Consultation - Internal Medicine   Name: Rich Abel 69 y.o. male I MRN: [id number]  Unit/Bed#: WE 2 N -01 I Date of Admission: 12/9/2024   Date of Service: 12/9/2024 I Hospital Day: 0   Consults  Physician Requesting Evaluation: Abiel Green MD   Reason for Evaluation / Principal Problem: Postop Medical Management    Assessment & Plan  Primary osteoarthritis of left hip  S/P left total hip arthroplasty  - Posterior hip precautions   - Continue post op pain control measures as prescribed  - Follow bowel regimen to help decrease narcotic induced constipation  - Encourage incentive spirometry  - DVT prophylaxis in place and reviewed  - Follow post operative CBC and BMP, treat accordingly        Hyperlipidemia  - resume medication   Type 2 diabetes mellitus 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, bilateral (HCC)  - follow fbs and resume medication when appropriate   - insulin sliding scale added  - D5 discontinued  - hypoglycemia protocol in place    Lab Results   Component Value Date    HGBA1C 5.9 (H) 11/15/2024     Primary hypertension  - continue to follow trends    Subjective/ HPI: Rich Abel was seen and examined. Hx of left HIP pain failed out patient conservative measures. Elected to undergo total HIP arthroplasty We are asked to see patient for post op management of underlying medical co-morbidities as outlined above. He reports pain is controlled. He denies dizziness, visual changes, shortness of breath, chest pain, or n/v.    Patient had taken 50u of long acting insulin prior to and became hypoglycemic in PACU. D5 IVF infusion initiated.         ROS:   A 10 point ROS was performed; negative except as noted above.     Past medical history    Past Medical History:   Diagnosis Date    Arthritis     Cancer (HCC)     Colon polyp     Diabetes mellitus (HCC)     Ear problems     Essential hypertension 02/05/2020    Glaucoma     HL (hearing loss)      Hyperlipidemia 2020    Hypertension     Kidney stone     Melanoma of ear, left (HCC)     Nasal polyp     Tinnitus        Social History:    Substance Use History:   Social History     Substance and Sexual Activity   Alcohol Use Yes    Alcohol/week: 2.0 standard drinks of alcohol    Types: 2 Standard drinks or equivalent per week     Social History     Tobacco Use   Smoking Status Former    Current packs/day: 0.00    Average packs/day: 1 pack/day for 33.0 years (33.0 ttl pk-yrs)    Types: Cigarettes    Start date: 1974    Quit date: 2007    Years since quittin.4    Passive exposure: Past   Smokeless Tobacco Current    Types: Chew   Tobacco Comments    quit 15 years ago     Social History     Substance and Sexual Activity   Drug Use No       Family History:    Family history non-contributory      Review of Scheduled Meds:  Current Facility-Administered Medications   Medication Dose Route Frequency Provider Last Rate    acetaminophen  975 mg Oral Q6H PRN Simi Mccarthy PA-C      [START ON 12/10/2024] atorvastatin  10 mg Oral Daily Simi Mccarthy PA-C      calcium carbonate  1,000 mg Oral Daily PRN Simi Mccarthy PA-C      cefazolin  1,000 mg Intravenous Q8H Simi Mccarthy PA-C 1,000 mg (24 1945)    dextrose 5 % and sodium chloride 0.9 %  100 mL/hr Intravenous Continuous Simi Mccarthy PA-C Stopped (24 1520)    dextrose  25 mL Intravenous Q30 Min PRN Simi Mccarthy PA-C      docusate sodium  100 mg Oral BID FRANSISCO Johnson-LIBIA      [START ON 12/10/2024] enoxaparin  40 mg Subcutaneous Daily Simi Mccarthy PA-C      gabapentin  100 mg Oral Q8H Simi Mccarthy PA-C      HYDROmorphone  0.5 mg Intravenous Q2H PRN FRANSISCO Johnson-LIBIA      [START ON 12/10/2024] insulin glargine  50 Units Subcutaneous QAM Simi Mccarthy PA-C      insulin lispro  1-6 Units Subcutaneous Q6H NATIVIDAD Ashley PA-C      lactated ringers  1,000 mL Intravenous Once PRN Simi Mccarthy PA-C      And    lactated ringers  1,000 mL Intravenous  "Once PRN Simi Ciano, PA-C      lactated ringers  125 mL/hr Intravenous Continuous Simi Ciano, PA-C      lactated ringers  125 mL/hr Intravenous Continuous Jerzy Ruiz  mL/hr (24 2259)    lactated ringers  125 mL/hr Intravenous Continuous Abiel Green MD Stopped (24 1312)    lactated ringers  125 mL/hr Intravenous Continuous Jerzy Riuz MD      [START ON 12/10/2024] metFORMIN  1,000 mg Oral BID Simi Ciano, PA-C      methocarbamol  500 mg Oral Q6H NATIVIDAD Simi Ciano, PA-C      ondansetron  4 mg Intravenous Q6H PRN Simi Ciano, PA-C      oxyCODONE  10 mg Oral Q4H PRN Simi Ciano, PA-C      oxyCODONE  5 mg Oral Q4H PRN Simi Ciano, PA-C      sodium chloride  1,000 mL Intravenous Once PRN Simi Ciano, PA-C      And    sodium chloride  1,000 mL Intravenous Once PRN Simi Ciano, PA-C         Labs:     PRE-OP HGB LEVEL:   Lab Results   Component Value Date    HGB 13.3 11/15/2024         Results from last 7 days   Lab Units 24  2130 24  1605 24  1336   POC GLUCOSE mg/dl 393* 77 123       No results found for: \"BLOODCX\", \"URINECX\", \"WOUNDCULT\", \"SPUTUMCULTUR\"    Input and Output Summary (last 24 hours):       Intake/Output Summary (Last 24 hours) at 2024 2333  Last data filed at 2024 2301  Gross per 24 hour   Intake 1807.14 ml   Output 1550 ml   Net 257.14 ml       Imaging:   No additional pertinent studies reviewed.    *Labs /Radiology studiesLabs reviewed  *Medications reviewed and reconciled as needed  *Please refer to order section for additional ordered labs studies  *Case discussed with primary attending during morning huddle case rounds    Vitals:   Temp (24hrs), Av.7 °F (36.5 °C), Min:97.4 °F (36.3 °C), Max:98.6 °F (37 °C)    Temp:  [97.4 °F (36.3 °C)-98.6 °F (37 °C)] 98.6 °F (37 °C)  HR:  [54-96] 95  Resp:  [10-18] 16  BP: (126-196)/(68-85) 174/80  SpO2:  [94 %-99 %] 97 %  Body mass index is 29.01 kg/m².     Physical Exam:   General Appearance: " no distress, conversive  HEENT: PERRLA, conjuctiva normal  Lungs: clear bilaterally, normal respiratory effort, no retractions, expiratory effort normal  CV: S1 S2, no murmurs rubs or gallops, rate is regular   ABD: soft non tender, +BS x4  EXT: DP pulses intact, no lymphadenopathy, no edema ;  left HIP dressing in place  Skin: normal turgor, normal texture, no rash  Psych: affect normal, mood normal  Neuro: AAOx3      Invasive Devices       Peripheral Intravenous Line  Duration             Peripheral IV 12/09/24 Dorsal (posterior);Right Hand <1 day                     VTE Pharmacologic Prophylaxis: VTE covered by:  [START ON 12/10/2024] enoxaparin, Subcutaneous     VTE Mechanical Prophylaxis: sequential compression device  Code Status: Level 1 - Full Code  Current Length of Stay: 0 day(s)    Total floor / unit time spent today 30 minutes  Coordination of patient's care was performed in conjunction with primary service. Time invested included review of patient's labs, vitals, and management of their comorbidities with continued monitoring, examination of patient as well as answering patient questions, documenting her findings and creating progress note in electronic medical record,  ordering appropriate diagnostic testing. Medical decision making for the day was made by supervising physician unless otherwise noted in their attestation statement.    ** Please Note: Fluency Direct voice to text software may have been used in the creation of this document. Audio transcription errors may occur**

## 2024-12-10 NOTE — ASSESSMENT & PLAN NOTE
S/P left total hip arthroplasty  - Posterior hip precautions   - Continue post op pain control measures as prescribed  - Follow bowel regimen to help decrease narcotic induced constipation  - Encourage incentive spirometry  - DVT prophylaxis in place and reviewed  - Follow post operative CBC and BMP, treat accordingly

## 2024-12-10 NOTE — ASSESSMENT & PLAN NOTE
- follow fbs and resume medication when appropriate   - insulin sliding scale added  - D5 discontinued  - hypoglycemia protocol in place    Lab Results   Component Value Date    HGBA1C 5.9 (H) 11/15/2024

## 2024-12-10 NOTE — ASSESSMENT & PLAN NOTE
S/P left total hip arthroplasty  - Posterior hip precautions   - Continue post op pain control measures as prescribed  - Follow bowel regimen to help decrease narcotic induced constipation  - Encourage incentive spirometry  - DVT prophylaxis in place and reviewed  - Follow post operative CBC and BMP, treat accordingly      - stat XR ordered  - NPO

## 2024-12-10 NOTE — PLAN OF CARE
Problem: OCCUPATIONAL THERAPY ADULT  Goal: Performs self-care activities at highest level of function for planned discharge setting.  See evaluation for individualized goals.  Description: Treatment Interventions: ADL retraining, Functional transfer training, Endurance training, Patient/family training, Equipment evaluation/education, Compensatory technique education, Continued evaluation, Energy conservation, Activityengagement          See flowsheet documentation for full assessment, interventions and recommendations.   Outcome: Adequate for Discharge  Note: Limitation: Decreased ADL status, Decreased endurance, Decreased self-care trans, Decreased high-level ADLs  Prognosis: Good  Assessment: Pt seen for OT treatment session focusing on ADLs/IADLs, functional mobility, functional standing tolerance, functional transfers, patient education, continued evaluation. Pt greeted OOB in recliner at start of session. Pt alert and cooperative throughout session. Pt with good sitting balance and fair - dynamic standing balance. Pt tolerated treatment well. Pt completed don of underwear and pants seated in chair with use of reacher to maintain posterior hip precautions. Pt then standing from chair with use of armrests to pull over waist and use of RW for stability. Pt completed don of shirt with supervision and setup in standing with use of RW for stability. Pt completed doff of socks with dressing stick seated. Pt then educated on use of sock aid and practiced seated in chair with supervision. Pt completed sit to stand with superviison and use of RW. Pt completed functional mobility from chair to bathroom with supervision and use of RW. Pt completed toilet transfer onto [facility] over standard toilet to simulate home environment with supervision and use of RW. Pt completed functional mobility back to chair with use of RW. Dispensed hip kit for independence at home with ADLs/IADLs. Pt educated on ADLs/IADLs, transfers and LE  management for independence at home. End of session pt setup with breakfast. Pt reports 3/10 pain and no dizziness. Pt's vitals include: stable.     Pt ended session seated OOB in recliner. Call bell and phone within reach. All needs met and pt reports no further questions at this time. Continue to recommend No post-acute rehabilitation needs when medically cleared. OT will continue to follow pt on caseload.     Rehab Resource Intensity Level, OT: No post-acute rehabilitation needs

## 2024-12-10 NOTE — PROGRESS NOTES
Progress Note - Orthopedics   Name: Rich Abel 69 y.o. male I MRN: [id number]  Unit/Bed#: WE 2 N -01 I Date of Admission: 12/9/2024   Date of Service: 12/10/2024 I Hospital Day: 0     Assessment & Plan  Primary osteoarthritis of left hip  POD 1 s/p left total hip arthroplasty   Patient was doing well but had acute increase in pain at 4am, no incident. Patient was sitting in chair at the time   Xray ordered this morning to evaluate hip - no dislocation or acute osseous abnormality    WBAT   Hgb at 11.4 from 13.3 pre-op, vitals stable   PT/OT   DVT ppx - aspirin   Pain control   Perioperative ancef   Hyperlipidemia  Continue current outpatient therapy per primary care    Type 2 diabetes mellitus 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, bilateral (HCC)    Lab Results   Component Value Date    HGBA1C 5.9 (H) 11/15/2024     Patient was hypoglycemic yesterday morning, given glucose. Overnight donell to 393, sliding scale insulin started by internal medicine, currently improving   Will continue to monitor       Orthopedics service will follow. DC planning per progress with therapy today     Subjective   69 y.o.male POD 1 s/p left total hip. Patient was doing well but had an acute increase of pain at about 4 am this morning. He denies any incident or abnormal movement, no popping sensation, no radicular symptoms. He has been up to use the bathroom with assistance. No fever or chills. Patient is diabetic and was hypoglycemic early yesterday, overnight he donell to over 390. Sliding scale insulin started     Objective :  Temp:  [97.4 °F (36.3 °C)-98.6 °F (37 °C)] 98.6 °F (37 °C)  HR:  [] 112  BP: (126-196)/(62-85) 174/62  Resp:  [10-18] 16  SpO2:  [94 %-99 %] 97 %  O2 Device: None (Room air)    Physical Exam  Musculoskeletal: leftlower  No significant ecchymosis or edema  Compartments soft   Leg lengths equal   Dressing C/D/I  TTP at the lateral and posterior hip, pain with movement  "and direct pressure   Motor intact to +FHL/EHL, +ankle dorsi/plantar flexion  Sensation intact to saphenous, sural, tibial, superficial peroneal nerve, and deep peroneal  2+ DP pulse  No calf swelling or tenderness to palpation      Lab Results: I have reviewed the following results:  Recent Labs     12/10/24  0531   WBC 11.35*   HGB 11.4*   HCT 34.0*      BUN 21   CREATININE 1.04     Blood Culture:  No results found for: \"BLOODCX\"  Wound Culture: No results found for: \"WOUNDCULT\"  "

## 2024-12-11 ENCOUNTER — TELEPHONE (OUTPATIENT)
Dept: OBGYN CLINIC | Facility: HOSPITAL | Age: 69
End: 2024-12-11

## 2024-12-11 NOTE — TELEPHONE ENCOUNTER
Patient contacted for a postoperative follow up assessment. Patient states current pain level of a 3/10 when sitting and 4/10 when walking with RW.  Patient reports increase in swelling and dressing is Dressing C/D/I Patient isicing the site regularly. NN educated patient on post-op bruising, swelling, and icing. PT 12/12 at 2:45PM     We reviewed patients AVS medication list. Patient is taking Tylenol 1000mg every 8 hours, Oxycodone 5mg every 6 hours, Methocarbamol 500mg TID, and Colace 100mg BID, Lovenox injections daily. Patient has not had a BM but is passing gas.       Patient denies nausea, vomiting, abdominal pain, chest pain, shortness of breath, fever, dizziness, and calf pain.Patient confirmed post-op appointment with surgeon on 12/20 at 9:15AM .Patient does not have any other questions or concerns at this time. Pt was encouraged to call with any questions, concerns or issues.  .

## 2024-12-12 ENCOUNTER — OFFICE VISIT (OUTPATIENT)
Dept: PHYSICAL THERAPY | Facility: CLINIC | Age: 69
End: 2024-12-12
Payer: MEDICARE

## 2024-12-12 DIAGNOSIS — M16.12 PRIMARY OSTEOARTHRITIS OF LEFT HIP: Primary | ICD-10-CM

## 2024-12-12 PROCEDURE — 97140 MANUAL THERAPY 1/> REGIONS: CPT

## 2024-12-12 PROCEDURE — 97110 THERAPEUTIC EXERCISES: CPT

## 2024-12-12 NOTE — PROGRESS NOTES
PT Evaluation     Today's date: 2024  Patient name: Rich Abel  : 1955  MRN: [id number]  Referring provider: Abiel Green,*  Dx:   Encounter Diagnosis     ICD-10-CM    1. Primary osteoarthritis of left hip  M16.12           Start Time: 1445  Stop Time: 1545  Total time in clinic (min): 60 minutes    Assessment  Impairments: abnormal coordination, abnormal gait, abnormal muscle firing, abnormal or restricted ROM, abnormal movement, activity intolerance, impaired balance, impaired physical strength, lacks appropriate home exercise program, pain with function, safety issue, weight-bearing intolerance, poor body mechanics, unable to perform ADL, activity limitations and endurance  Symptom irritability: moderate    Assessment details: Rich Abel is a pleasant 69 y.o. male who presents today for post-operative evaluation for their L JEMMA posterior approach on . The patient reports pain, decreased strength, decreased ROM, decreased joint mobility, lower extremity edema, ambulatory dysfunction, and balance dysfunction. Rich complains of aching and tight pain in the left hip ranging from 0/10 to 8/10. Pain is exacerbated by activity, standing, or transfers and made better by ice, medication, or rest.     The patient demonstrates moderately decreased strength during resisted muscle testing. Pt ROM is minimally decreased with soft end feel at surgical precaution limits.     The patient has difficulty with ambulation, transfers, leisure, athletics, and work. Patient will benefit from skilled physical therapy, including therapeutic exercise, stretching, balance training, manual therapy, and modalities prn to improve their level of function, to increase overall quality of life, and to address his impairments.    Dispensed home exercise program and educated patient on proper technique, frequency, and the possibility of DOMS for the next 24 to 48 hours. Patient demonstrated understanding and was  advised to call us if he has any further questions.        Goals  STG   Patient will increase their L hip strength to 4/5 MMT in 4 weeks   Patient will decrease worst pain to 5/10 in 4 weeks.   Patient will increase L hip ROM in all planes by 3-5 degrees in 4 weeks.   Patient will improve FOTO score by 50 % in 4 weeks.   Patient will be independent with their HEP.       LTG  Patient will be able to put his socks and shoes on pain free in 8 weeks.   Patient will meet =/> their FOTO goal score in 8 weeks.    Patient will decrease their worst pain to 2/10 in 8 weeks.  Patient will increase L hip ROM in all planes by 5-10 degrees in 8 weeks.   Patient will increase their L hip strength to +4/5 in 8 weeks.    Pt will improve five times sit to stand time from TBD to 12 seconds or less  Pt will improve TUG time from TBD to 13.5 seconds or less. (MDC 3 - 5 seconds)    Plan  Patient would benefit from: skilled physical therapy  Planned modality interventions: biofeedback, cryotherapy, ultrasound, traction, TENS and thermotherapy: hydrocollator packs    Planned therapy interventions: IASTM, joint mobilization, kinesiology taping, manual therapy, massage, neuromuscular re-education, patient/caregiver education, strengthening, stretching, therapeutic activities, therapeutic exercise, therapeutic training, home exercise program, functional ROM exercises, flexibility, compression, body mechanics training, balance/weight bearing training, balance, activity modification, abdominal trunk stabilization and ADL training    Frequency: 1x week  Duration in weeks: 1  Plan of Care beginning date: 12/12/2024  Plan of Care expiration date: 2/6/2025  Treatment plan discussed with: patient        Subjective Evaluation    History of Present Illness  Mechanism of injury: surgery  Mechanism of injury: Rich presents today following L posterior JEMMA on 12/9. The patient reports increased pain today and is having difficulty sleeping. He notes some  signifcant swelling in the LLE down ot his ankle. The patient is currently ambulating with RW. They report functional difficulty with stairs, walking, and transfers.    The patient will follow up with surgeon on .    The patient also reports diminished strength, decreased ROM, decreased endurance, disrupted sleep, and difficulty with function.    Rich  has a past medical history of Arthritis, Cancer (HCC), Colon polyp, Diabetes mellitus (HCC), Ear problems, Essential hypertension (2020), Glaucoma, HL (hearing loss), Hyperlipidemia (2020), Hypertension, Kidney stone, Melanoma of ear, left (HCC), Nasal polyp, and Tinnitus. The patient also  has a past surgical history that includes pr colonoscopy flx dx w/collj spec when pfrmd (N/A, 2017); ARTHROSCOPY KNEE (Bilateral); Shoulder arthroscopy; Cystoscopy; Nasal polyp excision; pr rpr tdn flxr foot 1/2 w/o free grafg each tendon (Left, 2017); [place] tooth extraction; Excision basal cell carcinoma (2020); Rotator cuff repair (Bilateral); Foot Tendon Surgery (Left, 2021); Fracture surgery (2023); Knee surgery; and pr arthrp acetblr/prox fem prostc agrft/algrft (Left, 2024).  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 No acute osseous abnormality)        Objective     Active Range of Motion     Right Hip   Flexion: 111 degrees   Abduction: 25 degrees   Adduction: 28 degrees  "    Passive Range of Motion   Left Hip   Flexion: 90 degrees   Abduction: 20 degrees   Adduction: 25 degrees     Right Hip   Flexion: 115 degrees   Abduction: 27 degrees   Adduction: 26 degrees     Additional Passive Range of Motion Details  No pain with PROM POD 3    Strength/Myotome Testing     Left Hip   Planes of Motion   Flexion: 4-  Abduction: 4  Adduction: 4    Right Hip   Normal muscle strength  Planes of Motion   Flexion: 4-  Abduction: 4  Adduction: 4    Left Knee   Flexion: 4  Extension: 4    Right Knee   Flexion: 4+  Extension: 4+    Left Ankle/Foot   Dorsiflexion: 4+  Plantar flexion: 4    Right Ankle/Foot   Dorsiflexion: 4+  Plantar flexion: 4+    Additional Strength Details  Strength not tested first post-op visit  Strength not tested 1st post op visit    Functional Assessment        Comments  Pre OP  12/6/24   5xSTS   TUG 8.82 NO AD           POC expires Unit limit Auth  expiration date PT/OT + Visit Limit?   2/6/24 N/a 12/31/24 BOMN                 Visit/Unit Tracking  AUTH Status:  Date 12/6 12/12             PENDING  Used 1 2              Remaining                   Precautions: HTN, T2DM, Skin Cancer       Manuals 12/6 12/12           L hip PROM                                       Re-evaluation  TS           Neuro Re-Ed             Weight Shifting                                                                                            Ther Ex             Pt. Education  HEP, Pathophysiology, Prognosis, precautions, preparation checklist TS - precautions, HEP            Nu-Step for cardiovascular endurance and LE strength   nv           SLR             Heel Slides              Hip 4 way             LAQ HEP HEP            Quad Set HEP 20x5\"            Glute Set HEP 20x5\"            Ankle Pumps HEP 45x           Ther Activity             STS/ squats              TG / Wall squats             LSU             Side step                                        5x STS, TUG TW nv           Gait Training  "                                      Modalities             CP/ MHP  10' post w elev.                          Access Code: [email or web address]  URL: [email or web address]  Date: 12/06/2024  Prepared by: Cam Walker     Exercises  - Seated Ankle Pumps  - 2 x daily - 7 x weekly - 3 sets - 10 reps  - Supine Gluteal Sets  - 2 x daily - 7 x weekly - 2 sets - 10 reps - 5 hold  - Supine Quadricep Sets  - 2 x daily - 7 x weekly - 2 sets - 10 reps - 5 hold  - Seated Long Arc Quad  - 2 x daily - 7 x weekly - 3 sets - 10 reps    Patient Education  - JEMMA Posterior Precautions  - Total Hip Replacement (Posterior Approach): Surgery Overview

## 2024-12-16 ENCOUNTER — TELEPHONE (OUTPATIENT)
Age: 69
End: 2024-12-16

## 2024-12-16 ENCOUNTER — OFFICE VISIT (OUTPATIENT)
Dept: PHYSICAL THERAPY | Facility: CLINIC | Age: 69
End: 2024-12-16
Payer: MEDICARE

## 2024-12-16 DIAGNOSIS — M16.12 PRIMARY OSTEOARTHRITIS OF LEFT HIP: Primary | ICD-10-CM

## 2024-12-16 PROCEDURE — 97110 THERAPEUTIC EXERCISES: CPT

## 2024-12-16 PROCEDURE — 97140 MANUAL THERAPY 1/> REGIONS: CPT

## 2024-12-16 NOTE — TELEPHONE ENCOUNTER
Caller: Beata[facility] [employer]    Doctor: Norma    Reason for call: Questioned sx date? Advised 12/9    Call back#: [phone]

## 2024-12-16 NOTE — PROGRESS NOTES
Daily Note     Today's date: 2024  Patient name: Rich Abel  : 1955  MRN: [id number]  Referring provider: Abiel Green,*  Dx:   Encounter Diagnosis     ICD-10-CM    1. Primary osteoarthritis of left hip  M16.12           Start Time: 1530  Stop Time: 1615  Total time in clinic (min): 45 minutes    Subjective: Presents to therapy noting he has stopped all of his medications, but has continued to keep up with icing and elevation. Reports he is being compliant with his hip precautions. States sleep is very inconsistent, as he is waking up every few hours.       Objective: See treatment diary below      Assessment: Patient demonstrated good effort throughout program, focusing on hip range of motion and strengthening within precautions. Verbal cueing and demonstrations provided with all exercises secondary to first visit post evaluation. Manual PROM effective in decreased left hip tightness post treatment. Trialed SPC during ambulation around clinic with decreased left quad stability present; SPC raised three notches to allow for proper elbow flexion. Educated patient on importance of continuing to ice and elevate for post treatment soreness; patient acknowledged understanding. Patient demonstrated fatigue post treatment, exhibited good technique with therapeutic exercises, and would benefit from continued PT      Plan: Continue per plan of care.        POC expires Unit limit Auth  expiration date PT/OT + Visit Limit?   24 N/a 24 BOMN                 Visit/Unit Tracking  AUTH Status:  Date             PENDING  Used 1 2 3             Remaining                   Precautions: HTN, T2DM, Skin Cancer       Manuals           L hip PROM   JS                                    Re-evaluation  TS           Neuro Re-Ed             Weight Shifting    A/P,  M/L  x10                                                                                        Ther Ex         "     Pt. Education  HEP, Pathophysiology, Prognosis, precautions, preparation checklist TS - precautions, HEP            Nu-Step for cardiovascular endurance and LE strength   nv L1  10'          SLR             Heel Slides              Hip 4 way   stand  X10 ea          LAQ HEP HEP            Quad Set HEP 20x5\"  20x5''          Glute Set HEP 20x5\"  20x5''          Ankle Pumps HEP 45x           Ther Activity             STS/ squats              TG / Wall squats             LSU             Side step                                        5x STS, TUG TW nv           Gait Training             SPC   1 lap around clinic                       Modalities             CP/ MHP  10' post w elev.                          Access Code: [email or web address]  URL: [email or web address]  Date: 12/06/2024  Prepared by: Cam Walker     Exercises  - Seated Ankle Pumps  - 2 x daily - 7 x weekly - 3 sets - 10 reps  - Supine Gluteal Sets  - 2 x daily - 7 x weekly - 2 sets - 10 reps - 5 hold  - Supine Quadricep Sets  - 2 x daily - 7 x weekly - 2 sets - 10 reps - 5 hold  - Seated Long Arc Quad  - 2 x daily - 7 x weekly - 3 sets - 10 reps    Patient Education  - JEMMA Posterior Precautions  - Total Hip Replacement (Posterior Approach): Surgery Overview       "

## 2024-12-19 ENCOUNTER — OFFICE VISIT (OUTPATIENT)
Dept: PHYSICAL THERAPY | Facility: CLINIC | Age: 69
End: 2024-12-19
Payer: MEDICARE

## 2024-12-19 DIAGNOSIS — M16.12 PRIMARY OSTEOARTHRITIS OF LEFT HIP: Primary | ICD-10-CM

## 2024-12-19 PROCEDURE — 97140 MANUAL THERAPY 1/> REGIONS: CPT

## 2024-12-19 PROCEDURE — 97110 THERAPEUTIC EXERCISES: CPT

## 2024-12-19 NOTE — PROGRESS NOTES
"Daily Note     Today's date: 2024  Patient name: Rich Abel  : 1955  MRN: [id number]  Referring provider: Abiel Green,*  Dx:   Encounter Diagnosis     ICD-10-CM    1. Primary osteoarthritis of left hip  M16.12                      Subjective: Patient reports that every day his hip gets a little bit better.       Objective: See treatment diary below      Assessment: Tolerated treatment well. Patient greatly challenged with forward step ups on 4\" step. Excellent hip flexion PROM achieved during heel slides. Educated on swelling management via muscle pumping, ice, and elevation. Patient demonstrated fatigue post treatment, exhibited good technique with therapeutic exercises, and would benefit from continued PT      Plan: Continue per plan of care.        POC expires Unit limit Auth  expiration date PT/OT + Visit Limit?   24 N/a 24 BOMN                 Visit/Unit Tracking  AUTH Status:  Date            BOMN Used 1 2 3 4            Remaining                   Precautions: L JEMMA posterior approach on 24, HTN, T2DM, Skin Cancer       Manuals          L hip PROM   JS BE                                   Re-evaluation  TS           Neuro Re-Ed             Weight Shifting    A/P,  M/L  x10                                                                                        Ther Ex             Pt. Education  HEP, Pathophysiology, Prognosis, precautions, preparation checklist TS - precautions, HEP            Nu-Step for cardiovascular endurance and LE strength   nv L1  10' L1 10'  With UE         SLR             Heel Slides     With strap 3\" x20         Hip 4 way   stand  X10 ea Standing 10x B/L ea dir          LAQ HEP HEP   30x         Quad Set HEP 20x5\"  20x5''          Glute Set HEP 20x5\"  20x5''          Ankle Pumps HEP 45x  30x                      Ther Activity             STS/ squats              TG / Wall squats             FSU    " "4\"x10         Side step                                        5x STS, TUG TW nv           Gait Training             SPC   1 lap around clinic                       Modalities             CP/ MHP  10' post w elev.                          Access Code: [email or web address]  URL: [email or web address]  Date: 12/06/2024  Prepared by: Cam Walker     Exercises  - Seated Ankle Pumps  - 2 x daily - 7 x weekly - 3 sets - 10 reps  - Supine Gluteal Sets  - 2 x daily - 7 x weekly - 2 sets - 10 reps - 5 hold  - Supine Quadricep Sets  - 2 x daily - 7 x weekly - 2 sets - 10 reps - 5 hold  - Seated Long Arc Quad  - 2 x daily - 7 x weekly - 3 sets - 10 reps    Patient Education  - JEMMA Posterior Precautions  - Total Hip Replacement (Posterior Approach): Surgery Overview         "

## 2024-12-20 ENCOUNTER — APPOINTMENT (OUTPATIENT)
Dept: RADIOLOGY | Facility: MEDICAL CENTER | Age: 69
End: 2024-12-20
Payer: MEDICARE

## 2024-12-20 ENCOUNTER — OFFICE VISIT (OUTPATIENT)
Dept: OBGYN CLINIC | Facility: MEDICAL CENTER | Age: 69
End: 2024-12-20

## 2024-12-20 VITALS
HEIGHT: 71 IN | HEART RATE: 101 BPM | DIASTOLIC BLOOD PRESSURE: 56 MMHG | BODY MASS INDEX: 29.12 KG/M2 | SYSTOLIC BLOOD PRESSURE: 139 MMHG | WEIGHT: 208 LBS

## 2024-12-20 DIAGNOSIS — Z47.1 AFTERCARE FOLLOWING LEFT HIP JOINT REPLACEMENT SURGERY: Primary | ICD-10-CM

## 2024-12-20 DIAGNOSIS — Z96.642 STATUS POST TOTAL REPLACEMENT OF LEFT HIP: ICD-10-CM

## 2024-12-20 DIAGNOSIS — Z96.642 AFTERCARE FOLLOWING LEFT HIP JOINT REPLACEMENT SURGERY: Primary | ICD-10-CM

## 2024-12-20 PROCEDURE — 99024 POSTOP FOLLOW-UP VISIT: CPT | Performed by: ORTHOPAEDIC SURGERY

## 2024-12-20 PROCEDURE — 73502 X-RAY EXAM HIP UNI 2-3 VIEWS: CPT

## 2024-12-20 NOTE — PROGRESS NOTES
Assessment:   Diagnosis ICD-10-CM Associated Orders   1. Aftercare following left hip joint replacement surgery  Z47.1     Z96.642       2. Status post total replacement of left hip  Z96.642 XR hip/pelv 2-3 vws left if performed          Plan:  69 y.o. male 11 days s/p left JEMMA  Continue physical therapy   Continue weight bearing activities as tolerated   Continue pain medications as needed   Able to shower, letting warm soapy water run over incision and only pat dry   Staples removed in office today, incision cleaned, steri-strips applied   Follow up in 4 weeks for 6 week post-op appointment      The above stated was discussed in layman's terms and the patient expressed understanding.  All questions were answered to the patient's satisfaction.     To do next visit:  Return in about 4 weeks (around 1/17/2025) for 6 week post-op appointment.      Subjective:   Rich Abel is a 69 y.o. male who presents 11 days s/p left total hip arthroplasty.  He is doing well.  He admits to moderate pain of the left hip as well as significant swelling of the left lower extremity.  He admits to taking OTC tylenol as needed for pain, feels that it helps.  He claims he had to stop the Oxycodone due to delirium and nausea.  He admits to working with physical therapy twice a week and is making good progress.  Patient ambulating well with walker today.  He continues to take lovenox daily.        Review of systems negative unless otherwise specified in HPI    Past Medical History:   Diagnosis Date   • Arthritis    • Cancer (HCC)    • Colon polyp    • Diabetes mellitus (HCC)    • Ear problems    • Essential hypertension 02/05/2020   • Glaucoma    • HL (hearing loss)    • Hyperlipidemia 02/05/2020   • Hypertension    • Kidney stone    • Melanoma of ear, left (HCC)    • Nasal polyp    • Tinnitus        Past Surgical History:   Procedure Laterality Date   • ARTHROSCOPY KNEE Bilateral    • BASAL CELL CARCINOMA EXCISION  03/2020    left arm    • CYSTOSCOPY      kidney stone removal   • FOOT TENDON SURGERY Left 2021    Procedure: ANTERIOR TALOFIBULAR LIGAMENT REPAIR;  Surgeon: Trent Obregon DPM;  Location: [facility];  Service: Podiatry   • FRACTURE SURGERY  2023   • KNEE SURGERY     • NASAL POLYP EXCISION     • MT ARTHRP ACETBLR/PROX FEM PROSTC AGRFT/ALGRFT Left 2024    Procedure: ARTHROPLASTY HIP TOTAL;  Surgeon: Abiel Green MD;  Location: [facility];  Service: Orthopedics   • MT COLONOSCOPY FLX DX W/COLLJ SPEC WHEN PFRMD N/A 2017    Procedure: COLONOSCOPY;  Surgeon: Jeff Rosen MD;  Location: [facility];  Service: Colorectal   • MT RPR TDN FLXR FOOT / W/O FREE GRAFG EACH TENDON Left 2017    Procedure: ANKLE PERONEUS LONGUS TENDON REPAIR with allograft;  RETINACULUM REPAIR SECONDARY  Application of Provena Vac;  Surgeon: Trent Obregon DPM;  Location: [facility];  Service: Podiatry   • ROTATOR CUFF REPAIR Bilateral    • SHOULDER ARTHROSCOPY     • WISDOM TOOTH EXTRACTION         Family History   Problem Relation Age of Onset   • Heart disease Mother         MOTHER   • Diabetes Mother    • Melanoma Mother    • Cancer Mother    • Aortic aneurysm Father         abdominal    • Prostate cancer Father    • Colon cancer Father        Social History     Occupational History   • Not on file   Tobacco Use   • Smoking status: Former     Current packs/day: 0.00     Average packs/day: 1 pack/day for 33.0 years (33.0 ttl pk-yrs)     Types: Cigarettes     Start date: 1974     Quit date: 2007     Years since quittin.5     Passive exposure: Past   • Smokeless tobacco: Current     Types: Chew   • Tobacco comments:     quit 15 years ago   Vaping Use   • Vaping status: Never Used   Substance and Sexual Activity   • Alcohol use: Yes     Alcohol/week: 2.0 standard drinks of alcohol     Types: 2 Standard drinks or equivalent per week   • Drug use: No   • Sexual activity: Yes     Partners: Female     Birth  control/protection: None         Current Outpatient Medications:   •  acetaminophen (TYLENOL) 500 mg tablet, Take 2 tablets (1,000 mg total) by mouth every 6 (six) hours as needed for mild pain, Disp: 90 tablet, Rfl: 0  •  aspirin (ECOTRIN LOW STRENGTH) 81 mg EC tablet, Take 81 mg by mouth daily, Disp: , Rfl:   •  atorvastatin (LIPITOR) 10 mg tablet, TAKE 1 TABLET BY MOUTH EVERY DAY, Disp: 90 tablet, Rfl: 1  •  Basaglar KwikPen 100 units/mL SOPN, INJECT 66 UNITS UNDER THE SKIN IN THE MORNING, Disp: 90 mL, Rfl: 1  •  BD Pen Needle Meghana 2nd Gen 32G X 4 MM MISC, DAILY USE AS DIRECTED, Disp: 100 each, Rfl: 1  •  fluticasone (FLONASE) 50 mcg/act nasal spray, 1 spray into each nostril daily, Disp: , Rfl:   •  gabapentin (NEURONTIN) 100 mg capsule, TAKE 1 CAPSULE BY MOUTH TWICE A DAY, Disp: 60 capsule, Rfl: 5  •  lisinopril (ZESTRIL) 10 mg tablet, TAKE 1 TABLET BY MOUTH EVERY DAY, Disp: 90 tablet, Rfl: 1  •  MAGNESIUM PO, Take by mouth, Disp: , Rfl:   •  metFORMIN (GLUCOPHAGE-XR) 500 mg 24 hr tablet, TAKE 2 TABLETS BY MOUTH TWICE A DAY, Disp: 360 tablet, Rfl: 1  •  methocarbamol (ROBAXIN) 500 mg tablet, Take 1 tablet (500 mg total) by mouth 3 (three) times a day as needed for muscle spasms, Disp: 60 tablet, Rfl: 0  •  Milk Thistle 150 MG CAPS, Take by mouth, Disp: , Rfl:   •  Multiple Vitamins-Minerals (MULTIVITAMIN WITH MINERALS) tablet, Take 1 tablet by mouth daily, Disp: , Rfl:   •  Simbrinza 1-0.2 % SUSP, instill 1 drop into both eyes twice a day, Disp: , Rfl:   •  sodium chloride (OCEAN) 0.65 % nasal spray, 1 spray into each nostril as needed for congestion, Disp: , Rfl:   •  TURMERIC PO, Take by mouth, Disp: , Rfl:   •  Vitamins-Lipotropics (B-100 PO), Take by mouth, Disp: , Rfl:   •  Cholecalciferol 125 MCG (5000 UT) capsule, Take 5,000 Units by mouth daily (Patient not taking: Reported on 12/20/2024), Disp: , Rfl:   •  Continuous Glucose  (FreeStyle Kalpana 3 [place]) CAMRON, Check  blood sugar  4 times  per day  "(Patient not taking: Reported on 8/2/2024), Disp: 1 each, Rfl: 0  •  Continuous Glucose Sensor (FreeStyle Kalpana 3 Sensor) MISC, Use 1 each every 14 (fourteen) days (Patient not taking: Reported on 8/2/2024), Disp: 6 each, Rfl: 3  •  enoxaparin (LOVENOX) 40 mg/0.4 mL, Inject 0.4 mL (40 mg total) under the skin daily for 28 days To start Post-Op (Patient not taking: Reported on 11/21/2024), Disp: 11.2 mL, Rfl: 0    No Known Allergies         Vitals:    12/20/24 0919   BP: 139/56   Pulse: 101       Objective:  Physical exam  General: Awake, Alert, Oriented  Eyes: Pupils equal, round and reactive to light  Heart: regular rate and rhythm  Lungs: No audible wheezing  Abdomen: soft                    Ortho Exam  Left hip:  Posterior incision clean dry and intact  Staples well approximated; removed in office today  Incision cleaned, steri-strips applied  mild ecchymosis present  No erythema present   Appropriate warmth and swelling  Good arc of motion with no pain  Patient sits comfortably in chair with hip flexed at 90 degrees  Patient stands from seated position without assistance  Calf compartments soft and supple  Sensation intact  Toes are warm sensate and mobile     Diagnostics, reviewed and taken today if performed as documented:    Left hip xray:   - Well aligned prosthesis without evidence of acute fractures, dislocations, acute changes, or hardware failure   - Prosthesis appears properly sized and properly bonded to surrounding bone          Procedures, if performed today:    None performed      Portions of the record may have been created with voice recognition software.  Occasional wrong word or \"sound a like\" substitutions may have occurred due to the inherent limitations of voice recognition software.  Read the chart carefully and recognize, using context, where substitutions have occurred.      "

## 2024-12-21 DIAGNOSIS — I10 ESSENTIAL HYPERTENSION: ICD-10-CM

## 2024-12-23 ENCOUNTER — OFFICE VISIT (OUTPATIENT)
Dept: PHYSICAL THERAPY | Facility: CLINIC | Age: 69
End: 2024-12-23
Payer: MEDICARE

## 2024-12-23 DIAGNOSIS — M16.12 PRIMARY OSTEOARTHRITIS OF LEFT HIP: Primary | ICD-10-CM

## 2024-12-23 PROCEDURE — 97110 THERAPEUTIC EXERCISES: CPT

## 2024-12-23 PROCEDURE — 97530 THERAPEUTIC ACTIVITIES: CPT

## 2024-12-23 RX ORDER — LISINOPRIL 10 MG/1
10 TABLET ORAL DAILY
Qty: 90 TABLET | Refills: 1 | Status: SHIPPED | OUTPATIENT
Start: 2024-12-23

## 2024-12-23 NOTE — PROGRESS NOTES
"Daily Note    Today's date: 24  Patient name: Rich Abel  : 1955  MRN: [id number]  Referring provider: Abiel Green,*  Dx:   Encounter Diagnosis     ICD-10-CM    1. Primary osteoarthritis of left hip  M16.12           Start Time: 1100  Stop Time: 1145  Total time in clinic (min): 45 minutes      Subjective: Rich reports no c/o today. He notes adherence to HEP     Objective: See treatment diary below.    Assessment: Rich tolerated treatment well with consistent cuing throughout. TE's were performed with increased reps and increased resistance. New TE's were demonstrated with proper technique, and tolerated well. Following treatment, the patient demonstrated fatigue and would benefit from continued physical therapy.    Plan: Continue per plan of care.  Progress treatment as tolerated.           POC expires Unit limit Auth  expiration date PT/OT + Visit Limit?   24 N/a 24 BOMN                 Visit/Unit Tracking  AUTH Status:  Date           BOMN Used 1 2 3 4 5           Remaining                   Precautions: L JEMMA posterior approach on 24, HTN, T2DM, Skin Cancer       Manuals         L hip PROM   JS BE                                   Re-evaluation  TS           Neuro Re-Ed             Weight Shifting - rockerboard    A/P,  M/L  x10  2'/2'                                                                                       Ther Ex             Pt. Education  HEP, Pathophysiology, Prognosis, precautions, preparation checklist TS - precautions, HEP            Nu-Step for cardiovascular endurance and LE strength   nv L1  10' L1 10'  With UE 10' L3 w/ UE        SLR             Heel Slides     With strap 3\" x20         Hip 4 way   stand  X10 ea Standing 10x B/L ea dir  Stand 3x10 3 way        LAQ HEP HEP   30x         Quad Set HEP 20x5\"  20x5''          Glute Set HEP 20x5\"  20x5''          Ankle Pumps HEP 45x  30x       " "               Ther Activity             STS/ squats              TG / Wall squats     L22 3x12         FSU    4\"x10 6\" 3x10         Side step up     6\" 3x10                                   5x STS, TUG TW nv           Gait Training             SPC   1 lap around clinic                       Modalities             CP/ MHP  10' post w elev.                          Access Code: [email or web address]  URL: [email or web address]  Date: 12/06/2024  Prepared by: Cam Walker     Exercises  - Seated Ankle Pumps  - 2 x daily - 7 x weekly - 3 sets - 10 reps  - Supine Gluteal Sets  - 2 x daily - 7 x weekly - 2 sets - 10 reps - 5 hold  - Supine Quadricep Sets  - 2 x daily - 7 x weekly - 2 sets - 10 reps - 5 hold  - Seated Long Arc Quad  - 2 x daily - 7 x weekly - 3 sets - 10 reps    Patient Education  - JEMMA Posterior Precautions  - Total Hip Replacement (Posterior Approach): Surgery Overview         Ede Gay, PT  12/23/2024,11:27 AM  "

## 2024-12-26 ENCOUNTER — OFFICE VISIT (OUTPATIENT)
Dept: PHYSICAL THERAPY | Facility: CLINIC | Age: 69
End: 2024-12-26
Payer: MEDICARE

## 2024-12-26 DIAGNOSIS — M16.12 PRIMARY OSTEOARTHRITIS OF LEFT HIP: Primary | ICD-10-CM

## 2024-12-26 PROCEDURE — 97110 THERAPEUTIC EXERCISES: CPT

## 2024-12-26 PROCEDURE — 97140 MANUAL THERAPY 1/> REGIONS: CPT

## 2024-12-26 PROCEDURE — 97530 THERAPEUTIC ACTIVITIES: CPT

## 2024-12-26 NOTE — PROGRESS NOTES
"Daily Note     Today's date: 2024  Patient name: Rich Abel  : 1955  MRN: [id number]  Referring provider: Abiel Green,*  Dx:   Encounter Diagnosis     ICD-10-CM    1. Primary osteoarthritis of left hip  M16.12                      Subjective: Patient reports that he is experiencing some increased soreness in his glutes and posterior thigh. He attributes this to a lot of walking and activity. He is ambulating today with his SPC.       Objective: See treatment diary below      Assessment: Tolerated treatment well. Patient with much improved weightbearing abilities and strength in his left hip as noted by less UE assistance needed to complete standing hip strengthening exercises. Patient demonstrated fatigue post treatment, exhibited good technique with therapeutic exercises, and would benefit from continued PT      Plan: Continue per plan of care.        POC expires Unit limit Auth  expiration date PT/OT + Visit Limit?   24 N/a 24 BOMN                 Visit/Unit Tracking  AUTH Status:  Date          BOMN Used 1 2 3 4 5 6          Remaining                   Precautions: L JEMMA posterior approach on 24, HTN, T2DM, Skin Cancer       Manuals        L hip PROM   JS BE  BE                                 Re-evaluation  TS           Neuro Re-Ed             Weight Shifting - rockerboard    A/P,  M/L  x10  2'/2'  2'/2'                                                                                     Ther Ex             Pt. Education  HEP, Pathophysiology, Prognosis, precautions, preparation checklist TS - precautions, HEP            Nu-Step for cardiovascular endurance and LE strength   nv L1  10' L1 10'  With UE 10' L3 w/ UE L3 10' with UE       SLR             Heel Slides     With strap 3\" x20         Hip 4 way   stand  X10 ea Standing 10x B/L ea dir  Stand 3x10 3 way Standing- 3 dir 3x10 ea dir B/L       LAQ HEP " "HEP   30x         Quad Set HEP 20x5\"  20x5''          Glute Set HEP 20x5\"  20x5''          Ankle Pumps HEP 45x  30x         Lateral walks at bar      YTB 3 laps        Ther Activity             STS/ squats              TG / Wall squats     L22 3x12  L22 3x12       FSU    4\"x10 6\" 3x10  6\" 3x10       Side step up     6\" 3x10  6\" 3X10                                 5x STS, TUG TW nv           Gait Training             SPC   1 lap around clinic                       Modalities             CP/ MHP  10' post w elev.                          Access Code: [email or web address]  URL: [email or web address]  Date: 12/06/2024  Prepared by: Cam Walker     Exercises  - Seated Ankle Pumps  - 2 x daily - 7 x weekly - 3 sets - 10 reps  - Supine Gluteal Sets  - 2 x daily - 7 x weekly - 2 sets - 10 reps - 5 hold  - Supine Quadricep Sets  - 2 x daily - 7 x weekly - 2 sets - 10 reps - 5 hold  - Seated Long Arc Quad  - 2 x daily - 7 x weekly - 3 sets - 10 reps    Patient Education  - JEMMA Posterior Precautions  - Total Hip Replacement (Posterior Approach): Surgery Overview           "

## 2024-12-30 ENCOUNTER — OFFICE VISIT (OUTPATIENT)
Dept: PHYSICAL THERAPY | Facility: CLINIC | Age: 69
End: 2024-12-30
Payer: MEDICARE

## 2024-12-30 DIAGNOSIS — M16.12 PRIMARY OSTEOARTHRITIS OF LEFT HIP: Primary | ICD-10-CM

## 2024-12-30 PROCEDURE — 97530 THERAPEUTIC ACTIVITIES: CPT

## 2024-12-30 PROCEDURE — 97110 THERAPEUTIC EXERCISES: CPT

## 2024-12-30 NOTE — PROGRESS NOTES
"Daily Note    Today's date: 24  Patient name: Rich Abel  : 1955  MRN: [id number]  Referring provider: Abiel Green,*  Dx:   Encounter Diagnosis     ICD-10-CM    1. Primary osteoarthritis of left hip  M16.12           Start Time: 1415  Stop Time: 1500  Total time in clinic (min): 45 minutes      Subjective: Rich reports no c/o today. He is using a SPC in the community at this time.    Objective: See treatment diary below.    Assessment: Rich tolerated treatment well with consistent cuing throughout. TE's were performed with increased reps. New TE's were demonstrated with proper technique, and tolerated well. Following treatment, the patient demonstrated fatigue and would benefit from continued physical therapy.    Plan: Continue per plan of care.  Progress treatment as tolerated.           POC expires Unit limit Auth  expiration date PT/OT + Visit Limit?   24 N/a 24 BOMN                 Visit/Unit Tracking  AUTH Status:  Date         BOMN Used 1 2 3 4 5 6 7         Remaining                   Precautions: L JEMMA posterior approach on 24, HTN, T2DM, Skin Cancer       Manuals       L hip PROM   JS BE  BE                                 Re-evaluation  TS           Neuro Re-Ed             Weight Shifting - rockerboard    A/P,  M/L  x10  2'/2'  2'/2' 2'/2'                                                                                     Ther Ex             Pt. Education  HEP, Pathophysiology, Prognosis, precautions, preparation checklist TS - precautions, HEP            Nu-Step for cardiovascular endurance and LE strength   nv L1  10' L1 10'  With UE 10' L3 w/ UE L3 10' with UE 10' L7 w/ UE      SLR             Heel Slides     With strap 3\" x20   3x20 w/ PB under foot                   Hip 4 way   stand  X10 ea Standing 10x B/L ea dir  Stand 3x10 3 way Standing- 3 dir 3x10 ea dir B/L       LAQ HEP " "HEP   30x         Matrix flex       3x10 65#      Matrix ext       3x10 40#      Quad Set HEP 20x5\"  20x5''          Glute Set > Bridge HEP 20x5\"  20x5''    3x10 5\"      Ankle Pumps HEP 45x  30x         Lateral walks at bar      YTB 3 laps        Ther Activity             STS/ squats              TG / Wall squats     L22 3x12  L22 3x12 L22 3x15      FSU    4\"x10 6\" 3x10  6\" 3x10 6\" 3x10       Side step up     6\" 3x10  6\" 3X10 6\" 3x10                                 5x STS, TUG TW nv           Gait Training             SPC   1 lap around clinic                       Modalities             CP/ MHP  10' post w elev.                          Access Code: [email or web address]  URL: [email or web address]  Date: 12/06/2024  Prepared by: Cam Walker     Exercises  - Seated Ankle Pumps  - 2 x daily - 7 x weekly - 3 sets - 10 reps  - Supine Gluteal Sets  - 2 x daily - 7 x weekly - 2 sets - 10 reps - 5 hold  - Supine Quadricep Sets  - 2 x daily - 7 x weekly - 2 sets - 10 reps - 5 hold  - Seated Long Arc Quad  - 2 x daily - 7 x weekly - 3 sets - 10 reps    Patient Education  - JEMMA Posterior Precautions  - Total Hip Replacement (Posterior Approach): Surgery Overview     Ede Gay, PT  12/30/2024,2:51 PM  "

## 2025-01-02 ENCOUNTER — OFFICE VISIT (OUTPATIENT)
Dept: PHYSICAL THERAPY | Facility: CLINIC | Age: 70
End: 2025-01-02
Payer: MEDICARE

## 2025-01-02 DIAGNOSIS — M16.12 PRIMARY OSTEOARTHRITIS OF LEFT HIP: Primary | ICD-10-CM

## 2025-01-02 PROCEDURE — 97110 THERAPEUTIC EXERCISES: CPT

## 2025-01-02 PROCEDURE — 97530 THERAPEUTIC ACTIVITIES: CPT

## 2025-01-02 NOTE — PROGRESS NOTES
"Daily Note    Today's date: 25  Patient name: Rich Abel  : 1955  MRN: [id number]  Referring provider: Abiel Green,*  Dx:   Encounter Diagnosis     ICD-10-CM    1. Primary osteoarthritis of left hip  M16.12           Start Time: 1445  Stop Time: 1530  Total time in clinic (min): 45 minutes      Subjective: Rich reports no c/o today. He notes that he has been able to walk without his SPC at home and his swelling is going down.    Objective: See treatment diary below.    Assessment: Rich tolerated treatment well with minimal cuing. TE's were performed with increased reps and increased resistance. New TE's were demonstrated with proper technique, and tolerated well. Following treatment, the patient demonstrated fatigue and would benefit from continued physical therapy.    Plan: Continue per plan of care.  Progress treatment as tolerated.           POC expires Unit limit Auth  expiration date PT/OT + Visit Limit?   24 N/a 24 BOMN                 Visit/Unit Tracking  AUTH Status:  Date  1/2       BOMN Used 1 2 3 4 5 6 7 8        Remaining                   Precautions: L JEMMA posterior approach on 24, HTN, T2DM, Skin Cancer       Manuals  1/2     L hip PROM   JS BE  BE                                 Re-evaluation  TS           Neuro Re-Ed             Weight Shifting - rockerboard    A/P,  M/L  x10  2'/2'  2'/2' 2'/2'                                                                                     Ther Ex             Pt. Education  HEP, Pathophysiology, Prognosis, precautions, preparation checklist TS - precautions, HEP            Nu-Step for cardiovascular endurance and LE strength   nv L1  10' L1 10'  With UE 10' L3 w/ UE L3 10' with UE 10' L7 w/ UE 10' L7      SLR        March supine 3x10      Heel Slides     With strap 3\" x20   3x20 w/ PB under foot      Clamshell        3x10 BTB 3\"     Hip " "4 way   stand  X10 ea Standing 10x B/L ea dir  Stand 3x10 3 way Standing- 3 dir 3x10 ea dir B/L       LAQ HEP HEP   30x         Matrix flex       3x10 65# 3x10 70#     Matrix ext       3x10 40# 3x10 45#     Quad Set HEP 20x5\"  20x5''          Glute Set > Bridge HEP 20x5\"  20x5''    3x10 5\" 3x10 5\" BTB     Ankle Pumps HEP 45x  30x         Lateral walks at bar      YTB 3 laps        Ther Activity             STS/ squats              TG / Wall squats     L22 3x12  L22 3x12 L22 3x15 L22 3x15     FSU    4\"x10 6\" 3x10  6\" 3x10 6\" 3x10       Side step up     6\" 3x10  6\" 3X10 6\" 3x10                                 5x STS, TUG TW nv           Gait Training             SPC   1 lap around clinic                       Modalities             CP/ MHP  10' post w elev.                          Access Code: [email or web address]  URL: [email or web address]  Date: 12/06/2024  Prepared by: Cam Walker     Exercises  - Seated Ankle Pumps  - 2 x daily - 7 x weekly - 3 sets - 10 reps  - Supine Gluteal Sets  - 2 x daily - 7 x weekly - 2 sets - 10 reps - 5 hold  - Supine Quadricep Sets  - 2 x daily - 7 x weekly - 2 sets - 10 reps - 5 hold  - Seated Long Arc Quad  - 2 x daily - 7 x weekly - 3 sets - 10 reps    Patient Education  - JEMMA Posterior Precautions  - Total Hip Replacement (Posterior Approach): Surgery Overview     Ede Gay, PT  1/2/2025,3:38 PM  "

## 2025-01-06 ENCOUNTER — OFFICE VISIT (OUTPATIENT)
Dept: PHYSICAL THERAPY | Facility: CLINIC | Age: 70
End: 2025-01-06
Payer: MEDICARE

## 2025-01-06 DIAGNOSIS — M16.12 PRIMARY OSTEOARTHRITIS OF LEFT HIP: Primary | ICD-10-CM

## 2025-01-06 PROCEDURE — 97530 THERAPEUTIC ACTIVITIES: CPT

## 2025-01-06 PROCEDURE — 97110 THERAPEUTIC EXERCISES: CPT

## 2025-01-06 NOTE — PROGRESS NOTES
"Daily Note    Today's date: 25  Patient name: Rich Abel  : 1955  MRN: [id number]  Referring provider: Abiel Green,*  Dx:   Encounter Diagnosis     ICD-10-CM    1. Primary osteoarthritis of left hip  M16.12           Start Time: 1330  Stop Time: 1415  Total time in clinic (min): 45 minutes      Subjective: Rich reports no c/o today. He notes that he is using his SPC less at home.    Objective: See treatment diary below.    Assessment: Rich tolerated treatment well with minimal cuing. TE's were performed with increased reps and increased resistance. New TE's were demonstrated with proper technique, and tolerated well. Following treatment, the patient demonstrated fatigue and would benefit from continued physical therapy.    Plan: Continue per plan of care.  Progress treatment as tolerated.           POC expires Unit limit Auth  expiration date PT/OT + Visit Limit?   24 N/a 24 BOMN                 Visit/Unit Tracking  AUTH Status:  Date  1/2       BOMN Used 1 2 3 4 5 6 7 8 9       Remaining                   Precautions: L JEMMA posterior approach on 24, HTN, T2DM, Skin Cancer       Manuals  1/2 6    L hip PROM   JS BE  BE                                 Re-evaluation  TS           Neuro Re-Ed             Weight Shifting - rockerboard    A/P,  M/L  x10  2'/2'  2'/2' 2'/2'   2'/2'     Tandem walk foam         5 laps     STS walk foam         5 laps                                                        Ther Ex             Pt. Education  HEP, Pathophysiology, Prognosis, precautions, preparation checklist TS - precautions, HEP            Nu-Step for cardiovascular endurance and LE strength   nv L1  10' L1 10'  With UE 10' L3 w/ UE L3 10' with UE 10' L7 w/ UE 10' L7  10' L7     SLR        March supine 3x10      Heel Slides     With strap 3\" x20   3x20 w/ PB under foot      Clamshell        3x10 " "BTB 3\"     Hip 4 way   stand  X10 ea Standing 10x B/L ea dir  Stand 3x10 3 way Standing- 3 dir 3x10 ea dir B/L   3x10     LAQ HEP HEP   30x         Matrix flex       3x10 65# 3x10 70#     Matrix ext       3x10 40# 3x10 45#     Quad Set HEP 20x5\"  20x5''          Glute Set > Bridge HEP 20x5\"  20x5''    3x10 5\" 3x10 5\" BTB     Ankle Pumps HEP 45x  30x         Lateral walks at bar      YTB 3 laps        Ther Activity             STS/ squats          3x10     TG / Wall squats     L22 3x12  L22 3x12 L22 3x15 L22 3x15     FSU    4\"x10 6\" 3x10  6\" 3x10 6\" 3x10   8\" 2x10     Side step up     6\" 3x10  6\" 3X10 6\" 3x10   8\" 2x10                               5x STS, TUG TW nv           Gait Training             SPC   1 lap around clinic                       Modalities             CP/ MHP  10' post w elev.                          Access Code: [email or web address]  URL: [email or web address]  Date: 12/06/2024  Prepared by: Cam Walker     Exercises  - Seated Ankle Pumps  - 2 x daily - 7 x weekly - 3 sets - 10 reps  - Supine Gluteal Sets  - 2 x daily - 7 x weekly - 2 sets - 10 reps - 5 hold  - Supine Quadricep Sets  - 2 x daily - 7 x weekly - 2 sets - 10 reps - 5 hold  - Seated Long Arc Quad  - 2 x daily - 7 x weekly - 3 sets - 10 reps    Patient Education  - JEMMA Posterior Precautions  - Total Hip Replacement (Posterior Approach): Surgery Overview     Ede Gay, PT  1/6/2025,1:46 PM  "

## 2025-01-09 ENCOUNTER — OFFICE VISIT (OUTPATIENT)
Dept: PHYSICAL THERAPY | Facility: CLINIC | Age: 70
End: 2025-01-09
Payer: MEDICARE

## 2025-01-09 DIAGNOSIS — M16.12 PRIMARY OSTEOARTHRITIS OF LEFT HIP: Primary | ICD-10-CM

## 2025-01-09 PROCEDURE — 97110 THERAPEUTIC EXERCISES: CPT

## 2025-01-09 PROCEDURE — 97530 THERAPEUTIC ACTIVITIES: CPT

## 2025-01-09 PROCEDURE — 97112 NEUROMUSCULAR REEDUCATION: CPT

## 2025-01-09 NOTE — PROGRESS NOTES
"Daily Note     Today's date: 2025  Patient name: Rich Abel  : 1955  MRN: [id number]  Referring provider: Abiel Green,*  Dx:   Encounter Diagnosis     ICD-10-CM    1. Primary osteoarthritis of left hip  M16.12                      Subjective: Pt noted no changes and no pain upon arrival for PT this visit.       Objective: See treatment diary below      Assessment: Tolerated treatment well. Patient exhibited good technique with therapeutic exercises and would benefit from continued PT      Plan: Continue per plan of care.        POC expires Unit limit Auth  expiration date PT/OT + Visit Limit?   25 N/a 25  BOMN                 Visit/Unit Tracking  AUTH Status:  Date       BOMN Used 1 2 3 4 5 6 7 8 9 10      Remaining                   Precautions: L JEMMA posterior approach on 24, HTN, T2DM, Skin Cancer       Manuals    L hip PROM   JS BE  BE                                 Re-evaluation  TS           Neuro Re-Ed             Weight Shifting - rockerboard    A/P,  M/L  x10  2'/2'  2'/2' 2'/2'   2'/2'  2 min    Tandem walk foam         5 laps  5 laps    STS walk foam         5 laps 5 laps                                                        Ther Ex             Pt. Education  HEP, Pathophysiology, Prognosis, precautions, preparation checklist TS - precautions, HEP            Nu-Step for cardiovascular endurance and LE strength   nv L1  10' L1 10'  With UE 10' L3 w/ UE L3 10' with UE 10' L7 w/ UE 10' L7  10' L7  10 min L7 with UE at 9    SLR        March supine 3x10      Heel Slides     With strap 3\" x20   3x20 w/ PB under foot      Clamshell        3x10 BTB 3\"     Hip 4 way   stand  X10 ea Standing 10x B/L ea dir  Stand 3x10 3 way Standing- 3 dir 3x10 ea dir B/L   3x10  GTB 2x 10 3 ways     LAQ HEP HEP   30x         Matrix flex       3x10 65# 3x10 70#  70# 3x 10    Matrix ext  " "     3x10 40# 3x10 45#  45# 3x 10    Quad Set HEP 20x5\"  20x5''          Glute Set > Bridge HEP 20x5\"  20x5''    3x10 5\" 3x10 5\" BTB     Ankle Pumps HEP 45x  30x         Lateral walks at bar      YTB 3 laps                                               Ther Activity             STS/ squats          3x10     TG / Wall squats     L22 3x12  L22 3x12 L22 3x15 L22 3x15     FSU    4\"x10 6\" 3x10  6\" 3x10 6\" 3x10   8\" 2x10  8\"  up and over  3x 10    Side step up     6\" 3x10  6\" 3X10 6\" 3x10   8\" 2x10  8\" 2 x 10                              5x STS, TUG TW nv           Gait Training             SPC   1 lap around clinic                       Modalities             CP/ MHP  10' post w elev.        Ice as needed at HEP  10 - 20 min on and 60 min off.                   Access Code: [email or web address]  URL: [email or web address]  Date: 12/06/2024  Prepared by: Cam Walker     Exercises  - Seated Ankle Pumps  - 2 x daily - 7 x weekly - 3 sets - 10 reps  - Supine Gluteal Sets  - 2 x daily - 7 x weekly - 2 sets - 10 reps - 5 hold  - Supine Quadricep Sets  - 2 x daily - 7 x weekly - 2 sets - 10 reps - 5 hold  - Seated Long Arc Quad  - 2 x daily - 7 x weekly - 3 sets - 10 reps    Patient Education  - JEMMA Posterior Precautions  - Total Hip Replacement (Posterior Approach): Surgery Overview       "

## 2025-01-11 DIAGNOSIS — E78.5 HYPERLIPIDEMIA, UNSPECIFIED HYPERLIPIDEMIA TYPE: ICD-10-CM

## 2025-01-13 ENCOUNTER — OFFICE VISIT (OUTPATIENT)
Dept: PHYSICAL THERAPY | Facility: CLINIC | Age: 70
End: 2025-01-13
Payer: MEDICARE

## 2025-01-13 DIAGNOSIS — M16.12 PRIMARY OSTEOARTHRITIS OF LEFT HIP: Primary | ICD-10-CM

## 2025-01-13 PROCEDURE — 97530 THERAPEUTIC ACTIVITIES: CPT

## 2025-01-13 PROCEDURE — 97110 THERAPEUTIC EXERCISES: CPT

## 2025-01-13 RX ORDER — ATORVASTATIN CALCIUM 10 MG/1
10 TABLET, FILM COATED ORAL DAILY
Qty: 90 TABLET | Refills: 1 | Status: SHIPPED | OUTPATIENT
Start: 2025-01-13

## 2025-01-13 NOTE — PROGRESS NOTES
"Daily Note    Today's date: 25  Patient name: Rich Abel  : 1955  MRN: [id number]  Referring provider: Abiel Green,*  Dx:   Encounter Diagnosis     ICD-10-CM    1. Primary osteoarthritis of left hip  M16.12           Start Time: 1400  Stop Time: 1445  Total time in clinic (min): 45 minutes      Subjective: Rich reports that some of the exercises with bands have been hurting his knee.    Objective: See treatment diary below.    Assessment: Rich tolerated treatment well with moderate cuing. TE's were performed with increased reps and increased resistance. New TE's were demonstrated with proper technique, and tolerated well. Following treatment, the patient demonstrated fatigue and would benefit from continued physical therapy.    Plan: Continue per plan of care.  Progress treatment as tolerated.           POC expires Unit limit Auth  expiration date PT/OT + Visit Limit?   25 N/a 25  BOMN                 Visit/Unit Tracking  AUTH Status:  Date  1    BOMN Used 1 2 3 4 5 6 7 8 9 10 11     Remaining                   Precautions: L JEMMA posterior approach on 24, HTN, T2DM, Skin Cancer     Manuals  1   L hip PROM   JS BE  BE                                 Re-evaluation             Neuro Re-Ed             Weight Shifting - rockerboard    A/P,  M/L  x10  2'/2'  2'/2' 2'/2'   2'/2'  2 min    Tandem walk foam         5 laps  5 laps    STS walk foam         5 laps 5 laps                                                        Ther Ex             Pt. Education              Nu-Step for cardiovascular endurance and LE strength  10' L3 upright  L1  10' L1 10'  With UE 10' L3 w/ UE L3 10' with UE 10' L7 w/ UE 10' L7  10' L7  10 min L7 with UE at 9    SLR        March supine 3x10      Heel Slides     With strap 3\" x20   3x20 w/ PB under foot      Clamshell        3x10 BTB 3\"     Hip 4 way   " "stand  X10 ea Standing 10x B/L ea dir  Stand 3x10 3 way Standing- 3 dir 3x10 ea dir B/L   3x10  GTB 2x 10 3 ways     LAQ    30x         Matrix flex 70# 3x12      3x10 65# 3x10 70#  70# 3x 10    Matrix ext 45# 3x12      3x10 40# 3x10 45#  45# 3x 10    Quad Set   20x5''          Glute Set > Bridge   20x5''    3x10 5\" 3x10 5\" BTB     Ankle Pumps    30x         Lateral walks at bar      YTB 3 laps                                               Ther Activity             STS/ squats          3x10     TG / Wall squats L22 3x12    L22 3x12  L22 3x12 L22 3x15 L22 3x15     FSU    4\"x10 6\" 3x10  6\" 3x10 6\" 3x10   8\" 2x10  8\"  up and over  3x 10    Side step up     6\" 3x10  6\" 3X10 6\" 3x10   8\" 2x10  8\" 2 x 10    Resisted walking 10x 18# fw bw                         5x STS, TUG             Gait Training             SPC   1 lap around clinic                       Modalities             CP/ MHP          Ice as needed at HEP  10 - 20 min on and 60 min off.                   Access Code: [email or web address]  URL: [email or web address]  Date: 12/06/2024  Prepared by: Cam Walker     Exercises  - Seated Ankle Pumps  - 2 x daily - 7 x weekly - 3 sets - 10 reps  - Supine Gluteal Sets  - 2 x daily - 7 x weekly - 2 sets - 10 reps - 5 hold  - Supine Quadricep Sets  - 2 x daily - 7 x weekly - 2 sets - 10 reps - 5 hold  - Seated Long Arc Quad  - 2 x daily - 7 x weekly - 3 sets - 10 reps    Patient Education  - JEMMA Posterior Precautions  - Total Hip Replacement (Posterior Approach): Surgery Overview       Ede Gay, PT  1/13/2025,2:15 PM  "

## 2025-01-16 ENCOUNTER — EVALUATION (OUTPATIENT)
Dept: PHYSICAL THERAPY | Facility: CLINIC | Age: 70
End: 2025-01-16
Payer: MEDICARE

## 2025-01-16 DIAGNOSIS — M16.12 PRIMARY OSTEOARTHRITIS OF LEFT HIP: Primary | ICD-10-CM

## 2025-01-16 PROCEDURE — 97110 THERAPEUTIC EXERCISES: CPT

## 2025-01-16 PROCEDURE — 97140 MANUAL THERAPY 1/> REGIONS: CPT

## 2025-01-16 PROCEDURE — 97530 THERAPEUTIC ACTIVITIES: CPT

## 2025-01-16 NOTE — PROGRESS NOTES
PT Evaluation     Today's date: 2025  Patient name: Rich Abel  : 1955  MRN: [id number]  Referring provider: Abiel Green,*  Dx:   Encounter Diagnosis     ICD-10-CM    1. Primary osteoarthritis of left hip  M16.12           Start Time: 1445  Stop Time: 1530  Total time in clinic (min): 45 minutes    Assessment  Impairments: abnormal or restricted ROM, activity intolerance, impaired physical strength, lacks appropriate home exercise program, pain with function, poor body mechanics, unable to perform ADL, activity limitations and endurance  Symptom irritability: moderate    Assessment details: Rich Abel is a pleasant 69 y.o. male who presents today for a re-evaluation of his L JEMMA. Rich complains of no pain at this time.    The patient demonstrates minimally decreased strength during resisted muscle testing, which has improved from initial evaluation. Pt ROM is minimally decreased with no pain.    The patient has difficulty with ambulation, transfers, athletics, and work. Patient will benefit from continued skilled physical therapy, including therapeutic exercise, stretching, balance training, manual therapy, and modalities prn to improve their level of function, to increase overall quality of life, and to address his impairments.    Rich has met most of his goals at this time. Pt was instructed on his updated plan of care and wishes to continue therapy.    Goals  STG   Patient will increase their L hip strength to 4/5 MMT in 4 weeks - GOAL MET   Patient will decrease worst pain to 5/10 in 4 weeks.  - GOAL MET   Patient will increase L hip ROM in all planes by 3-5 degrees in 4 weeks. - GOAL MET   Patient will improve FOTO score by 50 % in 4 weeks. - GOAL MET   Patient will be independent with their HEP. - GOAL MET       LTG  Patient will be able to put his socks and shoes on pain free in 8 weeks. - GOAL MET   Patient will meet =/> their FOTO goal score in 8 weeks.  - GOAL MET    Patient will decrease their worst pain to 2/10 in 8 weeks.- GOAL MET   Patient will increase L hip ROM in all planes by 5-10 degrees in 8 weeks. - GOAL MET   Patient will increase their L hip strength to +4/5 in 8 weeks.  - GOAL PROGRESSING     Plan  Patient would benefit from: skilled physical therapy  Planned modality interventions: biofeedback, cryotherapy, ultrasound, traction, TENS and thermotherapy: hydrocollator packs    Planned therapy interventions: IASTM, joint mobilization, kinesiology taping, manual therapy, massage, neuromuscular re-education, patient/caregiver education, strengthening, stretching, therapeutic activities, therapeutic exercise, therapeutic training, home exercise program, functional ROM exercises, flexibility, compression, body mechanics training, balance/weight bearing training, balance, activity modification, abdominal trunk stabilization and ADL training    Frequency: 1x week  Duration in weeks: 1  Plan of Care beginning date: 1/16/2025  Plan of Care expiration date: 2/13/2025  Treatment plan discussed with: patient        Subjective Evaluation    History of Present Illness  Mechanism of injury: Rich reports that he is feeling much better since starting therapy a month ago ago. he notes little difficulty with his usual tasks. he notes no hip pain at this time. he notes adherence to HEP and would like to continue with therapy.    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 No acute osseous abnormality)        Objective     Active Range of Motion   Left Hip   Flexion: 120 degrees WFL  Abduction: 30 degrees  "    Right Hip   Normal active range of motion    Strength/Myotome Testing     Left Hip   Planes of Motion   Flexion: 4+  Extension: 4  Abduction: 4+  Adduction: 4+  External rotation: 4-  Internal rotation: 4-    Right Hip   Planes of Motion   Flexion: 4+  Extension: 4+  Abduction: 4+  Adduction: 4+  External rotation: 4+  Internal rotation: 4+    Left Knee   Flexion: 4  Extension: 4    Right Knee   Flexion: 4+  Extension: 4+    Left Ankle/Foot   Dorsiflexion: 4+  Plantar flexion: 4    Right Ankle/Foot   Dorsiflexion: 4+  Plantar flexion: 4+    Functional Assessment        Forward Step Down 6\"   Left Leg  Positive Trendelenburg, valgus and contralateral toe touch first.     Right Leg  Positive Trendelenburg and contralateral toe touch first.     Comments  Pre OP  12/6/24   5xSTS   TUG 8.82 NO AD         POC expires Unit limit Auth  expiration date PT/OT + Visit Limit?   2/6/25 N/a 12/31/25  BOMN                 Visit/Unit Tracking  AUTH Status:  Date 12/6 12/12 12/16 12/19 12/23 12/26 12/30 1/2 1/6 1/9 1/13 1/16   BOMN Used 1 2 3 4 5 6 7 8 9 10 11 12    Remaining                   Precautions: L JEMMA posterior approach on 12/9/24, HTN, T2DM, Skin Cancer         Manuals 1/13 1/16 12/19 12/23 12/26 12/30 1/2 1/6 1/9   L hip PROM    BE  BE                                 Re-evaluation  TS           Neuro Re-Ed             Weight Shifting - rockerboard      2'/2'  2'/2' 2'/2'   2'/2'  2 min    Tandem walk foam         5 laps  5 laps    STS walk foam         5 laps 5 laps                                                        Ther Ex             Pt. Education              Nu-Step for cardiovascular endurance and LE strength  10' L3 upright 10' L4   L1 10'  With UE 10' L3 w/ UE L3 10' with UE 10' L7 w/ UE 10' L7  10' L7  10 min L7 with UE at 9    SLR        March supine 3x10      Heel Slides     With strap 3\" x20   3x20 w/ PB under foot      Clamshell        3x10 BTB 3\"     Hip 4 way    Standing 10x B/L ea dir  Stand " "3x10 3 way Standing- 3 dir 3x10 ea dir B/L   3x10  GTB 2x 10 3 ways     LAQ    30x         Matrix flex 70# 3x12 70# 3x12     3x10 65# 3x10 70#  70# 3x 10    Matrix ext 45# 3x12 45# 3x12     3x10 40# 3x10 45#  45# 3x 10    Quad Set             Glute Set > Bridge       3x10 5\" 3x10 5\" BTB     Ankle Pumps    30x         Lateral walks at bar      YTB 3 laps                                               Ther Activity             STS/ squats   3x10        3x10     TG / Wall squats L22 3x12    L22 3x12  L22 3x12 L22 3x15 L22 3x15     FSU    4\"x10 6\" 3x10  6\" 3x10 6\" 3x10   8\" 2x10  8\"  up and over  3x 10    Side step up     6\" 3x10  6\" 3X10 6\" 3x10   8\" 2x10  8\" 2 x 10    Resisted walking 10x 18# fw bw                         5x STS, TUG             Gait Training             SPC                          Modalities             CP/ MHP          Ice as needed at HEP  10 - 20 min on and 60 min off.                   Access Code: [email or web address]  URL: [email or web address]  Date: 12/06/2024  Prepared by: Cam Walker     Exercises  - Seated Ankle Pumps  - 2 x daily - 7 x weekly - 3 sets - 10 reps  - Supine Gluteal Sets  - 2 x daily - 7 x weekly - 2 sets - 10 reps - 5 hold  - Supine Quadricep Sets  - 2 x daily - 7 x weekly - 2 sets - 10 reps - 5 hold  - Seated Long Arc Quad  - 2 x daily - 7 x weekly - 3 sets - 10 reps    Patient Education  - JEMMA Posterior Precautions  - Total Hip Replacement (Posterior Approach): Surgery Overview       "

## 2025-01-17 ENCOUNTER — OFFICE VISIT (OUTPATIENT)
Dept: OBGYN CLINIC | Facility: MEDICAL CENTER | Age: 70
End: 2025-01-17

## 2025-01-17 VITALS — BODY MASS INDEX: 29.12 KG/M2 | WEIGHT: 208 LBS | HEIGHT: 71 IN

## 2025-01-17 DIAGNOSIS — R29.898 WEAKNESS OF LEFT HIP: ICD-10-CM

## 2025-01-17 DIAGNOSIS — Z96.642 AFTERCARE FOLLOWING LEFT HIP JOINT REPLACEMENT SURGERY: Primary | ICD-10-CM

## 2025-01-17 DIAGNOSIS — Z47.1 AFTERCARE FOLLOWING LEFT HIP JOINT REPLACEMENT SURGERY: Primary | ICD-10-CM

## 2025-01-17 PROCEDURE — 99024 POSTOP FOLLOW-UP VISIT: CPT | Performed by: ORTHOPAEDIC SURGERY

## 2025-01-17 NOTE — PROGRESS NOTES
Assessment:   Diagnosis ICD-10-CM Associated Orders   1. Aftercare following left hip joint replacement surgery  Z47.1 Ambulatory Referral to Physical Therapy    Z96.642       2. Weakness of left hip  R29.898 Ambulatory Referral to Physical Therapy          Plan:  6 weeks s/p left JEMMA, overall doing well  Total hip precautions as discussed. ABX until the 2 yr post-op hannah. Any elective dental appointments shoulder be no sooner than 3 months post-op left JEMMA  Weight bearing and activities as tolerated  Continue PT for ongoing strengthening of his hip abductors  Note provided to excuse from work until next visit.  Follow-up in 4 weeks, with x-rays, intent to return to work. Will treat that visit as his 3-month visit and will obtain XRs upon arrival.     To do next visit:  Return in about 4 weeks (around 2/14/2025) for re-check with x-rays upon arrival left hip and pelvis.    The above stated was discussed in layman's terms and the patient expressed understanding.  All questions were answered to the patient's satisfaction.       Scribe Attestation      I,:  Raghavendra Nayak am acting as a scribe while in the presence of the attending physician.:       I,:  Abiel Green MD personally performed the services described in this documentation    as scribed in my presence.:               Subjective:   Rich Abel is a 69 y.o. male who presents today 6 weeks s/p left JEMMA, 12/9/24  Overall he is doing well  He has been attending formal PT and progressing.   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  He denies any groin or thigh pain.  He presents today without use of ambulatory assistive device.  He does not require any oral medications for his left hip      Review of systems negative unless otherwise specified in HPI  Review of Systems    Past Medical History:   Diagnosis Date    Arthritis     Cancer (HCC)     Colon polyp     Diabetes mellitus (HCC)     Ear problems      Essential hypertension 2020    Glaucoma     HL (hearing loss)     Hyperlipidemia 2020    Hypertension     Kidney stone     Melanoma of ear, left (HCC)     Nasal polyp     Tinnitus        Past Surgical History:   Procedure Laterality Date    ARTHROSCOPY KNEE Bilateral     BASAL CELL CARCINOMA EXCISION  2020    left arm    CYSTOSCOPY      kidney stone removal    FOOT TENDON SURGERY Left 2021    Procedure: ANTERIOR TALOFIBULAR LIGAMENT REPAIR;  Surgeon: Trent Obregon DPM;  Location: [facility];  Service: Podiatry    FRACTURE SURGERY  2023    KNEE SURGERY      NASAL POLYP EXCISION      WV ARTHRP ACETBLR/PROX FEM PROSTC AGRFT/ALGRFT Left 2024    Procedure: ARTHROPLASTY HIP TOTAL;  Surgeon: Abiel Green MD;  Location: [facility];  Service: Orthopedics    WV COLONOSCOPY FLX DX W/COLLJ SPEC WHEN PFRMD N/A 2017    Procedure: COLONOSCOPY;  Surgeon: Jeff Rosen MD;  Location: [facility];  Service: Colorectal    WV RPR TDN FLXR FOOT 1/2 W/O FREE GRAFG EACH TENDON Left 2017    Procedure: ANKLE PERONEUS LONGUS TENDON REPAIR with allograft;  RETINACULUM REPAIR SECONDARY  Application of Provena Vac;  Surgeon: Trent Obregon DPM;  Location: [facility];  Service: Podiatry    ROTATOR CUFF REPAIR Bilateral     SHOULDER ARTHROSCOPY      WISDOM TOOTH EXTRACTION         Family History   Problem Relation Age of Onset    Heart disease Mother         MOTHER    Diabetes Mother     Melanoma Mother     Cancer Mother     Aortic aneurysm Father         abdominal     Prostate cancer Father     Colon cancer Father        Social History     Occupational History    Not on file   Tobacco Use    Smoking status: Former     Current packs/day: 0.00     Average packs/day: 1 pack/day for 33.0 years (33.0 ttl pk-yrs)     Types: Cigarettes     Start date: 1974     Quit date: 2007     Years since quittin.5     Passive exposure: Past    Smokeless tobacco: Current     Types: Chew     Tobacco comments:     quit 15 years ago   Vaping Use    Vaping status: Never Used   Substance and Sexual Activity    Alcohol use: Yes     Alcohol/week: 2.0 standard drinks of alcohol     Types: 2 Standard drinks or equivalent per week    Drug use: No    Sexual activity: Yes     Partners: Female     Birth control/protection: None         Current Outpatient Medications:     acetaminophen (TYLENOL) 500 mg tablet, Take 2 tablets (1,000 mg total) by mouth every 6 (six) hours as needed for mild pain, Disp: 90 tablet, Rfl: 0    aspirin (ECOTRIN LOW STRENGTH) 81 mg EC tablet, Take 81 mg by mouth daily, Disp: , Rfl:     atorvastatin (LIPITOR) 10 mg tablet, TAKE 1 TABLET BY MOUTH EVERY DAY, Disp: 90 tablet, Rfl: 1    Basaglar KwikPen 100 units/mL SOPN, INJECT 66 UNITS UNDER THE SKIN IN THE MORNING, Disp: 90 mL, Rfl: 1    BD Pen Needle Meghana 2nd Gen 32G X 4 MM MISC, DAILY USE AS DIRECTED, Disp: 100 each, Rfl: 1    fluticasone (FLONASE) 50 mcg/act nasal spray, 1 spray into each nostril daily, Disp: , Rfl:     gabapentin (NEURONTIN) 100 mg capsule, TAKE 1 CAPSULE BY MOUTH TWICE A DAY, Disp: 60 capsule, Rfl: 5    lisinopril (ZESTRIL) 10 mg tablet, TAKE 1 TABLET BY MOUTH EVERY DAY, Disp: 90 tablet, Rfl: 1    MAGNESIUM PO, Take by mouth, Disp: , Rfl:     metFORMIN (GLUCOPHAGE-XR) 500 mg 24 hr tablet, TAKE 2 TABLETS BY MOUTH TWICE A DAY, Disp: 360 tablet, Rfl: 1    methocarbamol (ROBAXIN) 500 mg tablet, Take 1 tablet (500 mg total) by mouth 3 (three) times a day as needed for muscle spasms, Disp: 60 tablet, Rfl: 0    Milk Thistle 150 MG CAPS, Take by mouth, Disp: , Rfl:     Multiple Vitamins-Minerals (MULTIVITAMIN WITH MINERALS) tablet, Take 1 tablet by mouth daily, Disp: , Rfl:     Simbrinza 1-0.2 % SUSP, instill 1 drop into both eyes twice a day, Disp: , Rfl:     sodium chloride (OCEAN) 0.65 % nasal spray, 1 spray into each nostril as needed for congestion, Disp: , Rfl:     TURMERIC PO, Take by mouth, Disp: , Rfl:      "Vitamins-Lipotropics (B-100 PO), Take by mouth, Disp: , Rfl:     Cholecalciferol 125 MCG (5000 UT) capsule, Take 5,000 Units by mouth daily (Patient not taking: Reported on 12/20/2024), Disp: , Rfl:     Continuous Glucose  (FreeStyle Kalpana 3 McLaughlin) CAMRON, Check  blood sugar  4 times  per day (Patient not taking: Reported on 8/2/2024), Disp: 1 each, Rfl: 0    Continuous Glucose Sensor (FreeStyle Kalpana 3 Sensor) MISC, Use 1 each every 14 (fourteen) days (Patient not taking: Reported on 8/2/2024), Disp: 6 each, Rfl: 3    enoxaparin (LOVENOX) 40 mg/0.4 mL, Inject 0.4 mL (40 mg total) under the skin daily for 28 days To start Post-Op (Patient not taking: Reported on 11/21/2024), Disp: 11.2 mL, Rfl: 0    No Known Allergies       There were no vitals filed for this visit.    Body mass index is 29.01 kg/m².  Wt Readings from Last 3 Encounters:   01/17/25 94.3 kg (208 lb)   12/20/24 94.3 kg (208 lb)   12/09/24 94.3 kg (208 lb)       Objective:                    Left Hip Exam     Other   Erythema: absent  Sensation: normal    Comments:    Healed posterior-lateral incision, minimal swelling. Minimal warmth  No signs of infection  Calf and thigh are soft and non-tender, no signs of DVT  Painless arc of gentle PROM  Modest weakness of hip abductors              Diagnostics, reviewed and taken today if performed as documented:    None performed          Procedures, if performed today:    Procedures    None performed      Portions of the record may have been created with voice recognition software.  Occasional wrong word or \"sound a like\" substitutions may have occurred due to the inherent limitations of voice recognition software.  Read the chart carefully and recognize, using context, where substitutions have occurred.  "

## 2025-01-17 NOTE — LETTER
January 17, 2025     Patient: Rich Abel  YOB: 1955  Date of Visit: 1/17/2025      To Whom it May Concern:    Rich Abel is under my professional care. Rich was seen in my office on 1/17/2025. Rich is excused from work until further orthopedic evaluation/notification.    If you have any questions or concerns, please don't hesitate to call.         Sincerely,          Abiel Green MD        CC: No Recipients

## 2025-01-21 ENCOUNTER — OFFICE VISIT (OUTPATIENT)
Dept: PHYSICAL THERAPY | Facility: CLINIC | Age: 70
End: 2025-01-21
Payer: MEDICARE

## 2025-01-21 DIAGNOSIS — M16.12 PRIMARY OSTEOARTHRITIS OF LEFT HIP: Primary | ICD-10-CM

## 2025-01-21 PROCEDURE — 97530 THERAPEUTIC ACTIVITIES: CPT | Performed by: PHYSICAL THERAPIST

## 2025-01-21 PROCEDURE — 97110 THERAPEUTIC EXERCISES: CPT | Performed by: PHYSICAL THERAPIST

## 2025-01-21 NOTE — PROGRESS NOTES
"Daily Note     Today's date: 2025  Patient name: Rich Abel  : 1955  MRN: [id number]  Referring provider: Abiel Green,*  Dx:   Encounter Diagnosis     ICD-10-CM    1. Primary osteoarthritis of left hip  M16.12                      Subjective: Hip has been feeling very good.  Not having pain.        Objective: See treatment diary below      Assessment: Tolerated treatment well. Patient demonstrated fatigue post treatment and would benefit from continued PT.  Added SL hip ABD and resisted sidestepping with Jairo to improve hip ABD strength.  Fatigued after session, but no complaints of pain.      Plan: Continue per plan of care.  Progress treatment as tolerated.         POC expires Unit limit Auth  expiration date PT/OT + Visit Limit?   25 N/a 25  BOMN                 Visit/Unit Tracking  AUTH Status:  Date    BOMN Used 13 2 3 4 5 6 7 8 9 10 11 12    Remaining                   Precautions: L JEMMA posterior approach on 24, HTN, T2DM, Skin Cancer         Manuals    L hip PROM    BE  BE                                 Re-evaluation  TS           Neuro Re-Ed             Weight Shifting - rockerboard      2'/2'  2'/2' 2'/2'   2'/2'  2 min    Tandem walk foam         5 laps  5 laps    STS walk foam         5 laps 5 laps                                                        Ther Ex             Pt. Education              Nu-Step for cardiovascular endurance and LE strength  10' L3 upright 10' L4  Nustep L7x10' L1 10'  With UE 10' L3 w/ UE L3 10' with UE 10' L7 w/ UE 10' L7  10' L7  10 min L7 with UE at 9    SLR        March supine 3x10      Heel Slides     With strap 3\" x20   3x20 w/ PB under foot      Clamshell        3x10 BTB 3\"     SL hip ABD left   2x10          Hip 4 way    Standing 10x B/L ea dir  Stand 3x10 3 way Standing- 3 dir 3x10 ea dir B/L   3x10  GTB 2x 10 3 " "ways     LAQ    30x         Matrix flex 70# 3x12 70# 3x12     3x10 65# 3x10 70#  70# 3x 10    Matrix ext 45# 3x12 45# 3x12     3x10 40# 3x10 45#  45# 3x 10    Quad Set             Glute Set > Bridge       3x10 5\" 3x10 5\" BTB     Ankle Pumps    30x         Lateral walks at bar      YTB 3 laps                                               Ther Activity             STS/ squats   3x10        3x10     TG / Wall squats L22 3x12  L22 3x12  L22 3x12  L22 3x12 L22 3x15 L22 3x15     FSU    4\"x10 6\" 3x10  6\" 3x10 6\" 3x10   8\" 2x10  8\"  up and over  3x 10    Side step up   8\" 2 x 10   6\" 3x10  6\" 3X10 6\" 3x10   8\" 2x10  8\" 2 x 10    Resisted walking 10x 18# fw bw  20# x10          Resisted walking L/R             5x STS, TUG             Gait Training             SPC                          Modalities             CP/ MHP          Ice as needed at HEP  10 - 20 min on and 60 min off.                   Access Code: [email or web address]  URL: [email or web address]  Date: 12/06/2024  Prepared by: Cam Walker     Exercises  - Seated Ankle Pumps  - 2 x daily - 7 x weekly - 3 sets - 10 reps  - Supine Gluteal Sets  - 2 x daily - 7 x weekly - 2 sets - 10 reps - 5 hold  - Supine Quadricep Sets  - 2 x daily - 7 x weekly - 2 sets - 10 reps - 5 hold  - Seated Long Arc Quad  - 2 x daily - 7 x weekly - 3 sets - 10 reps    Patient Education  - JEMMA Posterior Precautions  - Total Hip Replacement (Posterior Approach): Surgery Overview     "

## 2025-01-23 ENCOUNTER — OFFICE VISIT (OUTPATIENT)
Dept: PHYSICAL THERAPY | Facility: CLINIC | Age: 70
End: 2025-01-23
Payer: MEDICARE

## 2025-01-23 DIAGNOSIS — M16.12 PRIMARY OSTEOARTHRITIS OF LEFT HIP: Primary | ICD-10-CM

## 2025-01-23 PROCEDURE — 97110 THERAPEUTIC EXERCISES: CPT

## 2025-01-23 PROCEDURE — 97530 THERAPEUTIC ACTIVITIES: CPT

## 2025-01-23 NOTE — PROGRESS NOTES
"Daily Note    Today's date: 25  Patient name: Rich Abel  : 1955  MRN: [id number]  Referring provider: Abiel Green,*  Dx:   Encounter Diagnosis     ICD-10-CM    1. Primary osteoarthritis of left hip  M16.12           Start Time: 1445  Stop Time: 1530  Total time in clinic (min): 45 minutes      Subjective: Rich reports feeling pretty good today, but needs to strengthen his hip muscles.    Objective: See treatment diary below.    Assessment: Rich tolerated treatment well with consistent cuing throughout. TE's were performed with increased reps and increased resistance. New TE's were demonstrated with proper technique, and tolerated well. Following treatment, the patient demonstrated fatigue and would benefit from continued physical therapy.    Plan: Continue per plan of care.  Progress treatment as tolerated.           POC expires Unit limit Auth  expiration date PT/OT + Visit Limit?   25 N/a 25  BOMN                 Visit/Unit Tracking  AUTH Status:  Date    BOMN Used 13 14  4 5 6 7 8 9 10 11 12    Remaining                   Precautions: L JEMMA posterior approach on 24, HTN, T2DM, Skin Cancer         Manuals    L hip PROM      BE                                 Re-evaluation  TS           Neuro Re-Ed             Weight Shifting - rockerboard       2'/2' 2'/2'   2'/2'  2 min    Tandem walk foam         5 laps  5 laps    STS walk foam         5 laps 5 laps                                                        Ther Ex             Pt. Education              Nu-Step for cardiovascular endurance and LE strength  10' L3 upright 10' L4  Nustep L7x10' 10' L7   L3 10' with UE 10' L7 w/ UE 10' L7  10' L7  10 min L7 with UE at 9    SLR    3x10 VC    March supine 3x10      Heel Slides        3x20 w/ PB under foot      Clamshell        3x10 BTB 3\"     SL hip ABD left   2x10        " "  Hip 4 way      Standing- 3 dir 3x10 ea dir B/L   3x10  GTB 2x 10 3 ways     LAQ             Matrix flex 70# 3x12 70# 3x12     3x10 65# 3x10 70#  70# 3x 10    Matrix ext 45# 3x12 45# 3x12     3x10 40# 3x10 45#  45# 3x 10    Quad Set             Glute Set > Bridge    3x5 3\"   3x10 5\" 3x10 5\" BTB     Single leg bridge    2x5 ea 3\"         Ankle Pumps             Lateral walks at bar      YTB 3 laps        Isometric hip ab at wall    15x5\" ea                                   Ther Activity             STS/ squats   3x10        3x10     TG / Wall squats L22 3x12  L22 3x12   L22 3x12 L22 3x15 L22 3x15     FSU      6\" 3x10 6\" 3x10   8\" 2x10  8\"  up and over  3x 10    Side step up   8\" 2 x 10    6\" 3X10 6\" 3x10   8\" 2x10  8\" 2 x 10    Resisted walking 10x 18# fw bw  20# x10          Resisted walking L/R   BTB 5 laps          Monster walk   BTB 5 laps          Lunge w/ slider   3x5 w/ UE          5x STS, TUG             Gait Training             SPC                          Modalities             CP/ MHP          Ice as needed at HEP  10 - 20 min on and 60 min off.                   Access Code: [email or web address]  URL: [email or web address]  Date: 12/06/2024  Prepared by: Cam Walker     Exercises  - Seated Ankle Pumps  - 2 x daily - 7 x weekly - 3 sets - 10 reps  - Supine Gluteal Sets  - 2 x daily - 7 x weekly - 2 sets - 10 reps - 5 hold  - Supine Quadricep Sets  - 2 x daily - 7 x weekly - 2 sets - 10 reps - 5 hold  - Seated Long Arc Quad  - 2 x daily - 7 x weekly - 3 sets - 10 reps    Patient Education  - JEMMA Posterior Precautions  - Total Hip Replacement (Posterior Approach): Surgery Overview     Ede Gay, PT  1/23/2025,2:53 PM  "

## 2025-01-28 ENCOUNTER — APPOINTMENT (OUTPATIENT)
Dept: PHYSICAL THERAPY | Facility: CLINIC | Age: 70
End: 2025-01-28
Payer: MEDICARE

## 2025-01-29 ENCOUNTER — OFFICE VISIT (OUTPATIENT)
Dept: PHYSICAL THERAPY | Facility: CLINIC | Age: 70
End: 2025-01-29
Payer: MEDICARE

## 2025-01-29 DIAGNOSIS — M16.12 PRIMARY OSTEOARTHRITIS OF LEFT HIP: Primary | ICD-10-CM

## 2025-01-29 PROCEDURE — 97110 THERAPEUTIC EXERCISES: CPT

## 2025-01-29 PROCEDURE — 97530 THERAPEUTIC ACTIVITIES: CPT

## 2025-01-30 ENCOUNTER — OFFICE VISIT (OUTPATIENT)
Dept: PHYSICAL THERAPY | Facility: CLINIC | Age: 70
End: 2025-01-30
Payer: MEDICARE

## 2025-01-30 DIAGNOSIS — M16.12 PRIMARY OSTEOARTHRITIS OF LEFT HIP: Primary | ICD-10-CM

## 2025-01-30 PROCEDURE — 97112 NEUROMUSCULAR REEDUCATION: CPT

## 2025-01-30 PROCEDURE — 97530 THERAPEUTIC ACTIVITIES: CPT

## 2025-01-30 PROCEDURE — 97110 THERAPEUTIC EXERCISES: CPT

## 2025-01-30 NOTE — PROGRESS NOTES
"Daily Note    Today's date: 25  Patient name: Rich Abel  : 1955  MRN: [id number]  Referring provider: Abiel Green,*  Dx:   Encounter Diagnosis     ICD-10-CM    1. Primary osteoarthritis of left hip  M16.12           Start Time: 1445  Stop Time: 1530  Total time in clinic (min): 45 minutes      Subjective: Rich reports no c/o today.     Objective: See treatment diary below.    Assessment: Rich tolerated treatment well with consistent cuing throughout. TE's were performed with increased reps and increased resistance. No new TE's were performed today. Following treatment, the patient demonstrated fatigue and would benefit from continued physical therapy.    Plan: Continue per plan of care.  Progress treatment as tolerated.           POC expires Unit limit Auth  expiration date PT/OT + Visit Limit?   25 N/a 25  BOMN                 Visit/Unit Tracking  AUTH Status:  Date    BOMN Used 13 14 15 16  6 7 8 9 10 11 12    Remaining                   Precautions: L JEMMA posterior approach on 24, HTN, T2DM, Skin Cancer         Manuals    L hip PROM                                       Re-evaluation  TS           Neuro Re-Ed             Weight Shifting - rockerboard          2'/2'  2 min    Tandem walk foam         5 laps  5 laps    STS walk foam         5 laps 5 laps    BOSU step up holds      10x10\" ea       BOSU squats      nv                                 Ther Ex             Pt. Education              Nu-Step for cardiovascular endurance and LE strength  10' L3 upright 10' L4  Nustep L7x10' 10' L7  10' L7  10' L7   10' L7  10' L7  10 min L7 with UE at 9    SLR    3x10 VC    March supine 3x10      Heel Slides              Clamshell        3x10 BTB 3\"     SL hip ABD left   2x10  3x10         Hip 4 way         3x10  GTB 2x 10 3 ways     LAQ             Matrix flex 70# 3x12 70# 3x12    " "  3x10 70#  70# 3x 10    Matrix ext 45# 3x12 45# 3x12      3x10 45#  45# 3x 10    Quad Set             Glute Set > Bridge    3x5 3\" 3x10 2\"   3x10 5\" BTB     Single leg bridge    2x5 ea 3\"         Ankle Pumps             Lateral walks at bar             Isometric hip ab at wall    15x5\" ea 2x10 5\" ea                                  Ther Activity             STS/ squats   3x10        3x10     TG / Wall squats L22 3x12  L22 3x12  L22 3x15 3x15 PB  L22 3x15     FSU      8\" 3x10 15# KB   8\" 2x10  8\"  up and over  3x 10    Side step up   8\" 2 x 10    8\" 15# 3x10    8\" 2x10  8\" 2 x 10    Resisted walking 10x 18# fw bw  20# x10          Resisted walking L/R   BTB 5 laps  BTB 5 laps        Monster walk   BTB 5 laps  BTB 3 laps        Lunge w/ slider   3x5 w/ UE   3x7 ea       5x STS, TUG             Gait Training             SPC                          Modalities             CP/ MHP          Ice as needed at HEP  10 - 20 min on and 60 min off.                   Access Code: [email or web address]  URL: [email or web address]  Date: 12/06/2024  Prepared by: Cam Walker     Exercises  - Seated Ankle Pumps  - 2 x daily - 7 x weekly - 3 sets - 10 reps  - Supine Gluteal Sets  - 2 x daily - 7 x weekly - 2 sets - 10 reps - 5 hold  - Supine Quadricep Sets  - 2 x daily - 7 x weekly - 2 sets - 10 reps - 5 hold  - Seated Long Arc Quad  - 2 x daily - 7 x weekly - 3 sets - 10 reps    Patient Education  - JEMMA Posterior Precautions  - Total Hip Replacement (Posterior Approach): Surgery Overview     Ede Gay, PT  1/30/2025,3:23 PM  "

## 2025-02-04 ENCOUNTER — OFFICE VISIT (OUTPATIENT)
Dept: PHYSICAL THERAPY | Facility: CLINIC | Age: 70
End: 2025-02-04
Payer: MEDICARE

## 2025-02-04 DIAGNOSIS — M16.12 PRIMARY OSTEOARTHRITIS OF LEFT HIP: Primary | ICD-10-CM

## 2025-02-04 PROCEDURE — 97112 NEUROMUSCULAR REEDUCATION: CPT

## 2025-02-04 PROCEDURE — 97530 THERAPEUTIC ACTIVITIES: CPT

## 2025-02-04 PROCEDURE — 97110 THERAPEUTIC EXERCISES: CPT

## 2025-02-04 NOTE — PROGRESS NOTES
"Daily Note    Today's date: 25  Patient name: Rich Abel  : 1955  MRN: [id number]  Referring provider: Abiel Green,*  Dx:   Encounter Diagnosis     ICD-10-CM    1. Primary osteoarthritis of left hip  M16.12           Start Time: 1400  Stop Time: 1445  Total time in clinic (min): 45 minutes      Subjective: Rich reports he was sick over the weekend. He is still feeling a little weak today.    Objective: See treatment diary below.    Assessment: Rich tolerated treatment well with minimal cuing. TE's were performed with decreased reps and decreased resistance. No new TE's were performed today. Following treatment, the patient demonstrated fatigue and would benefit from continued physical therapy.    Plan: Continue per plan of care.  Progress treatment as tolerated.         POC expires Unit limit Auth  expiration date PT/OT + Visit Limit?   25 N/a 25  BOMN                 Visit/Unit Tracking  AUTH Status:  Date    BOMN Used 13 14 15 16 17  7 8 9 10 11 12    Remaining                   Precautions: L JEMMA posterior approach on 24, HTN, T2DM, Skin Cancer         Manuals  2   L hip PROM                                       Re-evaluation  TS           Neuro Re-Ed             Weight Shifting - rockerboard          2'/2'  2 min    Tandem walk foam         5 laps  5 laps    STS walk foam         5 laps 5 laps    BOSU step up holds      10x10\" ea       BOSU squats      nv                                 Ther Ex             Pt. Education              Nu-Step for cardiovascular endurance and LE strength  10' L3 upright 10' L4  Nustep L7x10' 10' L7  10' L7  10' L7  10' L7   10' L7  10 min L7 with UE at 9    SLR    3x10 VC         Heel Slides              Clamshell       3x12 BTB      SL hip ABD left   2x10  3x10         Hip 4 way       3x10   3x10  GTB 2x 10 3 ways     LAQ             Matrix " "flex 70# 3x12 70# 3x12        70# 3x 10    Matrix ext 45# 3x12 45# 3x12        45# 3x 10    Quad Set             Glute Set > Bridge    3x5 3\" 3x10 2\"  3x10 3\"      Single leg bridge    2x5 ea 3\"         Ankle Pumps             Lateral walks at bar             Isometric hip ab at wall    15x5\" ea 2x10 5\" ea                                  Ther Activity             STS/ squats   3x10        3x10     TG / Wall squats L22 3x12  L22 3x12  L22 3x15 3x15 PB 3x10 PB 15#      FSU      8\" 3x10 15# KB 8\" 3x10 15# KB  8\" 2x10  8\"  up and over  3x 10    Side step up   8\" 2 x 10    8\" 15# 3x10  8\" 3x10 15# KB  8\" 2x10  8\" 2 x 10    Resisted walking 10x 18# fw bw  20# x10          Resisted walking L/R   BTB 5 laps  BTB 5 laps        Monster walk   BTB 5 laps  BTB 3 laps        Lunge w/ slider   3x5 w/ UE   3x7 ea       5x STS, TUG             Gait Training             SPC                          Modalities             CP/ MHP          Ice as needed at HEP  10 - 20 min on and 60 min off.                   Access Code: [email or web address]  URL: [email or web address]  Date: 12/06/2024  Prepared by: Cam Walker     Exercises  - Seated Ankle Pumps  - 2 x daily - 7 x weekly - 3 sets - 10 reps  - Supine Gluteal Sets  - 2 x daily - 7 x weekly - 2 sets - 10 reps - 5 hold  - Supine Quadricep Sets  - 2 x daily - 7 x weekly - 2 sets - 10 reps - 5 hold  - Seated Long Arc Quad  - 2 x daily - 7 x weekly - 3 sets - 10 reps    Patient Education  - JEMMA Posterior Precautions  - Total Hip Replacement (Posterior Approach): Surgery Overview     Ede Gay, PT  2/4/2025,2:12 PM  "

## 2025-02-05 DIAGNOSIS — E11.3293 TYPE 2 DIABETES MELLITUS WITH BOTH EYES AFFECTED BY MILD NONPROLIFERATIVE RETINOPATHY WITHOUT MACULAR EDEMA, WITH LONG-TERM CURRENT USE OF INSULIN (HCC): ICD-10-CM

## 2025-02-05 DIAGNOSIS — Z79.4 TYPE 2 DIABETES MELLITUS WITH BOTH EYES AFFECTED BY MILD NONPROLIFERATIVE RETINOPATHY WITHOUT MACULAR EDEMA, WITH LONG-TERM CURRENT USE OF INSULIN (HCC): ICD-10-CM

## 2025-02-05 RX ORDER — METFORMIN HYDROCHLORIDE 500 MG/1
1000 TABLET, EXTENDED RELEASE ORAL 2 TIMES DAILY
Qty: 360 TABLET | Refills: 1 | Status: SHIPPED | OUTPATIENT
Start: 2025-02-05

## 2025-02-06 ENCOUNTER — APPOINTMENT (OUTPATIENT)
Dept: PHYSICAL THERAPY | Facility: CLINIC | Age: 70
End: 2025-02-06
Payer: MEDICARE

## 2025-02-07 DIAGNOSIS — Z79.4 TYPE 2 DIABETES MELLITUS WITH BOTH EYES AFFECTED BY MILD NONPROLIFERATIVE RETINOPATHY WITHOUT MACULAR EDEMA, WITH LONG-TERM CURRENT USE OF INSULIN (HCC): ICD-10-CM

## 2025-02-07 DIAGNOSIS — E11.3293 TYPE 2 DIABETES MELLITUS WITH BOTH EYES AFFECTED BY MILD NONPROLIFERATIVE RETINOPATHY WITHOUT MACULAR EDEMA, WITH LONG-TERM CURRENT USE OF INSULIN (HCC): ICD-10-CM

## 2025-02-07 RX ORDER — PEN NEEDLE, DIABETIC 32GX 5/32"
NEEDLE, DISPOSABLE MISCELLANEOUS
Qty: 100 EACH | Refills: 1 | Status: SHIPPED | OUTPATIENT
Start: 2025-02-07

## 2025-02-11 ENCOUNTER — OFFICE VISIT (OUTPATIENT)
Dept: PHYSICAL THERAPY | Facility: CLINIC | Age: 70
End: 2025-02-11
Payer: MEDICARE

## 2025-02-11 DIAGNOSIS — M16.12 PRIMARY OSTEOARTHRITIS OF LEFT HIP: Primary | ICD-10-CM

## 2025-02-11 PROCEDURE — 97112 NEUROMUSCULAR REEDUCATION: CPT

## 2025-02-11 PROCEDURE — 97110 THERAPEUTIC EXERCISES: CPT

## 2025-02-11 PROCEDURE — 97530 THERAPEUTIC ACTIVITIES: CPT

## 2025-02-11 NOTE — PROGRESS NOTES
"Daily Note    Today's date: 25  Patient name: Rich Abel  : 1955  MRN: [id number]  Referring provider: Abiel Green,*  Dx:   Encounter Diagnosis     ICD-10-CM    1. Primary osteoarthritis of left hip  M16.12           Start Time: 1400  Stop Time: 1445  Total time in clinic (min): 45 minutes      Subjective: Rich reports feeling better today after being sick last week.    Objective: See treatment diary below.    Assessment: Rich tolerated treatment well with consistent cuing throughout. TE's were performed with increased reps and increased resistance. New TE's were demonstrated with proper technique, and tolerated well. Following treatment, the patient demonstrated fatigue and would benefit from continued physical therapy.    Plan: Continue per plan of care.  Progress treatment as tolerated.           POC expires Unit limit Auth  expiration date PT/OT + Visit Limit?   25 N/a 25  BOMN                 Visit/Unit Tracking  AUTH Status:  Date    BOMN Used 13 14 15 16 17 18  8 9 10 11 12    Remaining                   Precautions: L JEMMA posterior approach on 24, HTN, T2DM, Skin Cancer         Manuals  2   L hip PROM                                       Re-evaluation  TS           Neuro Re-Ed             Weight Shifting - rockerboard           2 min    Tandem walk foam          5 laps    STS walk foam          5 laps    BOSU step up holds      10x10\" ea  10x10\" ea     BOSU squats      nv  3x5x5\"                               Ther Ex             Pt. Education              Nu-Step for cardiovascular endurance and LE strength  10' L3 upright 10' L4  Nustep L7x10' 10' L7  10' L7  10' L7  10' L7  10' L7   10 min L7 with UE at 9    SLR    3x10 VC         Heel Slides              Clamshell       3x12 BTB      SL hip ABD left   2x10  3x10         Hip 4 way       3x10    GTB 2x 10 3 ways     LAQ " "            Matrix flex 70# 3x12 70# 3x12        70# 3x 10    Matrix ext 45# 3x12 45# 3x12        45# 3x 10    Quad Set             Glute Set > Bridge    3x5 3\" 3x10 2\"  3x10 3\"      Single leg bridge    2x5 ea 3\"         Ankle Pumps             Lateral walks at bar             Isometric hip ab at wall    15x5\" ea 2x10 5\" ea                                  Ther Activity             STS/ squats   3x10            TG / Wall squats L22 3x12  L22 3x12  L22 3x15 3x15 PB 3x10 PB 15# 3x10 PB 15#     FSU      8\" 3x10 15# KB 8\" 3x10 15# KB   8\"  up and over  3x 10    Side step up   8\" 2 x 10    8\" 15# 3x10  8\" 3x10 15# KB   8\" 2 x 10    Resisted walking 10x 18# fw bw  20# x10     22# 10x fw/bw     Resisted walking L/R   BTB 5 laps  BTB 5 laps        Monster walk   BTB 5 laps  BTB 3 laps        Lunge w/ slider   3x5 w/ UE   3x7 ea  4# 2x5      5x STS, TUG             Gait Training             SPC                          Modalities             CP/ MHP          Ice as needed at HEP  10 - 20 min on and 60 min off.                   Access Code: [email or web address]  URL: [email or web address]  Date: 12/06/2024  Prepared by: Cam Walker     Exercises  - Seated Ankle Pumps  - 2 x daily - 7 x weekly - 3 sets - 10 reps  - Supine Gluteal Sets  - 2 x daily - 7 x weekly - 2 sets - 10 reps - 5 hold  - Supine Quadricep Sets  - 2 x daily - 7 x weekly - 2 sets - 10 reps - 5 hold  - Seated Long Arc Quad  - 2 x daily - 7 x weekly - 3 sets - 10 reps    Patient Education  - JEMMA Posterior Precautions  - Total Hip Replacement (Posterior Approach): Surgery Overview     Ede Gay, PT  2/11/2025,2:41 PM  "

## 2025-02-13 ENCOUNTER — EVALUATION (OUTPATIENT)
Dept: PHYSICAL THERAPY | Facility: CLINIC | Age: 70
End: 2025-02-13
Payer: MEDICARE

## 2025-02-13 DIAGNOSIS — M16.12 PRIMARY OSTEOARTHRITIS OF LEFT HIP: Primary | ICD-10-CM

## 2025-02-13 PROCEDURE — 97530 THERAPEUTIC ACTIVITIES: CPT

## 2025-02-13 PROCEDURE — 97140 MANUAL THERAPY 1/> REGIONS: CPT

## 2025-02-13 PROCEDURE — 97110 THERAPEUTIC EXERCISES: CPT

## 2025-02-13 NOTE — PROGRESS NOTES
PT Evaluation     Today's date: 2025  Patient name: Rich Abel  : 1955  MRN: [id number]  Referring provider: Abiel Green,*  Dx:   Encounter Diagnosis     ICD-10-CM    1. Primary osteoarthritis of left hip  M16.12           Start Time: 1400  Stop Time: 1445  Total time in clinic (min): 45 minutes    Assessment  Impairments: abnormal or restricted ROM, activity intolerance, impaired physical strength, lacks appropriate home exercise program, pain with function, poor body mechanics, unable to perform ADL, activity limitations and endurance  Symptom irritability: moderate    Assessment details: Rich Abel is a pleasant 69 y.o. male who presents today for re-evaluation for his L JEMMA. he complains of no pain at this time.    The patient demonstrates within functional limits strength during resisted muscle testing, which has improved from previous evaluation. Pt ROM is within functional limits with no pain, which has improved from previous evaluation.     The patient has improved function since starting therapy and has no difficulty with movement. Rich has met all of his goals at this time. Plan of care will be discontinued, and patient discharged to an independent HEP.    Goals  STG   Patient will increase their L hip strength to 4/5 MMT in 4 weeks - GOAL MET   Patient will decrease worst pain to 5/10 in 4 weeks.  - GOAL MET   Patient will increase L hip ROM in all planes by 3-5 degrees in 4 weeks. - GOAL MET   Patient will improve FOTO score by 50 % in 4 weeks. - GOAL MET   Patient will be independent with their HEP. - GOAL MET       LTG  Patient will be able to put his socks and shoes on pain free in 8 weeks. - GOAL MET   Patient will meet =/> their FOTO goal score in 8 weeks.  - GOAL MET   Patient will decrease their worst pain to 2/10 in 8 weeks.- GOAL MET   Patient will increase L hip ROM in all planes by 5-10 degrees in 8 weeks. - GOAL MET   Patient will increase their L hip  strength to +4/5 in 8 weeks.  - GOAL MET     Plan    Plan of Care beginning date: 2/13/2025  Plan of Care expiration date: 2/13/2025  Treatment plan discussed with: patient        Subjective Evaluation    History of Present Illness  Mechanism of injury: Rich reports that he is feeling much better since starting therapy two months ago. he notes no difficulty with his usual tasks. he notes no hip pain at this time. The patient is comfortable being discharged to an independent HEP today.  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 No acute osseous abnormality)        Objective     Active Range of Motion   Left Hip   Normal active range of motion    Right Hip   Normal active range of motion    Strength/Myotome Testing     Left Hip   Normal muscle strength    Right Hip   Normal muscle strength    Left Knee   Flexion: 4  Extension: 4    Right Knee   Flexion: 4+  Extension: 4+    Left Ankle/Foot   Dorsiflexion: 4+  Plantar flexion: 4    Right Ankle/Foot   Dorsiflexion: 4+  Plantar flexion: 4+         POC expires Unit limit Auth  expiration date PT/OT + Visit Limit?   2/6/25 N/a 12/31/25  BOMN                 Visit/Unit Tracking  AUTH Status:  Date 1/21 1/23 1/29 1/30 2/4 2/11 1/2 1/6 1/9 1/13 1/16   BOMN Used 13 14 15 16 17 18  8 9 10 11 12    Remaining                   Precautions: L JEMMA posterior approach on 12/9/24, HTN, T2DM, Skin Cancer         Manuals 1/13 1/16 1/21 1/23 1/29 1/30 2/4 2/11 2/13    L hip PROM                                       Re-evaluation  TS       TS    Neuro Re-Ed             Weight Shifting - rockerboard         "      Tandem walk foam             STS walk foam             BOSU step up holds      10x10\" ea  10x10\" ea 10x10\"     BOSU squats      nv  3x5x5\"                               Ther Ex             Pt. Education              Nu-Step for cardiovascular endurance and LE strength  10' L3 upright 10' L4  Nustep L7x10' 10' L7  10' L7  10' L7  10' L7  10' L7  10' L7     SLR    3x10 VC         Heel Slides              Clamshell       3x12 BTB      SL hip ABD left   2x10  3x10         Hip 4 way       3x10       LAQ             Matrix flex 70# 3x12 70# 3x12       3x10 65#    Matrix ext 45# 3x12 45# 3x12       3x10 50#    Quad Set             Glute Set > Bridge    3x5 3\" 3x10 2\"  3x10 3\"      Single leg bridge    2x5 ea 3\"         Ankle Pumps             Lateral walks at bar             Isometric hip ab at wall    15x5\" ea 2x10 5\" ea                                  Ther Activity             STS/ squats   3x10            TG / Wall squats L22 3x12  L22 3x12  L22 3x15 3x15 PB 3x10 PB 15# 3x10 PB 15#     FSU      8\" 3x10 15# KB 8\" 3x10 15# KB      Side step up   8\" 2 x 10    8\" 15# 3x10  8\" 3x10 15# KB      Resisted walking 10x 18# fw bw  20# x10     22# 10x fw/bw 24# 10x fw/bw    Resisted walking L/R   BTB 5 laps  BTB 5 laps        Monster walk   BTB 5 laps  BTB 3 laps        Lunge w/ slider   3x5 w/ UE   3x7 ea  4# 2x5      5x STS, TUG             Gait Training             SPC                          Modalities             CP/ MHP                               "

## 2025-02-14 ENCOUNTER — OFFICE VISIT (OUTPATIENT)
Dept: OBGYN CLINIC | Facility: MEDICAL CENTER | Age: 70
End: 2025-02-14

## 2025-02-14 ENCOUNTER — APPOINTMENT (OUTPATIENT)
Dept: RADIOLOGY | Facility: MEDICAL CENTER | Age: 70
End: 2025-02-14
Payer: MEDICARE

## 2025-02-14 VITALS — WEIGHT: 208 LBS | HEIGHT: 71 IN | BODY MASS INDEX: 29.12 KG/M2

## 2025-02-14 DIAGNOSIS — Z96.642 AFTERCARE FOLLOWING LEFT HIP JOINT REPLACEMENT SURGERY: Primary | ICD-10-CM

## 2025-02-14 DIAGNOSIS — Z96.642 AFTERCARE FOLLOWING LEFT HIP JOINT REPLACEMENT SURGERY: ICD-10-CM

## 2025-02-14 DIAGNOSIS — Z47.1 AFTERCARE FOLLOWING LEFT HIP JOINT REPLACEMENT SURGERY: ICD-10-CM

## 2025-02-14 DIAGNOSIS — Z47.1 AFTERCARE FOLLOWING LEFT HIP JOINT REPLACEMENT SURGERY: Primary | ICD-10-CM

## 2025-02-14 PROCEDURE — 99024 POSTOP FOLLOW-UP VISIT: CPT | Performed by: ORTHOPAEDIC SURGERY

## 2025-02-14 PROCEDURE — 73502 X-RAY EXAM HIP UNI 2-3 VIEWS: CPT

## 2025-02-14 RX ORDER — CEPHALEXIN 500 MG/1
500 CAPSULE ORAL EVERY 6 HOURS SCHEDULED
Qty: 1 CAPSULE | Refills: 0 | Status: SHIPPED | OUTPATIENT
Start: 2025-02-14 | End: 2025-02-15

## 2025-02-14 NOTE — PROGRESS NOTES
Assessment/Plan:  1. Aftercare following left hip joint replacement surgery  XR hip/pelv 2-3 vws left if performed        Scribe Attestation      I,:  Luis Alberto Ramey am acting as a scribe while in the presence of the attending physician.:       I,:  Abiel Green MD personally performed the services described in this documentation    as scribed in my presence.:           Rich upon examination appears to be doing very well now 10 weeks status post left total hip arthroplasty.  X-rays demonstrate stable appearing and appropriately positioned total hip arthroplasty.  I would like him to continue physical therapy as per protocol until they see fit to discharge him.  He may continue with activities of daily living as tolerated.  Additionally he may return to work without restrictions.  I did provide him a note dictated this today.  I did advise him that until 2 years postoperatively he is required to utilize predental antibiotics to mitigate risk of periprosthetic infection.  A prescription of Keflex 500 mg x 4 to be taken 1 hour prior to any dental procedure was given to him today.  I would like to see him back in 3 months for repeat clinical evaluation and repeat x-ray of the left hip.    Subjective: Postop evaluation left hip    Patient ID: Rich Abel is a 69 y.o. male presenting for post operative evaluation of the left hip.  He is approximately 10 weeks status post left total hip arthroplasty.  He states he is doing very well and is experiencing little to no pain.  He is able to ambulate without an assistive device.  He states that he does continue his home exercises learned from physical therapy on the days that he does not have formal therapy.  Overall his quality of life has improved.  He remarks that he does not have significant pain into the right hip as the exercises seem to help with his right hip discomfort as well.  Today he denies any distal paresthesias.    Review of Systems    Constitutional:  Negative for chills, fever and unexpected weight change.   HENT:  Negative for hearing loss, nosebleeds and sore throat.    Eyes:  Negative for pain, redness and visual disturbance.   Respiratory:  Negative for cough, shortness of breath and wheezing.    Cardiovascular:  Negative for chest pain, palpitations and leg swelling.   Gastrointestinal:  Negative for abdominal pain, nausea and vomiting.   Endocrine: Negative for polyphagia and polyuria.   Genitourinary:  Negative for dysuria and hematuria.   Musculoskeletal:  Positive for arthralgias and myalgias.        See HPI   Skin:  Negative for rash and wound.   Neurological:  Negative for dizziness, numbness and headaches.   Psychiatric/Behavioral:  Negative for decreased concentration and suicidal ideas. The patient is not nervous/anxious.          Past Medical History:   Diagnosis Date    Arthritis     Cancer (HCC)     Colon polyp     Diabetes mellitus (HCC)     Ear problems     Essential hypertension 02/05/2020    Glaucoma     HL (hearing loss)     Hyperlipidemia 02/05/2020    Hypertension     Kidney stone     Melanoma of ear, left (HCC)     Nasal polyp     Tinnitus        Past Surgical History:   Procedure Laterality Date    ARTHROSCOPY KNEE Bilateral     BASAL CELL CARCINOMA EXCISION  03/2020    left arm    CYSTOSCOPY      kidney stone removal    FOOT TENDON SURGERY Left 06/22/2021    Procedure: ANTERIOR TALOFIBULAR LIGAMENT REPAIR;  Surgeon: Trent Obregon DPM;  Location: [facility];  Service: Podiatry    FRACTURE SURGERY  March 2023    KNEE SURGERY      NASAL POLYP EXCISION      TN ARTHRP ACETBLR/PROX FEM PROSTC AGRFT/ALGRFT Left 12/9/2024    Procedure: ARTHROPLASTY HIP TOTAL;  Surgeon: Abile Green MD;  Location:  [facility] OR;  Service: Orthopedics    TN COLONOSCOPY FLX DX W/COLLJ SPEC WHEN PFRMD N/A 08/08/2017    Procedure: COLONOSCOPY;  Surgeon: Jeff Rosen MD;  Location: [facility];  Service: Colorectal    TN RPR TDN FLXR FOOT  1/2 W/O FREE GRAFG EACH TENDON Left 2017    Procedure: ANKLE PERONEUS LONGUS TENDON REPAIR with allograft;  RETINACULUM REPAIR SECONDARY  Application of Provena Vac;  Surgeon: Trent Obregon DPM;  Location: [facility];  Service: Podiatry    ROTATOR CUFF REPAIR Bilateral     SHOULDER ARTHROSCOPY      WISDOM TOOTH EXTRACTION         Family History   Problem Relation Age of Onset    Heart disease Mother         MOTHER    Diabetes Mother     Melanoma Mother     Cancer Mother     Aortic aneurysm Father         abdominal     Prostate cancer Father     Colon cancer Father        Social History     Occupational History    Not on file   Tobacco Use    Smoking status: Former     Current packs/day: 0.00     Average packs/day: 1 pack/day for 33.0 years (33.0 ttl pk-yrs)     Types: Cigarettes     Start date: 1974     Quit date: 2007     Years since quittin.6     Passive exposure: Past    Smokeless tobacco: Current     Types: Chew    Tobacco comments:     quit 15 years ago   Vaping Use    Vaping status: Never Used   Substance and Sexual Activity    Alcohol use: Yes     Alcohol/week: 2.0 standard drinks of alcohol     Types: 2 Standard drinks or equivalent per week    Drug use: No    Sexual activity: Yes     Partners: Female     Birth control/protection: None         Current Outpatient Medications:     acetaminophen (TYLENOL) 500 mg tablet, Take 2 tablets (1,000 mg total) by mouth every 6 (six) hours as needed for mild pain, Disp: 90 tablet, Rfl: 0    aspirin (ECOTRIN LOW STRENGTH) 81 mg EC tablet, Take 81 mg by mouth daily, Disp: , Rfl:     atorvastatin (LIPITOR) 10 mg tablet, TAKE 1 TABLET BY MOUTH EVERY DAY, Disp: 90 tablet, Rfl: 1    Basaglar KwikPen 100 units/mL SOPN, INJECT 66 UNITS UNDER THE SKIN IN THE MORNING, Disp: 90 mL, Rfl: 1    BD Pen Needle Meghana 2nd Gen 32G X 4 MM MISC, DAILY USE AS DIRECTED, Disp: 100 each, Rfl: 1    fluticasone (FLONASE) 50 mcg/act nasal spray, 1 spray into each nostril  daily, Disp: , Rfl:     gabapentin (NEURONTIN) 100 mg capsule, TAKE 1 CAPSULE BY MOUTH TWICE A DAY, Disp: 60 capsule, Rfl: 5    lisinopril (ZESTRIL) 10 mg tablet, TAKE 1 TABLET BY MOUTH EVERY DAY, Disp: 90 tablet, Rfl: 1    MAGNESIUM PO, Take by mouth, Disp: , Rfl:     metFORMIN (GLUCOPHAGE-XR) 500 mg 24 hr tablet, TAKE 2 TABLETS BY MOUTH TWICE A DAY, Disp: 360 tablet, Rfl: 1    methocarbamol (ROBAXIN) 500 mg tablet, Take 1 tablet (500 mg total) by mouth 3 (three) times a day as needed for muscle spasms, Disp: 60 tablet, Rfl: 0    Milk Thistle 150 MG CAPS, Take by mouth, Disp: , Rfl:     Multiple Vitamins-Minerals (MULTIVITAMIN WITH MINERALS) tablet, Take 1 tablet by mouth daily, Disp: , Rfl:     Simbrinza 1-0.2 % SUSP, instill 1 drop into both eyes twice a day, Disp: , Rfl:     sodium chloride (OCEAN) 0.65 % nasal spray, 1 spray into each nostril as needed for congestion, Disp: , Rfl:     TURMERIC PO, Take by mouth, Disp: , Rfl:     Vitamins-Lipotropics (B-100 PO), Take by mouth, Disp: , Rfl:     Cholecalciferol 125 MCG (5000 UT) capsule, Take 5,000 Units by mouth daily (Patient not taking: Reported on 12/20/2024), Disp: , Rfl:     Continuous Glucose  (FreeStyle Kalpana 3 Grace) CAMRON, Check  blood sugar  4 times  per day (Patient not taking: Reported on 8/2/2024), Disp: 1 each, Rfl: 0    Continuous Glucose Sensor (FreeStyle Kalpana 3 Sensor) MISC, Use 1 each every 14 (fourteen) days (Patient not taking: Reported on 8/2/2024), Disp: 6 each, Rfl: 3    enoxaparin (LOVENOX) 40 mg/0.4 mL, Inject 0.4 mL (40 mg total) under the skin daily for 28 days To start Post-Op (Patient not taking: Reported on 11/21/2024), Disp: 11.2 mL, Rfl: 0    No Known Allergies    Objective:  There were no vitals filed for this visit.    Body mass index is 29.01 kg/m².    Right Hip Exam     Range of Motion   Flexion:  120   External rotation:  50   Internal rotation:  20     Muscle Strength   Abduction: 5/5   Adduction: 5/5   Flexion: 5/5        Left Hip Exam     Tenderness   The patient is experiencing no tenderness.     Range of Motion   Abduction:  50   Flexion:  120   External rotation:  60   Internal rotation: 30     Muscle Strength   Abduction: 5/5   Adduction: 5/5   Flexion: 5/5     Other   Erythema: absent  Scars: present (well healed posterior lateral hip scar)  Sensation: normal  Pulse: present          Physical Exam  Vitals reviewed.   HENT:      Head: Normocephalic and atraumatic.   Eyes:      General:         Right eye: No discharge.         Left eye: No discharge.      Conjunctiva/sclera: Conjunctivae normal.   Cardiovascular:      Rate and Rhythm: Normal rate.   Pulmonary:      Effort: Pulmonary effort is normal. No respiratory distress.   Musculoskeletal:      Cervical back: Normal range of motion and neck supple.      Comments: As noted in Ortho exam   Skin:     General: Skin is warm and dry.   Neurological:      Mental Status: He is alert and oriented to person, place, and time.   Psychiatric:         Mood and Affect: Mood normal.         Behavior: Behavior normal.       I have personally reviewed pertinent films in PACS.      X-rays of the left hip obtained today February 14, 2025 demonstrate stable appearing in appropriate position total hip arthroplasty without evidence of implant failure or lucencies.  No acute fracture observed.  No obvious lytic or blastic lesions.    This document was created using speech voice recognition software.   Grammatical errors, random word insertions, pronoun errors, and incomplete sentences are an occasional consequence of this system due to software limitations, ambient noise, and hardware issues.   Any formal questions or concerns about content, text, or information contained within the body of this dictation should be directly addressed to the provider for clarification.

## 2025-02-14 NOTE — LETTER
February 14, 2025     Patient: Rich Abel  YOB: 1955  Date of Visit: 2/14/2025      To Whom it May Concern:    Rich Abel is under my professional care. Rich was seen in my office on 2/14/2025. Rich may return to work without restrictions.    If you have any questions or concerns, please don't hesitate to call.         Sincerely,          Abiel Green MD        CC: No Recipients

## 2025-03-12 DIAGNOSIS — M79.672 PAIN IN BOTH FEET: ICD-10-CM

## 2025-03-12 DIAGNOSIS — M79.671 PAIN IN BOTH FEET: ICD-10-CM

## 2025-03-12 RX ORDER — GABAPENTIN 100 MG/1
100 CAPSULE ORAL 2 TIMES DAILY
Qty: 60 CAPSULE | Refills: 5 | Status: SHIPPED | OUTPATIENT
Start: 2025-03-12

## 2025-03-23 DIAGNOSIS — Z79.4 TYPE 2 DIABETES MELLITUS WITH BOTH EYES AFFECTED BY MILD NONPROLIFERATIVE RETINOPATHY WITHOUT MACULAR EDEMA, WITH LONG-TERM CURRENT USE OF INSULIN (HCC): ICD-10-CM

## 2025-03-23 DIAGNOSIS — E11.3293 TYPE 2 DIABETES MELLITUS WITH BOTH EYES AFFECTED BY MILD NONPROLIFERATIVE RETINOPATHY WITHOUT MACULAR EDEMA, WITH LONG-TERM CURRENT USE OF INSULIN (HCC): ICD-10-CM

## 2025-03-23 RX ORDER — INSULIN GLARGINE 100 [IU]/ML
INJECTION, SOLUTION SUBCUTANEOUS
Qty: 60 ML | Refills: 2 | Status: SHIPPED | OUTPATIENT
Start: 2025-03-23

## 2025-04-25 ENCOUNTER — APPOINTMENT (OUTPATIENT)
Dept: LAB | Facility: MEDICAL CENTER | Age: 70
End: 2025-04-25
Payer: MEDICARE

## 2025-04-25 DIAGNOSIS — E11.9 TYPE 2 DIABETES MELLITUS WITHOUT COMPLICATION, WITHOUT LONG-TERM CURRENT USE OF INSULIN (HCC): Primary | ICD-10-CM

## 2025-04-25 LAB
ALBUMIN SERPL BCG-MCNC: 3.9 G/DL (ref 3.5–5)
ALP SERPL-CCNC: 72 U/L (ref 34–104)
ALT SERPL W P-5'-P-CCNC: 22 U/L (ref 7–52)
ANION GAP SERPL CALCULATED.3IONS-SCNC: 9 MMOL/L (ref 4–13)
AST SERPL W P-5'-P-CCNC: 21 U/L (ref 13–39)
BASOPHILS # BLD AUTO: 0.05 THOUSANDS/ÂΜL (ref 0–0.1)
BASOPHILS NFR BLD AUTO: 1 % (ref 0–1)
BILIRUB SERPL-MCNC: 0.61 MG/DL (ref 0.2–1)
BUN SERPL-MCNC: 15 MG/DL (ref 5–25)
CALCIUM SERPL-MCNC: 8.8 MG/DL (ref 8.4–10.2)
CHLORIDE SERPL-SCNC: 108 MMOL/L (ref 96–108)
CHOLEST SERPL-MCNC: 113 MG/DL (ref ?–200)
CO2 SERPL-SCNC: 26 MMOL/L (ref 21–32)
CREAT SERPL-MCNC: 0.94 MG/DL (ref 0.6–1.3)
CREAT UR-MCNC: 127.3 MG/DL
EOSINOPHIL # BLD AUTO: 0.27 THOUSAND/ÂΜL (ref 0–0.61)
EOSINOPHIL NFR BLD AUTO: 3 % (ref 0–6)
ERYTHROCYTE [DISTWIDTH] IN BLOOD BY AUTOMATED COUNT: 14.6 % (ref 11.6–15.1)
EST. AVERAGE GLUCOSE BLD GHB EST-MCNC: 160 MG/DL
GFR SERPL CREATININE-BSD FRML MDRD: 81 ML/MIN/1.73SQ M
GLUCOSE P FAST SERPL-MCNC: 55 MG/DL (ref 65–99)
HBA1C MFR BLD: 7.2 %
HCT VFR BLD AUTO: 38.7 % (ref 36.5–49.3)
HDLC SERPL-MCNC: 57 MG/DL
HGB BLD-MCNC: 12.5 G/DL (ref 12–17)
IMM GRANULOCYTES # BLD AUTO: 0.03 THOUSAND/UL (ref 0–0.2)
IMM GRANULOCYTES NFR BLD AUTO: 0 % (ref 0–2)
LDLC SERPL CALC-MCNC: 44 MG/DL (ref 0–100)
LYMPHOCYTES # BLD AUTO: 2.26 THOUSANDS/ÂΜL (ref 0.6–4.47)
LYMPHOCYTES NFR BLD AUTO: 28 % (ref 14–44)
MCH RBC QN AUTO: 29.4 PG (ref 26.8–34.3)
MCHC RBC AUTO-ENTMCNC: 32.3 G/DL (ref 31.4–37.4)
MCV RBC AUTO: 91 FL (ref 82–98)
MICROALBUMIN UR-MCNC: 116.8 MG/L
MICROALBUMIN/CREAT 24H UR: 92 MG/G CREATININE (ref 0–30)
MONOCYTES # BLD AUTO: 0.49 THOUSAND/ÂΜL (ref 0.17–1.22)
MONOCYTES NFR BLD AUTO: 6 % (ref 4–12)
NEUTROPHILS # BLD AUTO: 4.88 THOUSANDS/ÂΜL (ref 1.85–7.62)
NEUTS SEG NFR BLD AUTO: 62 % (ref 43–75)
NONHDLC SERPL-MCNC: 56 MG/DL
NRBC BLD AUTO-RTO: 0 /100 WBCS
PLATELET # BLD AUTO: 400 THOUSANDS/UL (ref 149–390)
PMV BLD AUTO: 10.6 FL (ref 8.9–12.7)
POTASSIUM SERPL-SCNC: 4.2 MMOL/L (ref 3.5–5.3)
PROT SERPL-MCNC: 6.1 G/DL (ref 6.4–8.4)
RBC # BLD AUTO: 4.25 MILLION/UL (ref 3.88–5.62)
SODIUM SERPL-SCNC: 143 MMOL/L (ref 135–147)
TRIGL SERPL-MCNC: 58 MG/DL (ref ?–150)
WBC # BLD AUTO: 7.98 THOUSAND/UL (ref 4.31–10.16)

## 2025-04-28 ENCOUNTER — RESULTS FOLLOW-UP (OUTPATIENT)
Dept: FAMILY MEDICINE CLINIC | Facility: CLINIC | Age: 70
End: 2025-04-28

## 2025-05-07 PROBLEM — Z96.642 HISTORY OF LEFT HIP REPLACEMENT: Status: ACTIVE | Noted: 2024-12-09

## 2025-05-08 ENCOUNTER — RA CDI HCC (OUTPATIENT)
Dept: OTHER | Facility: HOSPITAL | Age: 70
End: 2025-05-08

## 2025-05-09 ENCOUNTER — OFFICE VISIT (OUTPATIENT)
Dept: FAMILY MEDICINE CLINIC | Facility: CLINIC | Age: 70
End: 2025-05-09
Payer: MEDICARE

## 2025-05-09 VITALS
HEIGHT: 71 IN | BODY MASS INDEX: 29.12 KG/M2 | OXYGEN SATURATION: 96 % | SYSTOLIC BLOOD PRESSURE: 142 MMHG | WEIGHT: 208 LBS | HEART RATE: 53 BPM | TEMPERATURE: 97.6 F | DIASTOLIC BLOOD PRESSURE: 62 MMHG

## 2025-05-09 DIAGNOSIS — I10 PRIMARY HYPERTENSION: ICD-10-CM

## 2025-05-09 DIAGNOSIS — M25.572 CHRONIC PAIN OF LEFT ANKLE: ICD-10-CM

## 2025-05-09 DIAGNOSIS — Z00.00 MEDICARE ANNUAL WELLNESS VISIT, SUBSEQUENT: Primary | ICD-10-CM

## 2025-05-09 DIAGNOSIS — Z79.4 TYPE 2 DIABETES MELLITUS WITH BOTH EYES AFFECTED BY MILD NONPROLIFERATIVE RETINOPATHY WITHOUT MACULAR EDEMA, WITH LONG-TERM CURRENT USE OF INSULIN (HCC): ICD-10-CM

## 2025-05-09 DIAGNOSIS — G89.29 CHRONIC PAIN OF LEFT ANKLE: ICD-10-CM

## 2025-05-09 DIAGNOSIS — E11.3293 TYPE 2 DIABETES MELLITUS WITH BOTH EYES AFFECTED BY MILD NONPROLIFERATIVE RETINOPATHY WITHOUT MACULAR EDEMA, WITH LONG-TERM CURRENT USE OF INSULIN (HCC): ICD-10-CM

## 2025-05-09 DIAGNOSIS — E78.5 HYPERLIPIDEMIA, UNSPECIFIED HYPERLIPIDEMIA TYPE: ICD-10-CM

## 2025-05-09 DIAGNOSIS — Z12.5 SCREENING FOR PROSTATE CANCER: ICD-10-CM

## 2025-05-09 LAB
DME PARACHUTE DELIVERY DATE REQUESTED: NORMAL
DME PARACHUTE ITEM DESCRIPTION: NORMAL
DME PARACHUTE ORDER STATUS: NORMAL
DME PARACHUTE SUPPLIER NAME: NORMAL
DME PARACHUTE SUPPLIER PHONE: NORMAL

## 2025-05-09 PROCEDURE — G2211 COMPLEX E/M VISIT ADD ON: HCPCS | Performed by: PHYSICIAN ASSISTANT

## 2025-05-09 PROCEDURE — G0439 PPPS, SUBSEQ VISIT: HCPCS | Performed by: PHYSICIAN ASSISTANT

## 2025-05-09 PROCEDURE — 99214 OFFICE O/P EST MOD 30 MIN: CPT | Performed by: PHYSICIAN ASSISTANT

## 2025-05-09 NOTE — PATIENT INSTRUCTIONS
Medicare Preventive Visit Patient Instructions  Thank you for completing your Welcome to Medicare Visit or Medicare Annual Wellness Visit today. Your next wellness visit will be due in one year (5/10/2026).  The screening/preventive services that you may require over the next 5-10 years are detailed below. Some tests may not apply to you based off risk factors and/or age. Screening tests ordered at today's visit but not completed yet may show as past due. Also, please note that scanned in results may not display below.  Preventive Screenings:  Service Recommendations Previous Testing/Comments   Colorectal Cancer Screening  Colonoscopy    Fecal Occult Blood Test (FOBT)/Fecal Immunochemical Test (FIT)  Fecal DNA/Cologuard Test  Flexible Sigmoidoscopy Age: 45-75 years old   Colonoscopy: every 10 years (May be performed more frequently if at higher risk)  OR  FOBT/FIT: every 1 year  OR  Cologuard: every 3 years  OR  Sigmoidoscopy: every 5 years  Screening may be recommended earlier than age 45 if at higher risk for colorectal cancer. Also, an individualized decision between you and your healthcare provider will decide whether screening between the ages of 76-85 would be appropriate. Colonoscopy: 03/26/2021  FOBT/FIT: Not on file  Cologuard: Not on file  Sigmoidoscopy: Not on file    Screening Current     Prostate Cancer Screening Individualized decision between patient and health care provider in men between ages of 55-69   Medicare will cover every 12 months beginning on the day after your 50th birthday PSA: 1.59 ng/mL           Hepatitis C Screening Once for adults born between 1945 and 1965  More frequently in patients at high risk for Hepatitis C Hep C Antibody: Not on file        Diabetes Screening 1-2 times per year if you're at risk for diabetes or have pre-diabetes Fasting glucose: 55 mg/dL (4/25/2025)  A1C: 7.2 % (4/25/2025)  Screening Not Indicated  History Diabetes   Cholesterol Screening Once every 5 years  if you don't have a lipid disorder. May order more often based on risk factors. Lipid panel: 04/25/2025  Screening Not Indicated  History Lipid Disorder      Other Preventive Screenings Covered by Medicare:  Abdominal Aortic Aneurysm (AAA) Screening: covered once if your at risk. You're considered to be at risk if you have a family history of AAA or a male between the age of 65-75 who smoking at least 100 cigarettes in your lifetime.  Lung Cancer Screening: covers low dose CT scan once per year if you meet all of the following conditions: (1) Age 55-77; (2) No signs or symptoms of lung cancer; (3) Current smoker or have quit smoking within the last 15 years; (4) You have a tobacco smoking history of at least 20 pack years (packs per day x number of years you smoked); (5) You get a written order from a healthcare provider.  Glaucoma Screening: covered annually if you're considered high risk: (1) You have diabetes OR (2) Family history of glaucoma OR (3)  aged 50 and older OR (4)  American aged 65 and older  Osteoporosis Screening: covered every 2 years if you meet one of the following conditions: (1) Have a vertebral abnormality; (2) On glucocorticoid therapy for more than 3 months; (3) Have primary hyperparathyroidism; (4) On osteoporosis medications and need to assess response to drug therapy.  HIV Screening: covered annually if you're between the age of 15-65. Also covered annually if you are younger than 15 and older than 65 with risk factors for HIV infection. For pregnant patients, it is covered up to 3 times per pregnancy.    Immunizations:  Immunization Recommendations   Influenza Vaccine Annual influenza vaccination during flu season is recommended for all persons aged >= 6 months who do not have contraindications   Pneumococcal Vaccine   * Pneumococcal conjugate vaccine = PCV13 (Prevnar 13), PCV15 (Vaxneuvance), PCV20 (Prevnar 20)  * Pneumococcal polysaccharide vaccine = PPSV23  (Pneumovax) Adults 19-63 yo with certain risk factors or if 65+ yo  If never received any pneumonia vaccine: recommend Prevnar 20 (PCV20)  Give PCV20 if previously received 1 dose of PCV13 or PPSV23   Hepatitis B Vaccine 3 dose series if at intermediate or high risk (ex: diabetes, end stage renal disease, liver disease)   Respiratory syncytial virus (RSV) Vaccine - COVERED BY MEDICARE PART D  * RSVPreF3 (Arexvy) CDC recommends that adults 60 years of age and older may receive a single dose of RSV vaccine using shared clinical decision-making (SCDM)   Tetanus (Td) Vaccine - COST NOT COVERED BY MEDICARE PART B Following completion of primary series, a booster dose should be given every 10 years to maintain immunity against tetanus. Td may also be given as tetanus wound prophylaxis.   Tdap Vaccine - COST NOT COVERED BY MEDICARE PART B Recommended at least once for all adults. For pregnant patients, recommended with each pregnancy.   Shingles Vaccine (Shingrix) - COST NOT COVERED BY MEDICARE PART B  2 shot series recommended in those 19 years and older who have or will have weakened immune systems or those 50 years and older     Health Maintenance Due:      Topic Date Due   • Hepatitis C Screening  Never done   • Colorectal Cancer Screening  03/26/2031     Immunizations Due:      Topic Date Due   • COVID-19 Vaccine (4 - 2024-25 season) 09/01/2024   • Pneumococcal Vaccine: 65+ Years (3 of 3 - PCV20 or PCV21) 09/20/2024     Advance Directives   What are advance directives?  Advance directives are legal documents that state your wishes and plans for medical care. These plans are made ahead of time in case you lose your ability to make decisions for yourself. Advance directives can apply to any medical decision, such as the treatments you want, and if you want to donate organs.   What are the types of advance directives?  There are many types of advance directives, and each state has rules about how to use them. You may  choose a combination of any of the following:  Living will:  This is a written record of the treatment you want. You can also choose which treatments you do not want, which to limit, and which to stop at a certain time. This includes surgery, medicine, IV fluid, and tube feedings.   Durable power of  for healthcare (DPAHC):  This is a written record that states who you want to make healthcare choices for you when you are unable to make them for yourself. This person, called a proxy, is usually a family member or a friend. You may choose more than 1 proxy.  Do not resuscitate (DNR) order:  A DNR order is used in case your heart stops beating or you stop breathing. It is a request not to have certain forms of treatment, such as CPR. A DNR order may be included in other types of advance directives.  Medical directive:  This covers the care that you want if you are in a coma, near death, or unable to make decisions for yourself. You can list the treatments you want for each condition. Treatment may include pain medicine, surgery, blood transfusions, dialysis, IV or tube feedings, and a ventilator (breathing machine).  Values history:  This document has questions about your views, beliefs, and how you feel and think about life. This information can help others choose the care that you would choose.  Why are advance directives important?  An advance directive helps you control your care. Although spoken wishes may be used, it is better to have your wishes written down. Spoken wishes can be misunderstood, or not followed. Treatments may be given even if you do not want them. An advance directive may make it easier for your family to make difficult choices about your care.   How to Quit Using Smokeless Tobacco   Why it is important to stop using smokeless tobacco:  Smokeless tobacco comes in many forms. Examples include chew, snuff, dip, dissolvable tobacco, and snus. All smokeless tobacco products contain nicotine  and may contain as much nicotine as 3 cigarettes. You may be physically dependent on nicotine. You may also be emotionally addicted to it. The cravings can be strong, but it is important to quit using smokeless tobacco. You will improve your health and decrease your cancer, stroke, and heart attack risk. Mouth sores and tooth problems will also improve when you quit. You can benefit from quitting no matter how long you have used smokeless tobacco.   Prepare to stop using smokeless tobacco:  Nicotine is a highly addictive drug. Withdrawal symptoms can happen when you stop and make it hard to quit. The following can help keep you on track:  Set a quit date.    Tell friends, family, and coworkers that you plan to quit.    Remove all smokeless tobacco products from your home, car, and workplace.    Manage weight gain after you quit:  Nicotine can affect your metabolism. You may gain a few pounds after you quit. The following can help you control your weight:  Eat healthy foods.    Drink water before, during, and between meals.    Exercise as directed.      Weight Management   Why it is important to manage your weight:  Being overweight increases your risk of health conditions such as heart disease, high blood pressure, type 2 diabetes, and certain types of cancer. It can also increase your risk for osteoarthritis, sleep apnea, and other respiratory problems. Aim for a slow, steady weight loss. Even a small amount of weight loss can lower your risk of health problems.  How to lose weight safely:  A safe and healthy way to lose weight is to eat fewer calories and get regular exercise. You can lose up about 1 pound a week by decreasing the number of calories you eat by 500 calories each day.   Healthy meal plan for weight management:  A healthy meal plan includes a variety of foods, contains fewer calories, and helps you stay healthy. A healthy meal plan includes the following:  Eat whole-grain foods more often.  A healthy  meal plan should contain fiber. Fiber is the part of grains, fruits, and vegetables that is not broken down by your body. Whole-grain foods are healthy and provide extra fiber in your diet. Some examples of whole-grain foods are whole-wheat breads and pastas, oatmeal, brown rice, and bulgur.  Eat a variety of vegetables every day.  Include dark, leafy greens such as spinach, kale, pat greens, and mustard greens. Eat yellow and orange vegetables such as carrots, sweet potatoes, and winter squash.   Eat a variety of fruits every day.  Choose fresh or canned fruit (canned in its own juice or light syrup) instead of juice. Fruit juice has very little or no fiber.  Eat low-fat dairy foods.  Drink fat-free (skim) milk or 1% milk. Eat fat-free yogurt and low-fat cottage cheese. Try low-fat cheeses such as mozzarella and other reduced-fat cheeses.  Choose meat and other protein foods that are low in fat.  Choose beans or other legumes such as split peas or lentils. Choose fish, skinless poultry (chicken or turkey), or lean cuts of red meat (beef or pork). Before you cook meat or poultry, cut off any visible fat.   Use less fat and oil.  Try baking foods instead of frying them. Add less fat, such as margarine, sour cream, regular salad dressing and mayonnaise to foods. Eat fewer high-fat foods. Some examples of high-fat foods include french fries, doughnuts, ice cream, and cakes.  Eat fewer sweets.  Limit foods and drinks that are high in sugar. This includes candy, cookies, regular soda, and sweetened drinks.  Exercise:  Exercise at least 30 minutes per day on most days of the week. Some examples of exercise include walking, biking, dancing, and swimming. You can also fit in more physical activity by taking the stairs instead of the elevator or parking farther away from stores. Ask your healthcare provider about the best exercise plan for you.      © Copyright [employer] 2018 Information is for End User's use only  and may not be sold, redistributed or otherwise used for commercial purposes. All illustrations and images included in CareNotes® are the copyrighted property of [employer] or [employer] ([employer]

## 2025-05-09 NOTE — PROGRESS NOTES
Name: Rich Abel      : 1955      MRN: [id number]  Encounter Provider: Coleen Barrera PA-C  Encounter Date: 2025   Encounter department: [facility]  :  Assessment & Plan  Medicare annual wellness visit, subsequent         Type 2 diabetes mellitus with both eyes affected by mild nonproliferative retinopathy without macular edema, with long-term current use of insulin (HCC)    Lab Results   Component Value Date    HGBA1C 7.2 (H) 2025     BS is acceptable continue current regimen.  Will try to obtain continuous glucometer reader       Primary hypertension  Blood pressure at goal continue lisinopril 10 mg       Hyperlipidemia, unspecified hyperlipidemia type  Lipids at goal continue atorvastatin 10 mg and low-fat diet       Chronic pain of left ankle  Start with podiatry.         Tobacco Cessation Counseling: Tobacco cessation counseling was provided. The patient is sincerely urged to quit consumption of tobacco. He is not ready to quit tobacco. chews      Preventive health issues were discussed with patient, and age appropriate screening tests were ordered as noted in patient's After Visit Summary. Personalized health advice and appropriate referrals for health education or preventive services given if needed, as noted in patient's After Visit Summary.    History of Present Illness     Patient presents in the office for follow-up chronic conditions.  Patient has diabetes type 2 on insulin.  He also has complications of retinopathy.  Current regimen includes metformin ER 1000 mg twice daily and Basaglar 66 units.  Patient's A1c is up to 7.2.  Urine microalbumin is positive.  Had some stress over the winter.  Also his continuous glucometer reader is not covered by his insurance.  Will try to order through DME.  Attic and renal functions are stable.  Pressure remains controlled on lisinopril 10 mg.  Hyperlipidemia he is on atorvastatin 10 mg.  Lipid panel at goal.  BC shows  slightly elevated platelets.  Will continue to monitor       Patient Care Team:  Coleen Barrera PA-C as PCP - General (Family Medicine)  Jeff Rosen MD as Endoscopist    Review of Systems   Constitutional:  Negative for activity change, appetite change, chills, fatigue and fever.   HENT:  Negative for ear pain and sore throat.    Eyes:  Negative for visual disturbance.   Respiratory:  Negative for cough and shortness of breath.    Cardiovascular:  Positive for leg swelling. Negative for chest pain and palpitations.        Left  lower  leg  and   ankle.    Gastrointestinal:  Negative for abdominal pain, blood in stool, constipation, diarrhea and nausea.   Genitourinary:  Negative for difficulty urinating.   Musculoskeletal:  Positive for arthralgias. Negative for back pain and myalgias.   Skin:  Negative for rash.   Neurological:  Negative for dizziness, syncope and headaches.   Psychiatric/Behavioral:  Negative for self-injury, sleep disturbance and suicidal ideas. The patient is not nervous/anxious.      Medical History Reviewed by provider this encounter:  Allergies  Meds  Problems       Annual Wellness Visit Questionnaire   Rich is here for his Subsequent Wellness visit.     Health Risk Assessment:   Patient rates overall health as good. Patient feels that their physical health rating is same. Patient is satisfied with their life. Eyesight was rated as same. Hearing was rated as same. Patient feels that their emotional and mental health rating is slightly better. Patients states they are never, rarely angry. Patient states they are sometimes unusually tired/fatigued. Pain experienced in the last 7 days has been some. Patient's pain rating has been 4/10. Patient states that he has experienced weight loss or gain in last 6 months. Right  hip  pain    Fall Risk Screening:   In the past year, patient has experienced: no history of falling in past year      Home Safety:  Patient does not have trouble  with stairs inside or outside of their home. Patient has working smoke alarms and has working carbon monoxide detector. Home safety hazards include: none.     Nutrition:   Current diet is Diabetic and Low Carb.     Medications:   Patient is currently taking over-the-counter supplements. OTC medications include: see medication list. Patient is able to manage medications.     Activities of Daily Living (ADLs)/Instrumental Activities of Daily Living (IADLs):   Walk and transfer into and out of bed and chair?: Yes  Dress and groom yourself?: Yes    Bathe or shower yourself?: Yes    Feed yourself? Yes  Do your laundry/housekeeping?: Yes  Manage your money, pay your bills and track your expenses?: Yes  Make your own meals?: Yes    Do your own shopping?: Yes    Previous Hospitalizations:   Any hospitalizations or ED visits within the last 12 months?: Yes    How many hospitalizations have you had in the last year?: 1-2    Hospitalization Comments: hip replacement    Advance Care Planning:   Living will: Yes    Durable POA for healthcare: Yes    Advanced directive: Yes      Cognitive Screening:   Provider or family/friend/caregiver concerned regarding cognition?: No    Preventive Screenings      Cardiovascular Screening:    General: History Lipid Disorder and Screening Current      Diabetes Screening:     General: History Diabetes and Screening Current      Colorectal Cancer Screening:     General: Screening Current      Prostate Cancer Screening:      Due for: PSA      Abdominal Aortic Aneurysm (AAA) Screening:    Risk factors include: age between 65-76 yo and tobacco use        General: Screening Current      Lung Cancer Screening:     General: Screening Not Indicated    Immunizations:  - Immunizations due: Prevnar 20 and Zoster (Shingrix)    Screening, Brief Intervention, and Referral to Treatment (SBIRT)     Screening  Typical number of drinks in a day: 0  Typical number of drinks in a week: 0  Interpretation: Low risk  "drinking behavior.    AUDIT-C Screenin) How often did you have a drink containing alcohol in the past year? monthly or less  2) How many drinks did you have on a typical day when you were drinking in the past year? 1 to 2  3) How often did you have 6 or more drinks on one occasion in the past year? never    AUDIT-C Score: 1  Interpretation: Score 0-3 (male): Negative screen for alcohol misuse    Single Item Drug Screening:  How often have you used an illegal drug (including marijuana) or a prescription medication for non-medical reasons in the past year? never    Single Item Drug Screen Score: 0  Interpretation: Negative screen for possible drug use disorder    Social Drivers of Health     Financial Resource Strain: Low Risk  (2023)    Overall Financial Resource Strain (CARDIA)     Difficulty of Paying Living Expenses: Not hard at all   Food Insecurity: No Food Insecurity (2025)    Hunger Vital Sign     Worried About Running Out of Food in the Last Year: Never true     Ran Out of Food in the Last Year: Never true   Transportation Needs: No Transportation Needs (2025)    PRAPARE - Transportation     Lack of Transportation (Medical): No     Lack of Transportation (Non-Medical): No   Housing Stability: Unknown (2025)    Housing Stability Vital Sign     Unable to Pay for Housing in the Last Year: No     Homeless in the Last Year: No   Utilities: Not At Risk (2025)    [facility] Utilities     Threatened with loss of utilities: No     No results found.    Objective   /62 (BP Location: Left arm, Patient Position: Sitting, Cuff Size: Large)   Pulse (!) 53   Temp 97.6 °F (36.4 °C) (Temporal)   Ht 5' 10.5\" (1.791 m)   Wt 94.3 kg (208 lb)   SpO2 96%   BMI 29.42 kg/m²     Physical Exam  Vitals and nursing note reviewed.   Constitutional:       General: He is not in acute distress.     Appearance: Normal appearance. He is well-developed. He is not ill-appearing.   HENT:      Head: Normocephalic and " atraumatic.      Right Ear: Tympanic membrane, ear canal and external ear normal.      Left Ear: Tympanic membrane, ear canal and external ear normal.   Eyes:      Conjunctiva/sclera: Conjunctivae normal.      Pupils: Pupils are equal, round, and reactive to light.   Neck:      Thyroid: No thyromegaly.      Vascular: No carotid bruit.   Cardiovascular:      Rate and Rhythm: Normal rate and regular rhythm.      Pulses: no weak pulses.           Dorsalis pedis pulses are 2+ on the right side and 2+ on the left side.      Heart sounds: Normal heart sounds. No murmur heard.  Pulmonary:      Effort: Pulmonary effort is normal.      Breath sounds: Normal breath sounds. No wheezing.   Abdominal:      General: Bowel sounds are normal.      Palpations: Abdomen is soft. There is no mass.      Tenderness: There is no abdominal tenderness.   Musculoskeletal:      Right lower leg: No edema.      Left lower leg: Edema present.      Comments: Chronic  left  ankle pain. H/o of  surgery.  Ankle   tender  medially and  laterally.    Feet:      Right foot:      Skin integrity: No ulcer, skin breakdown, erythema, warmth, callus or dry skin.      Left foot:      Skin integrity: No ulcer, skin breakdown, erythema, warmth, callus or dry skin.   Lymphadenopathy:      Cervical: No cervical adenopathy.   Skin:     General: Skin is warm and dry.   Neurological:      General: No focal deficit present.      Mental Status: He is alert and oriented to person, place, and time.   Psychiatric:         Mood and Affect: Mood normal.         Behavior: Behavior normal.         Thought Content: Thought content normal.         Judgment: Judgment normal.     Diabetic Foot Exam    Patient's shoes and socks removed.    Right Foot/Ankle   Right Foot Inspection  Skin Exam: skin normal and skin intact. No dry skin, no warmth, no callus, no erythema, no maceration, no abnormal color, no pre-ulcer, no ulcer and no callus.     Toe Exam: right toe deformity. No  swelling, no tenderness and erythema    Sensory   Vibration: intact  Monofilament testing: intact    Vascular  The right DP pulse is 2+.     Left Foot/Ankle  Left Foot Inspection  Skin Exam: skin normal and skin intact. No dry skin, no warmth, no erythema, no maceration, normal color, no pre-ulcer, no ulcer and no callus.     Toe Exam: left toe deformity. No swelling, no tenderness and no erythema.     Sensory   Vibration: intact  Monofilament testing: intact    Vascular  The left DP pulse is 2+.     Assign Risk Category  Deformity present  No loss of protective sensation  No weak pulses  Risk: 0

## 2025-05-09 NOTE — ASSESSMENT & PLAN NOTE
Lab Results   Component Value Date    HGBA1C 7.2 (H) 04/25/2025     BS is acceptable continue current regimen.  Will try to obtain continuous glucometer reader

## 2025-05-15 LAB
DME PARACHUTE DELIVERY DATE ACTUAL: NORMAL
DME PARACHUTE DELIVERY DATE EXPECTED: NORMAL
DME PARACHUTE DELIVERY DATE REQUESTED: NORMAL
DME PARACHUTE ITEM DESCRIPTION: NORMAL
DME PARACHUTE ORDER STATUS: NORMAL
DME PARACHUTE SUPPLIER NAME: NORMAL
DME PARACHUTE SUPPLIER PHONE: NORMAL

## 2025-05-16 ENCOUNTER — OFFICE VISIT (OUTPATIENT)
Dept: OBGYN CLINIC | Facility: MEDICAL CENTER | Age: 70
End: 2025-05-16
Payer: MEDICARE

## 2025-05-16 ENCOUNTER — APPOINTMENT (OUTPATIENT)
Dept: RADIOLOGY | Facility: MEDICAL CENTER | Age: 70
End: 2025-05-16
Attending: ORTHOPAEDIC SURGERY
Payer: MEDICARE

## 2025-05-16 VITALS — HEIGHT: 71 IN | BODY MASS INDEX: 28.98 KG/M2 | WEIGHT: 207 LBS

## 2025-05-16 DIAGNOSIS — Z47.1 AFTERCARE FOLLOWING LEFT HIP JOINT REPLACEMENT SURGERY: ICD-10-CM

## 2025-05-16 DIAGNOSIS — Z96.642 AFTERCARE FOLLOWING LEFT HIP JOINT REPLACEMENT SURGERY: Primary | ICD-10-CM

## 2025-05-16 DIAGNOSIS — Z47.1 AFTERCARE FOLLOWING LEFT HIP JOINT REPLACEMENT SURGERY: Primary | ICD-10-CM

## 2025-05-16 DIAGNOSIS — Z96.642 AFTERCARE FOLLOWING LEFT HIP JOINT REPLACEMENT SURGERY: ICD-10-CM

## 2025-05-16 PROCEDURE — 73502 X-RAY EXAM HIP UNI 2-3 VIEWS: CPT

## 2025-05-16 PROCEDURE — 99213 OFFICE O/P EST LOW 20 MIN: CPT | Performed by: ORTHOPAEDIC SURGERY

## 2025-05-16 NOTE — ASSESSMENT & PLAN NOTE
5+ months s/p left JEMMA, 12/9/2024   He is doing well.    He is encouraged to remain active including HEP.    The patient is counseled on prophylactic antibiotic use prior to dental visits.    He should follow up in 6 months    Left ankle pain  Patient encouraged to return to podiatrist for care

## 2025-05-16 NOTE — PROGRESS NOTES
Encounter Provider: Abiel Green MD   Encounter Date: 05/16/25  Encounter department: [facility]         ASSESSMENT & PLAN:  Assessment & Plan  Aftercare following left hip joint replacement surgery  5+ months s/p left JEMMA, 12/9/2024   He is doing well.    He is encouraged to remain active including HEP.    The patient is counseled on prophylactic antibiotic use prior to dental visits.    He should follow up in 6 months    Left ankle pain  Patient encouraged to return to podiatrist for care              To do next visit:  Return for re-check with x-rays.    Scribe Attestation      I,:  Jigar Blue MA am acting as a scribe while in the presence of the attending physician.:       I,:  Abiel Green MD personally performed the services described in this documentation    as scribed in my presence.:             ____________________________________________________  CHIEF COMPLAINT:  Chief Complaint   Patient presents with    Left Hip - Follow-up         SUBJECTIVE:  Rich Abel is a 69 y.o. male who presents 5+ months s/p left JEMMA, 12/9/2024.  He is doing well.  Today he complains of left hip tightness and heaviness of leg with stairs.  He has returned to baseline activity without repercussion.             PAST MEDICAL HISTORY:  Past Medical History:   Diagnosis Date    Arthritis     Cancer (HCC)     Colon polyp     Diabetes mellitus (HCC)     Ear problems     Essential hypertension 02/05/2020    Glaucoma     HL (hearing loss)     Hyperlipidemia 02/05/2020    Hypertension     Kidney stone     Melanoma of ear, left (HCC)     Nasal polyp     Tinnitus        PAST SURGICAL HISTORY:  Past Surgical History:   Procedure Laterality Date    ARTHROSCOPY KNEE Bilateral     BASAL CELL CARCINOMA EXCISION  03/2020    left arm    CYSTOSCOPY      kidney stone removal    FOOT TENDON SURGERY Left 06/22/2021    Procedure: ANTERIOR TALOFIBULAR LIGAMENT REPAIR;  Surgeon: Trent  "LATOSHA Obregon;  Location: [facility];  Service: Podiatry    FRACTURE SURGERY  March 2023    KNEE SURGERY      NASAL POLYP EXCISION      NJ ARTHRP ACETBLR/PROX FEM PROSTC AGRFT/ALGRFT Left 12/9/2024    Procedure: ARTHROPLASTY HIP TOTAL;  Surgeon: Abiel Green MD;  Location: [facility];  Service: Orthopedics    NJ COLONOSCOPY FLX DX W/COLLJ SPEC WHEN PFRMD N/A 08/08/2017    Procedure: COLONOSCOPY;  Surgeon: Jeff Rosen MD;  Location: [facility];  Service: Colorectal    NJ RPR TDN FLXR FOOT 1/2 W/O FREE GRAFG EACH TENDON Left 12/08/2017    Procedure: ANKLE PERONEUS LONGUS TENDON REPAIR with allograft;  RETINACULUM REPAIR SECONDARY  Application of Provena Vac;  Surgeon: Trent Obregon DPM;  Location: [facility];  Service: Podiatry    ROTATOR CUFF REPAIR Bilateral     SHOULDER ARTHROSCOPY      WISDOM TOOTH EXTRACTION         FAMILY HISTORY:  Family History   Problem Relation Age of Onset    Heart disease Mother         MOTHER    Diabetes Mother     Melanoma Mother     Cancer Mother     Aortic aneurysm Father         abdominal     Prostate cancer Father     Colon cancer Father        SOCIAL HISTORY:  Social History[1]    MEDICATIONS:  Current Medications[2]    ALLERGIES:  Allergies[3]    LABS:  HgA1c:   Lab Results   Component Value Date    HGBA1C 7.2 (H) 04/25/2025     BMP:   Lab Results   Component Value Date    GLUCOSE 200 (H) 01/08/2016    CALCIUM 8.8 04/25/2025     01/08/2016    K 4.2 04/25/2025    CO2 26 04/25/2025     04/25/2025    BUN 15 04/25/2025    CREATININE 0.94 04/25/2025     CBC: No components found for: \"CBC\"    _____________________________________________________  PHYSICAL EXAMINATION:  Vital signs: Ht 5' 10.5\" (1.791 m)   Wt 93.9 kg (207 lb)   BMI 29.28 kg/m²   General: No acute distress, awake and alert  Psychiatric: Mood and affect appear appropriate  HEENT: Trachea Midline, No torticollis, no apparent facial trauma  Cardiovascular: No audible murmurs; Extremities appear " "perfused  Pulmonary: No audible wheezing or stridor  Skin: No open lesions; see further details (if any) below    Ortho Exam:  Left hip:  Well healed posterior incision  Good arc of motion with no pain  Patient sits comfortably in chair with hip flexed at 90 degrees  Patient stands from seated position without assistance  Calf compartments soft and supple  Sensation intact  Toes are warm sensate and mobile      _____________________________________________________  STUDIES REVIEWED:    The attending physician has personally reviewed the pertinent films in PACS and interpretation is as follows:  Left hip x-ray:  Well aligned prosthesis with no acute changes.      PROCEDURES PERFORMED:  Procedures      None Preformed       Portions of the record may have been created with voice recognition software.  Occasional wrong word or \"sound a like\" substitutions may have occurred due to the inherent limitations of voice recognition software.  Read the chart carefully and recognize, using context, where substitutions have occurred.             [1]   Social History  Tobacco Use    Smoking status: Former     Current packs/day: 0.00     Average packs/day: 1 pack/day for 33.0 years (33.0 ttl pk-yrs)     Types: Cigarettes     Start date: 1974     Quit date: 2007     Years since quittin.9     Passive exposure: Past    Smokeless tobacco: Current     Types: Chew    Tobacco comments:     quit 15 years ago   Vaping Use    Vaping status: Never Used   Substance Use Topics    Alcohol use: Yes     Alcohol/week: 2.0 standard drinks of alcohol     Types: 2 Standard drinks or equivalent per week    Drug use: No   [2]   Current Outpatient Medications:     acetaminophen (TYLENOL) 500 mg tablet, Take 2 tablets (1,000 mg total) by mouth every 6 (six) hours as needed for mild pain, Disp: 90 tablet, Rfl: 0    aspirin (ECOTRIN LOW STRENGTH) 81 mg EC tablet, Take 81 mg by mouth in the morning., Disp: , Rfl:     atorvastatin (LIPITOR) 10 " mg tablet, TAKE 1 TABLET BY MOUTH EVERY DAY, Disp: 90 tablet, Rfl: 1    BD Pen Needle Meghana 2nd Gen 32G X 4 MM MISC, DAILY USE AS DIRECTED, Disp: 100 each, Rfl: 1    fluticasone (FLONASE) 50 mcg/act nasal spray, 1 spray into each nostril in the morning., Disp: , Rfl:     gabapentin (NEURONTIN) 100 mg capsule, TAKE 1 CAPSULE BY MOUTH TWICE A DAY, Disp: 60 capsule, Rfl: 5    Insulin Glargine Solostar (Basaglar KwikPen) 100 UNIT/ML SOPN, INJECT 66 UNITS UNDER THE SKIN IN THE MORNING, Disp: 60 mL, Rfl: 2    lisinopril (ZESTRIL) 10 mg tablet, TAKE 1 TABLET BY MOUTH EVERY DAY, Disp: 90 tablet, Rfl: 1    MAGNESIUM PO, Take by mouth, Disp: , Rfl:     metFORMIN (GLUCOPHAGE-XR) 500 mg 24 hr tablet, TAKE 2 TABLETS BY MOUTH TWICE A DAY, Disp: 360 tablet, Rfl: 1    methocarbamol (ROBAXIN) 500 mg tablet, Take 1 tablet (500 mg total) by mouth 3 (three) times a day as needed for muscle spasms, Disp: 60 tablet, Rfl: 0    Milk Thistle 150 MG CAPS, Take by mouth, Disp: , Rfl:     Multiple Vitamins-Minerals (MULTIVITAMIN WITH MINERALS) tablet, Take 1 tablet by mouth in the morning., Disp: , Rfl:     Simbrinza 1-0.2 % SUSP, , Disp: , Rfl:     sodium chloride (OCEAN) 0.65 % nasal spray, 1 spray into each nostril as needed for congestion, Disp: , Rfl:     TURMERIC PO, Take by mouth, Disp: , Rfl:     Vitamins-Lipotropics (B-100 PO), Take by mouth, Disp: , Rfl:     Cholecalciferol 125 MCG (5000 UT) capsule, Take 5,000 Units by mouth daily (Patient not taking: Reported on 12/20/2024), Disp: , Rfl:     Continuous Glucose  (FreeStyle Kal[place]ana 3 [place]) CAMRON, Check  blood sugar  4 times  per day (Patient not taking: Reported on 8/2/2024), Disp: 1 each, Rfl: 0    Continuous Glucose Sensor (FreeStyle Kalpana 3 Sensor) MISC, Use 1 each every 14 (fourteen) days (Patient not taking: Reported on 8/2/2024), Disp: 6 each, Rfl: 3  [3] No Known Allergies

## 2025-05-20 ENCOUNTER — TELEPHONE (OUTPATIENT)
Age: 70
End: 2025-05-20

## 2025-05-20 NOTE — TELEPHONE ENCOUNTER
Caller: Rich Abel    Doctor: Dr. Castelan    Reason for call: appt/checked chart for date last seen/2024/scheduled    Call back#: NA

## 2025-06-04 ENCOUNTER — NURSE TRIAGE (OUTPATIENT)
Age: 70
End: 2025-06-04

## 2025-06-04 ENCOUNTER — HOSPITAL ENCOUNTER (INPATIENT)
Facility: HOSPITAL | Age: 70
LOS: 2 days | Discharge: HOME | DRG: 287 | End: 2025-06-07
Attending: EMERGENCY MEDICINE | Admitting: INTERNAL MEDICINE
Payer: MEDICARE

## 2025-06-04 ENCOUNTER — APPOINTMENT (EMERGENCY)
Dept: RADIOLOGY | Facility: HOSPITAL | Age: 70
DRG: 287 | End: 2025-06-04
Payer: MEDICARE

## 2025-06-04 DIAGNOSIS — E11.3293 TYPE 2 DIABETES MELLITUS WITH BOTH EYES AFFECTED BY MILD NONPROLIFERATIVE RETINOPATHY WITHOUT MACULAR EDEMA, WITH LONG-TERM CURRENT USE OF INSULIN (HCC): ICD-10-CM

## 2025-06-04 DIAGNOSIS — R07.9 CHEST PAIN: Primary | ICD-10-CM

## 2025-06-04 DIAGNOSIS — Z79.4 TYPE 2 DIABETES MELLITUS WITH BOTH EYES AFFECTED BY MILD NONPROLIFERATIVE RETINOPATHY WITHOUT MACULAR EDEMA, WITH LONG-TERM CURRENT USE OF INSULIN (HCC): ICD-10-CM

## 2025-06-04 DIAGNOSIS — I25.10 CAD (CORONARY ARTERY DISEASE), NATIVE CORONARY ARTERY: ICD-10-CM

## 2025-06-04 LAB
2HR DELTA HS TROPONIN: 1 NG/L
4HR DELTA HS TROPONIN: 0 NG/L
ALBUMIN SERPL BCG-MCNC: 4 G/DL (ref 3.5–5)
ALP SERPL-CCNC: 60 U/L (ref 34–104)
ALT SERPL W P-5'-P-CCNC: 20 U/L (ref 7–52)
ANION GAP SERPL CALCULATED.3IONS-SCNC: 6 MMOL/L (ref 4–13)
AST SERPL W P-5'-P-CCNC: 18 U/L (ref 13–39)
BASOPHILS # BLD AUTO: 0.04 THOUSANDS/ÂΜL (ref 0–0.1)
BASOPHILS NFR BLD AUTO: 1 % (ref 0–1)
BILIRUB SERPL-MCNC: 0.9 MG/DL (ref 0.2–1)
BUN SERPL-MCNC: 15 MG/DL (ref 5–25)
CALCIUM SERPL-MCNC: 8.7 MG/DL (ref 8.4–10.2)
CARDIAC TROPONIN I PNL SERPL HS: 6 NG/L (ref ?–50)
CARDIAC TROPONIN I PNL SERPL HS: 6 NG/L (ref ?–50)
CARDIAC TROPONIN I PNL SERPL HS: 7 NG/L (ref ?–50)
CHLORIDE SERPL-SCNC: 110 MMOL/L (ref 96–108)
CO2 SERPL-SCNC: 24 MMOL/L (ref 21–32)
CREAT SERPL-MCNC: 0.92 MG/DL (ref 0.6–1.3)
EOSINOPHIL # BLD AUTO: 0.27 THOUSAND/ÂΜL (ref 0–0.61)
EOSINOPHIL NFR BLD AUTO: 4 % (ref 0–6)
ERYTHROCYTE [DISTWIDTH] IN BLOOD BY AUTOMATED COUNT: 14.3 % (ref 11.6–15.1)
GFR SERPL CREATININE-BSD FRML MDRD: 83 ML/MIN/1.73SQ M
GLUCOSE SERPL-MCNC: 142 MG/DL (ref 65–140)
GLUCOSE SERPL-MCNC: 143 MG/DL (ref 65–140)
GLUCOSE SERPL-MCNC: 95 MG/DL (ref 65–140)
HCT VFR BLD AUTO: 37 % (ref 36.5–49.3)
HGB BLD-MCNC: 12.5 G/DL (ref 12–17)
IMM GRANULOCYTES # BLD AUTO: 0.02 THOUSAND/UL (ref 0–0.2)
IMM GRANULOCYTES NFR BLD AUTO: 0 % (ref 0–2)
LYMPHOCYTES # BLD AUTO: 2.56 THOUSANDS/ÂΜL (ref 0.6–4.47)
LYMPHOCYTES NFR BLD AUTO: 34 % (ref 14–44)
MCH RBC QN AUTO: 30.5 PG (ref 26.8–34.3)
MCHC RBC AUTO-ENTMCNC: 33.8 G/DL (ref 31.4–37.4)
MCV RBC AUTO: 90 FL (ref 82–98)
MONOCYTES # BLD AUTO: 0.56 THOUSAND/ÂΜL (ref 0.17–1.22)
MONOCYTES NFR BLD AUTO: 7 % (ref 4–12)
NEUTROPHILS # BLD AUTO: 4.08 THOUSANDS/ÂΜL (ref 1.85–7.62)
NEUTS SEG NFR BLD AUTO: 54 % (ref 43–75)
NRBC BLD AUTO-RTO: 0 /100 WBCS
PLATELET # BLD AUTO: 328 THOUSANDS/UL (ref 149–390)
PLATELET # BLD AUTO: 332 THOUSANDS/UL (ref 149–390)
PMV BLD AUTO: 10 FL (ref 8.9–12.7)
PMV BLD AUTO: 10.1 FL (ref 8.9–12.7)
POTASSIUM SERPL-SCNC: 3.9 MMOL/L (ref 3.5–5.3)
PROT SERPL-MCNC: 6 G/DL (ref 6.4–8.4)
RBC # BLD AUTO: 4.1 MILLION/UL (ref 3.88–5.62)
SODIUM SERPL-SCNC: 140 MMOL/L (ref 135–147)
WBC # BLD AUTO: 7.53 THOUSAND/UL (ref 4.31–10.16)

## 2025-06-04 PROCEDURE — 99285 EMERGENCY DEPT VISIT HI MDM: CPT | Performed by: EMERGENCY MEDICINE

## 2025-06-04 PROCEDURE — 80053 COMPREHEN METABOLIC PANEL: CPT

## 2025-06-04 PROCEDURE — 99285 EMERGENCY DEPT VISIT HI MDM: CPT

## 2025-06-04 PROCEDURE — 85025 COMPLETE CBC W/AUTO DIFF WBC: CPT

## 2025-06-04 PROCEDURE — 84484 ASSAY OF TROPONIN QUANT: CPT

## 2025-06-04 PROCEDURE — 36415 COLL VENOUS BLD VENIPUNCTURE: CPT

## 2025-06-04 PROCEDURE — 85049 AUTOMATED PLATELET COUNT: CPT | Performed by: PHYSICIAN ASSISTANT

## 2025-06-04 PROCEDURE — 71045 X-RAY EXAM CHEST 1 VIEW: CPT

## 2025-06-04 PROCEDURE — 99223 1ST HOSP IP/OBS HIGH 75: CPT | Performed by: PHYSICIAN ASSISTANT

## 2025-06-04 PROCEDURE — 84484 ASSAY OF TROPONIN QUANT: CPT | Performed by: PHYSICIAN ASSISTANT

## 2025-06-04 PROCEDURE — 93005 ELECTROCARDIOGRAM TRACING: CPT

## 2025-06-04 PROCEDURE — 82948 REAGENT STRIP/BLOOD GLUCOSE: CPT

## 2025-06-04 RX ORDER — FLUTICASONE PROPIONATE 50 MCG
1 SPRAY, SUSPENSION (ML) NASAL DAILY
Status: DISCONTINUED | OUTPATIENT
Start: 2025-06-05 | End: 2025-06-07 | Stop reason: HOSPADM

## 2025-06-04 RX ORDER — ATORVASTATIN CALCIUM 10 MG/1
10 TABLET, FILM COATED ORAL DAILY
Status: DISCONTINUED | OUTPATIENT
Start: 2025-06-05 | End: 2025-06-06

## 2025-06-04 RX ORDER — ACETAMINOPHEN 325 MG/1
650 TABLET ORAL EVERY 6 HOURS PRN
Status: DISCONTINUED | OUTPATIENT
Start: 2025-06-04 | End: 2025-06-07 | Stop reason: HOSPADM

## 2025-06-04 RX ORDER — ONDANSETRON 2 MG/ML
4 INJECTION INTRAMUSCULAR; INTRAVENOUS EVERY 6 HOURS PRN
Status: DISCONTINUED | OUTPATIENT
Start: 2025-06-04 | End: 2025-06-07 | Stop reason: HOSPADM

## 2025-06-04 RX ORDER — DORZOLAMIDE HYDROCHLORIDE AND TIMOLOL MALEATE 20; 5 MG/ML; MG/ML
1 SOLUTION/ DROPS OPHTHALMIC 2 TIMES DAILY
Status: DISCONTINUED | OUTPATIENT
Start: 2025-06-04 | End: 2025-06-07 | Stop reason: HOSPADM

## 2025-06-04 RX ORDER — GABAPENTIN 100 MG/1
100 CAPSULE ORAL 2 TIMES DAILY
Status: DISCONTINUED | OUTPATIENT
Start: 2025-06-04 | End: 2025-06-07 | Stop reason: HOSPADM

## 2025-06-04 RX ORDER — LISINOPRIL 10 MG/1
10 TABLET ORAL DAILY
Status: DISCONTINUED | OUTPATIENT
Start: 2025-06-05 | End: 2025-06-07 | Stop reason: HOSPADM

## 2025-06-04 RX ORDER — ENOXAPARIN SODIUM 100 MG/ML
40 INJECTION SUBCUTANEOUS DAILY
Status: DISCONTINUED | OUTPATIENT
Start: 2025-06-05 | End: 2025-06-07 | Stop reason: HOSPADM

## 2025-06-04 RX ORDER — ASPIRIN 81 MG/1
81 TABLET ORAL DAILY
Status: DISCONTINUED | OUTPATIENT
Start: 2025-06-05 | End: 2025-06-07 | Stop reason: HOSPADM

## 2025-06-04 RX ORDER — DORZOLAMIDE HYDROCHLORIDE AND TIMOLOL MALEATE 20; 5 MG/ML; MG/ML
1 SOLUTION/ DROPS OPHTHALMIC 2 TIMES DAILY
COMMUNITY

## 2025-06-04 RX ORDER — INSULIN LISPRO 100 [IU]/ML
1-6 INJECTION, SOLUTION INTRAVENOUS; SUBCUTANEOUS
Status: DISCONTINUED | OUTPATIENT
Start: 2025-06-04 | End: 2025-06-07 | Stop reason: HOSPADM

## 2025-06-04 RX ORDER — INSULIN GLARGINE 100 [IU]/ML
55 INJECTION, SOLUTION SUBCUTANEOUS EVERY MORNING
Status: DISCONTINUED | OUTPATIENT
Start: 2025-06-05 | End: 2025-06-05

## 2025-06-04 RX ORDER — ASPIRIN 81 MG/1
243 TABLET, CHEWABLE ORAL ONCE
Status: COMPLETED | OUTPATIENT
Start: 2025-06-04 | End: 2025-06-04

## 2025-06-04 RX ADMIN — ASPIRIN 243 MG: 81 TABLET, CHEWABLE ORAL at 13:16

## 2025-06-04 RX ADMIN — GABAPENTIN 100 MG: 100 CAPSULE ORAL at 18:24

## 2025-06-04 RX ADMIN — DORZOLAMIDE HYDROCHLORIDE AND TIMOLOL MALEATE 1 DROP: 20; 5 SOLUTION OPHTHALMIC at 18:24

## 2025-06-04 NOTE — ASSESSMENT & PLAN NOTE
Last had on Friday with walking uphill with radiation to left arm. This has been going on since last year. Troponin negative  Risk factors include HTN, T2DM, family history   FATIMAH = 2   Admit patient to med/surg under observation   Telemetry  Troponins  Stress test in AM

## 2025-06-04 NOTE — ED ATTENDING ATTESTATION
6/4/2025  IYosvany DO, saw and evaluated the patient. I have discussed the patient with the resident/non-physician practitioner and agree with the resident's/non-physician practitioner's findings, Plan of Care, and MDM as documented in the resident's/non-physician practitioner's note, except where noted. All available labs and Radiology studies were reviewed.  I was present for key portions of any procedure(s) performed by the resident/non-physician practitioner and I was immediately available to provide assistance.       At this point I agree with the current assessment done in the Emergency Department.  I have conducted an independent evaluation of this patient a history and physical is as follows: 69yoM, HTN, DM, fhx CAD, presenting to ER with CP on exertion that resolves at rest over last year - worsening in last few weeks. Notes the more he exerts, the worse the pain. Denies BLE edema, calf/popliteal pain, SOB. Notes radiates to RUE. Asymptomatic at this time. Wife at bedside.     VSS. Pt resting comfortably. No WOB. Lungs CTA. Heart RRR. Abd soft nontender. BLE without calf/popliteal tenderness. No BLE edema.     MDM: 69yoM with story highly concerning for CP of cardiac etiology. No SOB or signs DVT to suggest PE. CBC, CMP, EKG, CXR, Trop without acute patho. Pt did not exert himself today so given trop normal, not unexpected. Given risk factors and high HEART score, request obs for further investigation.     ED Course         Critical Care Time  Procedures

## 2025-06-04 NOTE — ED PROVIDER NOTES
Time reflects when diagnosis was documented in both MDM as applicable and the Disposition within this note       Time User Action Codes Description Comment    6/4/2025  2:44 PM Sam Pack Add [R07.9] Chest pain           ED Disposition       ED Disposition   Admit    Condition   Stable    Date/Time   Wed Jun 4, 2025  2:44 PM    Comment   Case was discussed with LILLIAN and the patient's admission status was agreed to be Admission Status: inpatient status to the service of Dr. Rosales.               Assessment & Plan       Medical Decision Making  Patient is a 69-year-old male with diabetes who presented to the ED for chest pain.  Patient stated that he initially started having chest pain this last Friday and has noticed them most days since then.  He states that in the past he had chest pain once when he was admitted probably a year ago never had it worked up and no other instances of chest pain that he remembers.  He states that the chest pain has mostly been in the context of exertion and he notices that get worse as he ambulates particularly when going up a hill.  Friday was 10/10, currently minimal.  States that he has had the mild discomfort however all day today bring him to the ED.  Chest pain is left-sided and radiates down the left arm, sometimes associated with diaphoresis.  Denies any shortness of breath.  Denies any fevers, chills, or systemic symptoms.    Ddx includes but is not limited to ACS, electrolyte abnormality, anemia, myocarditis/pericarditis, arrhythmia, chest wall pain/MSK etiology.  CBC, CMP, and troponin grossly unremarkable.  EKG without gross ischemic changes.  CXR grossly unremarkable.  However, given the extremely concerning story and heart score of 7 will admit for observation with telemetry to medicine.    Amount and/or Complexity of Data Reviewed  Labs: ordered.  Radiology: ordered and independent interpretation performed.    Risk  OTC drugs.  Decision regarding  hospitalization.             Medications   aspirin (ECOTRIN LOW STRENGTH) EC tablet 81 mg (has no administration in time range)   atorvastatin (LIPITOR) tablet 10 mg (has no administration in time range)   dorzolamide-timolol (COSOPT) 2-0.5 % ophthalmic solution 1 drop (has no administration in time range)   fluticasone (FLONASE) 50 mcg/act nasal spray 1 spray (has no administration in time range)   gabapentin (NEURONTIN) capsule 100 mg (has no administration in time range)   insulin glargine (LANTUS) subcutaneous injection 55 Units 0.55 mL (has no administration in time range)   lisinopril (ZESTRIL) tablet 10 mg (has no administration in time range)   acetaminophen (TYLENOL) tablet 650 mg (has no administration in time range)   ondansetron (ZOFRAN) injection 4 mg (has no administration in time range)   enoxaparin (LOVENOX) subcutaneous injection 40 mg (has no administration in time range)   insulin lispro (HumALOG/ADMELOG) 100 units/mL subcutaneous injection 1-6 Units (has no administration in time range)   insulin lispro (HumALOG/ADMELOG) 100 units/mL subcutaneous injection 1-6 Units (has no administration in time range)   aspirin chewable tablet 243 mg (243 mg Oral Given 6/4/25 1316)       ED Risk Strat Scores   HEART Risk Score      Flowsheet Row Most Recent Value   Heart Score Risk Calculator    History 2 Filed at: 06/04/2025 1330   ECG 1 Filed at: 06/04/2025 1330   Age 2 Filed at: 06/04/2025 1330   Risk Factors 2 Filed at: 06/04/2025 1330   Troponin 0 Filed at: 06/04/2025 1330   HEART Score 7 Filed at: 06/04/2025 1330          HEART Risk Score      Flowsheet Row Most Recent Value   Heart Score Risk Calculator    History 2 Filed at: 06/04/2025 1330   ECG 1 Filed at: 06/04/2025 1330   Age 2 Filed at: 06/04/2025 1330   Risk Factors 2 Filed at: 06/04/2025 1330   Troponin 0 Filed at: 06/04/2025 1330   HEART Score 7 Filed at: 06/04/2025 1330                      No data recorded        SBIRT 22yo+      Flowsheet Row  Most Recent Value   Initial Alcohol Screen: US AUDIT-C     1. How often do you have a drink containing alcohol? 0 Filed at: 06/04/2025 1220   2. How many drinks containing alcohol do you have on a typical day you are drinking?  0 Filed at: 06/04/2025 1220   3a. Male UNDER 65: How often do you have five or more drinks on one occasion? 0 Filed at: 06/04/2025 1220   Audit-C Score 0 Filed at: 06/04/2025 1220   BENI: How many times in the past year have you...    Used an illegal drug or used a prescription medication for non-medical reasons? Never Filed at: 06/04/2025 1220          FATIMAH Risk Score      Flowsheet Row Most Recent Value   Age >= 65 1 Filed at: 06/04/2025 1536   Known CAD (stenosis >= 50%) 0 Filed at: 06/04/2025 1536   Recent (<=24 hrs) Service Angina 0 Filed at: 06/04/2025 1536   ST Deviation >= 0.5 mm 0 Filed at: 06/04/2025 1536   3+ CAD Risk Factors (FHx, HTN, HLP, DM, Smoker) 1 Filed at: 06/04/2025 1536   Aspirin Use Past 7 Days 0 Filed at: 06/04/2025 1536   Elevated Cardiac Markers 0 Filed at: 06/04/2025 1536   FATIMAH Risk Score (Calculated) 2 Filed at: 06/04/2025 1536                          History of Present Illness       Chief Complaint   Patient presents with    Chest Pain     Left sided Chest pain into left arm with exertion since Friday.        Past Medical History[1]   Past Surgical History[2]   Family History[3]   Social History[4]   E-Cigarette/Vaping    E-Cigarette Use Never User       E-Cigarette/Vaping Substances    Nicotine No     THC No     CBD No     Flavoring No     Other No     Unknown No       I have reviewed and agree with the history as documented.     Patient is a 69-year-old male with diabetes who presented to the ED for chest pain.  Patient stated that he initially started having chest pain this last Friday and has noticed them most days since then.  He states that in the past he had chest pain once when he was admitted probably a year ago never had it worked up and no other  instances of chest pain that he remembers.  He states that the chest pain has mostly been in the context of exertion and he notices that get worse as he ambulates particularly when going up a hill.  Friday was 10/10, currently minimal.  States that he has had the mild discomfort however all day today bring him to the ED.  Chest pain is left-sided and radiates down the left arm, sometimes associated with diaphoresis.  Denies any shortness of breath.  Denies any fevers, chills, or systemic symptoms.        Review of Systems   Constitutional:  Positive for diaphoresis. Negative for chills and fever.   HENT: Negative.     Respiratory:  Negative for cough and shortness of breath.    Cardiovascular:  Positive for chest pain. Negative for palpitations and leg swelling.   Gastrointestinal:  Negative for abdominal pain, nausea and vomiting.   Genitourinary: Negative.    Skin:  Negative for color change and pallor.   Neurological:  Negative for dizziness and syncope.   Psychiatric/Behavioral: Negative.             Objective       ED Triage Vitals   Temperature Pulse Blood Pressure Respirations SpO2 Patient Position - Orthostatic VS   06/04/25 1210 06/04/25 1210 06/04/25 1210 06/04/25 1210 06/04/25 1210 06/04/25 1210   98 °F (36.7 °C) 87 (!) 176/68 19 100 % Lying      Temp Source Heart Rate Source BP Location FiO2 (%) Pain Score    06/04/25 1210 06/04/25 1210 06/04/25 1210 -- 06/04/25 1318    Oral Monitor Right arm  No Pain      Vitals      Date and Time Temp Pulse SpO2 Resp BP Pain Score FACES Pain Rating User   06/04/25 1705 -- -- -- -- -- No Pain -- MM   06/04/25 1658 98.1 °F (36.7 °C) 62 97 % 18 178/74 -- -- LO   06/04/25 1600 -- 59 98 % 14 165/76 -- -- LO   06/04/25 1318 -- 57 98 % 18 171/78 No Pain -- LR   06/04/25 1210 98 °F (36.7 °C) 87 100 % 19 176/68 -- -- KK            Physical Exam  On examination:  The patient is awake, alert and oriented  HEENT: Normocephalic/atraumatic  External examination of the ears is  unremarkable  Pupils are equal round and reactive to light, there is no conjunctival injection or scleral icterus noted  Nares are patent without rhinorrhea.  The oropharynx is moist without injection  The neck is supple  Lungs: Clear to auscultation bilaterally  Heart: Regular without murmurs rubs or gallops  Abdomen: Soft and nontender. There are positive bowel sounds. there is no rebound or guarding  Musculoskeletal: Normal range of motion with grossly normal strength  Neuro: Cranial nerves II through XII grossly intact. Nonfocal exam  Skin: No rash noted  Psych: Mood and affect normal    Results Reviewed       Procedure Component Value Units Date/Time    HS Troponin I 4hr [[id number]]  (Normal) Collected: 06/04/25 1641    Lab Status: Final result Specimen: Blood from Line, Venous Updated: 06/04/25 1712     hs TnI 4hr 6 ng/L      Delta 4hr hsTnI 0 ng/L     Platelet count [[id number]]  (Normal) Collected: 06/04/25 1646    Lab Status: Final result Specimen: Blood from Line, Venous Updated: 06/04/25 1654     Platelets 328 Thousands/uL      MPV 10.0 fL     HS Troponin I 2hr [[id number]]  (Normal) Collected: 06/04/25 1515    Lab Status: Final result Specimen: Blood from Line, Venous Updated: 06/04/25 1543     hs TnI 2hr 7 ng/L      Delta 2hr hsTnI 1 ng/L     HS Troponin 0hr (reflex protocol) [[id number]]  (Normal) Collected: 06/04/25 1240    Lab Status: Final result Specimen: Blood from Arm, Right Updated: 06/04/25 1427     hs TnI 0hr 6 ng/L     Comprehensive metabolic panel [[id number]]  (Abnormal) Collected: 06/04/25 1240    Lab Status: Final result Specimen: Blood from Arm, Right Updated: 06/04/25 1421     Sodium 140 mmol/L      Potassium 3.9 mmol/L      Chloride 110 mmol/L      CO2 24 mmol/L      ANION GAP 6 mmol/L      BUN 15 mg/dL      Creatinine 0.92 mg/dL      Glucose 95 mg/dL      Calcium 8.7 mg/dL      AST 18 U/L      ALT 20 U/L      Alkaline Phosphatase 60 U/L      Total Protein 6.0 g/dL      Albumin 4.0 g/dL       Total Bilirubin 0.90 mg/dL      eGFR 83 ml/min/1.73sq m     Narrative:      [employer] Kid[employer] guidelines for Chronic Kidney Disease (CKD):     Stage 1 with normal or high GFR (GFR > 90 mL/min/1.73 square meters)    Stage 2 Mild CKD (GFR = 60-89 mL/min/1.73 square meters)    Stage 3A Moderate CKD (GFR = 45-59 mL/min/1.73 square meters)    Stage 3B Moderate CKD (GFR = 30-44 mL/min/1.73 square meters)    Stage 4 Severe CKD (GFR = 15-29 mL/min/1.73 square meters)    Stage 5 End Stage CKD (GFR <15 mL/min/1.73 square meters)  Note: GFR calculation is accurate only with a steady state creatinine    CBC and differential [[id number]] Collected: 06/04/25 1240    Lab Status: Final result Specimen: Blood from Arm, Right Updated: 06/04/25 1307     WBC 7.53 Thousand/uL      RBC 4.10 Million/uL      Hemoglobin 12.5 g/dL      Hematocrit 37.0 %      MCV 90 fL      MCH 30.5 pg      MCHC 33.8 g/dL      RDW 14.3 %      MPV 10.1 fL      Platelets 332 Thousands/uL      nRBC 0 /100 WBCs      Segmented % 54 %      Immature Grans % 0 %      Lymphocytes % 34 %      Monocytes % 7 %      Eosinophils Relative 4 %      Basophils Relative 1 %      Absolute Neutrophils 4.08 Thousands/µL      Absolute Immature Grans 0.02 Thousand/uL      Absolute Lymphocytes 2.56 Thousands/µL      Absolute Monocytes 0.56 Thousand/µL      Eosinophils Absolute 0.27 Thousand/µL      Basophils Absolute 0.04 Thousands/µL             XR chest 1 view portable   ED Interpretation by Yosvany Espinoza DO (06/04 1305)   WNL      Final Interpretation by Nabeel Cameron MD (06/04 1530)      No acute cardiopulmonary disease.            Resident: RATNA CHEN I, the attending radiologist, have reviewed the images and agree with the final report above.      Workstation performed: [id number]             ECG 12 Lead Documentation Only    Date/Time: 6/4/2025 12:14 PM    Performed by: Sam Pack MD  Authorized by: Sam Pack MD    Indications /  Diagnosis:  Chest Pain  ECG reviewed by me, the ED Provider: yes    Patient location:  ED  Previous ECG:     Comparison to cardiac monitor: Yes    Interpretation:     Interpretation: non-specific    Rate:     ECG rate:  78    ECG rate assessment: normal    Rhythm:     Rhythm: sinus rhythm    Ectopy:     Ectopy: none    QRS:     QRS axis:  Left    QRS intervals:  Normal  Conduction:     Conduction: normal    ST segments:     ST segments:  Normal  T waves:     T waves: non-specific        ED Medication and Procedure Management   Prior to Admission Medications   Prescriptions Last Dose Informant Patient Reported? Taking?   BD Pen Needle Meghana 2nd Gen 32G X 4 MM MISC   No No   Sig: DAILY USE AS DIRECTED   Cholecalciferol 125 MCG (5000 UT) capsule  Self Yes No   Sig: Take 5,000 Units by mouth daily   Patient not taking: Reported on 12/20/2024   Continuous Glucose  (FreeStyle Kalpana 3 [place]) CAMRON  Self No No   Sig: Check  blood sugar  4 times  per day   Patient not taking: Reported on 8/2/2024   Continuous Glucose Sensor (FreeStyle Kalpana 3 Sensor) MISC  Self No No   Sig: Use 1 each every 14 (fourteen) days   Patient not taking: Reported on 8/2/2024   Insulin Glargine Solostar (Basaglar KwikPen) 100 UNIT/ML SOPN 6/4/2025 Morning  No Yes   Sig: INJECT 66 UNITS UNDER THE SKIN IN THE MORNING   Patient taking differently: Inject 55 units, reduced from 66   MAGNESIUM PO 6/4/2025 Morning Self Yes Yes   Sig: Take by mouth   Milk Thistle 150 MG CAPS 6/3/2025 Noon Self Yes Yes   Sig: Take by mouth   Multiple Vitamins-Minerals (MULTIVITAMIN WITH MINERALS) tablet 6/3/2025 Noon Self Yes Yes   Sig: Take 1 tablet by mouth in the morning.   Simbrinza 1-0.2 % SUSP Not Taking Self Yes No   Patient not taking: Reported on 6/4/2025   TURMERIC PO 6/3/2025 Noon  Yes Yes   Sig: Take by mouth   Vitamins-Lipotropics (B-100 PO) 6/3/2025 Noon Self Yes Yes   Sig: Take by mouth   acetaminophen (TYLENOL) 500 mg tablet Past Month  No Yes   Sig:  Take 2 tablets (1,000 mg total) by mouth every 6 (six) hours as needed for mild pain   aspirin (ECOTRIN LOW STRENGTH) 81 mg EC tablet 2025 Morning Self Yes Yes   Sig: Take 81 mg by mouth in the morning.   atorvastatin (LIPITOR) 10 mg tablet 2025 Morning  No Yes   Sig: TAKE 1 TABLET BY MOUTH EVERY DAY   dorzolamide-timolol (COSOPT) 2-0.5 % ophthalmic solution 2025 Morning  Yes Yes   Sig: Administer 1 drop to both eyes 2 (two) times a day   fluticasone (FLONASE) 50 mcg/act nasal spray 2025 Morning Self Yes Yes   Si spray into each nostril in the morning.   gabapentin (NEURONTIN) 100 mg capsule 2025 Morning  No Yes   Sig: TAKE 1 CAPSULE BY MOUTH TWICE A DAY   lisinopril (ZESTRIL) 10 mg tablet 2025 Morning  No Yes   Sig: TAKE 1 TABLET BY MOUTH EVERY DAY   metFORMIN (GLUCOPHAGE-XR) 500 mg 24 hr tablet 2025 Morning  No Yes   Sig: TAKE 2 TABLETS BY MOUTH TWICE A DAY   methocarbamol (ROBAXIN) 500 mg tablet Not Taking  No No   Sig: Take 1 tablet (500 mg total) by mouth 3 (three) times a day as needed for muscle spasms   Patient not taking: Reported on 2025   sodium chloride (OCEAN) 0.65 % nasal spray 2025 Morning Self Yes Yes   Si spray into each nostril as needed for congestion      Facility-Administered Medications: None     Current Discharge Medication List        CONTINUE these medications which have NOT CHANGED    Details   acetaminophen (TYLENOL) 500 mg tablet Take 2 tablets (1,000 mg total) by mouth every 6 (six) hours as needed for mild pain  Qty: 90 tablet, Refills: 0    Associated Diagnoses: Primary osteoarthritis of left hip      aspirin (ECOTRIN LOW STRENGTH) 81 mg EC tablet Take 81 mg by mouth in the morning.      atorvastatin (LIPITOR) 10 mg tablet TAKE 1 TABLET BY MOUTH EVERY DAY  Qty: 90 tablet, Refills: 1    Associated Diagnoses: Hyperlipidemia, unspecified hyperlipidemia type      dorzolamide-timolol (COSOPT) 2-0.5 % ophthalmic solution Administer 1 drop to both  eyes 2 (two) times a day      fluticasone (FLONASE) 50 mcg/act nasal spray 1 spray into each nostril in the morning.      gabapentin (NEURONTIN) 100 mg capsule TAKE 1 CAPSULE BY MOUTH TWICE A DAY  Qty: 60 capsule, Refills: 5    Associated Diagnoses: Pain in both feet      Insulin Glargine Solostar (Basaglar KwikPen) 100 UNIT/ML SOPN INJECT 66 UNITS UNDER THE SKIN IN THE MORNING  Qty: 60 mL, Refills: 2    Associated Diagnoses: Type 2 diabetes mellitus with both eyes affected by mild nonproliferative retinopathy without macular edema, with long-term current use of insulin ([facility])      lisinopril (ZESTRIL) 10 mg tablet TAKE 1 TABLET BY MOUTH EVERY DAY  Qty: 90 tablet, Refills: 1    Associated Diagnoses: Essential hypertension      MAGNESIUM PO Take by mouth      metFORMIN (GLUCOPHAGE-XR) 500 mg 24 hr tablet TAKE 2 TABLETS BY MOUTH TWICE A DAY  Qty: 360 tablet, Refills: 1    Associated Diagnoses: Type 2 diabetes mellitus with both eyes affected by mild nonproliferative retinopathy without macular edema, with long-term current use of insulin ([facility])      Milk Thistle 150 MG CAPS Take by mouth      Multiple Vitamins-Minerals (MULTIVITAMIN WITH MINERALS) tablet Take 1 tablet by mouth in the morning.      sodium chloride (OCEAN) 0.65 % nasal spray 1 spray into each nostril as needed for congestion      TURMERIC PO Take by mouth      Vitamins-Lipotropics (B-100 PO) Take by mouth      BD Pen Needle Meghana 2nd Gen 32G X 4 MM MISC DAILY USE AS DIRECTED  Qty: 100 each, Refills: 1    Associated Diagnoses: Type 2 diabetes mellitus with both eyes affected by mild nonproliferative retinopathy without macular edema, with long-term current use of insulin ([facility])      Cholecalciferol 125 MCG (5000 UT) capsule Take 5,000 Units by mouth daily      Continuous Glucose  (FreeStyle Kalpana 3 [place]) CAMRON Check  blood sugar  4 times  per day  Qty: 1 each, Refills: 0    Associated Diagnoses: Type 2 diabetes mellitus with both eyes affected by  mild nonproliferative retinopathy without macular edema, with long-term current use of insulin (HCC); Type 2 diabetes mellitus with mild nonproliferative retinopathy, with long-term current use of insulin, macular edema presence unspecified, unspecified laterality (HCC)      Continuous Glucose Sensor (FreeStyle Kalpana 3 Sensor) MISC Use 1 each every 14 (fourteen) days  Qty: 6 each, Refills: 3    Associated Diagnoses: Type 2 diabetes mellitus with both eyes affected by mild nonproliferative retinopathy without macular edema, with long-term current use of insulin (HCC); Type 2 diabetes mellitus with mild nonproliferative retinopathy, with long-term current use of insulin, macular edema presence unspecified, unspecified laterality (HCC)      methocarbamol (ROBAXIN) 500 mg tablet Take 1 tablet (500 mg total) by mouth 3 (three) times a day as needed for muscle spasms  Qty: 60 tablet, Refills: 0    Associated Diagnoses: Primary osteoarthritis of left hip      Simbrinza 1-0.2 % SUSP            No discharge procedures on file.  ED SEPSIS DOCUMENTATION   Time reflects when diagnosis was documented in both MDM as applicable and the Disposition within this note       Time User Action Codes Description Comment    6/4/2025  2:44 PM Sam Pack Add [R07.9] Chest pain                      [1]   Past Medical History:  Diagnosis Date    Arthritis     Cancer (HCC)     Colon polyp     Diabetes mellitus (HCC)     Ear problems     Essential hypertension 02/05/2020    Glaucoma     HL (hearing loss)     Hyperlipidemia 02/05/2020    Hypertension     Kidney stone     Melanoma of ear, left (HCC)     Nasal polyp     Tinnitus    [2]   Past Surgical History:  Procedure Laterality Date    ARTHROSCOPY KNEE Bilateral     BASAL CELL CARCINOMA EXCISION  03/2020    left arm    CYSTOSCOPY      kidney stone removal    FOOT TENDON SURGERY Left 06/22/2021    Procedure: ANTERIOR TALOFIBULAR LIGAMENT REPAIR;  Surgeon: Trent Obregon DPM;  Location: [facility];  Service: Podiatry    FRACTURE SURGERY  2023    KNEE SURGERY      NASAL POLYP EXCISION      AL ARTHRP ACETBLR/PROX FEM PROSTC AGRFT/ALGRFT Left 2024    Procedure: ARTHROPLASTY HIP TOTAL;  Surgeon: Abiel Green MD;  Location: [facility];  Service: Orthopedics    AL COLONOSCOPY FLX DX W/COLLJ SPEC WHEN PFRMD N/A 2017    Procedure: COLONOSCOPY;  Surgeon: Jeff Rosen MD;  Location: [facility];  Service: Colorectal    AL RPR TDN FLXR FOOT 1/2 W/O FREE GRAFG EACH TENDON Left 2017    Procedure: ANKLE PERONEUS LONGUS TENDON REPAIR with allograft;  RETINACULUM REPAIR SECONDARY  Application of Provena Vac;  Surgeon: Trent Obregon DPM;  Location: [facility];  Service: Podiatry    ROTATOR CUFF REPAIR Bilateral     SHOULDER ARTHROSCOPY      WISDOM TOOTH EXTRACTION     [3]   Family History  Problem Relation Name Age of Onset    Heart disease Mother DARLEEN BURNETT         MOTHER    Diabetes Mother DARLEEN BURNETT     Melanoma Mother DARLEEN BURNETT     Cancer Mother DARLEEN BURNETT     Aortic aneurysm Father Levi         abdominal     Prostate cancer Father Levi     Colon cancer Father Levi    [4]   Social History  Tobacco Use    Smoking status: Former     Current packs/day: 0.00     Average packs/day: 1 pack/day for 33.0 years (33.0 ttl pk-yrs)     Types: Cigarettes     Start date: 1974     Quit date: 2007     Years since quittin.9     Passive exposure: Past    Smokeless tobacco: Current     Types: Chew    Tobacco comments:     quit 15 years ago   Vaping Use    Vaping status: Never Used   Substance Use Topics    Alcohol use: Yes     Alcohol/week: 2.0 standard drinks of alcohol     Types: 2 Standard drinks or equivalent per week    Drug use: No        Sam Pack MD  25 1961

## 2025-06-04 NOTE — TELEPHONE ENCOUNTER
REASON FOR CONVERSATION: Chest Pain    SYMPTOMS: Left chest pain that radiates down left arm ONLY with exertion-walking up hill since trip to [place] last July. Worst occurrence Fri 5/30 was 8/10. Resolves immediately when on level surface.     OTHER HEALTH INFORMATION: Diabetes, Hypertension    PROTOCOL DISPOSITION: See Today in Office NO OV available; refuses to go to urgent care    CARE ADVICE PROVIDED: Hydrate; no exertion    PRACTICE FOLLOW-UP: OV scheduled for Fri 6/6 at 2:20 PM or Possible Same Day Thurs 6/5 at 2 PM or go to Emergency Room for evaluation. No chest pain at this time. Please follow up with patient if provider agrees to 6/5 OV.    Reason for Disposition   Chest pain(s) lasting a few seconds persists > 3 days    Additional Information   Negative: SEVERE difficulty breathing (e.g., struggling for each breath, speaks in single words)   Negative: Difficult to awaken or acting confused (e.g., disoriented, slurred speech)   Negative: Shock suspected (e.g., cold/pale/clammy skin, too weak to stand, low BP, rapid pulse)   Negative: Passed out (e.g., fainted, lost consciousness, blacked out and was not responding)   Negative: Chest pain lasting longer than 5 minutes and over 44 years old   Negative: Chest pain lasting longer than 5 minutes, over 30 years old, and at least one cardiac risk factor (e.g., diabetes mellitus, high blood pressure, high cholesterol, obesity with BMI 30 or higher, smoker, or strong family history of heart disease)   Negative: Chest pain lasting longer than 5 minutes and history of heart disease (i.e., angina, heart attack, heart failure, bypass surgery, takes nitroglycerin)   Negative: Chest pain lasting longer than 5 minutes and pain is crushing, pressure-like, or heavy   Negative: Heart beating < 50 beats per minute OR > 140 beats per minute   Negative: Visible sweat on face or sweat dripping down face   Negative: Sounds like a life-threatening emergency to the triager    "Negative: Followed an injury to chest   Negative: SEVERE chest pain   Negative: Pain also in shoulder(s) or arm(s) or jaw   Negative: Difficulty breathing   Negative: Cocaine use within last 3 days   Negative: Major surgery in the past month   Negative: Hip or leg fracture (broken bone) in past month (or had cast on leg or ankle in past month)   Negative: Illness requiring prolonged bedrest in past month (e.g., immobilization, long hospital stay)   Negative: Long-distance travel in past month (e.g., car, bus, train, plane; with trip lasting 6 or more hours)   Negative: History of prior 'blood clot' in leg or lungs (i.e., deep vein thrombosis, pulmonary embolism)   Negative: History of inherited increased risk of blood clots (e.g., Factor 5 Leiden, Anti-thrombin 3, Protein C or Protein S deficiency, Prothrombin mutation)   Negative: Cancer treatment in the past two months (or has cancer now)   Negative: Heart beating irregularly or very rapidly   Negative: Chest pain lasting longer than 5 minutes and occurred in last 3 days (72 hours) (Exception: Feels exactly the same as previously diagnosed heartburn and has accompanying sour taste in mouth.)   Negative: Chest pain or 'angina' comes and goes and is happening more often (increasing in frequency) or getting worse (increasing in severity) (Exception: Chest pains that last only a few seconds.)   Negative: Feeling weak or lightheaded (e.g., woozy, feeling like they might faint)   Negative: Coughing up blood   Negative: Patient sounds very sick or weak to the triager   Negative: Patient says chest pain feels exactly the same as previously diagnosed 'heartburn' and describes burning in chest and accompanying sour taste in mouth    Answer Assessment - Initial Assessment Questions  1. LOCATION: \"Where does it hurt?\"        Left chest   2. RADIATION: \"Does the pain go anywhere else?\" (e.g., into neck, jaw, arms, back)      Down left arm  3. ONSET: \"When did the chest pain " "begin?\" (Minutes, hours or days)       Last year on vacation in [place] while walking up a hill July 2024  4. PATTERN: \"Does the pain come and go, or has it been constant since it started?\"  \"Does it get worse with exertion?\"       Only occurs with exertion; uphill walking; goes away with rest; the worst on Fri 5/30 No problem Sun 6/1 cutting lawn which is flat; no problem with stairs in home  5. DURATION: \"How long does it last\" (e.g., seconds, minutes, hours)      As long as patient is in uphill activity; resolves as soon as he is on flat surface  6. SEVERITY: \"How bad is the pain?\"  (e.g., Scale 1-10; mild, moderate, or severe)      At the worst 8/10 Fri 5/30  7. CARDIAC RISK FACTORS: \"Do you have any history of heart problems or risk factors for heart disease?\" (e.g., angina, prior heart attack; diabetes, high blood pressure, high cholesterol, smoker, or strong family history of heart disease)      Diabetes, Hypertension, Maternal cardiac history,  8. PULMONARY RISK FACTORS: \"Do you have any history of lung disease?\"  (e.g., blood clots in lung, asthma, emphysema, birth control pills)      denies  9. CAUSE: \"What do you think is causing the chest pain?\"      unknown  10. OTHER SYMPTOMS: \"Do you have any other symptoms?\" (e.g., dizziness, nausea, vomiting, sweating, fever, difficulty breathing, cough)        Tired post episode  11. PREGNANCY: \"Is there any chance you are pregnant?\" \"When was your last menstrual period?\"        N/a    Protocols used: Chest Pain-Adult-[place]    "

## 2025-06-04 NOTE — PLAN OF CARE
Problem: PAIN - ADULT  Goal: Verbalizes/displays adequate comfort level or baseline comfort level  Description: Interventions:  - Encourage patient to monitor pain and request assistance  - Assess pain using appropriate pain scale  - Administer analgesics as ordered based on type and severity of pain and evaluate response  - Implement non-pharmacological measures as appropriate and evaluate response  - Consider cultural and social influences on pain and pain management  - Notify physician/advanced practitioner if interventions unsuccessful or patient reports new pain  - Educate patient/family on pain management process including their role and importance of  reporting pain   - Provide non-pharmacologic/complimentary pain relief interventions  Outcome: Progressing     Problem: INFECTION - ADULT  Goal: Absence or prevention of progression during hospitalization  Description: INTERVENTIONS:  - Assess and monitor for signs and symptoms of infection  - Monitor lab/diagnostic results  - Monitor all insertion sites, i.e. indwelling lines, tubes, and drains  - Monitor endotracheal if appropriate and nasal secretions for changes in amount and color  - [place] appropriate cooling/warming therapies per order  - Administer medications as ordered  - Instruct and encourage patient and family to use good hand hygiene technique  - Identify and instruct in appropriate isolation precautions for identified infection/condition  Outcome: Progressing  Goal: Absence of fever/infection during neutropenic period  Description: INTERVENTIONS:  - Monitor WBC  - Perform strict hand hygiene  - Limit to healthy visitors only  - No plants, dried, fresh or silk flowers with starr in patient room  Outcome: Progressing     Problem: SAFETY ADULT  Goal: Patient will remain free of falls  Description: INTERVENTIONS:  - Educate patient/family on patient safety including physical limitations  - Instruct patient to call for assistance with activity   -  Consider consulting OT/PT to assist with strengthening/mobility based on AM PAC & JH-HLM score  - Consult OT/PT to assist with strengthening/mobility   - Keep Call bell within reach  - Keep bed low and locked with side rails adjusted as appropriate  - Keep care items and personal belongings within reach  - Initiate and maintain comfort rounds  - Make Fall Risk Sign visible to staff  - Offer Toileting every  Hours, in advance of need  - Initiate/Maintain alarm  - Obtain necessary fall risk management equipment:   - Apply yellow socks and bracelet for high fall risk patients  - Consider moving patient to room near nurses station  Outcome: Progressing  Goal: Maintain or return to baseline ADL function  Description: INTERVENTIONS:  -  Assess patient's ability to carry out ADLs; assess patient's baseline for ADL function and identify physical deficits which impact ability to perform ADLs (bathing, care of mouth/teeth, toileting, grooming, dressing, etc.)  - Assess/evaluate cause of self-care deficits   - Assess range of motion  - Assess patient's mobility; develop plan if impaired  - Assess patient's need for assistive devices and provide as appropriate  - Encourage maximum independence but intervene and supervise when necessary  - Involve family in performance of ADLs  - Assess for home care needs following discharge   - Consider OT consult to assist with ADL evaluation and planning for discharge  - Provide patient education as appropriate  - Monitor functional capacity and physical performance, use of AM PAC & JH-HLM   - Monitor gait, balance and fatigue with ambulation    Outcome: Progressing  Goal: Maintains/Returns to pre admission functional level  Description: INTERVENTIONS:  - Perform AM-PAC 6 Click Basic Mobility/ Daily Activity assessment daily.  - Set and communicate daily mobility goal to care team and patient/family/caregiver.   - Collaborate with rehabilitation services on mobility goals if consulted  -  Perform Range of Motion  times a day.  - Reposition patient every  hours.  - Dangle patient  times a day  - Stand patient  times a day  - Ambulate patient  times a day  - Out of bed to chair times a day   - Out of bed for meals  times a day  - Out of bed for toileting  - Record patient progress and toleration of activity level   Outcome: Progressing     Problem: DISCHARGE PLANNING  Goal: Discharge to home or other facility with appropriate resources  Description: INTERVENTIONS:  - Identify barriers to discharge w/patient and caregiver  - Arrange for needed discharge resources and transportation as appropriate  - Identify discharge learning needs (meds, wound care, etc.)  - Arrange for interpretive services to assist at discharge as needed  - Refer to Case Management Department for coordinating discharge planning if the patient needs post-hospital services based on physician/advanced practitioner order or complex needs related to functional status, cognitive ability, or social support system  Outcome: Progressing     Problem: Knowledge Deficit  Goal: Patient/family/caregiver demonstrates understanding of disease process, treatment plan, medications, and discharge instructions  Description: Complete learning assessment and assess knowledge base.  Interventions:  - Provide teaching at level of understanding  - Provide teaching via preferred learning methods  Outcome: Progressing

## 2025-06-04 NOTE — ASSESSMENT & PLAN NOTE
"Lab Results   Component Value Date    HGBA1C 7.2 (H) 04/25/2025       No results for input(s): \"POCGLU\" in the last 72 hours.    Blood Sugar Average: Last 72 hrs:    Prior A1c acceptable   Continue home Lantus  Hold Metformin   SSI   QID accuchecks   "

## 2025-06-04 NOTE — H&P
"H&P - Hospitalist   Name: Rich Abel 69 y.o. male I MRN: [id number]  Unit/Bed#: ED-35 I Date of Admission: 6/4/2025   Date of Service: 6/4/2025 I Hospital Day: 0     Assessment & Plan  Chest pain  Last had on Friday with walking uphill with radiation to left arm. This has been going on since last year. Troponin negative  Risk factors include HTN, T2DM, family history   FATIMAH = 2   Admit patient to med/surg under observation   Telemetry  Troponins  Stress test in AM   Primary hypertension  BP elevated in ED, likely due to anxiety  Continue home meds, monitor   Hyperlipidemia  Continue statin   Type 2 diabetes mellitus 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, bilateral (HCC)  Lab Results   Component Value Date    HGBA1C 7.2 (H) 04/25/2025       No results for input(s): \"POCGLU\" in the last 72 hours.    Blood Sugar Average: Last 72 hrs:    Prior A1c acceptable   Continue home Lantus  Hold Metformin   SSI   QID accuchecks       VTE Pharmacologic Prophylaxis: VTE Score: 4 Moderate Risk (Score 3-4) - Pharmacological DVT Prophylaxis Ordered: enoxaparin (Lovenox).  Code Status: Full code   Discussion with family: Updated  (wife) at bedside.    Anticipated Length of Stay: Patient will be admitted on an observation basis with an anticipated length of stay of less than 2 midnights secondary to as per above assessment and plan .    History of Present Illness   Chief Complaint: Chest Pain    Rich Abel is a 69 y.o. male with a PMH of T2DM, HTN, HLD who presents with chest pain. He reports he last had these symptoms Friday while walking up hill. He reports this first happened a year ago in [place]. He denies breathing issues. Denies dizziness. Denies diaphoresis. Denies nausea or vomiting. Has some leg swelling, but this is after ortho surgery. No recent travel. Last surgery was December. He denies cardiac history. Reports family history of CAD in mother in her 60s.    Review of " Systems   Constitutional:  Negative for appetite change, chills, diaphoresis, fatigue and fever.   HENT:  Negative for congestion, rhinorrhea and sore throat.    Eyes:  Negative for visual disturbance.   Respiratory:  Negative for cough, chest tightness, shortness of breath and wheezing.    Cardiovascular:  Positive for chest pain. Negative for palpitations and leg swelling.   Gastrointestinal:  Negative for abdominal pain, constipation, diarrhea, nausea and rectal pain.   Genitourinary:  Negative for dysuria.   Musculoskeletal:  Negative for arthralgias and myalgias.   Neurological:  Negative for dizziness, syncope, weakness, light-headedness, numbness and headaches.   All other systems reviewed and are negative.      Historical Information   Past Medical History[1]  Past Surgical History[2]  Social History[3]  E-Cigarette/Vaping    E-Cigarette Use Never User      E-Cigarette/Vaping Substances    Nicotine No     THC No     CBD No     Flavoring No     Other No     Unknown No      Family History[4]  Social History:  Marital Status: /Civil Union   Occupation: Noncontributory   Patient Pre-hospital Living Situation: Home  Patient Pre-hospital Level of Mobility: walks  Patient Pre-hospital Diet Restrictions: None    Meds/Allergies   I have reviewed home medications with patient personally.  Prior to Admission medications    Medication Sig Start Date End Date Taking? Authorizing Provider   acetaminophen (TYLENOL) 500 mg tablet Take 2 tablets (1,000 mg total) by mouth every 6 (six) hours as needed for mild pain 12/9/24  Yes Simi Mccarthy PA-C   aspirin (ECOTRIN LOW STRENGTH) 81 mg EC tablet Take 81 mg by mouth in the morning.   Yes Historical Provider, MD   atorvastatin (LIPITOR) 10 mg tablet TAKE 1 TABLET BY MOUTH EVERY DAY 1/13/25  Yes Coleen Barrera PA-C   dorzolamide-timolol (COSOPT) 2-0.5 % ophthalmic solution Administer 1 drop to both eyes 2 (two) times a day   Yes Historical Provider, MD   fluticasone  (FLONASE) 50 mcg/act nasal spray 1 spray into each nostril in the morning.   Yes Historical Provider, MD   gabapentin (NEURONTIN) 100 mg capsule TAKE 1 CAPSULE BY MOUTH TWICE A DAY 3/12/25  Yes Coleen Barrera PA-C   Insulin Glargine Solostar (Basaglar KwikPen) 100 UNIT/ML SOPN INJECT 66 UNITS UNDER THE SKIN IN THE MORNING  Patient taking differently: Inject 55 units, reduced from 66 3/23/25  Yes Coleen Barrera PA-C   lisinopril (ZESTRIL) 10 mg tablet TAKE 1 TABLET BY MOUTH EVERY DAY 12/23/24  Yes Coleen Barrera PA-C   MAGNESIUM PO Take by mouth   Yes Historical Provider, MD   metFORMIN (GLUCOPHAGE-XR) 500 mg 24 hr tablet TAKE 2 TABLETS BY MOUTH TWICE A DAY 2/5/25  Yes Coleen Barrera PA-C   Milk Thistle 150 MG CAPS Take by mouth 12/7/17  Yes Historical Provider, MD   Multiple Vitamins-Minerals (MULTIVITAMIN WITH MINERALS) tablet Take 1 tablet by mouth in the morning.   Yes Historical Provider, MD   sodium chloride (OCEAN) 0.65 % nasal spray 1 spray into each nostril as needed for congestion   Yes Historical Provider, MD   TURMERIC PO Take by mouth   Yes Historical Provider, MD   Vitamins-Lipotropics (B-100 PO) Take by mouth   Yes Historical Provider, MD   BD Pen Needle Meghana 2nd Gen 32G X 4 MM MISC DAILY USE AS DIRECTED 2/7/25   Coleen Barrera PA-C   Cholecalciferol 125 MCG (5000 UT) capsule Take 5,000 Units by mouth daily  Patient not taking: Reported on 12/20/2024    Historical Provider, MD   Continuous Glucose  (FreeStyle Kalpana 3 [place]) CAM[place] Check  blood sugar  4 times  per day  Patient not taking: Reported on 8/2/2024 5/3/24   Coleen Barrera PA-C   Continuous Glucose Sensor (FreeStyle Kalpana 3 Sensor) MISC Use 1 each every 14 (fourteen) days  Patient not taking: Reported on 8/2/2024 5/3/24   Coleen Barrera PA-C   methocarbamol (ROBAXIN) 500 mg tablet Take 1 tablet (500 mg total) by mouth 3 (three) times a day as needed for muscle spasms  Patient not taking: Reported on 6/4/2025 12/9/24    Simi Mccarthy PA-C   Simbrinza 1-0.2 % SUSP  3/29/24   Historical Provider, MD     No Known Allergies    Objective :  Temp:  [98 °F (36.7 °C)] 98 °F (36.7 °C)  HR:  [57-87] 57  BP: (171-176)/(68-78) 171/78  Resp:  [18-19] 18  SpO2:  [98 %-100 %] 98 %  O2 Device: None (Room air)    Physical Exam  Vitals and nursing note reviewed.   Constitutional:       General: He is not in acute distress.     Appearance: Normal appearance. He is normal weight. He is not ill-appearing or diaphoretic.   HENT:      Head: Normocephalic and atraumatic.      Mouth/Throat:      Mouth: Mucous membranes are moist.     Eyes:      General: No scleral icterus.     Pupils: Pupils are equal, round, and reactive to light.       Cardiovascular:      Rate and Rhythm: Normal rate and regular rhythm.      Pulses: Normal pulses.      Heart sounds: Normal heart sounds, S1 normal and S2 normal. No murmur heard.     No systolic murmur is present.      No diastolic murmur is present.      No gallop. No S3 or S4 sounds.   Pulmonary:      Effort: Pulmonary effort is normal. No accessory muscle usage or respiratory distress.      Breath sounds: Normal breath sounds. No stridor. No decreased breath sounds, wheezing, rhonchi or rales.   Chest:      Chest wall: No tenderness.   Abdominal:      General: Bowel sounds are normal. There is no distension.      Palpations: Abdomen is soft.      Tenderness: There is no abdominal tenderness. There is no guarding.     Musculoskeletal:      Right lower leg: No edema.      Left lower leg: No edema.     Skin:     General: Skin is warm and dry.      Coloration: Skin is not jaundiced.     Neurological:      General: No focal deficit present.      Mental Status: He is alert. Mental status is at baseline.      Motor: No tremor or seizure activity.     Psychiatric:         Behavior: Behavior is cooperative.          Lines/Drains:            Lab Results: I have reviewed the following results:  Results from last 7 days   Lab Units  06/04/25  1240   WBC Thousand/uL 7.53   HEMOGLOBIN g/dL 12.5   HEMATOCRIT % 37.0   PLATELETS Thousands/uL 332   SEGS PCT % 54   LYMPHO PCT % 34   MONO PCT % 7   EOS PCT % 4     Results from last 7 days   Lab Units 06/04/25  1240   SODIUM mmol/L 140   POTASSIUM mmol/L 3.9   CHLORIDE mmol/L 110*   CO2 mmol/L 24   BUN mg/dL 15   CREATININE mg/dL 0.92   ANION GAP mmol/L 6   CALCIUM mg/dL 8.7   ALBUMIN g/dL 4.0   TOTAL BILIRUBIN mg/dL 0.90   ALK PHOS U/L 60   ALT U/L 20   AST U/L 18   GLUCOSE RANDOM mg/dL 95             Lab Results   Component Value Date    HGBA1C 7.2 (H) 04/25/2025    HGBA1C 5.9 (H) 11/15/2024    HGBA1C 5.9 (H) 10/25/2024           Imaging Results Review: I personally reviewed the following image studies/reports in PACS and discussed pertinent findings with Radiology: chest xray. My interpretation of the radiology images/reports is: NAD.  Other Study Results Review: EKG was reviewed.     Administrative Statements   I have spent a total time of 75 minutes in caring for this patient on the day of the visit/encounter including Diagnostic results, Instructions for management, Impressions, Counseling / Coordination of care, Documenting in the medical record, Reviewing/placing orders in the medical record (including tests, medications, and/or procedures), Obtaining or reviewing history  , and Communicating with other healthcare professionals .    ** Please Note: This note has been constructed using a voice recognition system. **         [1]   Past Medical History:  Diagnosis Date    Arthritis     Cancer (HCC)     Colon polyp     Diabetes mellitus (HCC)     Ear problems     Essential hypertension 02/05/2020    Glaucoma     HL (hearing loss)     Hyperlipidemia 02/05/2020    Hypertension     Kidney stone     Melanoma of ear, left (HCC)     Nasal polyp     Tinnitus    [2]   Past Surgical History:  Procedure Laterality Date    ARTHROSCOPY KNEE Bilateral     BASAL CELL CARCINOMA EXCISION  03/2020    left arm     CYSTOSCOPY      kidney stone removal    FOOT TENDON SURGERY Left 2021    Procedure: ANTERIOR TALOFIBULAR LIGAMENT REPAIR;  Surgeon: Trent Obregon DPM;  Location: [facility];  Service: Podiatry    FRACTURE SURGERY  2023    KNEE SURGERY      NASAL POLYP EXCISION      WY ARTHRP ACETBLR/PROX FEM PROSTC AGRFT/ALGRFT Left 2024    Procedure: ARTHROPLASTY HIP TOTAL;  Surgeon: Abiel Green MD;  Location: [facility];  Service: Orthopedics    WY COLONOSCOPY FLX DX W/COLLJ SPEC WHEN PFRMD N/A 2017    Procedure: COLONOSCOPY;  Surgeon: Jeff Rosen MD;  Location: [facility];  Service: Colorectal    WY RPR TDN FLXR FOOT 1/2 W/O FREE GRAFG EACH TENDON Left 2017    Procedure: ANKLE PERONEUS LONGUS TENDON REPAIR with allograft;  RETINACULUM REPAIR SECONDARY  Application of Provena Vac;  Surgeon: Trent Obregon DPM;  Location: [facility];  Service: Podiatry    ROTATOR CUFF REPAIR Bilateral     SHOULDER ARTHROSCOPY      WISDOM TOOTH EXTRACTION     [3]   Social History  Tobacco Use    Smoking status: Former     Current packs/day: 0.00     Average packs/day: 1 pack/day for 33.0 years (33.0 ttl pk-yrs)     Types: Cigarettes     Start date: 1974     Quit date: 2007     Years since quittin.9     Passive exposure: Past    Smokeless tobacco: Current     Types: Chew    Tobacco comments:     quit 15 years ago   Vaping Use    Vaping status: Never Used   Substance and Sexual Activity    Alcohol use: Yes     Alcohol/week: 2.0 standard drinks of alcohol     Types: 2 Standard drinks or equivalent per week    Drug use: No    Sexual activity: Yes     Partners: Female     Birth control/protection: None   [4]   Family History  Problem Relation Name Age of Onset    Heart disease Mother DARLEEN WALIZER         MOTHER    Diabetes Mother DARLEEN WALIZER     Melanoma Mother DARLEEN WALIZER     Cancer Mother DARLEEN WALIZER     Aortic aneurysm Father Levi         abdominal     Prostate cancer Father Levi      Colon cancer Father Levi

## 2025-06-04 NOTE — ED NOTES
Called lab in regards to results of trop and cmp. Per Lab samples were placed in done pile but have not been ran yet. Lab is running now.      Valerie Alvarez RN  06/04/25 [id number]

## 2025-06-05 ENCOUNTER — APPOINTMENT (OUTPATIENT)
Dept: NUCLEAR MEDICINE | Facility: HOSPITAL | Age: 70
DRG: 287 | End: 2025-06-05
Payer: MEDICARE

## 2025-06-05 ENCOUNTER — APPOINTMENT (OUTPATIENT)
Dept: NON INVASIVE DIAGNOSTICS | Facility: HOSPITAL | Age: 70
DRG: 287 | End: 2025-06-05
Payer: MEDICARE

## 2025-06-05 LAB
ANION GAP SERPL CALCULATED.3IONS-SCNC: 7 MMOL/L (ref 4–13)
BUN SERPL-MCNC: 13 MG/DL (ref 5–25)
CALCIUM SERPL-MCNC: 8.6 MG/DL (ref 8.4–10.2)
CHEST PAIN STATEMENT: NORMAL
CHLORIDE SERPL-SCNC: 111 MMOL/L (ref 96–108)
CO2 SERPL-SCNC: 25 MMOL/L (ref 21–32)
CREAT SERPL-MCNC: 0.9 MG/DL (ref 0.6–1.3)
GFR SERPL CREATININE-BSD FRML MDRD: 86 ML/MIN/1.73SQ M
GLUCOSE P FAST SERPL-MCNC: 73 MG/DL (ref 65–99)
GLUCOSE SERPL-MCNC: 100 MG/DL (ref 65–140)
GLUCOSE SERPL-MCNC: 136 MG/DL (ref 65–140)
GLUCOSE SERPL-MCNC: 207 MG/DL (ref 65–140)
GLUCOSE SERPL-MCNC: 222 MG/DL (ref 65–140)
GLUCOSE SERPL-MCNC: 73 MG/DL (ref 65–140)
MAX DIASTOLIC BP: 90 MMHG
MAX HR PERCENT: 69 %
MAX HR: 103 BPM
MAX PREDICTED HEART RATE: 150 BPM
POTASSIUM SERPL-SCNC: 3.5 MMOL/L (ref 3.5–5.3)
PROTOCOL NAME: NORMAL
RATE PRESSURE PRODUCT: NORMAL
REASON FOR TERMINATION: NORMAL
SL CV REST NUCLEAR ISOTOPE DOSE: 10.7 MCI
SL CV STRESS NUCLEAR ISOTOPE DOSE: 30.6 MCI
SL CV STRESS RECOVERY BP: NORMAL MMHG
SL CV STRESS RECOVERY HR: 76 BPM
SL CV STRESS RECOVERY O2 SAT: 98 %
SODIUM SERPL-SCNC: 143 MMOL/L (ref 135–147)
SPECT HRT GATED+EF W RNC IV: 55 %
STRESS ANGINA INDEX: 0
STRESS BASELINE BP: NORMAL MMHG
STRESS BASELINE HR: 54 BPM
STRESS O2 SAT REST: 98 %
STRESS PEAK HR: 103 BPM
STRESS POST ESTIMATED WORKLOAD: 1.5 METS
STRESS POST EXERCISE DUR MIN: 3 MIN
STRESS POST EXERCISE DUR MIN: 3 MIN
STRESS POST EXERCISE DUR SEC: 0 SEC
STRESS POST EXERCISE DUR SEC: 0 SEC
STRESS POST O2 SAT PEAK: 98 %
STRESS POST PEAK BP: 180 MMHG
STRESS POST PEAK HR: 104 BPM
STRESS POST PEAK SYSTOLIC BP: 180 MMHG
STRESS/REST PERFUSION RATIO: 1.06
TARGET HR FORMULA: NORMAL
TEST INDICATION: NORMAL

## 2025-06-05 PROCEDURE — 93016 CV STRESS TEST SUPVJ ONLY: CPT | Performed by: INTERNAL MEDICINE

## 2025-06-05 PROCEDURE — 82948 REAGENT STRIP/BLOOD GLUCOSE: CPT

## 2025-06-05 PROCEDURE — 80048 BASIC METABOLIC PNL TOTAL CA: CPT | Performed by: PHYSICIAN ASSISTANT

## 2025-06-05 PROCEDURE — 93018 CV STRESS TEST I&R ONLY: CPT | Performed by: INTERNAL MEDICINE

## 2025-06-05 PROCEDURE — A9502 TC99M TETROFOSMIN: HCPCS

## 2025-06-05 PROCEDURE — 78452 HT MUSCLE IMAGE SPECT MULT: CPT

## 2025-06-05 PROCEDURE — 99232 SBSQ HOSP IP/OBS MODERATE 35: CPT | Performed by: PHYSICIAN ASSISTANT

## 2025-06-05 PROCEDURE — 78452 HT MUSCLE IMAGE SPECT MULT: CPT | Performed by: INTERNAL MEDICINE

## 2025-06-05 PROCEDURE — 93017 CV STRESS TEST TRACING ONLY: CPT

## 2025-06-05 RX ORDER — HYDRALAZINE HYDROCHLORIDE 20 MG/ML
10 INJECTION INTRAMUSCULAR; INTRAVENOUS EVERY 4 HOURS PRN
Status: DISCONTINUED | OUTPATIENT
Start: 2025-06-05 | End: 2025-06-07 | Stop reason: HOSPADM

## 2025-06-05 RX ORDER — REGADENOSON 0.08 MG/ML
0.4 INJECTION, SOLUTION INTRAVENOUS ONCE
Status: COMPLETED | OUTPATIENT
Start: 2025-06-05 | End: 2025-06-05

## 2025-06-05 RX ORDER — INSULIN GLARGINE 100 [IU]/ML
40 INJECTION, SOLUTION SUBCUTANEOUS EVERY MORNING
Status: DISCONTINUED | OUTPATIENT
Start: 2025-06-06 | End: 2025-06-07 | Stop reason: HOSPADM

## 2025-06-05 RX ADMIN — DORZOLAMIDE HYDROCHLORIDE AND TIMOLOL MALEATE 1 DROP: 20; 5 SOLUTION OPHTHALMIC at 08:51

## 2025-06-05 RX ADMIN — GABAPENTIN 100 MG: 100 CAPSULE ORAL at 08:51

## 2025-06-05 RX ADMIN — INSULIN GLARGINE 55 UNITS: 100 INJECTION, SOLUTION SUBCUTANEOUS at 08:58

## 2025-06-05 RX ADMIN — ENOXAPARIN SODIUM 40 MG: 40 INJECTION SUBCUTANEOUS at 08:50

## 2025-06-05 RX ADMIN — FLUTICASONE PROPIONATE 1 SPRAY: 50 SPRAY, METERED NASAL at 08:58

## 2025-06-05 RX ADMIN — ATORVASTATIN CALCIUM 10 MG: 10 TABLET, FILM COATED ORAL at 08:51

## 2025-06-05 RX ADMIN — INSULIN LISPRO 2 UNITS: 100 INJECTION, SOLUTION INTRAVENOUS; SUBCUTANEOUS at 21:24

## 2025-06-05 RX ADMIN — LISINOPRIL 10 MG: 10 TABLET ORAL at 08:51

## 2025-06-05 RX ADMIN — ASPIRIN 81 MG: 81 TABLET ORAL at 08:51

## 2025-06-05 RX ADMIN — REGADENOSON 0.4 MG: 0.08 INJECTION, SOLUTION INTRAVENOUS at 12:54

## 2025-06-05 RX ADMIN — DORZOLAMIDE HYDROCHLORIDE AND TIMOLOL MALEATE 1 DROP: 20; 5 SOLUTION OPHTHALMIC at 18:57

## 2025-06-05 RX ADMIN — GABAPENTIN 100 MG: 100 CAPSULE ORAL at 18:57

## 2025-06-05 NOTE — PROGRESS NOTES
Progress Note - Hospitalist   Name: Rich Abel 69 y.o. male I MRN: [id number]  Unit/Bed#: -01 I Date of Admission: 6/4/2025   Date of Service: 6/5/2025 I Hospital Day: 0    Assessment & Plan  Chest pain  Patient presented with chest pain that has been increasing in frequency and intensity over the last year.    Occurs with exertion when walking uphill.  Does not have this when walking flat.  Does not exercise in other manners to assess with exertion and at capacity.    Radiates to his left arm when this occurs and feels increasingly fatigued.  No shortness of breath or diaphoresis (despite ED documentation that he did have diaphoresis pt reports he did not)  Inpatient stress test was performed which was negative however given high clinical suspicion coupled with risk factors, we will ask cardiology for evaluation to determine necessity for cardiac cath   N.p.o. at midnight pending cardiology evaluation  Risk factors include HTN, HLD, T2DM, family history   Primary hypertension  BP  remains elevated throughout entire admission despite the fact that patient reports at home it is well-controlled in the 130s or below  He is maintained on lisinopril 10 mg daily  Personally checked blood pressure manually and noted to be 164/62.  Continue to trend blood pressure with manual blood pressure checks only and consider additional agent if needed  Hyperlipidemia  Continue statin   Lipid panel performed in April showing LDL of 44  Type 2 diabetes mellitus 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, bilateral (HCC)  Lab Results   Component Value Date    HGBA1C 7.2 (H) 04/25/2025       Recent Labs     06/04/25  1704 06/04/25  2105 06/05/25  0735 06/05/25  1125   POCGLU 143* 142* 100 136       Blood Sugar Average: Last 72 hrs:  (P) 130.25  Prior A1c acceptable at 7.2  Has been wearing a Dexcom for the last 13 days.  Reports that his blood sugar today has been in the 85's on this.  Home Lantus is  "55 units in the morning.  Reports that previously when he has been made n.p.o. he has had blood sugars that dropped to \"unreadable\"  Will reduce basal insulin given will make n.p.o. at midnight in the event cardiology opts to proceed with cardiac catheterization  Hold metformin  Continue sliding scale insulin and ADA diet    VTE Pharmacologic Prophylaxis: VTE Score: 4 Moderate Risk (Score 3-4) - Pharmacological DVT Prophylaxis Ordered: enoxaparin (Lovenox).    Mobility:   Basic Mobility Inpatient Raw Score: 24  JH-HLM Goal: 8: Walk 250 feet or more  JH-HLM Achieved: 8: Walk 250 feet ot more  JH-HLM Goal achieved. Continue to encourage appropriate mobility.    Patient Centered Rounds: I performed bedside rounds with nursing staff today.   Discussions with Specialists or Other Care Team Provider: nursing    Education and Discussions with Family / Patient: Updated  (wife) at bedside.    Current Length of Stay: 0 day(s)  Current Patient Status: Inpatient   Certification Statement: The patient, admitted on an observation basis, will now require > 2 midnight hospital stay due to chest pain, cardiology eval  Discharge Plan: cardio eval tomorrow    Code Status: Level 1 - Full Code    Subjective   Patient reports that first episode occurred approximately 1 year ago when he was traveling in [place].  When he was walking uphill on the streets he developed chest pain that radiated to his left arm.  He reports that over the last few months this has been increasing in frequency.  He is unsure if this is related to the fact that he has been walking more because the weather has gotten nicer or if it is solely that this is increasing.  He reports that it has increased in intensity the last few episodes.  This most last episode on Friday was the most severe.  His wife reported that he did not look well when this occurred.  He had chest pain in the left aspect of his chest rating down his left arm which felt like stabbing " pressure.  He reports that when he would rest it would get better and then when he would continue up the hill it would get worse.  When he would walk flat distances it would also get better.  He has stairs at home and can walk up a flight of stairs without chest pain.  He reports that he was not diaphoretic however the ER did note diaphoresis but he reports this did not occur.  He reports that he felt extremely fatigued and tired after this most recent chest pain event and sat in the car for period of time given his significant fatigue.  When he cuts his lawn he uses a sit on mower.  He does not have chest pain with using the weed [place].  He does not exercise aside from these walks.    Objective :  Temp:  [97.6 °F (36.4 °C)-98.1 °F (36.7 °C)] 97.9 °F (36.6 °C)  HR:  [52-98] 98  BP: (118-190)/(56-82) 180/82  Resp:  [16-18] 16  SpO2:  [95 %-98 %] 98 %  O2 Device: None (Room air)    Body mass index is 29.99 kg/m².     Input and Output Summary (last 24 hours):     Intake/Output Summary (Last 24 hours) at 6/5/2025 1728  Last data filed at 6/4/2025 1801  Gross per 24 hour   Intake 360 ml   Output --   Net 360 ml       Physical Exam  Vitals and nursing note reviewed.   Constitutional:       General: He is not in acute distress.     Appearance: Normal appearance. He is not ill-appearing or diaphoretic.   HENT:      Head: Normocephalic and atraumatic.     Cardiovascular:      Rate and Rhythm: Normal rate and regular rhythm.   Pulmonary:      Effort: Pulmonary effort is normal.      Breath sounds: Normal breath sounds. No stridor. No wheezing, rhonchi or rales.   Chest:      Chest wall: No tenderness.   Abdominal:      General: Bowel sounds are normal.      Palpations: Abdomen is soft. There is no mass.      Tenderness: There is no abdominal tenderness. There is no guarding.     Musculoskeletal:         General: No swelling, tenderness or deformity.      Right lower leg: No edema.      Left lower leg: No edema.     Skin:      General: Skin is warm and dry.     Neurological:      Mental Status: He is alert.           Lines/Drains:        Telemetry:  Telemetry Orders (From admission, onward)               24 Hour Telemetry Monitoring  Continuous x 24 Hours (Telem)        Expiring   Question:  Reason for 24 Hour Telemetry  Answer:  PCI/EP study (including pacer and ICD implementation), Cardiac surgery, MI, abnormal cardiac cath, and chest pain- rule out MI                     Telemetry Reviewed: Normal Sinus Rhythm  Indication for Continued Telemetry Use: Acute MI/Unstable Angina/Rule out ACS               Lab Results: I have reviewed the following results:   Results from last 7 days   Lab Units 06/04/25  1646 06/04/25  1240   WBC Thousand/uL  --  7.53   HEMOGLOBIN g/dL  --  12.5   HEMATOCRIT %  --  37.0   PLATELETS Thousands/uL 328 332   SEGS PCT %  --  54   LYMPHO PCT %  --  34   MONO PCT %  --  7   EOS PCT %  --  4     Results from last 7 days   Lab Units 06/05/25  0451 06/04/25  1240   SODIUM mmol/L 143 140   POTASSIUM mmol/L 3.5 3.9   CHLORIDE mmol/L 111* 110*   CO2 mmol/L 25 24   BUN mg/dL 13 15   CREATININE mg/dL 0.90 0.92   ANION GAP mmol/L 7 6   CALCIUM mg/dL 8.6 8.7   ALBUMIN g/dL  --  4.0   TOTAL BILIRUBIN mg/dL  --  0.90   ALK PHOS U/L  --  60   ALT U/L  --  20   AST U/L  --  18   GLUCOSE RANDOM mg/dL 73 95         Results from last 7 days   Lab Units 06/05/25  1125 06/05/25  0735 06/04/25  2105 06/04/25  1704   POC GLUCOSE mg/dl 136 100 142* 143*               Recent Cultures (last 7 days):         Imaging Results Review: I reviewed radiology reports from this admission including: xray(s) and procedure reports.  Other Study Results Review: EKG was reviewed.     Last 24 Hours Medication List:     Current Facility-Administered Medications:     acetaminophen (TYLENOL) tablet 650 mg, Q6H PRN    aspirin (ECOTRIN LOW STRENGTH) EC tablet 81 mg, Daily    atorvastatin (LIPITOR) tablet 10 mg, Daily    dorzolamide-timolol (COSOPT) 2-0.5 %  ophthalmic solution 1 drop, BID    enoxaparin (LOVENOX) subcutaneous injection 40 mg, Daily    fluticasone (FLONASE) 50 mcg/act nasal spray 1 spray, Daily    gabapentin (NEURONTIN) capsule 100 mg, BID    hydrALAZINE (APRESOLINE) injection 10 mg, Q4H PRN    [START ON 6/6/2025] insulin glargine (LANTUS) subcutaneous injection 40 Units 0.4 mL, QAM    insulin lispro (HumALOG/ADMELOG) 100 units/mL subcutaneous injection 1-6 Units, TID AC **AND** Fingerstick Glucose (POCT), TID AC    insulin lispro (HumALOG/ADMELOG) 100 units/mL subcutaneous injection 1-6 Units, HS    lisinopril (ZESTRIL) tablet 10 mg, Daily    ondansetron (ZOFRAN) injection 4 mg, Q6H PRN    Administrative Statements   Today, Patient Was Seen By: Francine Albright PA-C      **Please Note: This note may have been constructed using a voice recognition system.**

## 2025-06-05 NOTE — ASSESSMENT & PLAN NOTE
Patient presented with chest pain that has been increasing in frequency and intensity over the last year.    Occurs with exertion when walking uphill.  Does not have this when walking flat.  Does not exercise in other manners to assess with exertion and at capacity.    Radiates to his left arm when this occurs and feels increasingly fatigued.  No shortness of breath or diaphoresis (despite ED documentation that he did have diaphoresis pt reports he did not)  Inpatient stress test was performed which was negative however given high clinical suspicion coupled with risk factors, we will ask cardiology for evaluation to determine necessity for cardiac cath   N.p.o. at midnight pending cardiology evaluation  Risk factors include HTN, HLD, T2DM, family history

## 2025-06-05 NOTE — ASSESSMENT & PLAN NOTE
BP  remains elevated throughout entire admission despite the fact that patient reports at home it is well-controlled in the 130s or below  He is maintained on lisinopril 10 mg daily  Personally checked blood pressure manually and noted to be 164/62.  Continue to trend blood pressure with manual blood pressure checks only and consider additional agent if needed

## 2025-06-05 NOTE — ASSESSMENT & PLAN NOTE
"Lab Results   Component Value Date    HGBA1C 7.2 (H) 04/25/2025       Recent Labs     06/04/25  1704 06/04/25  2105 06/05/25  0735 06/05/25  1125   POCGLU 143* 142* 100 136       Blood Sugar Average: Last 72 hrs:  (P) 130.25  Prior A1c acceptable at 7.2  Has been wearing a Dexcom for the last 13 days.  Reports that his blood sugar today has been in the 85's on this.  Home Lantus is 55 units in the morning.  Reports that previously when he has been made n.p.o. he has had blood sugars that dropped to \"unreadable\"  Will reduce basal insulin given will make n.p.o. at midnight in the event cardiology opts to proceed with cardiac catheterization  Hold metformin  Continue sliding scale insulin and ADA diet  "

## 2025-06-05 NOTE — PLAN OF CARE
Problem: PAIN - ADULT  Goal: Verbalizes/displays adequate comfort level or baseline comfort level  Description: Interventions:  - Encourage patient to monitor pain and request assistance  - Assess pain using appropriate pain scale  - Administer analgesics as ordered based on type and severity of pain and evaluate response  - Implement non-pharmacological measures as appropriate and evaluate response  - Consider cultural and social influences on pain and pain management  - Notify physician/advanced practitioner if interventions unsuccessful or patient reports new pain  - Educate patient/family on pain management process including their role and importance of  reporting pain   - Provide non-pharmacologic/complimentary pain relief interventions  Outcome: Progressing     Problem: INFECTION - ADULT  Goal: Absence or prevention of progression during hospitalization  Description: INTERVENTIONS:  - Assess and monitor for signs and symptoms of infection  - Monitor lab/diagnostic results  - Monitor all insertion sites, i.e. indwelling lines, tubes, and drains  - Monitor endotracheal if appropriate and nasal secretions for changes in amount and color  - [place] appropriate cooling/warming therapies per order  - Administer medications as ordered  - Instruct and encourage patient and family to use good hand hygiene technique  - Identify and instruct in appropriate isolation precautions for identified infection/condition  Outcome: Progressing  Goal: Absence of fever/infection during neutropenic period  Description: INTERVENTIONS:  - Monitor WBC  - Perform strict hand hygiene  - Limit to healthy visitors only  - No plants, dried, fresh or silk flowers with starr in patient room  Outcome: Progressing     Problem: SAFETY ADULT  Goal: Patient will remain free of falls  Description: INTERVENTIONS:  - Educate patient/family on patient safety including physical limitations  - Instruct patient to call for assistance with activity   -  Consider consulting OT/PT to assist with strengthening/mobility based on AM PAC & JH-HLM score  - Consult OT/PT to assist with strengthening/mobility   - Keep Call bell within reach  - Keep bed low and locked with side rails adjusted as appropriate  - Keep care items and personal belongings within reach  - Initiate and maintain comfort rounds  - Make Fall Risk Sign visible to staff  - Offer Toileting every  Hours, in advance of need  - Initiate/Maintain alarm  - Obtain necessary fall risk management equipment:   - Apply yellow socks and bracelet for high fall risk patients  - Consider moving patient to room near nurses station  Outcome: Progressing  Goal: Maintain or return to baseline ADL function  Description: INTERVENTIONS:  -  Assess patient's ability to carry out ADLs; assess patient's baseline for ADL function and identify physical deficits which impact ability to perform ADLs (bathing, care of mouth/teeth, toileting, grooming, dressing, etc.)  - Assess/evaluate cause of self-care deficits   - Assess range of motion  - Assess patient's mobility; develop plan if impaired  - Assess patient's need for assistive devices and provide as appropriate  - Encourage maximum independence but intervene and supervise when necessary  - Involve family in performance of ADLs  - Assess for home care needs following discharge   - Consider OT consult to assist with ADL evaluation and planning for discharge  - Provide patient education as appropriate  - Monitor functional capacity and physical performance, use of AM PAC & JH-HLM   - Monitor gait, balance and fatigue with ambulation    Outcome: Progressing  Goal: Maintains/Returns to pre admission functional level  Description: INTERVENTIONS:  - Perform AM-PAC 6 Click Basic Mobility/ Daily Activity assessment daily.  - Set and communicate daily mobility goal to care team and patient/family/caregiver.   - Collaborate with rehabilitation services on mobility goals if consulted  -  Perform Range of Motion  times a day.  - Reposition patient every  hours.  - Dangle patient  times a day  - Stand patient  times a day  - Ambulate patient  times a day  - Out of bed to chair  times a day   - Out of bed for meals times a day  - Out of bed for toileting  - Record patient progress and toleration of activity level   Outcome: Progressing     Problem: DISCHARGE PLANNING  Goal: Discharge to home or other facility with appropriate resources  Description: INTERVENTIONS:  - Identify barriers to discharge w/patient and caregiver  - Arrange for needed discharge resources and transportation as appropriate  - Identify discharge learning needs (meds, wound care, etc.)  - Arrange for interpretive services to assist at discharge as needed  - Refer to Case Management Department for coordinating discharge planning if the patient needs post-hospital services based on physician/advanced practitioner order or complex needs related to functional status, cognitive ability, or social support system  Outcome: Progressing     Problem: Knowledge Deficit  Goal: Patient/family/caregiver demonstrates understanding of disease process, treatment plan, medications, and discharge instructions  Description: Complete learning assessment and assess knowledge base.  Interventions:  - Provide teaching at level of understanding  - Provide teaching via preferred learning methods  Outcome: Progressing

## 2025-06-05 NOTE — PLAN OF CARE
Problem: PAIN - ADULT  Goal: Verbalizes/displays adequate comfort level or baseline comfort level  Description: Interventions:  - Encourage patient to monitor pain and request assistance  - Assess pain using appropriate pain scale  - Administer analgesics as ordered based on type and severity of pain and evaluate response  - Implement non-pharmacological measures as appropriate and evaluate response  - Consider cultural and social influences on pain and pain management  - Notify physician/advanced practitioner if interventions unsuccessful or patient reports new pain  - Educate patient/family on pain management process including their role and importance of  reporting pain   - Provide non-pharmacologic/complimentary pain relief interventions  Outcome: Progressing     Problem: INFECTION - ADULT  Goal: Absence or prevention of progression during hospitalization  Description: INTERVENTIONS:  - Assess and monitor for signs and symptoms of infection  - Monitor lab/diagnostic results  - Monitor all insertion sites, i.e. indwelling lines, tubes, and drains  - Monitor endotracheal if appropriate and nasal secretions for changes in amount and color  - [place] appropriate cooling/warming therapies per order  - Administer medications as ordered  - Instruct and encourage patient and family to use good hand hygiene technique  - Identify and instruct in appropriate isolation precautions for identified infection/condition  Outcome: Progressing  Goal: Absence of fever/infection during neutropenic period  Description: INTERVENTIONS:  - Monitor WBC  - Perform strict hand hygiene  - Limit to healthy visitors only  - No plants, dried, fresh or silk flowers with starr in patient room  Outcome: Progressing     Problem: SAFETY ADULT  Goal: Patient will remain free of falls  Description: INTERVENTIONS:  - Educate patient/family on patient safety including physical limitations  - Instruct patient to call for assistance with activity   -  Consider consulting OT/PT to assist with strengthening/mobility based on AM PAC & JH-HLM score  - Consult OT/PT to assist with strengthening/mobility   - Keep Call bell within reach  - Keep bed low and locked with side rails adjusted as appropriate  - Keep care items and personal belongings within reach  - Initiate and maintain comfort rounds  - Make Fall Risk Sign visible to staff  - Offer Toileting every 2 Hours, in advance of need  - Initiate/Maintain bed/chair alarm  - Obtain necessary fall risk management equipment  - Apply yellow socks and bracelet for high fall risk patients  - Consider moving patient to room near nurses station  Outcome: Progressing  Goal: Maintain or return to baseline ADL function  Description: INTERVENTIONS:  -  Assess patient's ability to carry out ADLs; assess patient's baseline for ADL function and identify physical deficits which impact ability to perform ADLs (bathing, care of mouth/teeth, toileting, grooming, dressing, etc.)  - Assess/evaluate cause of self-care deficits   - Assess range of motion  - Assess patient's mobility; develop plan if impaired  - Assess patient's need for assistive devices and provide as appropriate  - Encourage maximum independence but intervene and supervise when necessary  - Involve family in performance of ADLs  - Assess for home care needs following discharge   - Consider OT consult to assist with ADL evaluation and planning for discharge  - Provide patient education as appropriate  - Monitor functional capacity and physical performance, use of AM PAC & JH-HLM   - Monitor gait, balance and fatigue with ambulation    Outcome: Progressing  Goal: Maintains/Returns to pre admission functional level  Description: INTERVENTIONS:  - Perform AM-PAC 6 Click Basic Mobility/ Daily Activity assessment daily.  - Set and communicate daily mobility goal to care team and patient/family/caregiver.   - Collaborate with rehabilitation services on mobility goals if  consulted  - Perform Range of Motion 3 times a day.  - Reposition patient every 2 hours.  - Dangle patient 3 times a day  - Stand patient 3 times a day  - Ambulate patient 3 times a day  - Out of bed to chair 3 times a day   - Out of bed for meals 3 times a day  - Out of bed for toileting  - Record patient progress and toleration of activity level   Outcome: Progressing     Problem: DISCHARGE PLANNING  Goal: Discharge to home or other facility with appropriate resources  Description: INTERVENTIONS:  - Identify barriers to discharge w/patient and caregiver  - Arrange for needed discharge resources and transportation as appropriate  - Identify discharge learning needs (meds, wound care, etc.)  - Arrange for interpretive services to assist at discharge as needed  - Refer to Case Management Department for coordinating discharge planning if the patient needs post-hospital services based on physician/advanced practitioner order or complex needs related to functional status, cognitive ability, or social support system  Outcome: Progressing     Problem: Knowledge Deficit  Goal: Patient/family/caregiver demonstrates understanding of disease process, treatment plan, medications, and discharge instructions  Description: Complete learning assessment and assess knowledge base.  Interventions:  - Provide teaching at level of understanding  - Provide teaching via preferred learning methods  Outcome: Progressing

## 2025-06-06 ENCOUNTER — APPOINTMENT (INPATIENT)
Dept: NON INVASIVE DIAGNOSTICS | Facility: HOSPITAL | Age: 70
DRG: 287 | End: 2025-06-06
Payer: MEDICARE

## 2025-06-06 LAB
AORTIC ROOT: 3.4 CM
ASCENDING AORTA: 3.2 CM
AV LVOT MEAN GRADIENT: 2 MMHG
AV LVOT PEAK GRADIENT: 3 MMHG
BSA FOR ECHO PROCEDURE: 2.13 M2
DOP CALC LVOT PEAK VEL VTI: 24.34 CM
DOP CALC LVOT PEAK VEL: 0.9 M/S
E WAVE DECELERATION TIME: 275 MS
E/A RATIO: 0.9
FRACTIONAL SHORTENING: 29 (ref 28–44)
GLUCOSE SERPL-MCNC: 107 MG/DL (ref 65–140)
GLUCOSE SERPL-MCNC: 108 MG/DL (ref 65–140)
GLUCOSE SERPL-MCNC: 115 MG/DL (ref 65–140)
GLUCOSE SERPL-MCNC: 215 MG/DL (ref 65–140)
GLUCOSE SERPL-MCNC: 62 MG/DL (ref 65–140)
GLUCOSE SERPL-MCNC: 66 MG/DL (ref 65–140)
GLUCOSE SERPL-MCNC: 69 MG/DL (ref 65–140)
GLUCOSE SERPL-MCNC: 69 MG/DL (ref 65–140)
GLUCOSE SERPL-MCNC: 77 MG/DL (ref 65–140)
GLUCOSE SERPL-MCNC: 82 MG/DL (ref 65–140)
INTERVENTRICULAR SEPTUM IN DIASTOLE (PARASTERNAL SHORT AXIS VIEW): 1.6 CM
INTERVENTRICULAR SEPTUM: 1.6 CM (ref 0.6–1.1)
KCT BLD-ACNC: 231 SEC (ref 89–137)
LAAS-AP2: 24.1 CM2
LAAS-AP4: 23.7 CM2
LEFT ATRIUM SIZE: 4.4 CM
LEFT ATRIUM VOLUME (MOD BIPLANE): 78 ML
LEFT ATRIUM VOLUME INDEX (MOD BIPLANE): 36.6 ML/M2
LEFT INTERNAL DIMENSION IN SYSTOLE: 2.7 CM (ref 2.1–4)
LEFT VENTRICULAR INTERNAL DIMENSION IN DIASTOLE: 3.8 CM (ref 3.5–6)
LEFT VENTRICULAR POSTERIOR WALL IN END DIASTOLE: 1.3 CM
LEFT VENTRICULAR STROKE VOLUME: 33 ML
LV EF US.2D.A4C+ESTIMATED: 62 %
LVSV (TEICH): 33 ML
MV E'TISSUE VEL-SEP: 5 CM/S
MV PEAK A VEL: 0.79 M/S
MV PEAK E VEL: 71 CM/S
MV STENOSIS PRESSURE HALF TIME: 80 MS
MV VALVE AREA P 1/2 METHOD: 2.75
RIGHT ATRIUM AREA SYSTOLE A4C: 17.6 CM2
RIGHT VENTRICLE ID DIMENSION: 4.4 CM
SL CV LEFT ATRIUM LENGTH A2C: 5.7 CM
SL CV LV EF: 60
SL CV PED ECHO LEFT VENTRICLE DIASTOLIC VOLUME (MOD BIPLANE) 2D: 61 ML
SL CV PED ECHO LEFT VENTRICLE SYSTOLIC VOLUME (MOD BIPLANE) 2D: 28 ML
SPECIMEN SOURCE: ABNORMAL
TR MAX PG: 17 MMHG
TR PEAK VELOCITY: 2.1 M/S
TRICUSPID ANNULAR PLANE SYSTOLIC EXCURSION: 2.1 CM
TRICUSPID VALVE PEAK REGURGITATION VELOCITY: 2.09 M/S

## 2025-06-06 PROCEDURE — 99152 MOD SED SAME PHYS/QHP 5/>YRS: CPT | Performed by: INTERNAL MEDICINE

## 2025-06-06 PROCEDURE — 99153 MOD SED SAME PHYS/QHP EA: CPT | Performed by: INTERNAL MEDICINE

## 2025-06-06 PROCEDURE — C1769 GUIDE WIRE: HCPCS | Performed by: INTERNAL MEDICINE

## 2025-06-06 PROCEDURE — 85347 COAGULATION TIME ACTIVATED: CPT

## 2025-06-06 PROCEDURE — 99223 1ST HOSP IP/OBS HIGH 75: CPT | Performed by: INTERNAL MEDICINE

## 2025-06-06 PROCEDURE — B2111ZZ FLUOROSCOPY OF MULTIPLE CORONARY ARTERIES USING LOW OSMOLAR CONTRAST: ICD-10-PCS | Performed by: INTERNAL MEDICINE

## 2025-06-06 PROCEDURE — 99232 SBSQ HOSP IP/OBS MODERATE 35: CPT | Performed by: PHYSICIAN ASSISTANT

## 2025-06-06 PROCEDURE — 93571 IV DOP VEL&/PRESS C FLO 1ST: CPT | Performed by: INTERNAL MEDICINE

## 2025-06-06 PROCEDURE — 93458 L HRT ARTERY/VENTRICLE ANGIO: CPT | Performed by: INTERNAL MEDICINE

## 2025-06-06 PROCEDURE — 93306 TTE W/DOPPLER COMPLETE: CPT | Performed by: INTERNAL MEDICINE

## 2025-06-06 PROCEDURE — 93799 UNLISTED CV SVC/PROCEDURE: CPT | Performed by: INTERNAL MEDICINE

## 2025-06-06 PROCEDURE — C1887 CATHETER, GUIDING: HCPCS | Performed by: INTERNAL MEDICINE

## 2025-06-06 PROCEDURE — 82948 REAGENT STRIP/BLOOD GLUCOSE: CPT

## 2025-06-06 PROCEDURE — C1894 INTRO/SHEATH, NON-LASER: HCPCS | Performed by: INTERNAL MEDICINE

## 2025-06-06 PROCEDURE — 93306 TTE W/DOPPLER COMPLETE: CPT

## 2025-06-06 PROCEDURE — 4A023N7 MEASUREMENT OF CARDIAC SAMPLING AND PRESSURE, LEFT HEART, PERCUTANEOUS APPROACH: ICD-10-PCS | Performed by: INTERNAL MEDICINE

## 2025-06-06 RX ORDER — HEPARIN SODIUM 1000 [USP'U]/ML
INJECTION, SOLUTION INTRAVENOUS; SUBCUTANEOUS CODE/TRAUMA/SEDATION MEDICATION
Status: DISCONTINUED | OUTPATIENT
Start: 2025-06-06 | End: 2025-06-06 | Stop reason: HOSPADM

## 2025-06-06 RX ORDER — SODIUM CHLORIDE 9 MG/ML
100 INJECTION, SOLUTION INTRAVENOUS CONTINUOUS
OUTPATIENT
Start: 2025-06-06 | End: 2025-06-06

## 2025-06-06 RX ORDER — ACETAMINOPHEN 325 MG/1
975 TABLET ORAL ONCE AS NEEDED
OUTPATIENT
Start: 2025-06-06

## 2025-06-06 RX ORDER — SODIUM CHLORIDE 9 MG/ML
100 INJECTION, SOLUTION INTRAVENOUS CONTINUOUS
Status: DISCONTINUED | OUTPATIENT
Start: 2025-06-06 | End: 2025-06-07 | Stop reason: HOSPADM

## 2025-06-06 RX ORDER — NITROGLYCERIN 20 MG/100ML
INJECTION INTRAVENOUS CODE/TRAUMA/SEDATION MEDICATION
Status: DISCONTINUED | OUTPATIENT
Start: 2025-06-06 | End: 2025-06-06 | Stop reason: HOSPADM

## 2025-06-06 RX ORDER — NITROGLYCERIN 0.4 MG/1
0.4 TABLET SUBLINGUAL ONCE AS NEEDED
OUTPATIENT
Start: 2025-06-06

## 2025-06-06 RX ORDER — ISOSORBIDE MONONITRATE 30 MG/1
30 TABLET, EXTENDED RELEASE ORAL DAILY
Status: DISCONTINUED | OUTPATIENT
Start: 2025-06-07 | End: 2025-06-07 | Stop reason: HOSPADM

## 2025-06-06 RX ORDER — MIDAZOLAM HYDROCHLORIDE 2 MG/2ML
INJECTION, SOLUTION INTRAMUSCULAR; INTRAVENOUS CODE/TRAUMA/SEDATION MEDICATION
Status: DISCONTINUED | OUTPATIENT
Start: 2025-06-06 | End: 2025-06-06 | Stop reason: HOSPADM

## 2025-06-06 RX ORDER — ASPIRIN 81 MG/1
243 TABLET, CHEWABLE ORAL ONCE
Status: COMPLETED | OUTPATIENT
Start: 2025-06-06 | End: 2025-06-06

## 2025-06-06 RX ORDER — FENTANYL CITRATE 50 UG/ML
INJECTION, SOLUTION INTRAMUSCULAR; INTRAVENOUS CODE/TRAUMA/SEDATION MEDICATION
Status: DISCONTINUED | OUTPATIENT
Start: 2025-06-06 | End: 2025-06-06 | Stop reason: HOSPADM

## 2025-06-06 RX ORDER — DEXTROSE MONOHYDRATE 25 G/50ML
12.5 INJECTION, SOLUTION INTRAVENOUS ONCE
Status: COMPLETED | OUTPATIENT
Start: 2025-06-06 | End: 2025-06-06

## 2025-06-06 RX ORDER — METOPROLOL TARTRATE 25 MG/1
25 TABLET, FILM COATED ORAL ONCE
Status: DISCONTINUED | OUTPATIENT
Start: 2025-06-06 | End: 2025-06-06

## 2025-06-06 RX ORDER — ATORVASTATIN CALCIUM 40 MG/1
40 TABLET, FILM COATED ORAL DAILY
Status: DISCONTINUED | OUTPATIENT
Start: 2025-06-07 | End: 2025-06-07 | Stop reason: HOSPADM

## 2025-06-06 RX ORDER — DEXTROSE MONOHYDRATE 25 G/50ML
INJECTION, SOLUTION INTRAVENOUS
Status: COMPLETED
Start: 2025-06-06 | End: 2025-06-06

## 2025-06-06 RX ORDER — ONDANSETRON 2 MG/ML
4 INJECTION INTRAMUSCULAR; INTRAVENOUS ONCE AS NEEDED
OUTPATIENT
Start: 2025-06-06

## 2025-06-06 RX ORDER — OXYCODONE AND ACETAMINOPHEN 5; 325 MG/1; MG/1
1 TABLET ORAL EVERY 4 HOURS PRN
Refills: 0 | OUTPATIENT
Start: 2025-06-06

## 2025-06-06 RX ORDER — LIDOCAINE HYDROCHLORIDE 10 MG/ML
INJECTION, SOLUTION EPIDURAL; INFILTRATION; INTRACAUDAL; PERINEURAL CODE/TRAUMA/SEDATION MEDICATION
Status: DISCONTINUED | OUTPATIENT
Start: 2025-06-06 | End: 2025-06-06 | Stop reason: HOSPADM

## 2025-06-06 RX ADMIN — DEXTROSE MONOHYDRATE 25 ML: 25 INJECTION, SOLUTION INTRAVENOUS at 14:09

## 2025-06-06 RX ADMIN — INSULIN GLARGINE 20 UNITS: 100 INJECTION, SOLUTION SUBCUTANEOUS at 09:22

## 2025-06-06 RX ADMIN — GABAPENTIN 100 MG: 100 CAPSULE ORAL at 09:20

## 2025-06-06 RX ADMIN — ASPIRIN 81 MG: 81 TABLET ORAL at 09:20

## 2025-06-06 RX ADMIN — INSULIN LISPRO 2 UNITS: 100 INJECTION, SOLUTION INTRAVENOUS; SUBCUTANEOUS at 22:00

## 2025-06-06 RX ADMIN — FLUTICASONE PROPIONATE 1 SPRAY: 50 SPRAY, METERED NASAL at 10:22

## 2025-06-06 RX ADMIN — ATORVASTATIN CALCIUM 10 MG: 10 TABLET, FILM COATED ORAL at 09:20

## 2025-06-06 RX ADMIN — ASPIRIN 243 MG: 81 TABLET, CHEWABLE ORAL at 10:56

## 2025-06-06 RX ADMIN — DEXTROSE MONOHYDRATE 12.5 G: 25 INJECTION, SOLUTION INTRAVENOUS at 03:39

## 2025-06-06 RX ADMIN — SODIUM CHLORIDE 100 ML/HR: 0.9 INJECTION, SOLUTION INTRAVENOUS at 10:58

## 2025-06-06 RX ADMIN — DORZOLAMIDE HYDROCHLORIDE AND TIMOLOL MALEATE 1 DROP: 20; 5 SOLUTION OPHTHALMIC at 20:30

## 2025-06-06 RX ADMIN — DORZOLAMIDE HYDROCHLORIDE AND TIMOLOL MALEATE 1 DROP: 20; 5 SOLUTION OPHTHALMIC at 10:22

## 2025-06-06 RX ADMIN — Medication 12.5 MG: at 21:00

## 2025-06-06 RX ADMIN — LISINOPRIL 10 MG: 10 TABLET ORAL at 09:20

## 2025-06-06 RX ADMIN — Medication 12.5 MG: at 12:41

## 2025-06-06 RX ADMIN — GABAPENTIN 100 MG: 100 CAPSULE ORAL at 18:15

## 2025-06-06 NOTE — PROGRESS NOTES
"Progress Note - Hospitalist   Name: Rich Abel 69 y.o. male I MRN: [id number]  Unit/Bed#: S -01 I Date of Admission: 6/4/2025   Date of Service: 6/6/2025 I Hospital Day: 1    Assessment & Plan  Chest pain  Patient presented with chest pain that has been increasing in frequency and intensity over the last year.    Occurs with exertion when walking uphill.  Does not have this when walking flat. Does not exercise in other manners  Radiates to his left arm when this occurs and feels increasingly fatigued.    Inpatient stress test was performed which was negative however given high clinical suspicion coupled with risk factors, consulted cardiology and planning for cardiac cath this afternoon  Primary hypertension  BP  elevated throughout admission despite the fact that patient reports at home it is well-controlled in the 130s or below  He is maintained on lisinopril 10 mg daily  BP 140s/60s at this time - was up to 190 yesterday systolic  HR 50s - will hold on BB until post cath   Hyperlipidemia  Continue statin   Lipid panel performed in April showing LDL of 44  Type 2 diabetes mellitus 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, bilateral (HCC)  Lab Results   Component Value Date    HGBA1C 7.2 (H) 04/25/2025       Recent Labs     06/06/25  0319 06/06/25  0426 06/06/25  0740 06/06/25  1046   POCGLU 69 108 115 107       Blood Sugar Average: Last 72 hrs:  (P) 124.75  Prior A1c acceptable at 7.2  Has been wearing a Dexcom for the last 13 days.    Home Lantus is 55 units in the morning.  Reports that previously when he has been made n.p.o. he has had blood sugars that dropped to \"unreadable\"  Reduced basal insulin given NPO status to 20 units  Did have glucose drop overnight and needed dextrose  Holding metformin  Continue sliding scale insulin and ADA diet when able to have diet    VTE Pharmacologic Prophylaxis: VTE Score: 4 Moderate Risk (Score 3-4) - Pharmacological DVT Prophylaxis " Ordered: enoxaparin (Lovenox).    Mobility:   Basic Mobility Inpatient Raw Score: 24  JH-HLM Goal: 8: Walk 250 feet or more  JH-HLM Achieved: 8: Walk 250 feet ot more  JH-HLM Goal achieved. Continue to encourage appropriate mobility.    Patient Centered Rounds: I performed bedside rounds with nursing staff today.   Discussions with Specialists or Other Care Team Provider: nursing, cardiology    Education and Discussions with Family / Patient: declined call to wife at that time- she was then present for cardio eval.     Current Length of Stay: 1 day(s)  Current Patient Status: Inpatient   Certification Statement: The patient will continue to require additional inpatient hospital stay due to awaiting cath  Discharge Plan: Anticipate discharge later today or tomorrow to home. pending cath    Code Status: Level 1 - Full Code    Subjective   No nausea or vomiting.  No chest pain or shortness of breath at this time. Agreeable to cath. No symptoms since admission. Glucose dropped overnight    Objective :  Temp:  [97.5 °F (36.4 °C)-98.5 °F (36.9 °C)] 98.1 °F (36.7 °C)  HR:  [55-98] 60  BP: (140-180)/(58-82) 146/60  Resp:  [15-18] 18  SpO2:  [95 %-98 %] 96 %  O2 Device: None (Room air)    Body mass index is 29.99 kg/m².     Input and Output Summary (last 24 hours):     Intake/Output Summary (Last 24 hours) at 6/6/2025 1237  Last data filed at 6/6/2025 0700  Gross per 24 hour   Intake 0 ml   Output --   Net 0 ml       Physical Exam  Vitals and nursing note reviewed.   Constitutional:       General: He is not in acute distress.     Appearance: Normal appearance. He is not diaphoretic.   HENT:      Head: Normocephalic and atraumatic.     Cardiovascular:      Rate and Rhythm: Normal rate and regular rhythm.   Pulmonary:      Effort: Pulmonary effort is normal.      Breath sounds: Normal breath sounds. No stridor. No wheezing, rhonchi or rales.   Chest:      Chest wall: No tenderness.   Abdominal:      General: Bowel sounds are  normal.      Palpations: Abdomen is soft. There is no mass.      Tenderness: There is no abdominal tenderness. There is no guarding.     Musculoskeletal:      Right lower leg: No edema.      Left lower leg: No edema.     Skin:     General: Skin is warm and dry.     Neurological:      Mental Status: He is alert.           Lines/Drains:        Telemetry:  Telemetry Orders (From admission, onward)               24 Hour Telemetry Monitoring  Continuous x 24 Hours (Telem)        Expiring   Question:  Reason for 24 Hour Telemetry  Answer:  PCI/EP study (including pacer and ICD implementation), Cardiac surgery, MI, abnormal cardiac cath, and chest pain- rule out MI                     Telemetry Reviewed: Normal Sinus Rhythm  Indication for Continued Telemetry Use: Acute MI/Unstable Angina/Rule out ACS               Lab Results: I have reviewed the following results:   Results from last 7 days   Lab Units 06/04/25  1646 06/04/25  1240   WBC Thousand/uL  --  7.53   HEMOGLOBIN g/dL  --  12.5   HEMATOCRIT %  --  37.0   PLATELETS Thousands/uL 328 332   SEGS PCT %  --  54   LYMPHO PCT %  --  34   MONO PCT %  --  7   EOS PCT %  --  4     Results from last 7 days   Lab Units 06/05/25  0451 06/04/25  1240   SODIUM mmol/L 143 140   POTASSIUM mmol/L 3.5 3.9   CHLORIDE mmol/L 111* 110*   CO2 mmol/L 25 24   BUN mg/dL 13 15   CREATININE mg/dL 0.90 0.92   ANION GAP mmol/L 7 6   CALCIUM mg/dL 8.6 8.7   ALBUMIN g/dL  --  4.0   TOTAL BILIRUBIN mg/dL  --  0.90   ALK PHOS U/L  --  60   ALT U/L  --  20   AST U/L  --  18   GLUCOSE RANDOM mg/dL 73 95         Results from last 7 days   Lab Units 06/06/25  1046 06/06/25  0740 06/06/25  0426 06/06/25  0319 06/06/25  0317 06/06/25  0200 06/05/25  2050 06/05/25  1857 06/05/25  1125 06/05/25  0735 06/04/25  2105 06/04/25  1704   POC GLUCOSE mg/dl 107 115 108 69 66 82 222* 207* 136 100 142* 143*               Recent Cultures (last 7 days):         Imaging Results Review: I reviewed radiology reports  from this admission including: procedure reports.  Other Study Results Review: No additional pertinent studies reviewed.    Last 24 Hours Medication List:     Current Facility-Administered Medications:     acetaminophen (TYLENOL) tablet 650 mg, Q6H PRN    aspirin (ECOTRIN LOW STRENGTH) EC tablet 81 mg, Daily    [START ON 6/7/2025] atorvastatin (LIPITOR) tablet 40 mg, Daily    dorzolamide-timolol (COSOPT) 2-0.5 % ophthalmic solution 1 drop, BID    enoxaparin (LOVENOX) subcutaneous injection 40 mg, Daily    fluticasone (FLONASE) 50 mcg/act nasal spray 1 spray, Daily    gabapentin (NEURONTIN) capsule 100 mg, BID    hydrALAZINE (APRESOLINE) injection 10 mg, Q4H PRN    insulin glargine (LANTUS) subcutaneous injection 40 Units 0.4 mL, QAM    insulin lispro (HumALOG/ADMELOG) 100 units/mL subcutaneous injection 1-6 Units, TID AC **AND** Fingerstick Glucose (POCT), TID AC    insulin lispro (HumALOG/ADMELOG) 100 units/mL subcutaneous injection 1-6 Units, HS    lisinopril (ZESTRIL) tablet 10 mg, Daily    metoprolol tartrate (LOPRESSOR) partial tablet 12.5 mg, Q12H NATIVIDAD    ondansetron (ZOFRAN) injection 4 mg, Q6H PRN    sodium chloride 0.9 % infusion, Continuous, Last Rate: 100 mL/hr (06/06/25 1058)    Administrative Statements   Today, Patient Was Seen By: Francine Albright PA-C      **Please Note: This note may have been constructed using a voice recognition system.**

## 2025-06-06 NOTE — QUICK NOTE
Discussed with cardiology.  Cardiac cath was noted to show mod LAD disease and mild circ and RCA disease.  No intervention performed.  Starting Imdur and monitoring response.  Hopeful discharge tomorrow.

## 2025-06-06 NOTE — ASSESSMENT & PLAN NOTE
Pt with exertional chest pain for past year; more frequent and severe in past few weeks prompting eval in ED  Symptoms are concerning for stable perhaps even unstable angina at this tpoint.  Trop negative, EKG unchanged  Lexiscan nuclear ST negative yesterday  Risk factors for CAD: former smoker, + family hx of CAD, DM.  He has coronary calcifications on CTA 2023    Will discuss further with attending but given his progressive symptoms and risk factors likely need coronary angiogram.   Keep NPO, no dye allergy, normal Renal function. Cardiac cath reviewed with pt and he is agreeable to proceed if recommended.

## 2025-06-06 NOTE — CONSULTS
Consultation - Cardiology Team One  Rich Abel 69 y.o. male MRN: [id number]  Unit/Bed#: S -01 Encounter: [id number]    Inpatient consult to Cardiology  Consult performed by: PATRICIA Carlos  Consult ordered by: Francine Albright PA-C        Physician Requesting Consult: Marleny Willingham MD    Reason for Consult / Principal Problem: chest pain      Assessment/ Plan:    Assessment & Plan  Chest pain  Pt with exertional chest pain for past year; more frequent and severe in past few weeks prompting eval in ED  Symptoms are concerning for stable perhaps even unstable angina at this tpoint.  Trop negative, EKG unchanged  Lexiscan nuclear ST negative yesterday  Risk factors for CAD: former smoker, + family hx of CAD, DM.  He has coronary calcifications on CTA 2023    Will discuss further with attending but given his progressive symptoms and risk factors likely need coronary angiogram.   Keep NPO, no dye allergy, normal Renal function. Cardiac cath reviewed with pt and he is agreeable to proceed if recommended.     Primary hypertension  On lisinopril as OP; reports BP readings of 130s at home when he checks intermittently  BP was elevated on admission but improved without any further intervention; HRs slow at times; will hold off on adding BB until we eval ischemic symptoms.   Hyperlipidemia  Continue home atorvastatin 10mg for now; recent lipid panel tot 113, trig 58, LDL 44  Type 2 diabetes mellitus 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, bilateral (HCC)  Lab Results   Component Value Date    HGBA1C 7.2 (H) 04/25/2025       Recent Labs     06/06/25  0317 06/06/25  0319 06/06/25  0426 06/06/25  0740   POCGLU 66 69 108 115       Blood Sugar Average: Last 72 hrs:  (P) 126.4720545714130392  Management per primary team    History of Present Illness     HPI: Rich Abel is a 69 y.o. year old male who has a history of HTN,. HLD, Dm Type 2, prior tobacco use. Family hx of  premature CAD and AAA. He was seen by cardiologist Dr Vila in 2020 but does not follow with cardiology regularly.     Pt presented with complaints of chest pain. Has been having symptoms for about one year; no prior evaluation.   He notes left sided sharp chest pain across his chest with radiation to his left arm. The symptoms occur with exertion; going up an incline in particular and are relieved with rest. The symptoms are becoming more severe and more frequent in past weeks. He had an episode one week ago where he was walking up an incline with his wife and he had symptoms that were much more severe; he had some SOB and he felt bery wiped out. He typically can continue to walk up the incline and then rest but he had to stop and go rest in his truck. The pain persisted a bit longer than normal and he notes he was exteremly fatigued. His wife noted he did not look well at the time.     He has at times felt a numbness in his left chest and arm at rest that he would not describe as painful.     On presentation to ED he was hypertensive with /68.   EKG showed SR with widened QRS but unchanged from EKG 2023; no ST/twave changes.   HS serial trop negative x3.    He underwent a pharmacological nuclear stress test yesterday; report indicates he had dyspnea flushing chest pain and lightheadedness during stress with Lexiscan; there were no perfusion defects, post-rest EF of 55%; no evidence of transient ischemic dilation.     Given his symptoms and risk factors for CAD cardiology asked to see him for evaluation despite negative ST.     Echocardiogram 2023: Normal LV systolic function EF 60%, normal wall motion, mildly dilated left atrium, no significant valvular disease.    EKG reviewed personally:   6/4/2025  Sinus rhythm widened QRS  No ST/Twave changes      Telemetry reviewed personally:   Sinus rhythm, sinus bradycardia    Review of Systems   Constitutional: Negative for decreased appetite and fever.  "  Cardiovascular:  Negative for chest pain, dyspnea on exertion, leg swelling, orthopnea and palpitations.   Respiratory:  Negative for cough and shortness of breath.    Gastrointestinal:  Negative for nausea and vomiting.   Genitourinary:  Negative for dysuria.   Neurological:  Negative for dizziness and light-headedness.   Psychiatric/Behavioral:  Negative for altered mental status.    All other systems reviewed and are negative.      Historical Information   Past Medical History[1]  Past Surgical History[2]  Social History     Substance and Sexual Activity   Alcohol Use Yes    Alcohol/week: 2.0 standard drinks of alcohol    Types: 2 Standard drinks or equivalent per week     Social History     Substance and Sexual Activity   Drug Use No     Tobacco Use History[3]  Family History: Family history non-contributory    Meds/Allergies   current meds:   Current Facility-Administered Medications:     acetaminophen (TYLENOL) tablet 650 mg, Q6H PRN    aspirin (ECOTRIN LOW STRENGTH) EC tablet 81 mg, Daily    atorvastatin (LIPITOR) tablet 10 mg, Daily    dorzolamide-timolol (COSOPT) 2-0.5 % ophthalmic solution 1 drop, BID    enoxaparin (LOVENOX) subcutaneous injection 40 mg, Daily    fluticasone (FLONASE) 50 mcg/act nasal spray 1 spray, Daily    gabapentin (NEURONTIN) capsule 100 mg, BID    hydrALAZINE (APRESOLINE) injection 10 mg, Q4H PRN    insulin glargine (LANTUS) subcutaneous injection 40 Units 0.4 mL, QAM    insulin lispro (HumALOG/ADMELOG) 100 units/mL subcutaneous injection 1-6 Units, TID AC **AND** Fingerstick Glucose (POCT), TID AC    insulin lispro (HumALOG/ADMELOG) 100 units/mL subcutaneous injection 1-6 Units, HS    lisinopril (ZESTRIL) tablet 10 mg, Daily    ondansetron (ZOFRAN) injection 4 mg, Q6H PRN     Allergies[4]    Objective   Vitals: Blood pressure 146/60, pulse 80, temperature 98.1 °F (36.7 °C), temperature source Oral, resp. rate 18, height 5' 10\" (1.778 m), weight 94.8 kg (209 lb), SpO2 96%., "     Body mass index is 29.99 kg/m².,     Systolic (24hrs), Av , Min:140 , Max:190     Diastolic (24hrs), Av, Min:58, Max:82      Intake/Output Summary (Last 24 hours) at 2025 0917  Last data filed at 2025 0700  Gross per 24 hour   Intake 0 ml   Output --   Net 0 ml     Weight (last 2 days)       Date/Time Weight    25 1250 94.8 (209)    25 1705 94.8 (209)    25 1210 94.8 (209)          Invasive Devices       Peripheral Intravenous Line  Duration             Peripheral IV 25 Right Antecubital 1 day                  Physical Exam  Vitals reviewed.   Constitutional:       Appearance: Normal appearance.   HENT:      Head: Normocephalic.      Mouth/Throat:      Mouth: Mucous membranes are moist.     Cardiovascular:      Rate and Rhythm: Normal rate and regular rhythm.      Heart sounds: S1 normal and S2 normal. No murmur heard.  Pulmonary:      Effort: Pulmonary effort is normal.      Breath sounds: Normal breath sounds. No wheezing or rales.   Abdominal:      Palpations: Abdomen is soft.     Musculoskeletal:      Right lower leg: No edema.      Left lower leg: No edema.     Skin:     General: Skin is warm.     Neurological:      Mental Status: He is alert and oriented to person, place, and time.     Psychiatric:         Mood and Affect: Mood normal.       LABORATORY RESULTS:      CBC with diff:   Results from last 7 days   Lab Units 25  1646 25  1240   WBC Thousand/uL  --  7.53   HEMOGLOBIN g/dL  --  12.5   HEMATOCRIT %  --  37.0   MCV fL  --  90   PLATELETS Thousands/uL 328 332   RBC Million/uL  --  4.10   MCH pg  --  30.5   MCHC g/dL  --  33.8   RDW %  --  14.3   MPV fL 10.0 10.1   NRBC AUTO /100 WBCs  --  0     CMP:  Results from last 7 days   Lab Units 25  0451 25  1240   POTASSIUM mmol/L 3.5 3.9   CHLORIDE mmol/L 111* 110*   CO2 mmol/L 25 24   BUN mg/dL 13 15   CREATININE mg/dL 0.90 0.92   CALCIUM mg/dL 8.6 8.7   AST U/L  --  18   ALT U/L  --  20    ALK PHOS U/L  --  60   EGFR ml/min/1.73sq m 86 83     BMP:  Results from last 7 days   Lab Units 06/05/25  0451 06/04/25  1240   POTASSIUM mmol/L 3.5 3.9   CHLORIDE mmol/L 111* 110*   CO2 mmol/L 25 24   BUN mg/dL 13 15   CREATININE mg/dL 0.90 0.92   CALCIUM mg/dL 8.6 8.7         Lipid Profile:   Lab Results   Component Value Date    CHOL 86 01/06/2016    CHOL 107 06/30/2015    CHOL 103 09/09/2014     Lab Results   Component Value Date    HDL 57 04/25/2025    HDL 54 10/25/2024    HDL 58 04/12/2024     Lab Results   Component Value Date    LDLCALC 44 04/25/2025    LDLCALC 33 10/25/2024    LDLCALC 37 04/12/2024     Lab Results   Component Value Date    TRIG 58 04/25/2025    TRIG 54 10/25/2024    TRIG 39 04/12/2024     Imaging:   NM Myocardial Perfusion Spect (Pharmacological Induced Stress and/or Rest)  Result Date: 6/5/2025  Narrative:   Perfusion: There are no perfusion defects.   Stress Function: Left ventricular function post-stress is normal. Stress ejection fraction is 55%.   Stress ECG: No ST deviation is noted. The ECG was negative for ischemia. The stress ECG is negative for ischemia after pharmacologic vasodilation, without reproduction of symptoms. No evidence of ischemia or infarction by pharmacologic vasodilatory nuclear stress testing.     Stress strip  Result Date: 6/5/2025  Narrative: Confirmed by ROBERT BENITEZ ([id number]),  Josie Lemus ([id number]) on 6/5/2025 1:20:50 PM    XR chest 1 view portable  Result Date: 6/4/2025  Narrative: XR CHEST PORTABLE INDICATION: Chest pain. COMPARISON: CT chest 11/16/2023, chest radiograph 06/04/2021 FINDINGS: Monitoring leads and clips project over the chest. Clear lungs. No pneumothorax or pleural effusion. Normal cardiomediastinal silhouette. Bones are unremarkable for age. Normal upper abdomen.     Impression: No acute cardiopulmonary disease. Resident: RATNA CHEN I, the attending radiologist, have reviewed the images and agree with the final report above.  Workstation performed: [id number]     XR hip/pelv 2-3 vws left if performed  Result Date: 5/16/2025  Narrative: LEFT HIP INDICATION:   Aftercare following joint replacement surgery. Presence of left artificial hip joint. COMPARISON:  None. VIEWS:  XR HIP/PELV 2-3 VWS LEFT  W PELVIS IF PERFORMED FINDINGS: There is no acute fracture or dislocation. Status post left hip arthroplasty. Compared to 3/14/2025, there is anatomic alignment. Intact hardware. No loosening or heterotopic bone formation. No lytic or blastic osseous lesion. Soft tissues are unremarkable. Calcification of the seminal vesicles, often associated with diabetes but nonspecific Degenerative changes visualized lower lumbar spine.     Impression: Satisfactory left hip arthroplasty as above, unchanged from prior. Anatomic alignment without loosening.. Electronically signed: 05/16/2025 01:31 PM Oliver Cabrera MD    Assessment  Principal Problem:    Chest pain  Active Problems:    Primary hypertension    Hyperlipidemia    Type 2 diabetes mellitus 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, bilateral (HCC)    Thank you for allowing us to participate in this patient's care.     Counseling / Coordination of Care  Total floor / unit time spent today 45 minutes.  Greater than 50% of total time was spent with the patient and / or family counseling and / or coordination of care.  A description of the counseling / coordination of care: Review of history, current assessment, development of a plan.      Code Status: Level 1 - Full Code    ** Please Note: Dragon 360 Dictation voice to text software may have been used in the creation of this document. **          [1]   Past Medical History:  Diagnosis Date    Arthritis     Cancer (HCC)     Colon polyp     Diabetes mellitus (HCC)     Ear problems     Essential hypertension 02/05/2020    Glaucoma     HL (hearing loss)     Hyperlipidemia 02/05/2020    Hypertension     Kidney stone      Melanoma of ear, left (HCC)     Nasal polyp     Tinnitus    [2]   Past Surgical History:  Procedure Laterality Date    ARTHROSCOPY KNEE Bilateral     BASAL CELL CARCINOMA EXCISION  2020    left arm    CYSTOSCOPY      kidney stone removal    FOOT TENDON SURGERY Left 2021    Procedure: ANTERIOR TALOFIBULAR LIGAMENT REPAIR;  Surgeon: Trent Obregon DPM;  Location: [facility];  Service: Podiatry    FRACTURE SURGERY  2023    KNEE SURGERY      NASAL POLYP EXCISION      NC ARTHRP ACETBLR/PROX FEM PROSTC AGRFT/ALGRFT Left 2024    Procedure: ARTHROPLASTY HIP TOTAL;  Surgeon: Abiel Green MD;  Location: [facility];  Service: Orthopedics    NC COLONOSCOPY FLX DX W/COLLJ SPEC WHEN PFRMD N/A 2017    Procedure: COLONOSCOPY;  Surgeon: Jeff Rosen MD;  Location: [facility];  Service: Colorectal    NC RPR TDN FLXR FOOT 1/2 W/O FREE GRAFG EACH TENDON Left 2017    Procedure: ANKLE PERONEUS LONGUS TENDON REPAIR with allograft;  RETINACULUM REPAIR SECONDARY  Application of Provena Vac;  Surgeon: Trent Obregon DPM;  Location: [facility];  Service: Podiatry    ROTATOR CUFF REPAIR Bilateral     SHOULDER ARTHROSCOPY      WISDOM TOOTH EXTRACTION     [3]   Social History  Tobacco Use   Smoking Status Former    Current packs/day: 0.00    Average packs/day: 1 pack/day for 33.0 years (33.0 ttl pk-yrs)    Types: Cigarettes    Start date: 1974    Quit date: 2007    Years since quittin.9    Passive exposure: Past   Smokeless Tobacco Current    Types: Chew   Tobacco Comments    quit 15 years ago   [4] No Known Allergies

## 2025-06-06 NOTE — ASSESSMENT & PLAN NOTE
Patient presented with chest pain that has been increasing in frequency and intensity over the last year.    Occurs with exertion when walking uphill.  Does not have this when walking flat. Does not exercise in other manners  Radiates to his left arm when this occurs and feels increasingly fatigued.    Inpatient stress test was performed which was negative however given high clinical suspicion coupled with risk factors, consulted cardiology and planning for cardiac cath this afternoon

## 2025-06-06 NOTE — ASSESSMENT & PLAN NOTE
"Lab Results   Component Value Date    HGBA1C 7.2 (H) 04/25/2025       Recent Labs     06/06/25  0319 06/06/25  0426 06/06/25  0740 06/06/25  1046   POCGLU 69 108 115 107       Blood Sugar Average: Last 72 hrs:  (P) 124.75  Prior A1c acceptable at 7.2  Has been wearing a Dexcom for the last 13 days.    Home Lantus is 55 units in the morning.  Reports that previously when he has been made n.p.o. he has had blood sugars that dropped to \"unreadable\"  Reduced basal insulin given NPO status to 20 units  Did have glucose drop overnight and needed dextrose  Holding metformin  Continue sliding scale insulin and ADA diet when able to have diet  "

## 2025-06-06 NOTE — ASSESSMENT & PLAN NOTE
Lab Results   Component Value Date    HGBA1C 7.2 (H) 04/25/2025       Recent Labs     06/06/25  0317 06/06/25  0319 06/06/25  0426 06/06/25  0740   POCGLU 66 69 108 115       Blood Sugar Average: Last 72 hrs:  (P) 126.6926407976920827  Management per primary team

## 2025-06-06 NOTE — ASSESSMENT & PLAN NOTE
BP  elevated throughout admission despite the fact that patient reports at home it is well-controlled in the 130s or below  He is maintained on lisinopril 10 mg daily  BP 140s/60s at this time - was up to 190 yesterday systolic  HR 50s - will hold on BB until post cath

## 2025-06-06 NOTE — PLAN OF CARE
Problem: PAIN - ADULT  Goal: Verbalizes/displays adequate comfort level or baseline comfort level  Description: Interventions:  - Encourage patient to monitor pain and request assistance  - Assess pain using appropriate pain scale  - Administer analgesics as ordered based on type and severity of pain and evaluate response  - Implement non-pharmacological measures as appropriate and evaluate response  - Consider cultural and social influences on pain and pain management  - Notify physician/advanced practitioner if interventions unsuccessful or patient reports new pain  - Educate patient/family on pain management process including their role and importance of  reporting pain   - Provide non-pharmacologic/complimentary pain relief interventions  Outcome: Progressing     Problem: INFECTION - ADULT  Goal: Absence or prevention of progression during hospitalization  Description: INTERVENTIONS:  - Assess and monitor for signs and symptoms of infection  - Monitor lab/diagnostic results  - Monitor all insertion sites, i.e. indwelling lines, tubes, and drains  - Monitor endotracheal if appropriate and nasal secretions for changes in amount and color  - [place] appropriate cooling/warming therapies per order  - Administer medications as ordered  - Instruct and encourage patient and family to use good hand hygiene technique  - Identify and instruct in appropriate isolation precautions for identified infection/condition  Outcome: Progressing  Goal: Absence of fever/infection during neutropenic period  Description: INTERVENTIONS:  - Monitor WBC  - Perform strict hand hygiene  - Limit to healthy visitors only  - No plants, dried, fresh or silk flowers with starr in patient room  Outcome: Progressing     Problem: SAFETY ADULT  Goal: Patient will remain free of falls  Description: INTERVENTIONS:  - Educate patient/family on patient safety including physical limitations  - Instruct patient to call for assistance with activity   -  Consider consulting OT/PT to assist with strengthening/mobility based on AM PAC & JH-HLM score  - Consult OT/PT to assist with strengthening/mobility   - Keep Call bell within reach  - Keep bed low and locked with side rails adjusted as appropriate  - Keep care items and personal belongings within reach  - Initiate and maintain comfort rounds  - Make Fall Risk Sign visible to staff  - Offer Toileting every  Hours, in advance of need  - Initiate/Maintain alarm  - Obtain necessary fall risk management equipment:   - Apply yellow socks and bracelet for high fall risk patients  - Consider moving patient to room near nurses station  Outcome: Progressing  Goal: Maintain or return to baseline ADL function  Description: INTERVENTIONS:  -  Assess patient's ability to carry out ADLs; assess patient's baseline for ADL function and identify physical deficits which impact ability to perform ADLs (bathing, care of mouth/teeth, toileting, grooming, dressing, etc.)  - Assess/evaluate cause of self-care deficits   - Assess range of motion  - Assess patient's mobility; develop plan if impaired  - Assess patient's need for assistive devices and provide as appropriate  - Encourage maximum independence but intervene and supervise when necessary  - Involve family in performance of ADLs  - Assess for home care needs following discharge   - Consider OT consult to assist with ADL evaluation and planning for discharge  - Provide patient education as appropriate  - Monitor functional capacity and physical performance, use of AM PAC & JH-HLM   - Monitor gait, balance and fatigue with ambulation    Outcome: Progressing  Goal: Maintains/Returns to pre admission functional level  Description: INTERVENTIONS:  - Perform AM-PAC 6 Click Basic Mobility/ Daily Activity assessment daily.  - Set and communicate daily mobility goal to care team and patient/family/caregiver.   - Collaborate with rehabilitation services on mobility goals if consulted  -  Perform Range of Motion  times a day.  - Reposition patient every  hours.  - Dangle patient  times a day  - Stand patient  times a day  - Ambulate patient  times a day  - Out of bed to chair  times a day   - Out of bed for meals times a day  - Out of bed for toileting  - Record patient progress and toleration of activity level   Outcome: Progressing     Problem: DISCHARGE PLANNING  Goal: Discharge to home or other facility with appropriate resources  Description: INTERVENTIONS:  - Identify barriers to discharge w/patient and caregiver  - Arrange for needed discharge resources and transportation as appropriate  - Identify discharge learning needs (meds, wound care, etc.)  - Arrange for interpretive services to assist at discharge as needed  - Refer to Case Management Department for coordinating discharge planning if the patient needs post-hospital services based on physician/advanced practitioner order or complex needs related to functional status, cognitive ability, or social support system  Outcome: Progressing     Problem: Knowledge Deficit  Goal: Patient/family/caregiver demonstrates understanding of disease process, treatment plan, medications, and discharge instructions  Description: Complete learning assessment and assess knowledge base.  Interventions:  - Provide teaching at level of understanding  - Provide teaching via preferred learning methods  Outcome: Progressing

## 2025-06-06 NOTE — ASSESSMENT & PLAN NOTE
On lisinopril as OP; reports BP readings of 130s at home when he checks intermittently  BP was elevated on admission but improved without any further intervention; HRs slow at times; will hold off on adding BB until we eval ischemic symptoms.

## 2025-06-06 NOTE — PLAN OF CARE
Problem: PAIN - ADULT  Goal: Verbalizes/displays adequate comfort level or baseline comfort level  Description: Interventions:  - Encourage patient to monitor pain and request assistance  - Assess pain using appropriate pain scale  - Administer analgesics as ordered based on type and severity of pain and evaluate response  - Implement non-pharmacological measures as appropriate and evaluate response  - Consider cultural and social influences on pain and pain management  - Notify physician/advanced practitioner if interventions unsuccessful or patient reports new pain  - Educate patient/family on pain management process including their role and importance of  reporting pain   - Provide non-pharmacologic/complimentary pain relief interventions  Outcome: Progressing     Problem: INFECTION - ADULT  Goal: Absence or prevention of progression during hospitalization  Description: INTERVENTIONS:  - Assess and monitor for signs and symptoms of infection  - Monitor lab/diagnostic results  - Monitor all insertion sites, i.e. indwelling lines, tubes, and drains  - Monitor endotracheal if appropriate and nasal secretions for changes in amount and color  - [place] appropriate cooling/warming therapies per order  - Administer medications as ordered  - Instruct and encourage patient and family to use good hand hygiene technique  - Identify and instruct in appropriate isolation precautions for identified infection/condition  Outcome: Progressing  Goal: Absence of fever/infection during neutropenic period  Description: INTERVENTIONS:  - Monitor WBC  - Perform strict hand hygiene  - Limit to healthy visitors only  - No plants, dried, fresh or silk flowers with starr in patient room  Outcome: Progressing     Problem: SAFETY ADULT  Goal: Patient will remain free of falls  Description: INTERVENTIONS:  - Educate patient/family on patient safety including physical limitations  - Instruct patient to call for assistance with activity   -  Consider consulting OT/PT to assist with strengthening/mobility based on AM PAC & JH-HLM score  - Consult OT/PT to assist with strengthening/mobility   - Keep Call bell within reach  - Keep bed low and locked with side rails adjusted as appropriate  - Keep care items and personal belongings within reach  - Initiate and maintain comfort rounds  - Make Fall Risk Sign visible to staff  - Offer Toileting every 2 Hours, in advance of need  - Initiate/Maintain bed/chair alarm  - Obtain necessary fall risk management equipment  - Apply yellow socks and bracelet for high fall risk patients  - Consider moving patient to room near nurses station  Outcome: Progressing  Goal: Maintain or return to baseline ADL function  Description: INTERVENTIONS:  -  Assess patient's ability to carry out ADLs; assess patient's baseline for ADL function and identify physical deficits which impact ability to perform ADLs (bathing, care of mouth/teeth, toileting, grooming, dressing, etc.)  - Assess/evaluate cause of self-care deficits   - Assess range of motion  - Assess patient's mobility; develop plan if impaired  - Assess patient's need for assistive devices and provide as appropriate  - Encourage maximum independence but intervene and supervise when necessary  - Involve family in performance of ADLs  - Assess for home care needs following discharge   - Consider OT consult to assist with ADL evaluation and planning for discharge  - Provide patient education as appropriate  - Monitor functional capacity and physical performance, use of AM PAC & JH-HLM   - Monitor gait, balance and fatigue with ambulation    Outcome: Progressing  Goal: Maintains/Returns to pre admission functional level  Description: INTERVENTIONS:  - Perform AM-PAC 6 Click Basic Mobility/ Daily Activity assessment daily.  - Set and communicate daily mobility goal to care team and patient/family/caregiver.   - Collaborate with rehabilitation services on mobility goals if  consulted  - Perform Range of Motion 3 times a day.  - Reposition patient every 2 hours.  - Dangle patient 3 times a day  - Stand patient 3 times a day  - Ambulate patient 3 times a day  - Out of bed to chair 3 times a day   - Out of bed for meals 3 times a day  - Out of bed for toileting  - Record patient progress and toleration of activity level   Outcome: Progressing     Problem: DISCHARGE PLANNING  Goal: Discharge to home or other facility with appropriate resources  Description: INTERVENTIONS:  - Identify barriers to discharge w/patient and caregiver  - Arrange for needed discharge resources and transportation as appropriate  - Identify discharge learning needs (meds, wound care, etc.)  - Arrange for interpretive services to assist at discharge as needed  - Refer to Case Management Department for coordinating discharge planning if the patient needs post-hospital services based on physician/advanced practitioner order or complex needs related to functional status, cognitive ability, or social support system  Outcome: Progressing     Problem: Knowledge Deficit  Goal: Patient/family/caregiver demonstrates understanding of disease process, treatment plan, medications, and discharge instructions  Description: Complete learning assessment and assess knowledge base.  Interventions:  - Provide teaching at level of understanding  - Provide teaching via preferred learning methods  Outcome: Progressing

## 2025-06-07 VITALS
HEIGHT: 70 IN | SYSTOLIC BLOOD PRESSURE: 143 MMHG | RESPIRATION RATE: 15 BRPM | TEMPERATURE: 98 F | WEIGHT: 209 LBS | OXYGEN SATURATION: 97 % | BODY MASS INDEX: 29.92 KG/M2 | HEART RATE: 63 BPM | DIASTOLIC BLOOD PRESSURE: 56 MMHG

## 2025-06-07 LAB
ANION GAP SERPL CALCULATED.3IONS-SCNC: 4 MMOL/L (ref 4–13)
BUN SERPL-MCNC: 15 MG/DL (ref 5–25)
CALCIUM SERPL-MCNC: 8.6 MG/DL (ref 8.4–10.2)
CHLORIDE SERPL-SCNC: 110 MMOL/L (ref 96–108)
CO2 SERPL-SCNC: 28 MMOL/L (ref 21–32)
CREAT SERPL-MCNC: 0.99 MG/DL (ref 0.6–1.3)
GFR SERPL CREATININE-BSD FRML MDRD: 76 ML/MIN/1.73SQ M
GLUCOSE SERPL-MCNC: 102 MG/DL (ref 65–140)
GLUCOSE SERPL-MCNC: 134 MG/DL (ref 65–140)
POTASSIUM SERPL-SCNC: 4 MMOL/L (ref 3.5–5.3)
SODIUM SERPL-SCNC: 142 MMOL/L (ref 135–147)

## 2025-06-07 PROCEDURE — 82948 REAGENT STRIP/BLOOD GLUCOSE: CPT

## 2025-06-07 PROCEDURE — 99232 SBSQ HOSP IP/OBS MODERATE 35: CPT | Performed by: INTERNAL MEDICINE

## 2025-06-07 PROCEDURE — 80048 BASIC METABOLIC PNL TOTAL CA: CPT | Performed by: INTERNAL MEDICINE

## 2025-06-07 PROCEDURE — 99239 HOSP IP/OBS DSCHRG MGMT >30: CPT | Performed by: NURSE PRACTITIONER

## 2025-06-07 RX ORDER — METOPROLOL TARTRATE 25 MG/1
12.5 TABLET, FILM COATED ORAL EVERY 12 HOURS SCHEDULED
Qty: 30 TABLET | Refills: 0 | Status: SHIPPED | OUTPATIENT
Start: 2025-06-07

## 2025-06-07 RX ORDER — METFORMIN HYDROCHLORIDE 500 MG/1
1000 TABLET, EXTENDED RELEASE ORAL 2 TIMES DAILY
COMMUNITY
Start: 2025-06-09

## 2025-06-07 RX ORDER — ISOSORBIDE MONONITRATE 30 MG/1
30 TABLET, EXTENDED RELEASE ORAL DAILY
Qty: 30 TABLET | Refills: 0 | Status: SHIPPED | OUTPATIENT
Start: 2025-06-08 | End: 2025-06-07

## 2025-06-07 RX ORDER — ISOSORBIDE MONONITRATE 30 MG/1
30 TABLET, EXTENDED RELEASE ORAL DAILY
Qty: 30 TABLET | Refills: 0 | Status: SHIPPED | OUTPATIENT
Start: 2025-06-07 | End: 2025-06-16 | Stop reason: SDUPTHER

## 2025-06-07 RX ORDER — ATORVASTATIN CALCIUM 40 MG/1
40 TABLET, FILM COATED ORAL DAILY
Qty: 30 TABLET | Refills: 0 | Status: SHIPPED | OUTPATIENT
Start: 2025-06-08 | End: 2025-06-07

## 2025-06-07 RX ORDER — ATORVASTATIN CALCIUM 40 MG/1
40 TABLET, FILM COATED ORAL DAILY
Qty: 30 TABLET | Refills: 0 | Status: SHIPPED | OUTPATIENT
Start: 2025-06-07

## 2025-06-07 RX ADMIN — DORZOLAMIDE HYDROCHLORIDE AND TIMOLOL MALEATE 1 DROP: 20; 5 SOLUTION OPHTHALMIC at 08:17

## 2025-06-07 RX ADMIN — ASPIRIN 81 MG: 81 TABLET ORAL at 08:15

## 2025-06-07 RX ADMIN — LISINOPRIL 10 MG: 10 TABLET ORAL at 08:15

## 2025-06-07 RX ADMIN — FLUTICASONE PROPIONATE 1 SPRAY: 50 SPRAY, METERED NASAL at 08:49

## 2025-06-07 RX ADMIN — ATORVASTATIN CALCIUM 40 MG: 40 TABLET, FILM COATED ORAL at 08:15

## 2025-06-07 RX ADMIN — Medication 12.5 MG: at 08:15

## 2025-06-07 RX ADMIN — ISOSORBIDE MONONITRATE 30 MG: 30 TABLET, EXTENDED RELEASE ORAL at 08:15

## 2025-06-07 RX ADMIN — INSULIN GLARGINE 40 UNITS: 100 INJECTION, SOLUTION SUBCUTANEOUS at 08:13

## 2025-06-07 RX ADMIN — ENOXAPARIN SODIUM 40 MG: 40 INJECTION SUBCUTANEOUS at 08:14

## 2025-06-07 RX ADMIN — GABAPENTIN 100 MG: 100 CAPSULE ORAL at 08:15

## 2025-06-07 NOTE — ASSESSMENT & PLAN NOTE
Pt with exertional chest pain for past year; more frequent and severe in past few weeks prompting eval in ED  Symptoms are concerning for stable perhaps even unstable angina at this tpoint.  Trop negative, EKG unchanged  Lexiscan nuclear ST negative yesterday  Risk factors for CAD: former smoker, + family hx of CAD, DM.  He has coronary calcifications on CTA 2023    Underwent cardiac catheterization yesterday that showed 55% mid LAD lesion, 30% mid to distal circumflex lesion, 45% proximal RCA; no inventions performed    Echocardiogram with normal EF 60% normal wall motion, no significant valvular disease    We started him on Imdur 30 mg daily, Toprol tartrate 12.5 mg twice daily, increased his atorvastatin to 40 mg daily and continued aspirin 81 mg daily.  He is feeling well today ambulating without any symptoms.  Right radial cath site without complications small amount of ecchymosis, no hematoma.   Continue current medications on discharge  Cardiac rehab prescribed    Office will call him to arrange follow-up with either our Jc or blood  full office.

## 2025-06-07 NOTE — PROGRESS NOTES
Progress Note - Cardiology   Name: Rich Abel 69 y.o. male I MRN: [id number]  Unit/Bed#: S -01 I Date of Admission: 6/4/2025   Date of Service: 6/7/2025 I Hospital Day: 2     Assessment & Plan  Chest pain  Pt with exertional chest pain for past year; more frequent and severe in past few weeks prompting eval in ED  Symptoms are concerning for stable perhaps even unstable angina at this tpoint.  Trop negative, EKG unchanged  Lexiscan nuclear ST negative yesterday  Risk factors for CAD: former smoker, + family hx of CAD, DM.  He has coronary calcifications on CTA 2023    Underwent cardiac catheterization yesterday that showed 55% mid LAD lesion, 30% mid to distal circumflex lesion, 45% proximal RCA; no inventions performed    Echocardiogram with normal EF 60% normal wall motion, no significant valvular disease    We started him on Imdur 30 mg daily, Toprol tartrate 12.5 mg twice daily, increased his atorvastatin to 40 mg daily and continued aspirin 81 mg daily.  He is feeling well today ambulating without any symptoms.  Right radial cath site without complications small amount of ecchymosis, no hematoma.   Continue current medications on discharge  Cardiac rehab prescribed    Office will call him to arrange follow-up with either our Jc or blood his full office.  Primary hypertension  On lisinopril as OP; reports BP readings of 130s at home when he checks intermittently  Tolerating metoprolol tartrate 12.5 mg twice daily and Imdur  Hyperlipidemia  Continue home atorvastatin 10mg for now; recent lipid panel tot 113, trig 58, LDL 44  Type 2 diabetes mellitus 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, bilateral (HCC)  Lab Results   Component Value Date    HGBA1C 7.2 (H) 04/25/2025       Recent Labs     06/06/25  1400 06/06/25  1619 06/06/25  2052 06/07/25  0731   POCGLU 62* 69 215* 134       Blood Sugar Average: Last 72 hrs:  (P) [id number]  Management per primary  "team      Subjective: Patient seen for follow-up, no significant vents overnight.  He underwent cardiac catheterization that showed nonobstructive coronary disease yesterday.  He has not had any recurrent chest discomfort.  Started on metoprolol and Imdur and able to ambulate without any symptoms today.    Review of Systems   Constitutional: Negative for decreased appetite.   Cardiovascular:  Negative for chest pain, dyspnea on exertion, leg swelling, orthopnea and palpitations.   Respiratory:  Negative for cough and shortness of breath.    Gastrointestinal:  Negative for nausea and vomiting.   Genitourinary:  Negative for dysuria.   Neurological:  Negative for dizziness and light-headedness.   Psychiatric/Behavioral:  Negative for altered mental status.    All other systems reviewed and are negative.    Objective:   Vitals: Blood pressure 143/56, pulse 63, temperature 98 °F (36.7 °C), temperature source Oral, resp. rate 15, height 5' 10\" (1.778 m), weight 94.8 kg (209 lb), SpO2 97%.,     Body mass index is 29.99 kg/m².,     Systolic (24hrs), Av , Min:134 , Max:166     Diastolic (24hrs), Av, Min:51, Max:70      Intake/Output Summary (Last 24 hours) at 2025 1026  Last data filed at 2025 0817  Gross per 24 hour   Intake 240 ml   Output --   Net 240 ml     Weight (last 2 days)       Date/Time Weight    25 1145 94.8 (209)    25 1250 94.8 (209)          Telemetry Review:    Sinus rhythm, no significant events, intermittent sinus bradycardia with heart rate in the 50s overnight no pauses or evidence of heart block    Physical Exam  Vitals reviewed.   Constitutional:       Appearance: Normal appearance.   HENT:      Head: Normocephalic.      Mouth/Throat:      Mouth: Mucous membranes are moist.     Cardiovascular:      Rate and Rhythm: Normal rate and regular rhythm.      Pulses:           Radial pulses are 2+ on the right side.      Heart sounds: S1 normal and S2 normal. No murmur heard.     " Comments: Right radial cath site within normal meds, small amount of ecchymosis.  Intact radial pulse +2.  Sensation and range of motion intact  Pulmonary:      Effort: Pulmonary effort is normal.      Breath sounds: Normal breath sounds. No wheezing or rales.   Abdominal:      Palpations: Abdomen is soft.     Musculoskeletal:      Right lower leg: No edema.      Left lower leg: No edema.     Skin:     General: Skin is warm.     Neurological:      Mental Status: He is alert and oriented to person, place, and time.       LABORATORY RESULTS      CBC with diff:   Results from last 7 days   Lab Units 06/04/25  1646 06/04/25  1240   WBC Thousand/uL  --  7.53   HEMOGLOBIN g/dL  --  12.5   HEMATOCRIT %  --  37.0   MCV fL  --  90   PLATELETS Thousands/uL 328 332   RBC Million/uL  --  4.10   MCH pg  --  30.5   MCHC g/dL  --  33.8   RDW %  --  14.3   MPV fL 10.0 10.1   NRBC AUTO /100 WBCs  --  0     CMP:  Results from last 7 days   Lab Units 06/07/25  0528 06/05/25  0451 06/04/25  1240   POTASSIUM mmol/L 4.0 3.5 3.9   CHLORIDE mmol/L 110* 111* 110*   CO2 mmol/L 28 25 24   BUN mg/dL 15 13 15   CREATININE mg/dL 0.99 0.90 0.92   CALCIUM mg/dL 8.6 8.6 8.7   AST U/L  --   --  18   ALT U/L  --   --  20   ALK PHOS U/L  --   --  60   EGFR ml/min/1.73sq m 76 86 83     BMP:  Results from last 7 days   Lab Units 06/07/25  0528 06/05/25  0451 06/04/25  1240   POTASSIUM mmol/L 4.0 3.5 3.9   CHLORIDE mmol/L 110* 111* 110*   CO2 mmol/L 28 25 24   BUN mg/dL 15 13 15   CREATININE mg/dL 0.99 0.90 0.92   CALCIUM mg/dL 8.6 8.6 8.7     Lipid Profile:   Lab Results   Component Value Date    CHOL 86 01/06/2016    CHOL 107 06/30/2015    CHOL 103 09/09/2014     Lab Results   Component Value Date    HDL 57 04/25/2025    HDL 54 10/25/2024    HDL 58 04/12/2024     Lab Results   Component Value Date    LDLCALC 44 04/25/2025    LDLCALC 33 10/25/2024    LDLCALC 37 04/12/2024     Lab Results   Component Value Date    TRIG 58 04/25/2025    TRIG 54 10/25/2024     TRIG 39 04/12/2024     Meds/Allergies   current meds:   Current Facility-Administered Medications:     acetaminophen (TYLENOL) tablet 650 mg, Q6H PRN    aspirin (ECOTRIN LOW STRENGTH) EC tablet 81 mg, Daily    atorvastatin (LIPITOR) tablet 40 mg, Daily    dorzolamide-timolol (COSOPT) 2-0.5 % ophthalmic solution 1 drop, BID    enoxaparin (LOVENOX) subcutaneous injection 40 mg, Daily    fluticasone (FLONASE) 50 mcg/act nasal spray 1 spray, Daily    gabapentin (NEURONTIN) capsule 100 mg, BID    hydrALAZINE (APRESOLINE) injection 10 mg, Q4H PRN    insulin glargine (LANTUS) subcutaneous injection 40 Units 0.4 mL, QAM    insulin lispro (HumALOG/ADMELOG) 100 units/mL subcutaneous injection 1-6 Units, TID AC **AND** Fingerstick Glucose (POCT), TID AC    insulin lispro (HumALOG/ADMELOG) 100 units/mL subcutaneous injection 1-6 Units, HS    isosorbide mononitrate (IMDUR) 24 hr tablet 30 mg, Daily    lisinopril (ZESTRIL) tablet 10 mg, Daily    metoprolol tartrate (LOPRESSOR) partial tablet 12.5 mg, Q12H [place]    ondansetron (ZOFRAN) injection 4 mg, Q6H PRN    sodium chloride 0.9 % infusion, Continuous, Last Rate: Stopped (06/06/25 2005)    Medications Prior to Admission:     acetaminophen (TYLENOL) 500 mg tablet    aspirin (ECOTRIN LOW STRENGTH) 81 mg EC tablet    atorvastatin (LIPITOR) 10 mg tablet    dorzolamide-timolol (COSOPT) 2-0.5 % ophthalmic solution    fluticasone (FLONASE) 50 mcg/act nasal spray    gabapentin (NEURONTIN) 100 mg capsule    Insulin Glargine Solostar (Basaglar KwikPen) 100 UNIT/ML SOPN    lisinopril (ZESTRIL) 10 mg tablet    MAGNESIUM PO    metFORMIN (GLUCOPHAGE-XR) 500 mg 24 hr tablet    Milk Thistle 150 MG CAPS    Multiple Vitamins-Minerals (MULTIVITAMIN WITH MINERALS) tablet    sodium chloride (OCEAN) 0.65 % nasal spray    TURMERIC PO    Vitamins-Lipotropics (B-100 PO)    BD Pen Needle Meghana 2nd Gen 32G X 4 MM MISC    Cholecalciferol 125 MCG (5000 UT) capsule    Continuous Glucose  (FreeStyle  [place] 3 [place]) CAMRON    Continuous Glucose Sensor (FreeStyle Kal[place]ana 3 Sensor) MISC    methocarbamol (ROBAXIN) 500 mg tablet    Simbrinza 1-0.2 % SUSP    sodium chloride, 100 mL/hr, Last Rate: Stopped (06/06/25 2005)      Assessment:  Principal Problem:    Chest pain  Active Problems:    Primary hypertension    Hyperlipidemia    Type 2 diabetes mellitus 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, bilateral (HCC)    Counseling / Coordination of Care  Total floor / unit time spent today 20 minutes.  Greater than 50% of total time was spent with the patient and / or family counseling and / or coordination of care.      ** Please Note: Dragon 360 Dictation voice to text software may have been used in the creation of this document. **

## 2025-06-07 NOTE — ASSESSMENT & PLAN NOTE
On lisinopril as OP; reports BP readings of 130s at home when he checks intermittently  Tolerating metoprolol tartrate 12.5 mg twice daily and Imdur

## 2025-06-07 NOTE — ASSESSMENT & PLAN NOTE
Lab Results   Component Value Date    HGBA1C 7.2 (H) 04/25/2025       Recent Labs     06/06/25  1400 06/06/25  1619 06/06/25 2052 06/07/25  0731   POCGLU 62* 69 215* 134       Blood Sugar Average: Last 72 hrs:  (P) [id number]  Management per primary team

## 2025-06-07 NOTE — DISCHARGE SUMMARY
Discharge Summary - Hospitalist   Name: Rich Abel 69 y.o. male I MRN: [id number]  Unit/Bed#: S -01 I Date of Admission: 6/4/2025   Date of Service: 6/7/2025 I Hospital Day: 2     Assessment & Plan  Chest pain  Patient presented with chest pain that has been increasing in frequency and intensity over the last year.    Occurs with exertion when walking uphill.  Does not have this when walking flat. Does not exercise in other manners  Radiates to his left arm when this occurs and feels increasingly fatigued.    Inpatient stress test was performed which was negative however given high clinical suspicion coupled with risk factors, consulted cardiology and planning for cardiac cath this afternoon  Left heart cath performed on June 7, 55% stenosis of the mid LAD, 30% stenosis of the left circumflex, 45% stenosis to the RCA  Dietary and lifestyle modifications, continue aspirin and statin, antianginals, outpatient cardiac rehab  Echocardiogram June 7 revealed an EF of 60%, moderate concentric hypertrophy, grade 1 diastolic dysfunction, left atrial dilatation, trace TR  Primary hypertension  BP  elevated throughout admission despite the fact that patient reports at home it is well-controlled in the 130s or below  He is maintained on lisinopril 10 mg daily  BP 140s/60s at this time - was up to 190 yesterday systolic  HR 50s - will hold on BB until post cath   Hyperlipidemia  Continue statin   Lipid panel performed in April showing LDL of 44  Type 2 diabetes mellitus 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, bilateral (HCC)  Lab Results   Component Value Date    HGBA1C 7.2 (H) 04/25/2025       Recent Labs     06/06/25  1400 06/06/25  1619 06/06/25  2052 06/07/25  0731   POCGLU 62* 69 215* 134       Blood Sugar Average: Last 72 hrs:  (P) 120.3654343053487296  Prior A1c acceptable at 7.2  Has been wearing a Dexcom for the last 13 days.    Home Lantus is 55 units in the morning.  Reports  "that previously when he has been made n.p.o. he has had blood sugars that dropped to \"unreadable\"  Reduced basal insulin given NPO status to 20 units  Did have glucose drop overnight and needed dextrose  Holding metformin  Continue sliding scale insulin and ADA diet when able to have diet     Discharging Physician / Practitioner: PATRICIA Xiong  PCP: Coleen Barrera PA-C  Admission Date: 6/4/2025  Discharge Date: 06/07/25    Reason for Admission: Chest Pain (Left sided Chest pain into left arm with exertion since Friday. )        Medical Problems       Resolved Problems  Date Reviewed: 6/7/2025   None         Consultations During Hospital Stay:  IP CONSULT TO CARDIOLOGY    Procedures Performed:     Left heart cath-55% stenosis of the mid LAD, 30% stenosis of the left circumflex, 45% stenosis to the RCA, nonobstructive coronary artery disease with recommendations for medical management.    Significant Findings / Test Results:     Results from last 7 days   Lab Units 06/04/25  1646 06/04/25  1240   WBC Thousand/uL  --  7.53   HEMOGLOBIN g/dL  --  12.5   PLATELETS Thousands/uL 328 332     Results from last 7 days   Lab Units 06/07/25  0528 06/05/25  0451 06/04/25  1240   SODIUM mmol/L 142 143 140   POTASSIUM mmol/L 4.0 3.5 3.9   CHLORIDE mmol/L 110* 111* 110*   CO2 mmol/L 28 25 24   BUN mg/dL 15 13 15   CREATININE mg/dL 0.99 0.90 0.92   CALCIUM mg/dL 8.6 8.6 8.7   TOTAL BILIRUBIN mg/dL  --   --  0.90   ALK PHOS U/L  --   --  60   ALT U/L  --   --  20   AST U/L  --   --  18             Lab Results   Component Value Date/Time    HGBA1C 7.2 (H) 04/25/2025 08:00 AM    HGBA1C 8.0 (H) 10/02/2019 07:06 AM     Results from last 7 days   Lab Units 06/07/25  0731 06/06/25  2052 06/06/25  1619 06/06/25  1400 06/06/25  1250 06/06/25  1046 06/06/25  0740 06/06/25  0426 06/06/25  0319 06/06/25  0317 06/06/25  0200 06/05/25 2050   POC GLUCOSE mg/dl 134 215* 69 62* 77 107 115 108 69 66 82 222*         Blood Culture: No " "results found for: \"BLOODCX\"  Urine Culture: No results found for: \"URINECX\"  Sputum Culture: No components found for: \"SPUTUMCX\"  Wound Culture: No results found for: \"WOUNDCULT\"     XR chest 1 view portable   ED Interpretation by Yosvany Espinoza DO (06/04 1305)   WNL      Final Result by Nabeel Cameron MD (06/04 1530)      No acute cardiopulmonary disease.            Resident: RATNA CHEN I, the attending radiologist, have reviewed the images and agree with the final report above.      Workstation performed: [id number]                Incidental Findings:        Test Results Pending at Discharge (will require follow up):        Outpatient Tests Requested:      Complications:      Reason for Admission:   Chief Complaint   Patient presents with    Chest Pain     Left sided Chest pain into left arm with exertion since Friday.        Hospital Course:     Per HPI: Rich Abel is a 69 y.o. male patient with a PMH of type 2 diabetes, hypertension, hyperlipidemia who originally presented to the hospital on 6/4/2025 due to chest pain.  Patient reports chest pain with exertion radiating to his left arm and relieved with rest.  Patient was admitted for further management.  Patient had a stress test upon admission which was negative however given his risk factors for coronary artery disease, diffuse coronary artery calcification seen on chest CT and typical symptoms a left heart catheterization was performed which revealed diffuse calcifications but no obstructive disease.  Patient was started on Imdur to manage his chest pain symptoms.  He is discharged home on aspirin and statin, antianginal medications.  He was counseled on lifestyle modifications and tight control of his comorbidities.    Hospital Course:    Please see above list of diagnoses and related plan for additional information.     Condition at Discharge: good       Discharge Day Visit / Exam:     Subjective: Patient sitting up in the chair reports " "feeling well.  He wishes to be discharged from the hospital.  He has had no further episodes of chest pain or shortness of breath.  Vitals: Blood Pressure: 143/56 (06/07/25 0714)  Pulse: 63 (06/07/25 0812)  Temperature: 98 °F (36.7 °C) (06/07/25 0714)  Temp Source: Oral (06/07/25 0714)  Respirations: 15 (06/07/25 0100)  Height: 5' 10\" (177.8 cm) (06/06/25 1145)  Weight - Scale: 94.8 kg (209 lb) (06/06/25 1145)  SpO2: 97 % (06/07/25 0714)  Exam:   Physical Exam  Vitals and nursing note reviewed.   Constitutional:       Appearance: Normal appearance.   HENT:      Head: Normocephalic.      Nose: Nose normal.     Eyes:      Extraocular Movements: Extraocular movements intact.       Cardiovascular:      Rate and Rhythm: Normal rate and regular rhythm.   Pulmonary:      Effort: Pulmonary effort is normal.      Breath sounds: Normal breath sounds.   Abdominal:      General: Abdomen is flat.      Palpations: Abdomen is soft.     Musculoskeletal:         General: Normal range of motion.     Skin:     General: Skin is warm and dry.      Comments: Mild ecchymosis noted to right wrist, 2+ pulses, no bleeding, oozing or discharge from cath site     Neurological:      General: No focal deficit present.      Mental Status: He is alert and oriented to person, place, and time.           Discharge instructions/Information to patient and family:   See after visit summary for information provided to patient and family.      Provisions for Follow-Up Care:  See after visit summary for information related to follow-up care and any pertinent home health orders.      Disposition:     Home    Planned Readmission: None     Discharge Statement:  I spent 25 minutes discharging the patient. This time was spent on the day of discharge. I had direct contact with the patient on the day of discharge. Greater than 50% of the total time was spent examining patient, answering all patient questions, arranging and discussing plan of care with patient as " well as directly providing post-discharge instructions.  Additional time then spent on discharge activities.    Discharge Medications:  See after visit summary for reconciled discharge medications provided to patient and family.      ** Please Note: This note has been constructed using a voice recognition system **

## 2025-06-07 NOTE — ASSESSMENT & PLAN NOTE
Patient presented with chest pain that has been increasing in frequency and intensity over the last year.    Occurs with exertion when walking uphill.  Does not have this when walking flat. Does not exercise in other manners  Radiates to his left arm when this occurs and feels increasingly fatigued.    Inpatient stress test was performed which was negative however given high clinical suspicion coupled with risk factors, consulted cardiology and planning for cardiac cath this afternoon  Left heart cath performed on June 7, 55% stenosis of the mid LAD, 30% stenosis of the left circumflex, 45% stenosis to the RCA  Dietary and lifestyle modifications, continue aspirin and statin, antianginals, outpatient cardiac rehab  Echocardiogram June 7 revealed an EF of 60%, moderate concentric hypertrophy, grade 1 diastolic dysfunction, left atrial dilatation, trace TR

## 2025-06-07 NOTE — ASSESSMENT & PLAN NOTE
"Lab Results   Component Value Date    HGBA1C 7.2 (H) 04/25/2025       Recent Labs     06/06/25  1400 06/06/25  1619 06/06/25 2052 06/07/25  0731   POCGLU 62* 69 215* 134       Blood Sugar Average: Last 72 hrs:  (P) 120.2226631202953778  Prior A1c acceptable at 7.2  Has been wearing a Dexcom for the last 13 days.    Home Lantus is 55 units in the morning.  Reports that previously when he has been made n.p.o. he has had blood sugars that dropped to \"unreadable\"  Reduced basal insulin given NPO status to 20 units  Did have glucose drop overnight and needed dextrose  Holding metformin  Continue sliding scale insulin and ADA diet when able to have diet  "

## 2025-06-07 NOTE — PLAN OF CARE
Problem: PAIN - ADULT  Goal: Verbalizes/displays adequate comfort level or baseline comfort level  Description: Interventions:  - Encourage patient to monitor pain and request assistance  - Assess pain using appropriate pain scale  - Administer analgesics as ordered based on type and severity of pain and evaluate response  - Implement non-pharmacological measures as appropriate and evaluate response  - Consider cultural and social influences on pain and pain management  - Notify physician/advanced practitioner if interventions unsuccessful or patient reports new pain  - Educate patient/family on pain management process including their role and importance of  reporting pain   - Provide non-pharmacologic/complimentary pain relief interventions  Outcome: Progressing     Problem: INFECTION - ADULT  Goal: Absence or prevention of progression during hospitalization  Description: INTERVENTIONS:  - Assess and monitor for signs and symptoms of infection  - Monitor lab/diagnostic results  - Monitor all insertion sites, i.e. indwelling lines, tubes, and drains  - Monitor endotracheal if appropriate and nasal secretions for changes in amount and color  - [place] appropriate cooling/warming therapies per order  - Administer medications as ordered  - Instruct and encourage patient and family to use good hand hygiene technique  - Identify and instruct in appropriate isolation precautions for identified infection/condition  Outcome: Progressing  Goal: Absence of fever/infection during neutropenic period  Description: INTERVENTIONS:  - Monitor WBC  - Perform strict hand hygiene  - Limit to healthy visitors only  - No plants, dried, fresh or silk flowers with starr in patient room  Outcome: Progressing     Problem: SAFETY ADULT  Goal: Patient will remain free of falls  Description: INTERVENTIONS:  - Educate patient/family on patient safety including physical limitations  - Instruct patient to call for assistance with activity   -  Consider consulting OT/PT to assist with strengthening/mobility based on AM PAC & JH-HLM score  - Consult OT/PT to assist with strengthening/mobility   - Keep Call bell within reach  - Keep bed low and locked with side rails adjusted as appropriate  - Keep care items and personal belongings within reach  - Initiate and maintain comfort rounds  - Make Fall Risk Sign visible to staff  - Offer Toileting every 2 Hours, in advance of need  - Apply yellow socks and bracelet for high fall risk patients  - Consider moving patient to room near nurses station  Outcome: Progressing  Goal: Maintain or return to baseline ADL function  Description: INTERVENTIONS:  -  Assess patient's ability to carry out ADLs; assess patient's baseline for ADL function and identify physical deficits which impact ability to perform ADLs (bathing, care of mouth/teeth, toileting, grooming, dressing, etc.)  - Assess/evaluate cause of self-care deficits   - Assess range of motion  - Assess patient's mobility; develop plan if impaired  - Assess patient's need for assistive devices and provide as appropriate  - Encourage maximum independence but intervene and supervise when necessary  - Involve family in performance of ADLs  - Assess for home care needs following discharge   - Consider OT consult to assist with ADL evaluation and planning for discharge  - Provide patient education as appropriate  - Monitor functional capacity and physical performance, use of AM PAC & JH-HLM   - Monitor gait, balance and fatigue with ambulation    Outcome: Progressing  Goal: Maintains/Returns to pre admission functional level  Description: INTERVENTIONS:  - Perform AM-PAC 6 Click Basic Mobility/ Daily Activity assessment daily.  - Set and communicate daily mobility goal to care team and patient/family/caregiver.   - Collaborate with rehabilitation services on mobility goals if consulted  - Perform Range of Motion 3 times a day.  - Reposition patient every 2 hours.  -  Dangle patient 3 times a day  - Stand patient 3 times a day  - Ambulate patient 3 times a day  - Out of bed to chair 3 times a day   - Out of bed for meals 3 times a day  - Out of bed for toileting  - Record patient progress and toleration of activity level   Outcome: Progressing     Problem: DISCHARGE PLANNING  Goal: Discharge to home or other facility with appropriate resources  Description: INTERVENTIONS:  - Identify barriers to discharge w/patient and caregiver  - Arrange for needed discharge resources and transportation as appropriate  - Identify discharge learning needs (meds, wound care, etc.)  - Arrange for interpretive services to assist at discharge as needed  - Refer to Case Management Department for coordinating discharge planning if the patient needs post-hospital services based on physician/advanced practitioner order or complex needs related to functional status, cognitive ability, or social support system  Outcome: Progressing

## 2025-06-08 LAB
ATRIAL RATE: 63 BPM
ATRIAL RATE: 78 BPM
P AXIS: 53 DEGREES
P AXIS: 54 DEGREES
PR INTERVAL: 180 MS
PR INTERVAL: 184 MS
QRS AXIS: -34 DEGREES
QRS AXIS: -34 DEGREES
QRSD INTERVAL: 112 MS
QRSD INTERVAL: 122 MS
QT INTERVAL: 386 MS
QT INTERVAL: 422 MS
QTC INTERVAL: 431 MS
QTC INTERVAL: 440 MS
T WAVE AXIS: 66 DEGREES
T WAVE AXIS: 68 DEGREES
VENTRICULAR RATE: 63 BPM
VENTRICULAR RATE: 78 BPM

## 2025-06-08 PROCEDURE — 93010 ELECTROCARDIOGRAM REPORT: CPT | Performed by: INTERNAL MEDICINE

## 2025-06-09 ENCOUNTER — TRANSITIONAL CARE MANAGEMENT (OUTPATIENT)
Dept: FAMILY MEDICINE CLINIC | Facility: CLINIC | Age: 70
End: 2025-06-09

## 2025-06-13 DIAGNOSIS — I10 ESSENTIAL HYPERTENSION: ICD-10-CM

## 2025-06-13 RX ORDER — LISINOPRIL 10 MG/1
10 TABLET ORAL DAILY
Qty: 90 TABLET | Refills: 1 | Status: SHIPPED | OUTPATIENT
Start: 2025-06-13 | End: 2025-06-16 | Stop reason: ALTCHOICE

## 2025-06-16 ENCOUNTER — OFFICE VISIT (OUTPATIENT)
Dept: CARDIOLOGY CLINIC | Facility: CLINIC | Age: 70
End: 2025-06-16
Payer: MEDICARE

## 2025-06-16 VITALS
BODY MASS INDEX: 30.85 KG/M2 | DIASTOLIC BLOOD PRESSURE: 64 MMHG | OXYGEN SATURATION: 99 % | SYSTOLIC BLOOD PRESSURE: 168 MMHG | HEART RATE: 54 BPM | WEIGHT: 215 LBS

## 2025-06-16 DIAGNOSIS — I10 PRIMARY HYPERTENSION: ICD-10-CM

## 2025-06-16 DIAGNOSIS — E78.5 HYPERLIPIDEMIA, UNSPECIFIED HYPERLIPIDEMIA TYPE: ICD-10-CM

## 2025-06-16 DIAGNOSIS — I25.10 CAD (CORONARY ARTERY DISEASE), NATIVE CORONARY ARTERY: ICD-10-CM

## 2025-06-16 DIAGNOSIS — Z79.4 TYPE 2 DIABETES MELLITUS WITH BOTH EYES AFFECTED BY MILD NONPROLIFERATIVE RETINOPATHY WITHOUT MACULAR EDEMA, WITH LONG-TERM CURRENT USE OF INSULIN (HCC): ICD-10-CM

## 2025-06-16 DIAGNOSIS — I25.118 CORONARY ARTERY DISEASE OF NATIVE ARTERY OF NATIVE HEART WITH STABLE ANGINA PECTORIS (HCC): Primary | ICD-10-CM

## 2025-06-16 DIAGNOSIS — E11.3293 TYPE 2 DIABETES MELLITUS WITH BOTH EYES AFFECTED BY MILD NONPROLIFERATIVE RETINOPATHY WITHOUT MACULAR EDEMA, WITH LONG-TERM CURRENT USE OF INSULIN (HCC): ICD-10-CM

## 2025-06-16 PROCEDURE — 99214 OFFICE O/P EST MOD 30 MIN: CPT | Performed by: NURSE PRACTITIONER

## 2025-06-16 RX ORDER — ISOSORBIDE MONONITRATE 30 MG/1
30 TABLET, EXTENDED RELEASE ORAL DAILY
Qty: 90 TABLET | Refills: 3 | Status: SHIPPED | OUTPATIENT
Start: 2025-06-16

## 2025-06-16 RX ORDER — LISINOPRIL 20 MG/1
20 TABLET ORAL DAILY
Start: 2025-06-16

## 2025-06-16 RX ORDER — ISOSORBIDE MONONITRATE 30 MG/1
30 TABLET, EXTENDED RELEASE ORAL DAILY
Qty: 90 TABLET | Refills: 3 | Status: SHIPPED | OUTPATIENT
Start: 2025-06-16 | End: 2025-06-16 | Stop reason: SDUPTHER

## 2025-06-16 NOTE — ASSESSMENT & PLAN NOTE
Atorvastatin increased to 40 mg daily  Lab Results   Component Value Date    LDLCALC 44 04/25/2025   Encouraged a healthy diet.  Reassess 4 to 12 weeks

## 2025-06-16 NOTE — ASSESSMENT & PLAN NOTE
Recent adm 6/4-6/7/25: ACS ruled out  Pharm SPECT 6/5/25:  No evidence of ischemia or infarction by pharmacologic vasodilatory nuclear stress testing.  Given sx and coronary calcium on CT, LHC was pursued:  LHC 6/6/25:    Mid LAD has an eccentric/calcified lesion that is 55% stenosed and DFR negative   Mid Cx to Dist Cx lesion is 30% stenosed.  Prox RCA lesion is 45% stenosed.  LVEDP is normal without gradient on LV-AO pullback  Placed on OMT with aspirin 81, statin, beta-blocker.  Cardiac rehab was recommended as an outpatient

## 2025-06-16 NOTE — LETTER
2025     Coleen Barrera PA-C  [address] [place] [address]  [address] [place] [id number]    Patient: Rich Abel   YOB: 1955   Date of Visit: 2025       Dear Dr. Coleen Barrera PA-C:    Thank you for referring Rich Abel to me for evaluation. Below are my notes for this consultation.    If you have questions, please do not hesitate to call me. I look forward to following your patient along with you.         Sincerely,        PATRICIA Short        CC: No Recipients    PATRICIA Short  2025  2:29 PM  Sign when Signing Visit  Cardiology   Hospital Follow Up   Office Visit Note  Rich Abel   69 y.o.   male   MRN: [id number]  [facility] [place]  [address] [place] [address]  [address]  [facility] [address]  [phone]    PCP: Coleen Barrera PA-C  Cardiologist: will be Dr Woodward          Summary of Plan:  Heart healthy diet: Mediterranean or DASH.  Education provided  Increase lisinopril to 20 mg daily  Cardiac rehab, given stable angina  FLP + direct LDL 3 months  Follow-up with Dr. Woodward:  1 yr  Rec periodic follow up with his PCP/aggressive risk factor modification  Colon Ca screenin2021, up-to-date            Assessment & Plan  Coronary artery disease of native artery of native heart with stable angina pectoris (HCC)  Recent adm -25: ACS ruled out  Pharm SPECT 25:  No evidence of ischemia or infarction by pharmacologic vasodilatory nuclear stress testing.  Given sx and coronary calcium on CT, LHC was pursued:  LHC 25:    Mid LAD has an eccentric/calcified lesion that is 55% stenosed and DFR negative   Mid Cx to Dist Cx lesion is 30% stenosed.  Prox RCA lesion is 45% stenosed.  LVEDP is normal without gradient on LV-AO pullback  Placed on OMT with aspirin 81, statin, beta-blocker.  Cardiac rehab was recommended as an outpatient  Hyperlipidemia, unspecified hyperlipidemia type  Atorvastatin increased to 40 mg daily  Lab  Results   Component Value Date    LDLCALC 44 04/25/2025   Encouraged a healthy diet.  Reassess 4 to 12 weeks    Primary hypertension  BP  168/64  BP Readings from Last 3 Encounters:   06/16/25 168/64   06/07/25 143/56   05/09/25 142/62       Tolerating metoprolol tartrate  12.5 mg twice daily and Imdur 30 mg BID, as well as lisinopril 10 mg daily  A salt restricted diet was reinforced  Increase lisinopril to 20 mg daily  Periodic, pressure monitoring, as he has been doing  Goal BP less than 130/80  Type 2 diabetes mellitus with both eyes affected by mild nonproliferative retinopathy without macular edema, with long-term current use of insulin ([facility])  Lab Results   Component Value Date    HGBA1C 7.2 (H) 04/25/2025     Per endocrinology or his PCP  He may benefit from Jardiance or Farxiga  Family history premature CAD, AAA  Screening US 2017: no AAA  Cardiac testing  TTE 2023: Normal LV systolic function EF 60%, normal wall motion, mildly dilated left atrium, no significant valvular disease  Pharm SPECT 6/5/25:  No evidence of ischemia or infarction by pharmacologic vasodilatory nuclear stress testing.  LHC 6/6/25:    Mid LAD has an eccentric/calcified lesion that is 55% stenosed and DFR negative on Spot Check and Pullback  Mid Cx to Dist Cx lesion is 30% stenosed.  Prox RCA lesion is 45% stenosed.  LVEDP is normal without gradient on LV-AO pullback              HPI  Rich Abel is a 69 y.o.year old male with hypertension, hyperlipidemia, type 2 diabetes, former tobacco abuse.  He has a family hx of premature CAD and AAA. He was seen by cardiologist Dr Vila in 2020 but does not follow with cardiology regularly.       Adm 6/4-6/7/25  CC: Chest pain-worsening symptoms x 1 year  This began when walking an incline in [place] last year.   He typically would only have it when he would walk a significant incline however how he has chest pain walking a flat surface and had severe chest pain on Friday with exertion,  radiating to his left arm and relieved with rest.   EKG: NSR with LVH and IVCD troponins negative x 3  Nuclear stress testing showed normal perfusion  Echo: Preserved LV function no valvular heart disease  Given his symptoms of typical angina and diffuse coronary artery calcification on chest ct, recommend left heart cath   This showed nonobstructive disease for which ongoing medical management was recommended.    OMT was uptitrated      6/16/25  Overall he is doing well.  He is adherent to his medical therapy.  No further chest pain shortness of breath lightheadedness or dizziness.  He does have some bruising of the thenar prominence on his right thumb.  His neurovascular status is intact  His blood pressure is elevated 168/64.  He remains on lisinopril 10 mg same as prior.  He is taking the Imdur as well, and a low-dose metoprolol  His blood pressure at home has also been elevated  Will increase lisinopril to 20 mg daily  Cardiac rehab has been prescribed and recommended, given stable angina  Today, we reviewed his coronary angiogram.  He is aware of the findings.  Recommend aggressive risk factor modification-of his diabetes, LDL, blood pressure  I discussed the importance of salt restricted diet.  He tells me he avoids dietary sodium  Will repeat fasting lipids, and a direct LDL in 3 months  He will continue to follow with his PCP; he will return to cardiology in 1 year      Review of Systems   Constitutional: Negative for chills.   Cardiovascular:  Negative for chest pain, claudication, cyanosis, dyspnea on exertion, irregular heartbeat, leg swelling, near-syncope, orthopnea, palpitations, paroxysmal nocturnal dyspnea and syncope.   Respiratory:  Negative for cough and shortness of breath.    Gastrointestinal:  Negative for heartburn and nausea.   Neurological:  Negative for dizziness, focal weakness, headaches, light-headedness and weakness.   All other systems reviewed and are  negative.            Assessment  Diagnoses and all orders for this visit:    Coronary artery disease of native artery of native heart with stable angina pectoris (HCC)    Hyperlipidemia, unspecified hyperlipidemia type    Primary hypertension    Type 2 diabetes mellitus with both eyes affected by mild nonproliferative retinopathy without macular edema, with long-term current use of insulin (HCC)        Past Medical History[1]    Past Surgical History[2]        Allergies  Allergies[3]      Medications  Current Medications[4]      Social History     Socioeconomic History   • Marital status: /Civil Union     Spouse name: Not on file   • Number of children: Not on file   • Years of education: Not on file   • Highest education level: Not on file   Occupational History   • Not on file   Tobacco Use   • Smoking status: Former     Current packs/day: 0.00     Average packs/day: 1 pack/day for 33.0 years (33.0 ttl pk-yrs)     Types: Cigarettes     Start date: 1974     Quit date: 2007     Years since quittin.0     Passive exposure: Past   • Smokeless tobacco: Current     Types: Chew   • Tobacco comments:     quit 15 years ago   Vaping Use   • Vaping status: Never Used   Substance and Sexual Activity   • Alcohol use: Yes     Alcohol/week: 2.0 standard drinks of alcohol     Types: 2 Standard drinks or equivalent per week   • Drug use: No   • Sexual activity: Yes     Partners: Female     Birth control/protection: None   Other Topics Concern   • Not on file   Social History Narrative   • Not on file     Social Drivers of Health     Financial Resource Strain: Low Risk  (2023)    Overall Financial Resource Strain (CARDIA)    • Difficulty of Paying Living Expenses: Not hard at all   Food Insecurity: No Food Insecurity (2025)    Nursing - Inadequate Food Risk Classification    • Worried About Running Out of Food in the Last Year: Never true    • Ran Out of Food in the Last Year: Never true    • Ran Out  of Food in the Last Year: Never true   Transportation Needs: No Transportation Needs (6/4/2025)    Nursing - Transportation Risk Classification    • Lack of Transportation: Not on file    • Lack of Transportation: No   Physical Activity: Not on file   Stress: Not on file   Social Connections: Not on file   Intimate Partner Violence: Unknown (6/4/2025)    Nursing IPS    • Feels Physically and Emotionally Safe: Not on file    • Physically Hurt by Someone: Not on file    • Humiliated or Emotionally Abused by Someone: Not on file    • Physically Hurt by Someone: No    • Hurt or Threatened by Someone: No   Housing Stability: Unknown (6/4/2025)    Nursing: Inadequate Housing Risk Classification    • Has Housing: Not on file    • Worried About Losing Housing: Not on file    • Unable to Get Utilities: Not on file    • Unable to Pay for Housing in the Last Year: No    • Has Housing: No       Family History[5]    Physical Exam  Vitals and nursing note reviewed.   Constitutional:       General: He is not in acute distress.  HENT:      Head: Normocephalic and atraumatic.     Eyes:      Extraocular Movements: Extraocular movements intact.      Conjunctiva/sclera: Conjunctivae normal.       Cardiovascular:      Rate and Rhythm: Normal rate and regular rhythm.      Pulses: Intact distal pulses.      Heart sounds: Normal heart sounds.   Pulmonary:      Effort: Pulmonary effort is normal.      Breath sounds: Normal breath sounds.   Abdominal:      General: Bowel sounds are normal.      Palpations: Abdomen is soft.     Musculoskeletal:         General: Normal range of motion.      Cervical back: Normal range of motion and neck supple.     Skin:     General: Skin is warm and dry.     Neurological:      Mental Status: He is alert and oriented to person, place, and time.     Psychiatric:         Mood and Affect: Mood normal.       Vitals: Blood pressure 168/64, pulse (!) 54, weight 97.5 kg (215 lb), SpO2 99%.   Wt Readings from Last 3  "Encounters:   06/16/25 97.5 kg (215 lb)   06/06/25 94.8 kg (209 lb)   05/16/25 93.9 kg (207 lb)           Labs & Results:  Lab Results   Component Value Date    WBC 7.53 06/04/2025    HGB 12.5 06/04/2025    HCT 37.0 06/04/2025    MCV 90 06/04/2025     06/04/2025     No results found for: \"BNP\"  No components found for: \"CHEM\"  Troponin I   Date Value Ref Range Status   01/06/2016 0.03 0.00 - 0.04 ng/mL Final     Comment:     The above 1 analytes [place] [id number]     01/06/2016 0.04 0.00 - 0.04 ng/mL Final     Comment:     The above 1 analytes [place] [id number]     01/05/2016 0.04 0.00 - 0.04 ng/mL Final     Comment:     The above 1 analytes were performed by [facility] [place] [id number]       No results found for this or any previous visit.    No results found for this or any previous visit.    No valid procedures specified.  No results found for this or any previous visit.                This note was completed in part utilizing [employer] direct voice recognition software.   Grammatical errors, random word insertion, spelling mistakes, and incomplete sentences may be an occasional consequence of the system secondary to software limitations, ambient noise and hardware issues. At the time of dictation, efforts were made to edit, clarify and /or correct errors.  Please read the chart carefully and recognize, using context, where substitutions have occurred.  If you have any questions or concerns about the context, text or information contained within the body of this dictation, please contact myself, the provider, for further clarification           [1]   Past Medical History:  Diagnosis Date   • Arthritis    • Cancer (HCC)    • Colon polyp    • Diabetes mellitus (HCC)    • Ear problems    • Essential hypertension 02/05/2020   • Glaucoma    • HL (hearing loss)    • Hyperlipidemia 02/05/2020 "   • Hypertension    • Kidney stone    • Melanoma of ear, left (HCC)    • Nasal polyp    • Tinnitus    [2]   Past Surgical History:  Procedure Laterality Date   • ARTHROSCOPY KNEE Bilateral    • BASAL CELL CARCINOMA EXCISION  03/2020    left arm   • CARDIAC CATHETERIZATION N/A 6/6/2025    Procedure: Cardiac Catheterization;  Surgeon: Maria sIabel Sumner DO;  [facility];  Service: Cardiology   • CARDIAC CATHETERIZATION N/A 6/6/2025    Procedure: Cardiac FFR/IFR;  Surgeon: Maria Isabel Sumner DO;  [facility];  Service: Cardiology   • CARDIAC CATHETERIZATION Left 6/6/2025    Procedure: Cardiac Left Heart Cath;  Surgeon: Maria Isabel Sumner DO;  [facility];  Service: Cardiology   • CYSTOSCOPY      kidney stone removal   • FOOT TENDON SURGERY Left 06/22/2021    Procedure: ANTERIOR TALOFIBULAR LIGAMENT REPAIR;  Surgeon: Trent Obregon DPM;  [facility];  Service: Podiatry   • FRACTURE SURGERY  March 2023   • KNEE SURGERY     • NASAL POLYP EXCISION     • AL ARTHRP ACETBLR/PROX FEM PROSTC AGRFT/ALGRFT Left 12/9/2024    Procedure: ARTHROPLASTY HIP TOTAL;  Surgeon: Abiel Green MD;  [facility];  Service: Orthopedics   • AL COLONOSCOPY FLX DX W/COLLJ SPEC WHEN PFRMD N/A 08/08/2017    Procedure: COLONOSCOPY;  Surgeon: Jeff Rosen MD;  [facility];  Service: Colorectal   • AL RPR TDN FLXR FOOT 1/2 W/O FREE GRAFG EACH TENDON Left 12/08/2017    Procedure: ANKLE PERONEUS LONGUS TENDON REPAIR with allograft;  RETINACULUM REPAIR SECONDARY  Application of Provena Vac;  Surgeon: Trent Obregon DPM;  [facility];  Service: Podiatry   • ROTATOR CUFF REPAIR Bilateral    • SHOULDER ARTHROSCOPY     • WISDOM TOOTH EXTRACTION     [3] No Known Allergies  [4]   Current Outpatient Medications:   •  aspirin (ECOTRIN LOW STRENGTH) 81 mg EC tablet, Take 81 mg by mouth in the morning., Disp: , Rfl:   •  atorvastatin (LIPITOR) 40 mg tablet, Take 1  tablet (40 mg total) by mouth daily, Disp: 30 tablet, Rfl: 0  •  BD Pen Needle Meghana 2nd Gen 32G X 4 MM MISC, DAILY USE AS DIRECTED, Disp: 100 each, Rfl: 1  •  Continuous Glucose  (FreeStyle Kalpana 3 [place]) CAMRON, Check  blood sugar  4 times  per day, Disp: 1 each, Rfl: 0  •  dorzolamide-timolol (COSOPT) 2-0.5 % ophthalmic solution, Administer 1 drop to both eyes 2 (two) times a day, Disp: , Rfl:   •  fluticasone (FLONASE) 50 mcg/act nasal spray, 1 spray into each nostril in the morning., Disp: , Rfl:   •  gabapentin (NEURONTIN) 100 mg capsule, TAKE 1 CAPSULE BY MOUTH TWICE A DAY, Disp: 60 capsule, Rfl: 5  •  Insulin Glargine Solostar (Basaglar KwikPen) 100 UNIT/ML SOPN, INJECT 66 UNITS UNDER THE SKIN IN THE MORNING (Patient taking differently: Inject 55 units, reduced from 66), Disp: 60 mL, Rfl: 2  •  isosorbide mononitrate (IMDUR) 30 mg 24 hr tablet, Take 1 tablet (30 mg total) by mouth daily (Patient taking differently: Take 30 mg by mouth daily He's taking a half a tablet( half in the morning and a half in the evening)), Disp: 30 tablet, Rfl: 0  •  lisinopril (ZESTRIL) 10 mg tablet, TAKE 1 TABLET BY MOUTH EVERY DAY, Disp: 90 tablet, Rfl: 1  •  MAGNESIUM PO, Take by mouth, Disp: , Rfl:   •  metFORMIN (GLUCOPHAGE-XR) 500 mg 24 hr tablet, Take 2 tablets (1,000 mg total) by mouth 2 (two) times a day Do not start before June 9, 2025., Disp: , Rfl:   •  metoprolol tartrate (LOPRESSOR) 25 mg tablet, Take 0.5 tablets (12.5 mg total) by mouth every 12 (twelve) hours, Disp: 30 tablet, Rfl: 0  •  Milk Thistle 150 MG CAPS, Take by mouth, Disp: , Rfl:   •  Multiple Vitamins-Minerals (MULTIVITAMIN WITH MINERALS) tablet, Take 1 tablet by mouth in the morning., Disp: , Rfl:   •  sodium chloride (OCEAN) 0.65 % nasal spray, 1 spray into each nostril as needed for congestion, Disp: , Rfl:   •  TURMERIC PO, Take by mouth, Disp: , Rfl:   •  Vitamins-Lipotropics (B-100 PO), Take by mouth, Disp: , Rfl:   •  acetaminophen  (TYLENOL) 500 mg tablet, Take 2 tablets (1,000 mg total) by mouth every 6 (six) hours as needed for mild pain (Patient not taking: Reported on 6/16/2025), Disp: 90 tablet, Rfl: 0  •  Continuous Glucose Sensor (FreeStyle Kalpana 3 Sensor) MISC, Use 1 each every 14 (fourteen) days (Patient not taking: Reported on 8/2/2024), Disp: 6 each, Rfl: 3  [5]   Family History  Problem Relation Name Age of Onset   • Heart disease Mother DARLEEN BURNETT         MOTHER   • Diabetes Mother DARLEEN BURNETT    • Melanoma Mother DARLEEN BURNETT    • Cancer Mother DARLEEN BURNETT    • Aortic aneurysm Father Levi         abdominal    • Prostate cancer Father Levi    • Colon cancer Father Levi         [1]  Past Medical History:  Diagnosis Date   • Arthritis    • Cancer (HCC)    • Colon polyp    • Diabetes mellitus (HCC)    • Ear problems    • Essential hypertension 02/05/2020   • Glaucoma    • HL (hearing loss)    • Hyperlipidemia 02/05/2020   • Hypertension    • Kidney stone    • Melanoma of ear, left (HCC)    • Nasal polyp    • Tinnitus    [2]  Past Surgical History:  Procedure Laterality Date   • ARTHROSCOPY KNEE Bilateral    • BASAL CELL CARCINOMA EXCISION  03/2020    left arm   • CARDIAC CATHETERIZATION N/A 6/6/2025    Procedure: Cardiac Catheterization;  Surgeon: Maria Isabel Sumner DO;  [facility];  Service: Cardiology   • CARDIAC CATHETERIZATION N/A 6/6/2025    Procedure: Cardiac FFR/IFR;  Surgeon: Maria Isabel Sumner DO;  [facility];  Service: Cardiology   • CARDIAC CATHETERIZATION Left 6/6/2025    Procedure: Cardiac Left Heart Cath;  Surgeon: Maria Isabel Sumner DO;  [facility];  Service: Cardiology   • CYSTOSCOPY      kidney stone removal   • FOOT TENDON SURGERY Left 06/22/2021    Procedure: ANTERIOR TALOFIBULAR LIGAMENT REPAIR;  Surgeon: Trent Obregon DPM;  [facility];  Service: Podiatry   • FRACTURE SURGERY  March 2023   • KNEE SURGERY     • NASAL POLYP EXCISION     • [place]  ARTHRP ACETBLR/PROX FEM PROSTC AGRFT/ALGRFT Left 12/9/2024    Procedure: ARTHROPLASTY HIP TOTAL;  Surgeon: Abiel Green MD;  [facility];  Service: Orthopedics   • [place] COLONOSCOPY FLX DX W/COLLJ SPEC WHEN PFRMD N/A 08/08/2017    Procedure: COLONOSCOPY;  Surgeon: Jeff Rosen MD;  [facility];  Service: Colorectal   • [place] RPR TDN FLXR FOOT 1/2 W/O FREE GRAFG EACH TENDON Left 12/08/2017    Procedure: ANKLE PERONEUS LONGUS TENDON REPAIR with allograft;  RETINACULUM REPAIR SECONDARY  Application of Provena Vac;  Surgeon: Trent Obregon DPM;  [facility];  Service: Podiatry   • ROTATOR CUFF REPAIR Bilateral    • SHOULDER ARTHROSCOPY     • WISDOM TOOTH EXTRACTION     [3]  No Known Allergies  [4]    Current Outpatient Medications:   •  aspirin (ECOTRIN LOW STRENGTH) 81 mg EC tablet, Take 81 mg by mouth in the morning., Disp: , Rfl:   •  atorvastatin (LIPITOR) 40 mg tablet, Take 1 tablet (40 mg total) by mouth daily, Disp: 30 tablet, Rfl: 0  •  BD Pen Needle Meghana 2nd Gen 32G X 4 MM MISC, DAILY USE AS DIRECTED, Disp: 100 each, Rfl: 1  •  Continuous Glucose  (FreeStyle Kalpana 3 [place]) CAMRON, Check  blood sugar  4 times  per day, Disp: 1 each, Rfl: 0  •  dorzolamide-timolol (COSOPT) 2-0.5 % ophthalmic solution, Administer 1 drop to both eyes 2 (two) times a day, Disp: , Rfl:   •  fluticasone (FLONASE) 50 mcg/act nasal spray, 1 spray into each nostril in the morning., Disp: , Rfl:   •  gabapentin (NEURONTIN) 100 mg capsule, TAKE 1 CAPSULE BY MOUTH TWICE A DAY, Disp: 60 capsule, Rfl: 5  •  Insulin Glargine Solostar (Basaglar KwikPen) 100 UNIT/ML SOPN, INJECT 66 UNITS UNDER THE SKIN IN THE MORNING (Patient taking differently: Inject 55 units, reduced from 66), Disp: 60 mL, Rfl: 2  •  isosorbide mononitrate (IMDUR) 30 mg 24 hr tablet, Take 1 tablet (30 mg total) by mouth daily (Patient taking differently: Take 30 mg by mouth daily He's taking a half a tablet( half in the morning and a  half in the evening)), Disp: 30 tablet, Rfl: 0  •  lisinopril (ZESTRIL) 10 mg tablet, TAKE 1 TABLET BY MOUTH EVERY DAY, Disp: 90 tablet, Rfl: 1  •  MAGNESIUM PO, Take by mouth, Disp: , Rfl:   •  metFORMIN (GLUCOPHAGE-XR) 500 mg 24 hr tablet, Take 2 tablets (1,000 mg total) by mouth 2 (two) times a day Do not start before June 9, 2025., Disp: , Rfl:   •  metoprolol tartrate (LOPRESSOR) 25 mg tablet, Take 0.5 tablets (12.5 mg total) by mouth every 12 (twelve) hours, Disp: 30 tablet, Rfl: 0  •  Milk Thistle 150 MG CAPS, Take by mouth, Disp: , Rfl:   •  Multiple Vitamins-Minerals (MULTIVITAMIN WITH MINERALS) tablet, Take 1 tablet by mouth in the morning., Disp: , Rfl:   •  sodium chloride (OCEAN) 0.65 % nasal spray, 1 spray into each nostril as needed for congestion, Disp: , Rfl:   •  TURMERIC PO, Take by mouth, Disp: , Rfl:   •  Vitamins-Lipotropics (B-100 PO), Take by mouth, Disp: , Rfl:   •  acetaminophen (TYLENOL) 500 mg tablet, Take 2 tablets (1,000 mg total) by mouth every 6 (six) hours as needed for mild pain (Patient not taking: Reported on 6/16/2025), Disp: 90 tablet, Rfl: 0  •  Continuous Glucose Sensor (FreeStyle Kalpana 3 Sensor) MISC, Use 1 each every 14 (fourteen) days (Patient not taking: Reported on 8/2/2024), Disp: 6 each, Rfl: 3  [5]  Family History  Problem Relation Name Age of Onset   • Heart disease Mother DARLEEN BURNETT         MOTHER   • Diabetes Mother DARLEEN BURNETT    • Melanoma Mother DARLEEN BURNETT    • Cancer Mother DARLEEN BURNETT    • Aortic aneurysm Father Levi         abdominal    • Prostate cancer Father Levi    • Colon cancer Father Levi

## 2025-06-16 NOTE — ASSESSMENT & PLAN NOTE
BP  168/64  BP Readings from Last 3 Encounters:   06/16/25 168/64   06/07/25 143/56   05/09/25 142/62       Tolerating metoprolol tartrate  12.5 mg twice daily and Imdur 30 mg BID, as well as lisinopril 10 mg daily  A salt restricted diet was reinforced  Increase lisinopril to 20 mg daily  Periodic, pressure monitoring, as he has been doing  Goal BP less than 130/80

## 2025-06-16 NOTE — PROGRESS NOTES
Cardiology   Hospital Follow Up   Office Visit Note  Rich Abel   69 y.o.   male   MRN: [id number]  [facility] [place]  [address]  [address]  [place] [address] [phone]    PCP: Coleen Barrera PA-C  Cardiologist: will be Dr Woodward          Summary of Plan:  Heart healthy diet: Mediterranean or DASH.  Education provided  Increase lisinopril to 20 mg daily  Cardiac rehab, given stable angina  FLP + direct LDL 3 months  Follow-up with Dr. Woodward:  1 yr  Rec periodic follow up with his PCP/aggressive risk factor modification  Colon Ca screenin2021, up-to-date            Assessment & Plan  Coronary artery disease of native artery of native heart with stable angina pectoris (HCC)  Recent adm -25: ACS ruled out  Pharm SPECT 25:  No evidence of ischemia or infarction by pharmacologic vasodilatory nuclear stress testing.  Given sx and coronary calcium on CT, LHC was pursued:  LHC 25:    Mid LAD has an eccentric/calcified lesion that is 55% stenosed and DFR negative   Mid Cx to Dist Cx lesion is 30% stenosed.  Prox RCA lesion is 45% stenosed.  LVEDP is normal without gradient on LV-AO pullback  Placed on OMT with aspirin 81, statin, beta-blocker.  Cardiac rehab was recommended as an outpatient  Hyperlipidemia, unspecified hyperlipidemia type  Atorvastatin increased to 40 mg daily  Lab Results   Component Value Date    LDLCALC 44 2025   Encouraged a healthy diet.  Reassess 4 to 12 weeks    Primary hypertension  BP  168/64  BP Readings from Last 3 Encounters:   25 168/64   25 143/56   25 142/62       Tolerating metoprolol tartrate  12.5 mg twice daily and Imdur 30 mg BID, as well as lisinopril 10 mg daily  A salt restricted diet was reinforced  Increase lisinopril to 20 mg daily  Periodic, pressure monitoring, as he has been doing  Goal BP less than 130/80  Type 2 diabetes mellitus with both eyes affected by mild nonproliferative  retinopathy without macular edema, with long-term current use of insulin ([facility])  Lab Results   Component Value Date    HGBA1C 7.2 (H) 04/25/2025     Per endocrinology or his PCP  He may benefit from Jardiance or Farxiga  Family history premature CAD, AAA  Screening US 2017: no AAA  Cardiac testing  TTE 2023: Normal LV systolic function EF 60%, normal wall motion, mildly dilated left atrium, no significant valvular disease  Pharm SPECT 6/5/25:  No evidence of ischemia or infarction by pharmacologic vasodilatory nuclear stress testing.  LHC 6/6/25:    Mid LAD has an eccentric/calcified lesion that is 55% stenosed and DFR negative on Spot Check and Pullback  Mid Cx to Dist Cx lesion is 30% stenosed.  Prox RCA lesion is 45% stenosed.  LVEDP is normal without gradient on LV-AO pullback              HPI  Rich Abel is a 69 y.o.year old male with hypertension, hyperlipidemia, type 2 diabetes, former tobacco abuse.  He has a family hx of premature CAD and AAA. He was seen by cardiologist Dr Vila in 2020 but does not follow with cardiology regularly.       Adm 6/4-6/7/25  CC: Chest pain-worsening symptoms x 1 year  This began when walking an incline in [place] last year.   He typically would only have it when he would walk a significant incline however how he has chest pain walking a flat surface and had severe chest pain on Friday with exertion, radiating to his left arm and relieved with rest.   EKG: NSR with LVH and IVCD troponins negative x 3  Nuclear stress testing showed normal perfusion  Echo: Preserved LV function no valvular heart disease  Given his symptoms of typical angina and diffuse coronary artery calcification on chest ct, recommend left heart cath   This showed nonobstructive disease for which ongoing medical management was recommended.    OMT was uptitrated      6/16/25  Overall he is doing well.  He is adherent to his medical therapy.  No further chest pain shortness of breath lightheadedness or  dizziness.  He does have some bruising of the thenar prominence on his right thumb.  His neurovascular status is intact  His blood pressure is elevated 168/64.  He remains on lisinopril 10 mg same as prior.  He is taking the Imdur as well, and a low-dose metoprolol  His blood pressure at home has also been elevated  Will increase lisinopril to 20 mg daily  Cardiac rehab has been prescribed and recommended, given stable angina  Today, we reviewed his coronary angiogram.  He is aware of the findings.  Recommend aggressive risk factor modification-of his diabetes, LDL, blood pressure  I discussed the importance of salt restricted diet.  He tells me he avoids dietary sodium  Will repeat fasting lipids, and a direct LDL in 3 months  He will continue to follow with his PCP; he will return to cardiology in 1 year      Review of Systems   Constitutional: Negative for chills.   Cardiovascular:  Negative for chest pain, claudication, cyanosis, dyspnea on exertion, irregular heartbeat, leg swelling, near-syncope, orthopnea, palpitations, paroxysmal nocturnal dyspnea and syncope.   Respiratory:  Negative for cough and shortness of breath.    Gastrointestinal:  Negative for heartburn and nausea.   Neurological:  Negative for dizziness, focal weakness, headaches, light-headedness and weakness.   All other systems reviewed and are negative.            Assessment  Diagnoses and all orders for this visit:    Coronary artery disease of native artery of native heart with stable angina pectoris (HCC)    Hyperlipidemia, unspecified hyperlipidemia type    Primary hypertension    Type 2 diabetes mellitus with both eyes affected by mild nonproliferative retinopathy without macular edema, with long-term current use of insulin (HCC)        Past Medical History[1]    Past Surgical History[2]        Allergies  Allergies[3]      Medications  Current Medications[4]      Social History     Socioeconomic History    Marital status: /Civil  Union     Spouse name: Not on file    Number of children: Not on file    Years of education: Not on file    Highest education level: Not on file   Occupational History    Not on file   Tobacco Use    Smoking status: Former     Current packs/day: 0.00     Average packs/day: 1 pack/day for 33.0 years (33.0 ttl pk-yrs)     Types: Cigarettes     Start date: 1974     Quit date: 2007     Years since quittin.0     Passive exposure: Past    Smokeless tobacco: Current     Types: Chew    Tobacco comments:     quit 15 years ago   Vaping Use    Vaping status: Never Used   Substance and Sexual Activity    Alcohol use: Yes     Alcohol/week: 2.0 standard drinks of alcohol     Types: 2 Standard drinks or equivalent per week    Drug use: No    Sexual activity: Yes     Partners: Female     Birth control/protection: None   Other Topics Concern    Not on file   Social History Narrative    Not on file     Social Drivers of Health     Financial Resource Strain: Low Risk  (2023)    Overall Financial Resource Strain (CARDIA)     Difficulty of Paying Living Expenses: Not hard at all   Food Insecurity: No Food Insecurity (2025)    Nursing - Inadequate Food Risk Classification     Worried About Running Out of Food in the Last Year: Never true     Ran Out of Food in the Last Year: Never true     Ran Out of Food in the Last Year: Never true   Transportation Needs: No Transportation Needs (2025)    Nursing - Transportation Risk Classification     Lack of Transportation: Not on file     Lack of Transportation: No   Physical Activity: Not on file   Stress: Not on file   Social Connections: Not on file   Intimate Partner Violence: Unknown (2025)    Nursing IPS     Feels Physically and Emotionally Safe: Not on file     Physically Hurt by Someone: Not on file     Humiliated or Emotionally Abused by Someone: Not on file     Physically Hurt by Someone: No     Hurt or Threatened by Someone: No   Housing Stability: Unknown  "(6/4/2025)    Nursing: Inadequate Housing Risk Classification     Has Housing: Not on file     Worried About Losing Housing: Not on file     Unable to Get Utilities: Not on file     Unable to Pay for Housing in the Last Year: No     Has Housing: No       Family History[5]    Physical Exam  Vitals and nursing note reviewed.   Constitutional:       General: He is not in acute distress.  HENT:      Head: Normocephalic and atraumatic.     Eyes:      Extraocular Movements: Extraocular movements intact.      Conjunctiva/sclera: Conjunctivae normal.       Cardiovascular:      Rate and Rhythm: Normal rate and regular rhythm.      Pulses: Intact distal pulses.      Heart sounds: Normal heart sounds.   Pulmonary:      Effort: Pulmonary effort is normal.      Breath sounds: Normal breath sounds.   Abdominal:      General: Bowel sounds are normal.      Palpations: Abdomen is soft.     Musculoskeletal:         General: Normal range of motion.      Cervical back: Normal range of motion and neck supple.     Skin:     General: Skin is warm and dry.     Neurological:      Mental Status: He is alert and oriented to person, place, and time.     Psychiatric:         Mood and Affect: Mood normal.       Vitals: Blood pressure 168/64, pulse (!) 54, weight 97.5 kg (215 lb), SpO2 99%.   Wt Readings from Last 3 Encounters:   06/16/25 97.5 kg (215 lb)   06/06/25 94.8 kg (209 lb)   05/16/25 93.9 kg (207 lb)           Labs & Results:  Lab Results   Component Value Date    WBC 7.53 06/04/2025    HGB 12.5 06/04/2025    HCT 37.0 06/04/2025    MCV 90 06/04/2025     06/04/2025     No results found for: \"BNP\"  No components found for: \"CHEM\"  Troponin I   Date Value Ref Range Status   01/06/2016 0.03 0.00 - 0.04 ng/mL Final     Comment:     The above 1 analytes were [place] [address]     01/06/2016 0.04 0.00 - 0.04 ng/mL Final     Comment:     The above 1 analytes were performed by [facility] " [facility] [place] [address]     01/05/2016 0.04 0.00 - 0.04 ng/mL Final     Comment:     The above 1 analytes were performed by [facility] [address]       No results found for this or any previous visit.    No results found for this or any previous visit.    No valid procedures specified.  No results found for this or any previous visit.                This note was completed in part utilizing iovox direct voice recognition software.   Grammatical errors, random word insertion, spelling mistakes, and incomplete sentences may be an occasional consequence of the system secondary to software limitations, ambient noise and hardware issues. At the time of dictation, efforts were made to edit, clarify and /or correct errors.  Please read the chart carefully and recognize, using context, where substitutions have occurred.  If you have any questions or concerns about the context, text or information contained within the body of this dictation, please contact myself, the provider, for further clarification           [1]   Past Medical History:  Diagnosis Date    Arthritis     Cancer (HCC)     Colon polyp     Diabetes mellitus (HCC)     Ear problems     Essential hypertension 02/05/2020    Glaucoma     HL (hearing loss)     Hyperlipidemia 02/05/2020    Hypertension     Kidney stone     Melanoma of ear, left (HCC)     Nasal polyp     Tinnitus    [2]   Past Surgical History:  Procedure Laterality Date    ARTHROSCOPY KNEE Bilateral     BASAL CELL CARCINOMA EXCISION  03/2020    left arm    CARDIAC CATHETERIZATION N/A 6/6/2025    Procedure: Cardiac Catheterization;  Surgeon: Maria Isabel Sumner DO;  [facility];  Service: Cardiology    CARDIAC CATHETERIZATION N/A 6/6/2025    Procedure: Cardiac FFR/IFR;  Surgeon: Maria Isabel Sumner DO;  [facility];  Service: Cardiology    CARDIAC CATHETERIZATION Left 6/6/2025    Procedure: Cardiac Left Heart Cath;   Surgeon: Maria Isabel Sumner DO;  [facility];  Service: Cardiology    CYSTOSCOPY      kidney stone removal    FOOT TENDON SURGERY Left 06/22/2021    Procedure: ANTERIOR TALOFIBULAR LIGAMENT REPAIR;  Surgeon: Trent Obregon DPM;  Location: [facility];  Service: Podiatry    FRACTURE SURGERY  March 2023    KNEE SURGERY      NASAL POLYP EXCISION      AL ARTHRP ACETBLR/PROX FEM PROSTC AGRFT/ALGRFT Left 12/9/2024    Procedure: ARTHROPLASTY HIP TOTAL;  Surgeon: Abiel Green MD;  [facility]: [facility];  Service: Orthopedics    AL COLONOSCOPY FLX DX W/COLLJ SPEC WHEN PFRMD N/A 08/08/2017    Procedure: COLONOSCOPY;  Surgeon: Jeff Rosen MD;  Location: [facility];  Service: Colorectal    AL RPR TDN FLXR FOOT 1/2 W/O FREE GRAFG EACH TENDON Left 12/08/2017    Procedure: ANKLE PERONEUS LONGUS TENDON REPAIR with allograft;  RETINACULUM REPAIR SECONDARY  Application of Provena Vac;  Surgeon: Trent Obregon DPM;  Location: [facility];  Service: Podiatry    ROTATOR CUFF REPAIR Bilateral     SHOULDER ARTHROSCOPY      WISDOM TOOTH EXTRACTION     [3] No Known Allergies  [4]   Current Outpatient Medications:     aspirin (ECOTRIN LOW STRENGTH) 81 mg EC tablet, Take 81 mg by mouth in the morning., Disp: , Rfl:     atorvastatin (LIPITOR) 40 mg tablet, Take 1 tablet (40 mg total) by mouth daily, Disp: 30 tablet, Rfl: 0    BD Pen Needle Meghana 2nd Gen 32G X 4 MM MISC, DAILY USE AS DIRECTED, Disp: 100 each, Rfl: 1    Continuous Glucose  (FreeStyle Kalpana 3 Lester) CAMRON, Check  blood sugar  4 times  per day, Disp: 1 each, Rfl: 0    dorzolamide-timolol (COSOPT) 2-0.5 % ophthalmic solution, Administer 1 drop to both eyes 2 (two) times a day, Disp: , Rfl:     fluticasone (FLONASE) 50 mcg/act nasal spray, 1 spray into each nostril in the morning., Disp: , Rfl:     gabapentin (NEURONTIN) 100 mg capsule, TAKE 1 CAPSULE BY MOUTH TWICE A DAY, Disp: 60 capsule, Rfl: 5    Insulin Glargine Solostar (Basaglar KwikPen)  100 UNIT/ML SOPN, INJECT 66 UNITS UNDER THE SKIN IN THE MORNING (Patient taking differently: Inject 55 units, reduced from 66), Disp: 60 mL, Rfl: 2    isosorbide mononitrate (IMDUR) 30 mg 24 hr tablet, Take 1 tablet (30 mg total) by mouth daily (Patient taking differently: Take 30 mg by mouth daily He's taking a half a tablet( half in the morning and a half in the evening)), Disp: 30 tablet, Rfl: 0    lisinopril (ZESTRIL) 10 mg tablet, TAKE 1 TABLET BY MOUTH EVERY DAY, Disp: 90 tablet, Rfl: 1    MAGNESIUM PO, Take by mouth, Disp: , Rfl:     metFORMIN (GLUCOPHAGE-XR) 500 mg 24 hr tablet, Take 2 tablets (1,000 mg total) by mouth 2 (two) times a day Do not start before June 9, 2025., Disp: , Rfl:     metoprolol tartrate (LOPRESSOR) 25 mg tablet, Take 0.5 tablets (12.5 mg total) by mouth every 12 (twelve) hours, Disp: 30 tablet, Rfl: 0    Milk Thistle 150 MG CAPS, Take by mouth, Disp: , Rfl:     Multiple Vitamins-Minerals (MULTIVITAMIN WITH MINERALS) tablet, Take 1 tablet by mouth in the morning., Disp: , Rfl:     sodium chloride (OCEAN) 0.65 % nasal spray, 1 spray into each nostril as needed for congestion, Disp: , Rfl:     TURMERIC PO, Take by mouth, Disp: , Rfl:     Vitamins-Lipotropics (B-100 PO), Take by mouth, Disp: , Rfl:     acetaminophen (TYLENOL) 500 mg tablet, Take 2 tablets (1,000 mg total) by mouth every 6 (six) hours as needed for mild pain (Patient not taking: Reported on 6/16/2025), Disp: 90 tablet, Rfl: 0    Continuous Glucose Sensor (FreeStyle Kalpana 3 Sensor) MISC, Use 1 each every 14 (fourteen) days (Patient not taking: Reported on 8/2/2024), Disp: 6 each, Rfl: 3  [5]   Family History  Problem Relation Name Age of Onset    Heart disease Mother DARLEEN BURNETT         MOTHER    Diabetes Mother DARLEEN BURNETT     Melanoma Mother DARLEEN WALIZ[place]     Cancer Mother DARLEEN BURNETT     Aortic aneurysm Father [place]         abdominal     Prostate cancer Father [place]     Colon cancer Father Levi

## 2025-06-16 NOTE — ASSESSMENT & PLAN NOTE
Recent ADM 6/4 - 6/7/2025   pt with exertional chest pain for past year; more frequent and severe in past few weeks prompting eval in ED  Trop negative, EKG unchanged  Lexiscan nuclear ST negative   Risk factors for CAD: former smoker, + family hx of CAD, DM.  He has coronary calcifications on CTA 2023  Given concern for USA,  Underwent LHC: 55% mid LAD lesion, 30% mid to distal circumflex lesion, 45% proximal RCA; no inventions performed  Echo: EF 60% normal wall motion, no significant valvular disease  started on Imdur 30 mg daily, Metroprolol tartrate 12.5 mg twice daily, increased his atorvastatin to 40 mg daily and continued aspirin 81 mg daily.

## 2025-06-16 NOTE — ASSESSMENT & PLAN NOTE
Lab Results   Component Value Date    HGBA1C 7.2 (H) 04/25/2025     Per endocrinology or his PCP  He may benefit from Jardiance or Farxiga

## 2025-06-17 RX ORDER — ISOSORBIDE MONONITRATE 30 MG/1
TABLET, EXTENDED RELEASE ORAL
Qty: 90 TABLET | Refills: 3 | OUTPATIENT
Start: 2025-06-17

## 2025-06-20 ENCOUNTER — OFFICE VISIT (OUTPATIENT)
Dept: AUDIOLOGY | Age: 70
End: 2025-06-20
Payer: MEDICARE

## 2025-06-20 ENCOUNTER — OFFICE VISIT (OUTPATIENT)
Dept: FAMILY MEDICINE CLINIC | Facility: CLINIC | Age: 70
End: 2025-06-20
Payer: MEDICARE

## 2025-06-20 ENCOUNTER — OFFICE VISIT (OUTPATIENT)
Dept: AUDIOLOGY | Age: 70
End: 2025-06-20

## 2025-06-20 VITALS
SYSTOLIC BLOOD PRESSURE: 150 MMHG | BODY MASS INDEX: 29.95 KG/M2 | HEIGHT: 70 IN | DIASTOLIC BLOOD PRESSURE: 60 MMHG | OXYGEN SATURATION: 97 % | WEIGHT: 209.2 LBS | HEART RATE: 52 BPM | TEMPERATURE: 97.2 F

## 2025-06-20 DIAGNOSIS — R07.9 CHEST PAIN, UNSPECIFIED TYPE: Primary | ICD-10-CM

## 2025-06-20 DIAGNOSIS — I25.118 CORONARY ARTERY DISEASE OF NATIVE ARTERY OF NATIVE HEART WITH STABLE ANGINA PECTORIS (HCC): ICD-10-CM

## 2025-06-20 DIAGNOSIS — H90.3 SENSORY HEARING LOSS, BILATERAL: Primary | ICD-10-CM

## 2025-06-20 DIAGNOSIS — I10 PRIMARY HYPERTENSION: ICD-10-CM

## 2025-06-20 DIAGNOSIS — E11.3293 TYPE 2 DIABETES MELLITUS WITH BOTH EYES AFFECTED BY MILD NONPROLIFERATIVE RETINOPATHY WITHOUT MACULAR EDEMA, WITH LONG-TERM CURRENT USE OF INSULIN (HCC): ICD-10-CM

## 2025-06-20 DIAGNOSIS — Z79.4 TYPE 2 DIABETES MELLITUS WITH BOTH EYES AFFECTED BY MILD NONPROLIFERATIVE RETINOPATHY WITHOUT MACULAR EDEMA, WITH LONG-TERM CURRENT USE OF INSULIN (HCC): ICD-10-CM

## 2025-06-20 DIAGNOSIS — E78.5 HYPERLIPIDEMIA, UNSPECIFIED HYPERLIPIDEMIA TYPE: ICD-10-CM

## 2025-06-20 PROCEDURE — 99495 TRANSJ CARE MGMT MOD F2F 14D: CPT | Performed by: PHYSICIAN ASSISTANT

## 2025-06-20 PROCEDURE — 92567 TYMPANOMETRY: CPT

## 2025-06-20 PROCEDURE — 92551 PURE TONE HEARING TEST AIR: CPT

## 2025-06-20 PROCEDURE — 92556 SPEECH AUDIOMETRY COMPLETE: CPT

## 2025-06-20 NOTE — PROGRESS NOTES
Transition of Care Visit:  Name: Rich Abel      : 1955      MRN: [id number]  Encounter Provider: Coleen Barrera PA-C  Encounter Date: 2025   Encounter department: [facility]    Assessment & Plan  Chest pain, unspecified type         Coronary artery disease of native artery of native heart with stable angina pectoris (HCC)         Primary hypertension         Hyperlipidemia, unspecified hyperlipidemia type         Type 2 diabetes mellitus with both eyes affected by mild nonproliferative retinopathy without macular edema, with long-term current use of insulin (HCC)    Lab Results   Component Value Date    HGBA1C 7.2 (H) 2025                 History of Present Illness     Transitional Care Management Review:   Rich Abel is a 69 y.o. male here for TCM follow up.     During the TCM phone call patient stated:  TCM Call (since 2025)       Date and time call was made  2025  9:25 AM    Hospital care reviewed  Records reviewed    Patient was hospitialized at  [facility]    Date of Admission  25    Date of discharge  25    Diagnosis  Chest pain    Disposition  Home    Were the patients medications reviewed and updated  Yes          TCM Call (since 2025)       Scheduled for follow up?  Yes    Patients specialists  Cardiologist    Did you obtain your prescribed medications  Yes    Do you need help managing your prescriptions or medications  No    Is transportation to your appointment needed  No    I have advised the patient to call PCP with any new or worsening symptoms  Patricia Prince MA    Living Arrangements  Spouse or Significiant other    Are you recieving home care services  No          Presents in the office with his wife for transition of care management.  Was followed hospitalized  through .  Sent him to the hospital with complaints of chest pain and radiation into the left arm.  He was admitted.  He had stress test.  He failed  "the stress test.  Then was taken for cardiac cath.  Found to have coronary artery disease.  Anterior descending 55%.  No stent.  No angioplasty was done.  Maximum medical modification.  He is on aspirin 81 mg.  He is to be on Imdur 30 mg for the angina.  Patient states he has not yet received this medicine.  Pressure medicine is lisinopril 20 mg a day and metoprolol 12.5 mg twice daily.  His Lipitor was increased to 40 mg.  And also has insulin-dependent diabetes.  He is on glargine 55 units, metformin ER 1000 mg twice daily.  Has had no angina since he has been home.  Instructed to  the Imdur and start that.  He will be already in cardiac rehab.      Review of Systems   Constitutional:  Negative for activity change, appetite change, chills, fatigue and fever.   HENT:  Negative for ear pain and sore throat.    Eyes:  Negative for visual disturbance.   Respiratory:  Negative for cough and shortness of breath.    Cardiovascular:  Negative for chest pain, palpitations and leg swelling.   Gastrointestinal:  Negative for abdominal pain, blood in stool, constipation, diarrhea and nausea.   Genitourinary:  Negative for difficulty urinating.   Musculoskeletal:  Negative for arthralgias, back pain and myalgias.   Skin:  Negative for rash.   Neurological:  Negative for dizziness, syncope and headaches.   Psychiatric/Behavioral:  Negative for self-injury, sleep disturbance and suicidal ideas. The patient is not nervous/anxious.      Objective   /60   Pulse (!) 52   Temp (!) 97.2 °F (36.2 °C) (Temporal)   Ht 5' 10\" (1.778 m)   Wt 94.9 kg (209 lb 3.2 oz)   SpO2 97%   BMI 30.02 kg/m²     Physical Exam  Vitals and nursing note reviewed.   Constitutional:       General: He is not in acute distress.     Appearance: Normal appearance. He is not ill-appearing.   HENT:      Head: Normocephalic and atraumatic.      Right Ear: External ear normal.      Left Ear: External ear normal.      Ears:      Comments: Bilateral  " hearing  aides    Eyes:      Conjunctiva/sclera: Conjunctivae normal.      Pupils: Pupils are equal, round, and reactive to light.     Neck:      Thyroid: No thyromegaly.      Vascular: No carotid bruit.     Cardiovascular:      Rate and Rhythm: Regular rhythm. Bradycardia present.      Heart sounds: Normal heart sounds. No murmur heard.  Pulmonary:      Effort: Pulmonary effort is normal.      Breath sounds: Normal breath sounds.   Abdominal:      General: Bowel sounds are normal.      Palpations: Abdomen is soft. There is no mass.      Tenderness: There is no abdominal tenderness.     Musculoskeletal:      Right lower leg: No edema.      Left lower leg: No edema.   Lymphadenopathy:      Cervical: No cervical adenopathy.     Skin:     General: Skin is warm and dry.      Coloration: Skin is not pale.      Findings: No erythema.     Neurological:      General: No focal deficit present.      Mental Status: He is alert and oriented to person, place, and time.     Psychiatric:         Mood and Affect: Mood normal.         Behavior: Behavior normal.         Thought Content: Thought content normal.         Judgment: Judgment normal.       Medications have been reviewed by provider in current encounter

## 2025-06-20 NOTE — PROGRESS NOTES
Hearing Aid Visit:    Name:  Rich Abel  :  1955  Age:  69 y.o.  MRN:  [id number]  Date of Evaluation: 25     HISTORY:    Rich Abel was seen today (2025) for a(n) in-warranty hearing aid check of his bilateral hearing aids. Today, Rich reports no issues with the hearing aids.    DEVICE INFORMATION:     Left Device Right Device    Hearing Aid Make: OtShopText  OtShopText    Hearing Aid Model: Real 3 miniRITE-R rEAL 3 miniRITE-R   Serial Number: [id number]   Repair Warranty Date: 2026   Loss/Damage Warranty Date:  Active  Active         Length/Output 3x85 3x85   Wax System: Phrazit minifit Prowax minifit   Dome Size/Style: 10 mm DB 10 mm DB   Battery: Lithium-ion Rechargeable Lithium-ion Rechargeable       Earmold Serial Number: N/A N/A   [employer]old Warranty Date:  N/A N/A    Serial Number:  [id number]    Warranty Date: 2026    Accessories: N/A         ACTION/ADJUSTMENTS:    Visual inspection of the device(s) revealed no noticeable damage or defects.     A listening check revealed good sound quality from the device(s).    The hearing aids were cleaned and checked. The patients wax filters and domes were replaced bilaterally.     No adjustments were made at this time.     Latest firmware was updated to the hearing aids.    Redux drying treatment was completed and removed 1.2 uL of moisture from the hearing aid(s).       RECOMMENDATIONS:     Follow up in 6 months      Milady Ramos, [facility]  Clinical Audiologist  [facility]  [address]  [place] [address]

## 2025-06-20 NOTE — PROGRESS NOTES
Diagnostic Hearing Evaluation    Name:  Rich Abel  :  1955  Age:  69 y.o.   MRN:  [id number]  Date of Evaluation: 25     HISTORY:     Reason for visit: Known Hearing Loss binaurally    Rich Abel is being seen today at the request of Dr. Barrera for an annual evaluation of hearing. The patient reports no issues or concerns for change to hearing status. He has a known mild sloping to severe sensorineural hearing loss, bilaterally.    EVALUATION:    Otoscopic Evaluation:   Right Ear: Unremarkable, canal clear   Left Ear: Non-occluding cerumen    Tympanometry:   Right Ear: Type A; normal middle ear pressure and static compliance    Left Ear: Type A; normal middle ear pressure and static compliance     Speech Audiometry:  Speech Reception (SRT)    Right Ear: 30 dB HL    Left Ear: 35 dB HL    Word Recognition Scores (WRS):  Right Ear: excellent (88 % correct)     Left Ear: excellent (92 % correct)    Stimuli: [id number]    Pure Tone Audiometry:  Conventional pure tone audiometry from 250 - 8000 Hz  was obtained with good reliability and revealed the following:     Right Ear: Mild sloping to severe sensorineural hearing loss (SNHL)    Left Ear: Mild sloping to severe sensorineural hearing loss (SNHL)     *see attached audiogram      RECOMMENDATIONS:  Annual hearing eval, Return to [facility]. for F/U, and Copy to Patient/Caregiver    PATIENT EDUCATION:   The results of today's results and recommendations were reviewed with the patient and his hearing thresholds were explained at length. Treatment options, including amplification and communication strategies, were discussed as appropriate. The patient voiced understanding of his test results. Questions were addressed and the patient was encouraged to contact our department should concerns arise.      Servando Ramos., [facility]  Clinical Audiologist  [facility]  [address] [place] [address] RD  [place] [id number]

## 2025-06-21 DIAGNOSIS — E11.3293 TYPE 2 DIABETES MELLITUS WITH BOTH EYES AFFECTED BY MILD NONPROLIFERATIVE RETINOPATHY WITHOUT MACULAR EDEMA, WITH LONG-TERM CURRENT USE OF INSULIN (HCC): ICD-10-CM

## 2025-06-21 DIAGNOSIS — Z79.4 TYPE 2 DIABETES MELLITUS WITH BOTH EYES AFFECTED BY MILD NONPROLIFERATIVE RETINOPATHY WITHOUT MACULAR EDEMA, WITH LONG-TERM CURRENT USE OF INSULIN (HCC): ICD-10-CM

## 2025-06-22 ENCOUNTER — TELEPHONE (OUTPATIENT)
Dept: FAMILY MEDICINE CLINIC | Facility: CLINIC | Age: 70
End: 2025-06-22

## 2025-06-22 DIAGNOSIS — Z79.4 TYPE 2 DIABETES MELLITUS WITH BOTH EYES AFFECTED BY MILD NONPROLIFERATIVE RETINOPATHY WITHOUT MACULAR EDEMA, WITH LONG-TERM CURRENT USE OF INSULIN (HCC): Primary | ICD-10-CM

## 2025-06-22 DIAGNOSIS — E11.3293 TYPE 2 DIABETES MELLITUS WITH BOTH EYES AFFECTED BY MILD NONPROLIFERATIVE RETINOPATHY WITHOUT MACULAR EDEMA, WITH LONG-TERM CURRENT USE OF INSULIN (HCC): Primary | ICD-10-CM

## 2025-06-22 RX ORDER — INSULIN GLARGINE 100 [IU]/ML
INJECTION, SOLUTION SUBCUTANEOUS
Refills: 2 | OUTPATIENT
Start: 2025-06-22

## 2025-06-23 DIAGNOSIS — Z79.4 TYPE 2 DIABETES MELLITUS WITH BOTH EYES AFFECTED BY MILD NONPROLIFERATIVE RETINOPATHY WITHOUT MACULAR EDEMA, WITH LONG-TERM CURRENT USE OF INSULIN (HCC): ICD-10-CM

## 2025-06-23 DIAGNOSIS — E11.3293 TYPE 2 DIABETES MELLITUS WITH BOTH EYES AFFECTED BY MILD NONPROLIFERATIVE RETINOPATHY WITHOUT MACULAR EDEMA, WITH LONG-TERM CURRENT USE OF INSULIN (HCC): ICD-10-CM

## 2025-06-23 RX ORDER — INSULIN GLARGINE 100 [IU]/ML
INJECTION, SOLUTION SUBCUTANEOUS
Refills: 2 | OUTPATIENT
Start: 2025-06-23

## 2025-06-23 RX ORDER — INSULIN GLARGINE 100 [IU]/ML
66 INJECTION, SOLUTION SUBCUTANEOUS DAILY
Qty: 15 ML | Refills: 5 | Status: SHIPPED | OUTPATIENT
Start: 2025-06-23

## 2025-06-23 NOTE — TELEPHONE ENCOUNTER
PA for Insulin Glargine Solostar (Basaglar KwikPen) 100 UNIT/ML SOPN DENIED    Reason:(Screenshot if applicable)      Message sent to office clinical pool Yes    Denial letter scanned into Media Yes    We can gladly do an appeal but the process can take about 30-60 days to provide determination. Please have the office staff schedule a Peer to Peer at phone [phone]. If an appeal is truly warranted please have Provider send clinical documentation to the PA department to support the appeal.     **Please follow up with your patient regarding denial and next steps**

## 2025-06-23 NOTE — TELEPHONE ENCOUNTER
PA for     Insulin Glargine Solostar (Basaglar ErnestineRichie) 100 UNIT/ML SOPN   SUBMITTED to [employer]    via    [x]CMM-KEY: [id number]  []Surescripts-Case ID #    []Availity-Auth ID #  NDC #    []Faxed to plan   []Other website    []Phone call Case ID #      [x]PA sent as URGENT    All office notes, labs and other pertaining documents and studies sent. Clinical questions answered. Awaiting determination from insurance company.     Turnaround time for your insurance to make a decision on your Prior Authorization can take 7-21 business days.

## 2025-07-07 ENCOUNTER — TELEPHONE (OUTPATIENT)
Age: 70
End: 2025-07-07

## 2025-07-07 DIAGNOSIS — I25.10 CAD (CORONARY ARTERY DISEASE), NATIVE CORONARY ARTERY: ICD-10-CM

## 2025-07-07 RX ORDER — METOPROLOL TARTRATE 25 MG/1
12.5 TABLET, FILM COATED ORAL EVERY 12 HOURS SCHEDULED
Qty: 30 TABLET | Refills: 11 | Status: SHIPPED | OUTPATIENT
Start: 2025-07-07

## 2025-07-07 NOTE — TELEPHONE ENCOUNTER
Caller: Rich Abel    Doctor: Dr. Woodward/Margie Adams    Reason for call: pt was prescribed metoprolol in hospital and has been having a hard time getting it refilled. He wants to know if he is still supposed to take it, and if so, can Margie renew his script.    Call back#: [phone]

## 2025-07-31 DIAGNOSIS — I25.10 CAD (CORONARY ARTERY DISEASE), NATIVE CORONARY ARTERY: ICD-10-CM

## 2025-07-31 RX ORDER — METOPROLOL TARTRATE 25 MG/1
12.5 TABLET, FILM COATED ORAL EVERY 12 HOURS SCHEDULED
Qty: 90 TABLET | Refills: 1 | Status: SHIPPED | OUTPATIENT
Start: 2025-07-31

## 2025-08-05 ENCOUNTER — TELEPHONE (OUTPATIENT)
Age: 70
End: 2025-08-05

## 2025-08-05 DIAGNOSIS — I25.10 CAD (CORONARY ARTERY DISEASE), NATIVE CORONARY ARTERY: ICD-10-CM

## 2025-08-05 RX ORDER — ATORVASTATIN CALCIUM 40 MG/1
40 TABLET, FILM COATED ORAL DAILY
Qty: 90 TABLET | Refills: 3 | Status: SHIPPED | OUTPATIENT
Start: 2025-08-05

## 2025-08-15 ENCOUNTER — APPOINTMENT (OUTPATIENT)
Dept: LAB | Facility: MEDICAL CENTER | Age: 70
End: 2025-08-15
Payer: MEDICARE

## 2025-08-15 LAB
ANION GAP SERPL CALCULATED.3IONS-SCNC: 7 MMOL/L (ref 4–13)
BASOPHILS # BLD AUTO: 0.06 THOUSANDS/ÂΜL (ref 0–0.1)
BASOPHILS NFR BLD AUTO: 1 % (ref 0–1)
BUN SERPL-MCNC: 13 MG/DL (ref 5–25)
CALCIUM SERPL-MCNC: 9.3 MG/DL (ref 8.4–10.2)
CHLORIDE SERPL-SCNC: 107 MMOL/L (ref 96–108)
CO2 SERPL-SCNC: 28 MMOL/L (ref 21–32)
CREAT SERPL-MCNC: 0.99 MG/DL (ref 0.6–1.3)
EOSINOPHIL # BLD AUTO: 0.27 THOUSAND/ÂΜL (ref 0–0.61)
EOSINOPHIL NFR BLD AUTO: 5 % (ref 0–6)
ERYTHROCYTE [DISTWIDTH] IN BLOOD BY AUTOMATED COUNT: 13.3 % (ref 11.6–15.1)
EST. AVERAGE GLUCOSE BLD GHB EST-MCNC: 143 MG/DL
GFR SERPL CREATININE-BSD FRML MDRD: 76 ML/MIN/1.73SQ M
GLUCOSE SERPL-MCNC: 100 MG/DL (ref 65–140)
HBA1C MFR BLD: 6.6 %
HCT VFR BLD AUTO: 39 % (ref 36.5–49.3)
HGB BLD-MCNC: 12.8 G/DL (ref 12–17)
IMM GRANULOCYTES # BLD AUTO: 0.02 THOUSAND/UL (ref 0–0.2)
IMM GRANULOCYTES NFR BLD AUTO: 0 % (ref 0–2)
LYMPHOCYTES # BLD AUTO: 2.03 THOUSANDS/ÂΜL (ref 0.6–4.47)
LYMPHOCYTES NFR BLD AUTO: 34 % (ref 14–44)
MCH RBC QN AUTO: 30.3 PG (ref 26.8–34.3)
MCHC RBC AUTO-ENTMCNC: 32.8 G/DL (ref 31.4–37.4)
MCV RBC AUTO: 92 FL (ref 82–98)
MONOCYTES # BLD AUTO: 0.5 THOUSAND/ÂΜL (ref 0.17–1.22)
MONOCYTES NFR BLD AUTO: 8 % (ref 4–12)
NEUTROPHILS # BLD AUTO: 3.11 THOUSANDS/ÂΜL (ref 1.85–7.62)
NEUTS SEG NFR BLD AUTO: 52 % (ref 43–75)
NRBC BLD AUTO-RTO: 0 /100 WBCS
PLATELET # BLD AUTO: 301 THOUSANDS/UL (ref 149–390)
PMV BLD AUTO: 10.8 FL (ref 8.9–12.7)
POTASSIUM SERPL-SCNC: 4.3 MMOL/L (ref 3.5–5.3)
PSA SERPL-MCNC: 1.06 NG/ML (ref 0–4)
RBC # BLD AUTO: 4.22 MILLION/UL (ref 3.88–5.62)
SODIUM SERPL-SCNC: 142 MMOL/L (ref 135–147)
WBC # BLD AUTO: 5.99 THOUSAND/UL (ref 4.31–10.16)

## 2025-08-21 PROBLEM — Z47.1 AFTERCARE FOLLOWING LEFT HIP JOINT REPLACEMENT SURGERY: Status: RESOLVED | Noted: 2025-05-16 | Resolved: 2025-08-21

## 2025-08-21 PROBLEM — Z96.642 AFTERCARE FOLLOWING LEFT HIP JOINT REPLACEMENT SURGERY: Status: RESOLVED | Noted: 2025-05-16 | Resolved: 2025-08-21

## 2025-08-21 PROBLEM — R07.9 CHEST PAIN: Status: RESOLVED | Noted: 2025-06-04 | Resolved: 2025-08-21

## 2025-08-22 ENCOUNTER — OFFICE VISIT (OUTPATIENT)
Dept: FAMILY MEDICINE CLINIC | Facility: CLINIC | Age: 70
End: 2025-08-22
Payer: MEDICARE

## 2025-08-22 VITALS
SYSTOLIC BLOOD PRESSURE: 112 MMHG | TEMPERATURE: 97.5 F | HEART RATE: 58 BPM | HEIGHT: 71 IN | BODY MASS INDEX: 28.87 KG/M2 | DIASTOLIC BLOOD PRESSURE: 60 MMHG | OXYGEN SATURATION: 98 % | WEIGHT: 206.2 LBS

## 2025-08-22 DIAGNOSIS — E11.3293 TYPE 2 DIABETES MELLITUS WITH BOTH EYES AFFECTED BY MILD NONPROLIFERATIVE RETINOPATHY WITHOUT MACULAR EDEMA, WITH LONG-TERM CURRENT USE OF INSULIN (HCC): Primary | ICD-10-CM

## 2025-08-22 DIAGNOSIS — Z79.4 TYPE 2 DIABETES MELLITUS WITH BOTH EYES AFFECTED BY MILD NONPROLIFERATIVE RETINOPATHY WITHOUT MACULAR EDEMA, WITH LONG-TERM CURRENT USE OF INSULIN (HCC): Primary | ICD-10-CM

## 2025-08-22 DIAGNOSIS — I10 PRIMARY HYPERTENSION: ICD-10-CM

## 2025-08-22 DIAGNOSIS — E78.5 HYPERLIPIDEMIA, UNSPECIFIED HYPERLIPIDEMIA TYPE: ICD-10-CM

## 2025-08-22 DIAGNOSIS — Z79.4 TYPE 2 DIABETES MELLITUS WITH BOTH EYES AFFECTED BY MILD NONPROLIFERATIVE RETINOPATHY WITHOUT MACULAR EDEMA, WITH LONG-TERM CURRENT USE OF INSULIN (HCC): ICD-10-CM

## 2025-08-22 DIAGNOSIS — I25.118 CORONARY ARTERY DISEASE OF NATIVE ARTERY OF NATIVE HEART WITH STABLE ANGINA PECTORIS (HCC): ICD-10-CM

## 2025-08-22 DIAGNOSIS — E11.3293 TYPE 2 DIABETES MELLITUS WITH BOTH EYES AFFECTED BY MILD NONPROLIFERATIVE RETINOPATHY WITHOUT MACULAR EDEMA, WITH LONG-TERM CURRENT USE OF INSULIN (HCC): ICD-10-CM

## 2025-08-22 PROCEDURE — 99214 OFFICE O/P EST MOD 30 MIN: CPT | Performed by: PHYSICIAN ASSISTANT

## 2025-08-22 PROCEDURE — G2211 COMPLEX E/M VISIT ADD ON: HCPCS | Performed by: PHYSICIAN ASSISTANT

## 2025-08-22 RX ORDER — NITROGLYCERIN 0.4 MG/1
0.4 TABLET SUBLINGUAL
Qty: 30 TABLET | Refills: 5 | Status: SHIPPED | OUTPATIENT
Start: 2025-08-22

## 2025-08-22 RX ORDER — PEN NEEDLE, DIABETIC 32GX 5/32"
NEEDLE, DISPOSABLE MISCELLANEOUS
Qty: 100 EACH | Refills: 1 | Status: SHIPPED | OUTPATIENT
Start: 2025-08-22

## 2025-08-22 RX ORDER — LISINOPRIL 20 MG/1
20 TABLET ORAL DAILY
Qty: 90 TABLET | Refills: 1 | Status: SHIPPED | OUTPATIENT
Start: 2025-08-22

## (undated) DEVICE — GLOVE SRG BIOGEL 8

## (undated) DEVICE — PLUMEPEN PRO 10FT

## (undated) DEVICE — CHLORAPREP HI-LITE 26ML ORANGE

## (undated) DEVICE — DRAPE C-ARM X-RAY

## (undated) DEVICE — SUT VICRYL PLUS 1 CTB-1 36 IN VCPB947H

## (undated) DEVICE — SYRINGE 10ML LL

## (undated) DEVICE — ACE WRAP 4 IN STERILE

## (undated) DEVICE — CUFF TOURNIQUET 24 X 4 IN QUICK CONNECT DISP 1BLA

## (undated) DEVICE — PREP PAD BNS: Brand: CONVERTORS

## (undated) DEVICE — SYRINGE 3ML LL

## (undated) DEVICE — GLOVE INDICATOR PI UNDERGLOVE SZ 8 BLUE

## (undated) DEVICE — CAPIT HIP MOP -POLY CEMEMTED

## (undated) DEVICE — BETHLEHEM UNIVERSAL  MIONR EXT: Brand: CARDINAL HEALTH

## (undated) DEVICE — 3M™ TEGADERM™ TRANSPARENT FILM DRESSING FRAME STYLE, 1628, 6 IN X 8 IN (15 CM X 20 CM), 10/CT 8CT/CASE: Brand: 3M™ TEGADERM™

## (undated) DEVICE — GLIDESHEATH BASIC HYDROPHILIC COATED INTRODUCER SHEATH: Brand: GLIDESHEATH

## (undated) DEVICE — STIRRUP STRAP ADULT DISP

## (undated) DEVICE — PENCIL ELECTROSURG E-Z CLEAN -0035H

## (undated) DEVICE — INTENDED FOR TISSUE SEPARATION, AND OTHER PROCEDURES THAT REQUIRE A SHARP SURGICAL BLADE TO PUNCTURE OR CUT.: Brand: BARD-PARKER ® CARBON RIB-BACK BLADES

## (undated) DEVICE — SCD SEQUENTIAL COMPRESSION COMFORT SLEEVE MEDIUM KNEE LENGTH: Brand: KENDALL SCD

## (undated) DEVICE — ASTOUND STANDARD SURGICAL GOWN, XL: Brand: CONVERTORS

## (undated) DEVICE — GLOVE SRG BIOGEL 7.5

## (undated) DEVICE — BLADE INTREX LRG BONE OSCILLATING

## (undated) DEVICE — KERLIX BANDAGE ROLL: Brand: KERLIX

## (undated) DEVICE — OCCLUSIVE GAUZE STRIP,3% BISMUTH TRIBROMOPHENATE IN PETROLATUM BLEND: Brand: XEROFORM

## (undated) DEVICE — LIGHT HANDLE COVER SLEEVE DISP BLUE STELLAR

## (undated) DEVICE — TUBING SUCTION 5MM X 12 FT

## (undated) DEVICE — TRAY FOLEY 16FR URIMETER SURESTEP

## (undated) DEVICE — CATH DIAG 5FR IMPULSE 110CM PIG

## (undated) DEVICE — CAST PADDING 4 IN SYNTHETIC NON-STRL

## (undated) DEVICE — GLOVE INDICATOR PI UNDERGLOVE SZ 7 BLUE

## (undated) DEVICE — INTENDED FOR TISSUE SEPARATION, AND OTHER PROCEDURES THAT REQUIRE A SHARP SURGICAL BLADE TO PUNCTURE OR CUT.: Brand: BARD-PARKER SAFETY BLADES SIZE 15, STERILE

## (undated) DEVICE — DGW .035 FC J3MM 260CM TEF: Brand: EMERALD

## (undated) DEVICE — CATH GUIDE LAUNCHER 6FR EBU 3.5

## (undated) DEVICE — NEEDLE 25G X 1 1/2

## (undated) DEVICE — PROXIMATE PLUS MD MULTI-DIRECTIONAL RELEASE SKIN STAPLERS CONTAINS 35 STAINLESS STEEL STAPLES APPROXIMATE CLOSED DIMENSIONS: 6.9MM X 3.9MM WIDE: Brand: PROXIMATE

## (undated) DEVICE — WET SKIN PREP TRAY: Brand: MEDLINE INDUSTRIES, INC.

## (undated) DEVICE — 10FR FRAZIER SUCTION HANDLE: Brand: CARDINAL HEALTH

## (undated) DEVICE — VIAL DECANTER

## (undated) DEVICE — HOOD: Brand: T7PLUS

## (undated) DEVICE — BETHLEHEM TOTAL HIP, KIT: Brand: CARDINAL HEALTH

## (undated) DEVICE — NEEDLE 18 G X 1 1/2

## (undated) DEVICE — STRETCH BANDAGE: Brand: CURITY

## (undated) DEVICE — GLOVE SRG BIOGEL 7

## (undated) DEVICE — SPONGE LAP 18 X 18 IN STRL RFD

## (undated) DEVICE — MEDI-VAC TUBING CONNECTOR 6-IN-1 STRAIGHT: Brand: CARDINAL HEALTH

## (undated) DEVICE — PREVENA INCISION MANAGEMENT SYSTEM- PEEL & PLACE DRESSING: Brand: PREVENA™ PEEL & PLACE™

## (undated) DEVICE — SUT VICRYL PLUS 2-0 CTB-1 27 IN VCPB259H

## (undated) DEVICE — GLOVE INDICATOR PI UNDERGLOVE SZ 7.5 BLUE

## (undated) DEVICE — COBAN 6 IN STERILE

## (undated) DEVICE — STOCKINETTE REGULAR

## (undated) DEVICE — HOOD WITH PEEL AWAY FACE SHIELD: Brand: T7PLUS

## (undated) DEVICE — 2000CC GUARDIAN II: Brand: GUARDIAN

## (undated) DEVICE — GUIDEWIRE WHOLEY HI TORQUE INTERM MOD J .035 145CM

## (undated) DEVICE — GLOVE SRG BIOGEL 6.5

## (undated) DEVICE — DRILL: Brand: SONICFUSION

## (undated) DEVICE — PRESSURE GUIDEWIRE: Brand: COMET™ II

## (undated) DEVICE — REM POLYHESIVE ADULT PATIENT RETURN ELECTRODE: Brand: VALLEYLAB

## (undated) DEVICE — TR BAND RADIAL ARTERY COMPRESSION DEVICE: Brand: TR BAND

## (undated) DEVICE — DRAPE EQUIPMENT RF WAND

## (undated) DEVICE — DRESSING MEPILEX AG BORDER POST-OP 4 X 12 IN

## (undated) DEVICE — GLOVE INDICATOR PI UNDERGLOVE SZ 8.5 BLUE

## (undated) DEVICE — PADDING CAST 4 IN  COTTON STRL

## (undated) DEVICE — UNIVERSAL  MINOR EXTREMITY PK: Brand: CARDINAL HEALTH

## (undated) DEVICE — ABDUCTION PILLOW FOAM POSITIONER: Brand: CARDINAL HEALTH

## (undated) DEVICE — RADIFOCUS OPTITORQUE ANGIOGRAPHIC CATHETER: Brand: OPTITORQUE

## (undated) DEVICE — HANDPIECE SET WITH RETRACTABLE COAXIAL FAN SPRAY TIP AND SUCTION TUBE: Brand: INTERPULSE